# Patient Record
Sex: MALE | Race: WHITE | NOT HISPANIC OR LATINO | Employment: OTHER | URBAN - METROPOLITAN AREA
[De-identification: names, ages, dates, MRNs, and addresses within clinical notes are randomized per-mention and may not be internally consistent; named-entity substitution may affect disease eponyms.]

---

## 2017-01-11 RX ORDER — LISINOPRIL 20 MG/1
TABLET ORAL
Qty: 90 TABLET | Refills: 1 | Status: SHIPPED | OUTPATIENT
Start: 2017-01-11 | End: 2017-06-02

## 2017-01-23 RX ORDER — TAMSULOSIN HYDROCHLORIDE 0.4 MG/1
CAPSULE ORAL
Qty: 90 CAPSULE | Refills: 1 | Status: SHIPPED | OUTPATIENT
Start: 2017-01-23 | End: 2017-07-30 | Stop reason: SDUPTHER

## 2017-01-24 RX ORDER — DICLOFENAC SODIUM 75 MG/1
TABLET, DELAYED RELEASE ORAL
Qty: 90 TABLET | Refills: 1 | Status: SHIPPED | OUTPATIENT
Start: 2017-01-24 | End: 2017-07-07 | Stop reason: SDUPTHER

## 2017-01-24 RX ORDER — ATORVASTATIN CALCIUM 40 MG/1
TABLET, FILM COATED ORAL
Qty: 90 TABLET | Refills: 1 | Status: SHIPPED | OUTPATIENT
Start: 2017-01-24 | End: 2017-07-19 | Stop reason: SDUPTHER

## 2017-01-26 ENCOUNTER — OFFICE VISIT (OUTPATIENT)
Dept: INTERNAL MEDICINE | Facility: CLINIC | Age: 77
End: 2017-01-26

## 2017-01-26 ENCOUNTER — APPOINTMENT (OUTPATIENT)
Dept: LAB | Facility: HOSPITAL | Age: 77
End: 2017-01-26

## 2017-01-26 VITALS
RESPIRATION RATE: 16 BRPM | BODY MASS INDEX: 25.18 KG/M2 | HEART RATE: 64 BPM | OXYGEN SATURATION: 98 % | SYSTOLIC BLOOD PRESSURE: 124 MMHG | WEIGHT: 156 LBS | DIASTOLIC BLOOD PRESSURE: 60 MMHG | TEMPERATURE: 97.8 F

## 2017-01-26 DIAGNOSIS — M15.9 GENERALIZED OSTEOARTHRITIS: ICD-10-CM

## 2017-01-26 DIAGNOSIS — G89.29 CHRONIC LEFT SHOULDER PAIN: ICD-10-CM

## 2017-01-26 DIAGNOSIS — R73.03 PREDIABETES: ICD-10-CM

## 2017-01-26 DIAGNOSIS — E78.00 PURE HYPERCHOLESTEROLEMIA: Primary | ICD-10-CM

## 2017-01-26 DIAGNOSIS — I10 ESSENTIAL HYPERTENSION: ICD-10-CM

## 2017-01-26 DIAGNOSIS — M25.512 CHRONIC LEFT SHOULDER PAIN: ICD-10-CM

## 2017-01-26 DIAGNOSIS — R73.9 HYPERGLYCEMIA: ICD-10-CM

## 2017-01-26 LAB
ALBUMIN SERPL-MCNC: 4.5 G/DL (ref 3.2–4.8)
ALBUMIN/GLOB SERPL: 1.6 G/DL (ref 1.5–2.5)
ALP SERPL-CCNC: 108 U/L (ref 25–100)
ALT SERPL W P-5'-P-CCNC: 39 U/L (ref 7–40)
ANION GAP SERPL CALCULATED.3IONS-SCNC: 5 MMOL/L (ref 3–11)
ARTICHOKE IGE QN: 58 MG/DL (ref 0–130)
AST SERPL-CCNC: 37 U/L (ref 0–33)
BILIRUB SERPL-MCNC: 0.6 MG/DL (ref 0.3–1.2)
BUN BLD-MCNC: 29 MG/DL (ref 9–23)
BUN/CREAT SERPL: 22.3 (ref 7–25)
CALCIUM SPEC-SCNC: 10.7 MG/DL (ref 8.7–10.4)
CHLORIDE SERPL-SCNC: 102 MMOL/L (ref 99–109)
CHOLEST SERPL-MCNC: 118 MG/DL (ref 0–200)
CO2 SERPL-SCNC: 32 MMOL/L (ref 20–31)
CREAT BLD-MCNC: 1.3 MG/DL (ref 0.6–1.3)
GFR SERPL CREATININE-BSD FRML MDRD: 54 ML/MIN/1.73
GLOBULIN UR ELPH-MCNC: 2.8 GM/DL
GLUCOSE BLD-MCNC: 106 MG/DL (ref 70–100)
HBA1C MFR BLD: 6.1 % (ref 4.8–5.6)
HDLC SERPL-MCNC: 39 MG/DL (ref 40–60)
POTASSIUM BLD-SCNC: 5.3 MMOL/L (ref 3.5–5.5)
PROT SERPL-MCNC: 7.3 G/DL (ref 5.7–8.2)
SODIUM BLD-SCNC: 139 MMOL/L (ref 132–146)
TRIGL SERPL-MCNC: 75 MG/DL (ref 0–150)

## 2017-01-26 PROCEDURE — 80053 COMPREHEN METABOLIC PANEL: CPT | Performed by: INTERNAL MEDICINE

## 2017-01-26 PROCEDURE — 80061 LIPID PANEL: CPT | Performed by: INTERNAL MEDICINE

## 2017-01-26 PROCEDURE — 99214 OFFICE O/P EST MOD 30 MIN: CPT | Performed by: INTERNAL MEDICINE

## 2017-01-26 PROCEDURE — 83036 HEMOGLOBIN GLYCOSYLATED A1C: CPT | Performed by: INTERNAL MEDICINE

## 2017-01-26 PROCEDURE — 36415 COLL VENOUS BLD VENIPUNCTURE: CPT | Performed by: INTERNAL MEDICINE

## 2017-01-26 NOTE — PATIENT INSTRUCTIONS
1.  Continue same medications and supplements - as listed.    2.  Maintain routine physical fitness program - every week.    3.  Follow well-balanced diet - low in salt and low in sugar.    4.  The nurse will call with test results.    5.  Return in May - annual checkup fasting.

## 2017-01-26 NOTE — MR AVS SNAPSHOT
Nathan Velarde   1/26/2017 9:30 AM   Office Visit    Provider:  Hu Castillo MD   Department:  [facility]   Dept Phone:  [phone]                Your Full Care Plan              Your Updated Medication List          This list is accurate as of: 1/26/17 10:20 AM.  Always use your most recent med list.                atorvastatin 40 MG tablet   Commonly known as:  LIPITOR   TAKE 1 TABLET AT BEDTIME       DELZICOL 400 MG capsule delayed-release delayed release capsule   Generic drug:  mesalamine   TAKE 2 CAPSULES TWICE A DAY       diclofenac 75 MG EC tablet   Commonly known as:  VOLTAREN   TAKE 1 TABLET DAILY       digoxin 125 MCG tablet   Commonly known as:  LANOXIN   TAKE ONE TABLET BY MOUTH DAILY       diltiazem  MG 24 hr capsule   Commonly known as:  DILACOR XR   Take 1 capsule by mouth Daily.       EXCEDRIN MIGRAINE 250-250-65 MG per tablet   Generic drug:  aspirin-acetaminophen-caffeine       folic acid 400 MCG tablet   Commonly known as:  FOLVITE       GLUCOSAMINE 1500 COMPLEX capsule       hydrochlorothiazide 25 MG tablet   Commonly known as:  HYDRODIURIL   TAKE 1 TABLET DAILY       lisinopril 20 MG tablet   Commonly known as:  PRINIVIL,ZESTRIL   TAKE ONE TABLET BY MOUTH DAILY       potassium chloride 10 MEQ CR tablet   Commonly known as:  K-DUR   TAKE 1 TABLET DAILY       tamsulosin 0.4 MG capsule 24 hr capsule   Commonly known as:  FLOMAX   TAKE ONE CAPSULE BY MOUTH EVERY NIGHT AT BEDTIME               We Performed the Following     Comprehensive Metabolic Panel     Hemoglobin A1c     Lipid Panel       You Were Diagnosed With        Codes Comments    Pure hypercholesterolemia    -  Primary ICD-10-CM: E78.00  ICD-9-CM: 272.0     Essential hypertension     ICD-10-CM: I10  ICD-9-CM: 401.9     Chronic left shoulder pain     ICD-10-CM: M25.512, G89.29  ICD-9-CM: 719.41, 338.29     Generalized osteoarthritis     ICD-10-CM: M15.9  ICD-9-CM:  715.00     Prediabetes     ICD-10-CM: R73.03  ICD-9-CM: 790.29     Hyperglycemia     ICD-10-CM: R73.9  ICD-9-CM: 790.29       Instructions    1.  Continue same medications and supplements - as listed.    2.  Maintain routine physical fitness program - every week.    3.  Follow well-balanced diet - low in salt and low in sugar.    4.  The nurse will call with test results.    5.  Return in May - annual checkup fasting.     Patient Instructions History      MyChart Signup     Our records indicate that you have an active [facility] account.    You can view your After Visit Summary by going to [facility] and logging in with your [facility] username and password.  If you don't have a [employer] username and password but a parent or guardian has access to your record, the parent or guardian should login with their own [employer] username and password and access your record to view the After Visit Summary.    If you have questions, you can email [email or web address] or call [phone] to talk to our [employer] staff.  Remember, [employer] is NOT to be used for urgent needs.  For medical emergencies, dial 911.               Other Info from Your Visit           Your Appointments     Apr 27, 2017  9:30 AM EDT   Office Visit with Ry Feliciano MD   [facility] (--)    [address] [place] [address]. [address] [place] [id number] [phone]           Arrive 15 minutes prior to appointment.              Allergies     Sulfa Antibiotics Unspecified     Headache      Reason for Visit     Hyperlipidemia           Vital Signs     Blood Pressure Pulse Temperature Respirations Weight Oxygen Saturation    124/60 (BP Location: Right arm, Patient Position: Sitting) 64 97.8 °F (36.6 °C) (Oral) 16 156 lb (70.8 kg) 98%    Body Mass Index Smoking Status                25.18 kg/m2 Never Smoker          Problems and Diagnoses Noted     Generalized osteoarthritis    High cholesterol or  triglycerides    High blood pressure    Prediabetes    Pain in left shoulder    Hyperglycemia          No Longer an Issue     Neck pain

## 2017-01-26 NOTE — PROGRESS NOTES
Subjective   Nathan Velarde is a 76 y.o. male.     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  The left shoulder has become more stiff and achy in the last year.  He is doing gentle rehabilitation now and feels that he is coping well.  He does take diclofenac on a daily basis with significant relief.  History of substantial weight lifting but is decreased his weight load with some benefit in pain control.    The patient has moderate hyperlipidemia and risk for atherosclerotic cardiovascular disease.  He has no history of tobacco use.  He is been on atorvastatin and generally kept his LDL below 100.  He has been on prediabetic and remains on a diabetic diet.  He has no history of an atherosclerotic event.  He does have mild paroxysmal fibrillation but has only depended on aspirin for embolic prevention.  He has done well with diltiazem and digoxin with rate control.      The following portions of the patient's history were reviewed and updated as appropriate: allergies, current medications, past family history, past medical history, past social history, past surgical history and problem list.    Review of Systems   Constitutional: Negative for appetite change and fatigue.   HENT: Negative for ear pain and sore throat.    Respiratory: Negative for cough and shortness of breath.    Cardiovascular: Positive for palpitations. Negative for chest pain.        Minimal palpitations   Gastrointestinal: Negative for abdominal pain and nausea.   Musculoskeletal: Negative for arthralgias and back pain.        Persistent left shoulder pain   Neurological: Negative for dizziness and headaches.       Objective   Blood pressure 124/60, pulse 64, temperature 97.8 °F (36.6 °C), temperature source Oral, resp. rate 16, weight 156 lb (70.8 kg), SpO2 98 %.    Physical Exam   Constitutional: He is oriented to person, place, and time. He appears well-developed and well-nourished. No distress.    Cardiovascular: Normal rate, regular rhythm and normal heart sounds.    Pulmonary/Chest: Effort normal and breath sounds normal. He has no wheezes. He has no rales.   Abdominal: Soft. Bowel sounds are normal. He exhibits no distension and no mass. There is no tenderness.   Musculoskeletal: He exhibits no edema.   Shoulders reveals 70% elevation and rotation with 3+ stiffness and mild tenderness  Knee show advanced osteoarthritis with moderate stiffness and mild tenderness   Neurological: He is alert and oriented to person, place, and time. He exhibits normal muscle tone. Coordination normal.   Psychiatric: He has a normal mood and affect. His behavior is normal. Judgment and thought content normal.   Nursing note and vitals reviewed.    Procedures  Assessment/Plan   Nathan was seen today for hyperlipidemia.    Diagnoses and all orders for this visit:    Pure hypercholesterolemia  -     Comprehensive Metabolic Panel  -     Lipid Panel    Essential hypertension    Chronic left shoulder pain    Generalized osteoarthritis    Prediabetes    Hyperglycemia  -     Hemoglobin A1c    The patient's lipid control is excellent with an LDL of 58.  He should continue on daily aspirin for primary prevention.      His hemoglobin A1c has risen to 6.1 and he should continue a diabetic diet.  We may need to decrease the HCTZ.    The patient's hypertension is in good control on current medications.  If we can keep his systolic between 125-135 we may be able to lower his HCTZ dose.    The patient has has osteoarthritis of his shoulders.  He would require a major shoulder replacement if pain became refractory.  He seems to be content with his current treatment plan.    Patient Instructions   1.  Continue same medications and supplements - as listed.    2.  Maintain routine physical fitness program - every week.    3.  Follow well-balanced diet - low in salt and low in sugar.    4.  The nurse will call with test results.    5.  Return in  May - annual checkup fasting.    6.  Hemoglobin A1c has increased to 6.1.  This suggests the need for a tight diabetic diet and lifestyle.    7.  Consider Reducing HCTZ if glucoses continue to rise.    Electronically signed Hu Castillo M.D.1/28/2017 4:32 PM

## 2017-02-01 ENCOUNTER — TELEPHONE (OUTPATIENT)
Dept: INTERNAL MEDICINE | Facility: CLINIC | Age: 77
End: 2017-02-01

## 2017-02-01 NOTE — TELEPHONE ENCOUNTER
Pt called and notified labs all fine except haic 6.1 higher, need to tighten up on diabetic diet per TGF. Pt admits he eats a lot of breads.

## 2017-04-17 RX ORDER — MESALAMINE 400 MG/1
CAPSULE, DELAYED RELEASE ORAL
Qty: 360 CAPSULE | Refills: 1 | Status: SHIPPED | OUTPATIENT
Start: 2017-04-17 | End: 2017-09-25 | Stop reason: SDUPTHER

## 2017-04-27 ENCOUNTER — OFFICE VISIT (OUTPATIENT)
Dept: GASTROENTEROLOGY | Facility: CLINIC | Age: 77
End: 2017-04-27

## 2017-04-27 VITALS
DIASTOLIC BLOOD PRESSURE: 70 MMHG | OXYGEN SATURATION: 94 % | TEMPERATURE: 97.9 F | SYSTOLIC BLOOD PRESSURE: 108 MMHG | WEIGHT: 153.4 LBS | BODY MASS INDEX: 24.65 KG/M2 | HEART RATE: 71 BPM | HEIGHT: 66 IN

## 2017-04-27 DIAGNOSIS — K51.90 ULCERATIVE COLITIS WITHOUT COMPLICATIONS, UNSPECIFIED LOCATION (HCC): Primary | ICD-10-CM

## 2017-04-27 PROCEDURE — 99213 OFFICE O/P EST LOW 20 MIN: CPT | Performed by: INTERNAL MEDICINE

## 2017-04-27 NOTE — PROGRESS NOTES
PCP: Hu Castillo MD    Chief Complaint   Patient presents with   • Follow-up     UC       History of Present Illness:   HPI  Mr. Velarde returns for a follow up visit. The patient has regular bowel habits and there is no sign of bleeding. He has a good appetite without any unexplained weight loss.  The patient denies any abdominal pain.  There is no history of unexplained weight loss.  Mr. Velarde exercises on a regular basis and has a good energy level.  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    • Bladder cancer    • Dyslipidemia    • Knee osteoarthritis    • Mitral valve insufficiency    • Nephrolithiasis    • Paroxysmal atrial fibrillation    • Prostatitis    • Rotator cuff tendinitis    • Ulcerative colitis        Past Surgical History:   Procedure Laterality Date   • CYSTOSCOPY  1977   • INGUINAL HERNIA REPAIR Right 1994   • TONSILLECTOMY           Current Outpatient Prescriptions:   •  aspirin-acetaminophen-caffeine (EXCEDRIN MIGRAINE) 250-250-65 MG per tablet, Take  by mouth., Disp: , Rfl:   •  atorvastatin (LIPITOR) 40 MG tablet, TAKE 1 TABLET AT BEDTIME, Disp: 90 tablet, Rfl: 1  •  DELZICOL 400 MG capsule delayed-release delayed release capsule, TAKE 2 CAPSULES TWICE A DAY, Disp: 360 capsule, Rfl: 1  •  diclofenac (VOLTAREN) 75 MG EC tablet, TAKE 1 TABLET DAILY, Disp: 90 tablet, Rfl: 1  •  digoxin (LANOXIN) 125 MCG tablet, TAKE ONE TABLET BY MOUTH DAILY, Disp: 90 tablet, Rfl: 1  •  diltiazem XR (DILACOR XR) 180 MG 24 hr capsule, Take 1 capsule by mouth Daily., Disp: 90 capsule, Rfl: 1  •  folic acid (FOLVITE) 400 MCG tablet, Take 1 tablet by mouth. OTC, Disp: , Rfl:   •  Glucosamine-Chondroit-Vit C-Mn (GLUCOSAMINE 1500 COMPLEX) capsule, Take  by mouth daily., Disp: , Rfl:   •  hydrochlorothiazide (HYDRODIURIL) 25 MG tablet, TAKE 1 TABLET DAILY, Disp: 90 tablet, Rfl: 1  •  lisinopril  (PRINIVIL,ZESTRIL) 20 MG tablet, TAKE ONE TABLET BY MOUTH DAILY, Disp: 90 tablet, Rfl: 1  •  potassium chloride (K-DUR) 10 MEQ CR tablet, TAKE 1 TABLET DAILY, Disp: 90 tablet, Rfl: 1  •  tamsulosin (FLOMAX) 0.4 MG capsule 24 hr capsule, TAKE ONE CAPSULE BY MOUTH EVERY NIGHT AT BEDTIME, Disp: 90 capsule, Rfl: 1    Allergies   Allergen Reactions   • Sulfa Antibiotics      Headache       Family History   Problem Relation Age of Onset   • Alzheimer's disease Mother      diedag e89   • Arthritis Mother    • Hyperlipidemia Mother    • Angina Father    • Hypertension Father    • Pneumonia Father       age 70 - tobacco   • Aneurysm Father      abd aortic   • Unexplained death Brother    • Breast cancer Maternal Grandmother    • Arthritis Maternal Grandmother    • Diabetes Paternal Uncle        Social History     Social History   • Marital status:      Spouse name: N/A   • Number of children: N/A   • Years of education: N/A     Occupational History   • Not on file.     Social History Main Topics   • Smoking status: Never Smoker   • Smokeless tobacco: Never Used   • Alcohol use 0.6 oz/week     1 Cans of beer per week   • Drug use: No   • Sexual activity: Defer     Other Topics Concern   • Not on file     Social History Narrative    Domestic life- lives in private home with wife        Amish- none listed        Sleep hygiene- sleeps 8 hours nightly good quality        Caffeine use- drinks 1 diet Mountain Dew daily and occasionally uses Excedrin        Exercise habits- disciplined exercise Program 5 days weekly with walking and weight lifting and yoga        Diet- salt diet well-balanced American Heart Association guidelines        Occupation- retired         Hearing- Hears well        Vision- Corrects well with glasses        Driving- No limitations               Review of Systems   Constitutional: Negative for activity change, appetite change, fatigue, fever and unexpected weight change.    HENT: Negative for dental problem, hearing loss, mouth sores, postnasal drip, sneezing, trouble swallowing and voice change.    Eyes: Negative for pain, redness, itching and visual disturbance.   Respiratory: Negative for cough, choking, chest tightness, shortness of breath and wheezing.    Cardiovascular: Negative for chest pain, palpitations and leg swelling.   Gastrointestinal: Negative for abdominal distention, abdominal pain, anal bleeding, blood in stool, constipation, diarrhea, nausea, rectal pain and vomiting.   Endocrine: Negative for cold intolerance, heat intolerance, polydipsia, polyphagia and polyuria.   Genitourinary: Negative.  Negative for dysuria, enuresis, flank pain, hematuria and urgency.   Musculoskeletal: Negative for arthralgias, back pain, gait problem, joint swelling and myalgias.   Skin: Negative for color change, pallor and rash.   Allergic/Immunologic: Negative for environmental allergies, food allergies and immunocompromised state.   Neurological: Negative for dizziness, tremors, seizures, facial asymmetry, speech difficulty, numbness and headaches.   Hematological: Negative for adenopathy.   Psychiatric/Behavioral: Negative for behavioral problems, confusion, dysphoric mood, hallucinations and self-injury.       Vitals:    04/27/17 0915   BP: 108/70   Pulse: 71   Temp: 97.9 °F (36.6 °C)   SpO2: 94%       Physical Exam   Constitutional: He is oriented to person, place, and time. He appears well-nourished. No distress.   HENT:   Head: Normocephalic and atraumatic.   Mouth/Throat: Oropharynx is clear and moist. No oropharyngeal exudate.   Eyes: Conjunctivae and EOM are normal. Pupils are equal, round, and reactive to light. No scleral icterus.   Neck: Normal range of motion. Neck supple. No thyromegaly present.   Cardiovascular: Normal rate, regular rhythm and normal heart sounds.  Exam reveals no gallop.    No murmur heard.  Pulmonary/Chest: Effort normal and breath sounds normal. He  has no wheezes. He has no rales.   Abdominal: Soft. Bowel sounds are normal. There is no tenderness. There is no rebound and no guarding.   Musculoskeletal: Normal range of motion. He exhibits no edema or tenderness.   Lymphadenopathy:     He has no cervical adenopathy.   Neurological: He is alert and oriented to person, place, and time. He exhibits normal muscle tone.   Skin: Skin is dry. No rash noted. He is not diaphoretic. No erythema.   Psychiatric: He has a normal mood and affect. His behavior is normal. Judgment and thought content normal.   Vitals reviewed.      Nathan was seen today for follow-up.    Diagnoses and all orders for this visit:    Ulcerative colitis without complications, unspecified location  Mr. Velarde is doing well on mesalamine medication. He has no clinical signs of active colitis.      Plan: Continue current medication.           Follow-up office visit in 6 months.

## 2017-05-15 RX ORDER — HYDROCHLOROTHIAZIDE 25 MG/1
TABLET ORAL
Qty: 90 TABLET | Refills: 0 | Status: SHIPPED | OUTPATIENT
Start: 2017-05-15 | End: 2017-06-02

## 2017-05-30 RX ORDER — POTASSIUM CHLORIDE 750 MG/1
TABLET, FILM COATED, EXTENDED RELEASE ORAL
Qty: 90 TABLET | Refills: 1 | Status: SHIPPED | OUTPATIENT
Start: 2017-05-30 | End: 2017-11-30 | Stop reason: SDUPTHER

## 2017-06-02 ENCOUNTER — OFFICE VISIT (OUTPATIENT)
Dept: INTERNAL MEDICINE | Facility: CLINIC | Age: 77
End: 2017-06-02

## 2017-06-02 ENCOUNTER — APPOINTMENT (OUTPATIENT)
Dept: LAB | Facility: HOSPITAL | Age: 77
End: 2017-06-02
Attending: INTERNAL MEDICINE

## 2017-06-02 VITALS
TEMPERATURE: 96.6 F | SYSTOLIC BLOOD PRESSURE: 112 MMHG | HEART RATE: 56 BPM | OXYGEN SATURATION: 97 % | RESPIRATION RATE: 16 BRPM | BODY MASS INDEX: 24.11 KG/M2 | WEIGHT: 150 LBS | DIASTOLIC BLOOD PRESSURE: 50 MMHG | HEIGHT: 66 IN

## 2017-06-02 DIAGNOSIS — G37.3 TRANSVERSE MYELITIS (HCC): ICD-10-CM

## 2017-06-02 DIAGNOSIS — E78.00 PURE HYPERCHOLESTEROLEMIA: ICD-10-CM

## 2017-06-02 DIAGNOSIS — R31.0 GROSS HEMATURIA: ICD-10-CM

## 2017-06-02 DIAGNOSIS — I48.0 PAROXYSMAL ATRIAL FIBRILLATION (HCC): Primary | ICD-10-CM

## 2017-06-02 DIAGNOSIS — R73.03 PREDIABETES: ICD-10-CM

## 2017-06-02 DIAGNOSIS — D64.9 ANEMIA, UNSPECIFIED TYPE: ICD-10-CM

## 2017-06-02 DIAGNOSIS — R53.82 CHRONIC FATIGUE: ICD-10-CM

## 2017-06-02 DIAGNOSIS — R31.21 ASYMPTOMATIC MICROSCOPIC HEMATURIA: ICD-10-CM

## 2017-06-02 DIAGNOSIS — N40.0 BENIGN NON-NODULAR PROSTATIC HYPERPLASIA WITHOUT LOWER URINARY TRACT SYMPTOMS: ICD-10-CM

## 2017-06-02 DIAGNOSIS — E53.9 VITAMIN B DEFICIENCY: ICD-10-CM

## 2017-06-02 DIAGNOSIS — N18.2 CKD (CHRONIC KIDNEY DISEASE), STAGE II: ICD-10-CM

## 2017-06-02 DIAGNOSIS — E55.9 VITAMIN D DEFICIENCY: ICD-10-CM

## 2017-06-02 DIAGNOSIS — K51.00 ULCERATIVE PANCOLITIS WITHOUT COMPLICATION (HCC): ICD-10-CM

## 2017-06-02 DIAGNOSIS — M15.9 GENERALIZED OSTEOARTHRITIS: ICD-10-CM

## 2017-06-02 DIAGNOSIS — M25.512 CHRONIC LEFT SHOULDER PAIN: ICD-10-CM

## 2017-06-02 DIAGNOSIS — I10 ESSENTIAL HYPERTENSION: ICD-10-CM

## 2017-06-02 DIAGNOSIS — G89.29 CHRONIC LEFT SHOULDER PAIN: ICD-10-CM

## 2017-06-02 LAB
25(OH)D3 SERPL-MCNC: 46.2 NG/ML
ALBUMIN SERPL-MCNC: 4.6 G/DL (ref 3.2–4.8)
ALBUMIN/GLOB SERPL: 1.8 G/DL (ref 1.5–2.5)
ALP SERPL-CCNC: 107 U/L (ref 25–100)
ALT SERPL W P-5'-P-CCNC: 37 U/L (ref 7–40)
ANION GAP SERPL CALCULATED.3IONS-SCNC: 3 MMOL/L (ref 3–11)
ARTICHOKE IGE QN: 68 MG/DL (ref 0–130)
AST SERPL-CCNC: 38 U/L (ref 0–33)
BASOPHILS # BLD AUTO: 0.02 10*3/MM3 (ref 0–0.2)
BASOPHILS NFR BLD AUTO: 0.2 % (ref 0–1)
BILIRUB SERPL-MCNC: 0.6 MG/DL (ref 0.3–1.2)
BILIRUB UR QL STRIP: NEGATIVE
BUN BLD-MCNC: 38 MG/DL (ref 9–23)
BUN/CREAT SERPL: 29.2 (ref 7–25)
CALCIUM SPEC-SCNC: 10.3 MG/DL (ref 8.7–10.4)
CHLORIDE SERPL-SCNC: 107 MMOL/L (ref 99–109)
CHOLEST SERPL-MCNC: 117 MG/DL (ref 0–200)
CLARITY UR: CLEAR
CO2 SERPL-SCNC: 31 MMOL/L (ref 20–31)
COLOR UR: ABNORMAL
CREAT BLD-MCNC: 1.3 MG/DL (ref 0.6–1.3)
CRP SERPL-MCNC: 1.08 MG/DL (ref 0–1)
DEPRECATED RDW RBC AUTO: 48.2 FL (ref 37–54)
EOSINOPHIL # BLD AUTO: 0.27 10*3/MM3 (ref 0.1–0.3)
EOSINOPHIL NFR BLD AUTO: 3.2 % (ref 0–3)
ERYTHROCYTE [DISTWIDTH] IN BLOOD BY AUTOMATED COUNT: 14.2 % (ref 11.3–14.5)
ERYTHROCYTE [SEDIMENTATION RATE] IN BLOOD: 33 MM/HR (ref 0–20)
FOLATE SERPL-MCNC: >24 NG/ML (ref 3.2–20)
GFR SERPL CREATININE-BSD FRML MDRD: 54 ML/MIN/1.73
GLOBULIN UR ELPH-MCNC: 2.6 GM/DL
GLUCOSE BLD-MCNC: 98 MG/DL (ref 70–100)
GLUCOSE UR STRIP-MCNC: NEGATIVE MG/DL
HBA1C MFR BLD: 5.7 % (ref 4.8–5.6)
HCT VFR BLD AUTO: 43.6 % (ref 38.9–50.9)
HDLC SERPL-MCNC: 37 MG/DL (ref 40–60)
HGB BLD-MCNC: 13.4 G/DL (ref 13.1–17.5)
HGB UR QL STRIP.AUTO: NEGATIVE
IMM GRANULOCYTES # BLD: 0.02 10*3/MM3 (ref 0–0.03)
IMM GRANULOCYTES NFR BLD: 0.2 % (ref 0–0.6)
KETONES UR QL STRIP: ABNORMAL
LEUKOCYTE ESTERASE UR QL STRIP.AUTO: NEGATIVE
LYMPHOCYTES # BLD AUTO: 1.62 10*3/MM3 (ref 0.6–4.8)
LYMPHOCYTES NFR BLD AUTO: 19.1 % (ref 24–44)
MCH RBC QN AUTO: 28.4 PG (ref 27–31)
MCHC RBC AUTO-ENTMCNC: 30.7 G/DL (ref 32–36)
MCV RBC AUTO: 92.4 FL (ref 80–99)
MONOCYTES # BLD AUTO: 0.5 10*3/MM3 (ref 0–1)
MONOCYTES NFR BLD AUTO: 5.9 % (ref 0–12)
NEUTROPHILS # BLD AUTO: 6.07 10*3/MM3 (ref 1.5–8.3)
NEUTROPHILS NFR BLD AUTO: 71.4 % (ref 41–71)
NITRITE UR QL STRIP: NEGATIVE
PH UR STRIP.AUTO: 6 [PH] (ref 5–8)
PLATELET # BLD AUTO: 309 10*3/MM3 (ref 150–450)
PMV BLD AUTO: 9.8 FL (ref 6–12)
POTASSIUM BLD-SCNC: 4.7 MMOL/L (ref 3.5–5.5)
PROT SERPL-MCNC: 7.2 G/DL (ref 5.7–8.2)
PROT UR QL STRIP: NEGATIVE
PSA SERPL-MCNC: 3.68 NG/ML (ref 0–4)
RBC # BLD AUTO: 4.72 10*6/MM3 (ref 4.2–5.76)
SODIUM BLD-SCNC: 141 MMOL/L (ref 132–146)
SP GR UR STRIP: 1.03 (ref 1–1.03)
TRIGL SERPL-MCNC: 64 MG/DL (ref 0–150)
TSH SERPL DL<=0.05 MIU/L-ACNC: 2.16 MIU/ML (ref 0.35–5.35)
UROBILINOGEN UR QL STRIP: ABNORMAL
VIT B12 BLD-MCNC: >2000 PG/ML (ref 211–911)
WBC NRBC COR # BLD: 8.5 10*3/MM3 (ref 3.5–10.8)

## 2017-06-02 PROCEDURE — 82306 VITAMIN D 25 HYDROXY: CPT | Performed by: INTERNAL MEDICINE

## 2017-06-02 PROCEDURE — 36415 COLL VENOUS BLD VENIPUNCTURE: CPT | Performed by: INTERNAL MEDICINE

## 2017-06-02 PROCEDURE — 80061 LIPID PANEL: CPT | Performed by: INTERNAL MEDICINE

## 2017-06-02 PROCEDURE — 93000 ELECTROCARDIOGRAM COMPLETE: CPT | Performed by: INTERNAL MEDICINE

## 2017-06-02 PROCEDURE — 85025 COMPLETE CBC W/AUTO DIFF WBC: CPT | Performed by: INTERNAL MEDICINE

## 2017-06-02 PROCEDURE — 82746 ASSAY OF FOLIC ACID SERUM: CPT | Performed by: INTERNAL MEDICINE

## 2017-06-02 PROCEDURE — 83036 HEMOGLOBIN GLYCOSYLATED A1C: CPT | Performed by: INTERNAL MEDICINE

## 2017-06-02 PROCEDURE — 84153 ASSAY OF PSA TOTAL: CPT | Performed by: INTERNAL MEDICINE

## 2017-06-02 PROCEDURE — 86140 C-REACTIVE PROTEIN: CPT | Performed by: INTERNAL MEDICINE

## 2017-06-02 PROCEDURE — 85652 RBC SED RATE AUTOMATED: CPT | Performed by: INTERNAL MEDICINE

## 2017-06-02 PROCEDURE — 99215 OFFICE O/P EST HI 40 MIN: CPT | Performed by: INTERNAL MEDICINE

## 2017-06-02 PROCEDURE — 82607 VITAMIN B-12: CPT | Performed by: INTERNAL MEDICINE

## 2017-06-02 PROCEDURE — 80053 COMPREHEN METABOLIC PANEL: CPT | Performed by: INTERNAL MEDICINE

## 2017-06-02 PROCEDURE — 81003 URINALYSIS AUTO W/O SCOPE: CPT | Performed by: INTERNAL MEDICINE

## 2017-06-02 PROCEDURE — 84443 ASSAY THYROID STIM HORMONE: CPT | Performed by: INTERNAL MEDICINE

## 2017-06-02 RX ORDER — LISINOPRIL AND HYDROCHLOROTHIAZIDE 12.5; 1 MG/1; MG/1
1 TABLET ORAL DAILY
Qty: 90 TABLET | Refills: 3 | Status: SHIPPED | OUTPATIENT
Start: 2017-06-02 | End: 2017-08-15

## 2017-06-02 NOTE — PROGRESS NOTES
Subjective   Nathan Velarde is a 76 y.o. male.     Chief Complaint   Patient presents with   • Shoulder Pain       History of Present Illness     The patient developed progressive left shoulder pain throughout 2016.  X-ray shows advanced DJD.  He takes diclofenac which helps his degree of arthritic pain.  He is physically active with a disciplined exercise program every week at the gymnasium.  He is losing function capacity of the left arm lifting and neuropathy or behind his back.  He has had a modified many of his ADLs.  Physical therapy offered him little benefit.  He has used tramadol in the past and has been able to wean off of this medication.  He feels that he has lost substantial use of his left arm.    The following portions of the patient's history were reviewed and updated as appropriate: allergies, current medications, past family history, past medical history, past social history, past surgical history and problem list.    Active Ambulatory Problems     Diagnosis Date Noted   • Anemia 05/15/2016   • Aortic valve disorder 05/15/2016   • Ataxic gait 05/15/2016   • Atrial fibrillation 05/15/2016   • Benign prostatic hyperplasia 05/15/2016   • Fatigue 05/15/2016   • Ulcerative colitis 05/15/2016   • Transverse myelitis 05/15/2016   • Testosterone deficiency 05/15/2016   • Paresthesia 05/15/2016   • Lumbosacral neuritis 05/15/2016   • 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   • Hypertension 05/15/2016   • Hyperlipidemia 05/15/2016   • Generalized osteoarthritis 05/15/2016   • Gross hematuria 05/15/2016   • CKD (chronic kidney disease), 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   • Prediabetes 05/28/2016   • Shoulder pain, left 09/26/2016   • Preventative health care 11/29/2016     Resolved Ambulatory Problems     Diagnosis Date Noted   • Neck pain 05/15/2016   • Ulcerative enterocolitis 05/15/2016   • Impaired glucose tolerance 05/15/2016     Past Medical History:   Diagnosis Date   • Aortic valve insufficiency    • Atrial  flutter    • 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    • Bilateral bunions    • Bladder cancer    • Dyslipidemia    • HTN (hypertension)    • Knee osteoarthritis    • Mitral valve insufficiency    • Nephrolithiasis    • Paroxysmal atrial fibrillation    • Prostatism    • Prostatitis    • Rotator cuff tendinitis    • Ulcerative colitis      Past Surgical History:   Procedure Laterality Date   • COLONOSCOPY      Colitis in remission   • CYSTOSCOPY  1977    Excision of bladder cancer   • INGUINAL HERNIA REPAIR Right    • KNEE SURGERY Right     Excision of cyst   • TONSILLECTOMY  1943     Family History   Problem Relation Age of Onset   • Alzheimer's disease Mother       age 89   • Arthritis Mother    • Hyperlipidemia Mother    • Angina Father    • Hypertension Father    • Pneumonia Father       age 70 - tobacco   • Aneurysm Father      abd aortic   • Unexplained death Brother    • Breast cancer Maternal Grandmother    • Arthritis Maternal Grandmother    • Diabetes Paternal Uncle      Social History     Social History   • Marital status:      Spouse name: N/A   • Number of children: N/A   • Years of education: N/A     Occupational History   • Not on file.     Social History Main Topics   • Smoking status: Never Smoker   • Smokeless tobacco: Never Used   • Alcohol use 0.6 oz/week     1 Cans of beer per week   • Drug use: No   • Sexual activity: Defer     Other Topics Concern   • Not on file     Social History Narrative    Domestic life- lives in private home with wife        Mandaen- none listed        Sleep hygiene- sleeps 8 hours nightly good quality        Caffeine use- drinks 1 diet Mountain Dew daily and occasionally uses Excedrin        Exercise habits- disciplined exercise Program 5 days weekly with walking and weight lifting and yoga        Diet- salt diet well-balanced American Heart Association guidelines        Occupation- retired  "        Hearing- Hears well        Vision- Corrects well with bifocal glasses        Driving- No limitations                 Review of Systems   Constitutional: Negative for appetite change and fatigue.   HENT: Negative for ear pain and sore throat.    Eyes: Negative for itching and visual disturbance.   Respiratory: Negative for cough and shortness of breath.    Cardiovascular: Negative for chest pain and palpitations.        Minimal palpitations this year   Gastrointestinal: Negative for abdominal pain and nausea.   Endocrine: Negative for cold intolerance and heat intolerance.   Genitourinary: Negative for dysuria and hematuria.   Musculoskeletal: Positive for arthralgias. Negative for back pain and gait problem.        Moderate progressive left shoulder pain   Skin: Negative for rash and wound.   Allergic/Immunologic: Negative for environmental allergies and food allergies.   Neurological: Negative for dizziness and headaches.   Hematological: Negative for adenopathy. Does not bruise/bleed easily.   Psychiatric/Behavioral: Negative for sleep disturbance. The patient is not nervous/anxious.        Objective   Blood pressure 112/50, pulse 56, temperature 96.6 °F (35.9 °C), temperature source Oral, resp. rate 16, height 66\" (167.6 cm), weight 150 lb (68 kg), SpO2 97 %.    Physical Exam   Constitutional: He is oriented to person, place, and time. He appears well-developed and well-nourished.   HENT:   Right Ear: External ear normal.   Left Ear: External ear normal.   Nose: Nose normal.   Mouth/Throat: Oropharynx is clear and moist.   Eyes: EOM are normal. Pupils are equal, round, and reactive to light. No scleral icterus.   Neck: Normal range of motion. Neck supple. No JVD present. No thyromegaly present.   Cardiovascular: Normal rate, regular rhythm and normal heart sounds.    Pulmonary/Chest: Effort normal and breath sounds normal. He has no wheezes. He has no rales.   Abdominal: Soft. Bowel " sounds are normal. He exhibits no distension and no mass. There is no tenderness. No hernia.   Genitourinary: Rectum normal, prostate normal and penis normal.   Genitourinary Comments: Testicles normal   Musculoskeletal: Normal range of motion. He exhibits no edema.   Right shoulder area percent elevation and rotation no pain  Left shoulder elevation and 30% rotation with moderate tenderness and guarding  Advanced DJD of the knees right much more than left  Moderate bilateral bunions left much more than right   Lymphadenopathy:     He has no cervical adenopathy.   Neurological: He is alert and oriented to person, place, and time. He displays normal reflexes. No cranial nerve deficit. He exhibits normal muscle tone. Coordination normal.   Vibratory normal  Romberg negative  Gait normal  Plantars downgoing     Skin: Skin is warm and dry. No rash noted.   Psychiatric: He has a normal mood and affect. His behavior is normal. Judgment and thought content normal.   Nursing note and vitals reviewed.      ECG 12 Lead  Date/Time: 6/2/2017 10:22 AM  Performed by: HU CASTILLO  Authorized by: HU CASTILLO   Interpreted by ED physician: Hu Castillo M.D.  Comparison: compared with previous ECG from 5/26/2016  Similar to previous ECG  Rhythm: sinus rhythm  Rate: normal  BPM: 55  Conduction: conduction normal  ST Segments: ST segments normal  T Waves: T waves normal  QRS axis: left  Other: no other findings  Clinical impression: non-specific ECG  Comments: Indication - atrial fibrillation  Baseline EKG          Assessment/Plan   Nathan was seen today for shoulder pain.    Diagnoses and all orders for this visit:    Paroxysmal atrial fibrillation  -     TSH  -     ECG 12 Lead    Essential hypertension    Pure hypercholesterolemia  -     Comprehensive Metabolic Panel  -     Lipid Panel    Ulcerative pancolitis without complication  -     CBC & Differential  -     C-reactive Protein  -     CBC Auto  Differential    Chronic left shoulder pain  -     Sedimentation Rate  -     Ambulatory Referral to Orthopedic Surgery    Transverse myelitis    Generalized osteoarthritis    Benign non-nodular prostatic hyperplasia without lower urinary tract symptoms    Prediabetes  -     Hemoglobin A1c  -     PSA    Chronic fatigue    Anemia, unspecified type    CKD (chronic kidney disease), stage II    Gross hematuria  -     Urinalysis With / Microscopic If Indicated    Vitamin B deficiency  -     Folate  -     Vitamin B12    Vitamin D deficiency  -     Vitamin D 25 Hydroxy    Asymptomatic microscopic hematuria   -     PSA    Other orders  -     lisinopril-hydrochlorothiazide (ZESTORETIC) 10-12.5 MG per tablet; Take 1 tablet by mouth Daily.      The left arm has lost substantial function due to advanced DJD of the shoulder joint.  I've asked him to see the orthopedist about his options for pain control and rehabilitation.  Likely he will need surgical correction to make substantial progress.      The patient has become prediabetic in recent years.  His hemoglobin A1c has been as high as 6.2%.  His weight loss has been remarkable through dietary discipline.  His hemoglobin A1c has almost returned to normal at 5.7%.    Patient's blood pressure has run much lower in the last year with multiple readings ranging from 110-120 systolic.  Much of this likely has to do with his weight loss.  I've counseled him on reducing his lisinopril and HCTZ to one half of previous doses.    The patient has a history of recurring atrial fibrillation.  He has generally done well on digoxin and diltiazem.  He has declined using anticoagulation.  A new Holter monitor will give us improved guidance on this treatment needs.    The patient's ulcerative colitis is in full remission with mesalamine.  He has recently seen the gastroenterologist who has confirmed his treatment plan.    Patient Instructions   1.  Stop lisinopril and HCTZ separate pills.    2.    Start lisinopril/HCTZ 10/12.5 daily - for blood pressure.    3.  Appointment with shoulder specialist - review left shoulder pain.    4.  Continue usual medicines and supplements - as listed.    5.  Return in 4 months - fasting checkup and Holter monitor.    6.  Hemoglobin A1c is improved at 5.7%.  Continue well-balanced diabetic diet.    7.  Sedimentation rate and CRP are only borderline high - indicating minimal inflammatory activity.    8.  Other laboratory tests are acceptable and require no change in treatment.    Current Outpatient Prescriptions:   •  aspirin-acetaminophen-caffeine (EXCEDRIN MIGRAINE) 250-250-65 MG per tablet, Take  by mouth., Disp: , Rfl:   •  atorvastatin (LIPITOR) 40 MG tablet, TAKE 1 TABLET AT BEDTIME, Disp: 90 tablet, Rfl: 1  •  DELZICOL 400 MG capsule delayed-release delayed release capsule, TAKE 2 CAPSULES TWICE A DAY, Disp: 360 capsule, Rfl: 1  •  diclofenac (VOLTAREN) 75 MG EC tablet, TAKE 1 TABLET DAILY, Disp: 90 tablet, Rfl: 1  •  digoxin (LANOXIN) 125 MCG tablet, TAKE ONE TABLET BY MOUTH DAILY, Disp: 90 tablet, Rfl: 1  •  diltiazem XR (DILACOR XR) 180 MG 24 hr capsule, Take 1 capsule by mouth Daily., Disp: 90 capsule, Rfl: 1  •  Glucosamine-Chondroit-Vit C-Mn (GLUCOSAMINE 1500 COMPLEX) capsule, Take  by mouth daily., Disp: , Rfl:   •  lisinopril-hydrochlorothiazide (ZESTORETIC) 10-12.5 MG per tablet, Take 1 tablet by mouth Daily., Disp: 90 tablet, Rfl: 3  •  potassium chloride (K-DUR) 10 MEQ CR tablet, TAKE 1 TABLET DAILY, Disp: 90 tablet, Rfl: 1  •  tamsulosin (FLOMAX) 0.4 MG capsule 24 hr capsule, TAKE ONE CAPSULE BY MOUTH EVERY NIGHT AT BEDTIME, Disp: 90 capsule, Rfl: 1    Allergies   Allergen Reactions   • Sulfa Antibiotics      Headache       Immunization History   Administered Date(s) Administered   • Influenza Split High Dose Preservative Free IM 09/26/2016   • Influenza, Quadrivalent 10/11/2015   • Pneumococcal Polysaccharide 05/01/1999, 03/16/2010   • Td 12/13/2007      Electronically signed Hu Castillo M.D.6/4/2017 10:41 AM

## 2017-06-02 NOTE — PATIENT INSTRUCTIONS
1.  Stop lisinopril and HCTZ separate pills.    2.   Start lisinopril/HCTZ 10/12.5 daily - for blood pressure.    3.  Appointment with shoulder specialist - review left shoulder pain.    4.  Continue usual medicines and supplements - as listed.    5.  Return in 4 months - fasting checkup and Holter monitor.

## 2017-06-08 ENCOUNTER — TELEPHONE (OUTPATIENT)
Dept: INTERNAL MEDICINE | Facility: CLINIC | Age: 77
End: 2017-06-08

## 2017-06-08 NOTE — TELEPHONE ENCOUNTER
Left message for pt re: recent labs.  Per TGF - Hemoglobin A1c is improved at 5.7%. Continue well-balanced diabetic diet.   Sedimentation rate and CRP are only borderline high - indicating minimal inflammatory activity.  Other laboratory tests are acceptable and require no change in treatment.  Enc to call if questions or concerns.

## 2017-06-14 RX ORDER — DIGOXIN 125 MCG
TABLET ORAL
Qty: 90 TABLET | Refills: 1 | Status: SHIPPED | OUTPATIENT
Start: 2017-06-14 | End: 2017-12-09 | Stop reason: SDUPTHER

## 2017-07-07 RX ORDER — DICLOFENAC SODIUM 75 MG/1
TABLET, DELAYED RELEASE ORAL
Qty: 90 TABLET | Refills: 1 | Status: SHIPPED | OUTPATIENT
Start: 2017-07-07 | End: 2017-12-30 | Stop reason: SDUPTHER

## 2017-07-19 RX ORDER — ATORVASTATIN CALCIUM 40 MG/1
TABLET, FILM COATED ORAL
Qty: 90 TABLET | Refills: 1 | Status: SHIPPED | OUTPATIENT
Start: 2017-07-19 | End: 2018-01-17 | Stop reason: SDUPTHER

## 2017-07-31 RX ORDER — TAMSULOSIN HYDROCHLORIDE 0.4 MG/1
CAPSULE ORAL
Qty: 90 CAPSULE | Refills: 1 | Status: SHIPPED | OUTPATIENT
Start: 2017-07-31 | End: 2018-01-13 | Stop reason: SDUPTHER

## 2017-08-15 ENCOUNTER — OFFICE VISIT (OUTPATIENT)
Dept: INTERNAL MEDICINE | Facility: CLINIC | Age: 77
End: 2017-08-15

## 2017-08-15 DIAGNOSIS — M15.9 GENERALIZED OSTEOARTHRITIS: ICD-10-CM

## 2017-08-15 DIAGNOSIS — I10 ESSENTIAL HYPERTENSION: Primary | ICD-10-CM

## 2017-08-15 DIAGNOSIS — M25.512 CHRONIC LEFT SHOULDER PAIN: ICD-10-CM

## 2017-08-15 DIAGNOSIS — G89.29 CHRONIC LEFT SHOULDER PAIN: ICD-10-CM

## 2017-08-15 PROCEDURE — 99213 OFFICE O/P EST LOW 20 MIN: CPT | Performed by: INTERNAL MEDICINE

## 2017-08-15 RX ORDER — LISINOPRIL AND HYDROCHLOROTHIAZIDE 12.5; 1 MG/1; MG/1
0.5 TABLET ORAL DAILY
Qty: 90 TABLET | Refills: 3
Start: 2017-08-15 | End: 2018-07-05

## 2017-08-15 NOTE — PROGRESS NOTES
Subjective   Nathan Velarde is a 76 y.o. male.     History of Present Illness     The patient has 4 days of increasing right sided chest wall pain and abdominal pain.  He has had no obvious injuries or falls.  He has been working out with weights more at the fitness club and accelerating the weights in recent weeks.  He has been doing new exercises requiring him to pull weights down with his arms working out his abdominal muscles.  He has had no significant cough or palpitations.  He has had no exertional chest pain or dyspnea.    The following portions of the patient's history were reviewed and updated as appropriate: allergies, current medications, past family history, past medical history, past social history, past surgical history and problem list.    Review of Systems   Constitutional: Negative for appetite change and fatigue.   Respiratory: Positive for chest tightness. Negative for cough.    Cardiovascular: Positive for chest pain. Negative for palpitations.   Gastrointestinal: Positive for abdominal pain. Negative for abdominal distention and nausea.   Neurological: Negative for dizziness and light-headedness.       Objective   Blood pressure 110/60, pulse 60, temperature 97.4 °F (36.3 °C), temperature source Oral, resp. rate 16, weight 150 lb (68 kg), SpO2 96 %.    Physical Exam   Constitutional: He is oriented to person, place, and time. He appears well-developed and well-nourished. No distress.   Cardiovascular: Normal rate, regular rhythm, normal heart sounds and intact distal pulses.    Pulmonary/Chest: Effort normal and breath sounds normal. He has no wheezes. He has no rales. He exhibits tenderness.   Abdominal: Soft. Bowel sounds are normal. He exhibits distension. He exhibits no mass. There is no tenderness. There is no rebound and no guarding.   Moderate tenderness in right upper abdominal wall   Neurological: He is alert and oriented to person, place, and time. He exhibits normal muscle tone.  Coordination normal.   Psychiatric: He has a normal mood and affect. His behavior is normal. Judgment and thought content normal.   Nursing note and vitals reviewed.    Procedures  Assessment/Plan   Nathan was seen today for pain.    Diagnoses and all orders for this visit:    Essential hypertension    Chronic left shoulder pain    Generalized osteoarthritis    Other orders  -     lisinopril-hydrochlorothiazide (ZESTORETIC) 10-12.5 MG per tablet; Take 0.5 tablets by mouth Daily.      The patient appears to have a chest wall tenderness and abdominal wall tenderness from excessive weight lifting.  He feels he in fact is improved over the last 24 hours.  Because of his benign findings and initial improvement I've asked him to monitor this on only light stretching the legs.     The patient has a low blood pressure presenting at a systolic of 90.  He did read 110/60 sitting and standing during the exam.  Nevertheless he should drop this dose was in a pill for prevention of orthostatic symptoms.    Patient Instructions   1.  Continue light weights stretching exercises - maintain flexibility and strength.    2.  Avoid excessive weight lifting - protect joints muscles and tendons.    3.  Decrease lisinopril/HCTZ - 5 mg daily.    4.  Check blood pressure Monday Wednesday Friday - record readings for next visit.    5.  Return visit October 16 - fasting checkup    Electronically signed Hu Castillo M.D.8/17/2017 7:08 PM

## 2017-08-15 NOTE — PATIENT INSTRUCTIONS
1.  Continue light weights stretching exercises - maintain flexibility and strength.    2.  Avoid excessive weight lifting - protect joints muscles and tendons.    3.  Decrease lisinopril/HCTZ - 5 mg daily.    4.  Check blood pressure Monday Wednesday Friday - record readings for next visit.    5.  Return visit October 16 - fasting checkup

## 2017-08-17 VITALS
SYSTOLIC BLOOD PRESSURE: 110 MMHG | TEMPERATURE: 97.4 F | BODY MASS INDEX: 24.21 KG/M2 | RESPIRATION RATE: 16 BRPM | OXYGEN SATURATION: 96 % | WEIGHT: 150 LBS | DIASTOLIC BLOOD PRESSURE: 60 MMHG | HEART RATE: 60 BPM

## 2017-09-24 RX ORDER — MESALAMINE 400 MG/1
CAPSULE, DELAYED RELEASE ORAL
Qty: 360 CAPSULE | Refills: 1 | Status: CANCELLED | OUTPATIENT
Start: 2017-09-24

## 2017-09-25 RX ORDER — DILTIAZEM HYDROCHLORIDE 180 MG/1
180 CAPSULE, EXTENDED RELEASE ORAL DAILY
Qty: 90 CAPSULE | Refills: 1 | Status: SHIPPED | OUTPATIENT
Start: 2017-09-25 | End: 2018-03-05 | Stop reason: SDUPTHER

## 2017-09-25 RX ORDER — DILTIAZEM HYDROCHLORIDE 180 MG/1
CAPSULE, EXTENDED RELEASE ORAL
Qty: 90 CAPSULE | Refills: 1 | OUTPATIENT
Start: 2017-09-25

## 2017-09-25 RX ORDER — MESALAMINE 400 MG/1
800 CAPSULE, DELAYED RELEASE ORAL 2 TIMES DAILY
Qty: 360 CAPSULE | Refills: 1 | Status: SHIPPED | OUTPATIENT
Start: 2017-09-25 | End: 2018-04-09 | Stop reason: SDUPTHER

## 2017-10-16 ENCOUNTER — APPOINTMENT (OUTPATIENT)
Dept: LAB | Facility: HOSPITAL | Age: 77
End: 2017-10-16

## 2017-10-16 ENCOUNTER — OFFICE VISIT (OUTPATIENT)
Dept: INTERNAL MEDICINE | Facility: CLINIC | Age: 77
End: 2017-10-16

## 2017-10-16 VITALS
WEIGHT: 150 LBS | TEMPERATURE: 96.3 F | RESPIRATION RATE: 16 BRPM | OXYGEN SATURATION: 98 % | DIASTOLIC BLOOD PRESSURE: 60 MMHG | HEART RATE: 72 BPM | SYSTOLIC BLOOD PRESSURE: 136 MMHG | BODY MASS INDEX: 24.21 KG/M2

## 2017-10-16 DIAGNOSIS — M25.512 CHRONIC LEFT SHOULDER PAIN: ICD-10-CM

## 2017-10-16 DIAGNOSIS — R73.9 HYPERGLYCEMIA: ICD-10-CM

## 2017-10-16 DIAGNOSIS — I48.0 PAROXYSMAL ATRIAL FIBRILLATION (HCC): Primary | ICD-10-CM

## 2017-10-16 DIAGNOSIS — E78.00 PURE HYPERCHOLESTEROLEMIA: ICD-10-CM

## 2017-10-16 DIAGNOSIS — I10 ESSENTIAL HYPERTENSION: ICD-10-CM

## 2017-10-16 DIAGNOSIS — G89.29 CHRONIC LEFT SHOULDER PAIN: ICD-10-CM

## 2017-10-16 LAB
ALBUMIN SERPL-MCNC: 4.7 G/DL (ref 3.2–4.8)
ALBUMIN/GLOB SERPL: 1.8 G/DL (ref 1.5–2.5)
ALP SERPL-CCNC: 129 U/L (ref 25–100)
ALT SERPL W P-5'-P-CCNC: 39 U/L (ref 7–40)
ANION GAP SERPL CALCULATED.3IONS-SCNC: 9 MMOL/L (ref 3–11)
ARTICHOKE IGE QN: 69 MG/DL (ref 0–130)
AST SERPL-CCNC: 32 U/L (ref 0–33)
BILIRUB SERPL-MCNC: 0.6 MG/DL (ref 0.3–1.2)
BUN BLD-MCNC: 34 MG/DL (ref 9–23)
BUN/CREAT SERPL: 28.3 (ref 7–25)
CALCIUM SPEC-SCNC: 10.1 MG/DL (ref 8.7–10.4)
CHLORIDE SERPL-SCNC: 106 MMOL/L (ref 99–109)
CHOLEST SERPL-MCNC: 122 MG/DL (ref 0–200)
CO2 SERPL-SCNC: 27 MMOL/L (ref 20–31)
CREAT BLD-MCNC: 1.2 MG/DL (ref 0.6–1.3)
GFR SERPL CREATININE-BSD FRML MDRD: 59 ML/MIN/1.73
GLOBULIN UR ELPH-MCNC: 2.6 GM/DL
GLUCOSE BLD-MCNC: 107 MG/DL (ref 70–100)
HBA1C MFR BLD: 6 % (ref 4.8–5.6)
HDLC SERPL-MCNC: 41 MG/DL (ref 40–60)
POTASSIUM BLD-SCNC: 4.4 MMOL/L (ref 3.5–5.5)
PROT SERPL-MCNC: 7.3 G/DL (ref 5.7–8.2)
SODIUM BLD-SCNC: 142 MMOL/L (ref 132–146)
TRIGL SERPL-MCNC: 105 MG/DL (ref 0–150)

## 2017-10-16 PROCEDURE — 90662 IIV NO PRSV INCREASED AG IM: CPT | Performed by: INTERNAL MEDICINE

## 2017-10-16 PROCEDURE — 99213 OFFICE O/P EST LOW 20 MIN: CPT | Performed by: INTERNAL MEDICINE

## 2017-10-16 PROCEDURE — 80053 COMPREHEN METABOLIC PANEL: CPT | Performed by: INTERNAL MEDICINE

## 2017-10-16 PROCEDURE — 80061 LIPID PANEL: CPT | Performed by: INTERNAL MEDICINE

## 2017-10-16 PROCEDURE — 83036 HEMOGLOBIN GLYCOSYLATED A1C: CPT | Performed by: INTERNAL MEDICINE

## 2017-10-16 PROCEDURE — 36415 COLL VENOUS BLD VENIPUNCTURE: CPT | Performed by: INTERNAL MEDICINE

## 2017-10-16 PROCEDURE — G0008 ADMIN INFLUENZA VIRUS VAC: HCPCS | Performed by: INTERNAL MEDICINE

## 2017-10-16 NOTE — PATIENT INSTRUCTIONS
1.  Continue usual medicines and supplements - as listed.    2.  Follow well-balanced diet - low in salt and low in sugar.    3.  Maintain routine physical fitness program - every week.    4.  Avoid excessive weights for upper body - protect shoulders.    5.  Return visit in December - for wellness exam.    6.  Nurse will call with test results.    7.  Return visit in February - fasting checkup.

## 2017-10-16 NOTE — PROGRESS NOTES
Subjective   Nathan Velarde is a 77 y.o. male.     History of Present Illness     The patient has end-stage DJD of his left shoulder.  He has had progressive shoulder pain in recent years and is unable to sleep on that side.  He has seen the orthopedist who has given him a cortisone injection.  He has less pain at rest after the injection but generally his functional capacity is stable and limited.    The following portions of the patient's history were reviewed and updated as appropriate: allergies, current medications, past family history, past medical history, past social history, past surgical history and problem list.    Review of Systems   Constitutional: Negative for appetite change and fatigue.   Cardiovascular: Negative for chest pain.        Minimal Palpitations   Gastrointestinal: Negative for abdominal distention and nausea.   Genitourinary: Negative for frequency and urgency.   Musculoskeletal: Negative for back pain.        Moderate chronic left shoulder pain   Neurological: Negative for dizziness and light-headedness.       Objective   Blood pressure 136/60, pulse 72, temperature 96.3 °F (35.7 °C), temperature source Oral, resp. rate 16, weight 150 lb (68 kg), SpO2 98 %.    Physical Exam   Constitutional: He is oriented to person, place, and time. He appears well-developed and well-nourished. No distress.   Cardiovascular: Normal rate, regular rhythm and normal heart sounds.    Pulmonary/Chest: Effort normal and breath sounds normal. He has no wheezes. He has no rales.   Musculoskeletal:   Right shoulder 70% range of motion with no tenderness left shoulder 50% rotation and range of motion with moderate tenderness   Neurological: He is alert and oriented to person, place, and time. He exhibits normal muscle tone. Coordination normal.   Psychiatric: He has a normal mood and affect. His behavior is normal. Judgment and thought content normal.   Nursing note and vitals  reviewed.    Procedures  Assessment/Plan   Nathan was seen today for shoulder pain.    Diagnoses and all orders for this visit:    Paroxysmal atrial fibrillation    Pure hypercholesterolemia  -     Comprehensive Metabolic Panel  -     Lipid Panel    Essential hypertension    Hyperglycemia  -     Hemoglobin A1c    Chronic left shoulder pain    Other orders  -     Flu Vaccine High Dose PF 65YR+ ([id number])     the patient has advanced DJD left shoulder.  I've advised him to maintain range of motion to prevent shoulder injury.    Patient has moderate hyperlipidemia on atorvastatin 40 mg.  His LDL cholesterol is acceptable at 69.    Patient is prediabetic doing well on the diabetic diet.  He is hemoglobin A1c at 6.0% is acceptable.    Patient Instructions   1.  Continue usual medicines and supplements - as listed.    2.  Follow well-balanced diet - low in salt and low in sugar.    3.  Maintain routine physical fitness program - every week.    4.  Avoid excessive weights for upper body - protect shoulders.    5.  Return visit in December - for wellness exam.    6.  Nurse will call with test results.    7.  Return visit in February - fasting checkup.    8.  Hemoglobin A1c at 6.0% indicates adequate control of blood sugars.    9.  Chemistry panel and lipid panel are acceptable and require no change in treatment.    Electronically signed Hu Castillo M.D.10/16/2017 5:51 PM

## 2017-10-18 ENCOUNTER — TELEPHONE (OUTPATIENT)
Dept: INTERNAL MEDICINE | Facility: CLINIC | Age: 77
End: 2017-10-18

## 2017-10-18 NOTE — TELEPHONE ENCOUNTER
Left message re: lab results.  Per TGF - Hemoglobin A1c at 6.0% indicates adequate control of blood sugars.  Chemistry panel and lipid panel are acceptable and require no change in treatment.  Enc to call if questions or concerns.

## 2017-11-01 ENCOUNTER — OFFICE VISIT (OUTPATIENT)
Dept: GASTROENTEROLOGY | Facility: CLINIC | Age: 77
End: 2017-11-01

## 2017-11-01 VITALS
BODY MASS INDEX: 25.07 KG/M2 | HEART RATE: 71 BPM | SYSTOLIC BLOOD PRESSURE: 130 MMHG | DIASTOLIC BLOOD PRESSURE: 62 MMHG | WEIGHT: 156 LBS | HEIGHT: 66 IN | OXYGEN SATURATION: 100 % | TEMPERATURE: 97.9 F

## 2017-11-01 DIAGNOSIS — K51.90 ULCERATIVE COLITIS WITHOUT COMPLICATIONS, UNSPECIFIED LOCATION (HCC): Primary | ICD-10-CM

## 2017-11-01 PROCEDURE — 99213 OFFICE O/P EST LOW 20 MIN: CPT | Performed by: INTERNAL MEDICINE

## 2017-11-01 NOTE — PROGRESS NOTES
PCP: Hu Castillo MD    Chief Complaint   Patient presents with   • Follow-up       History of Present Illness:   HPI  Mr. Velarde returns for an office visit.  The patient is doing well at this time.  He denies any abdominal pain.  There is no history of nausea or vomiting.  The patient has a good appetite.  He has no unexplained weight loss.  He will occasionally have one or 2 bowel movements in the morning that are not watery.  He has not experienced any blood in the stool.  Mr. Velarde is still very active exercising on a regular basis.  He does have some left shoulder discomfort and possibly may need surgery.  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    • Bilateral bunions    • Bladder cancer 1977    curative exfulguration   • Dyslipidemia    • HTN (hypertension) 1990   • Knee osteoarthritis 2004   • Mitral valve insufficiency    • Nephrolithiasis 2000    Documented on IVP   • Paroxysmal atrial fibrillation 2008   • Prostatism 1995   • Prostatitis    • Rotator cuff tendinitis    • Ulcerative colitis 1977    Mild disease       Past Surgical History:   Procedure Laterality Date   • COLONOSCOPY  2015    Colitis in remission   • CYSTOSCOPY  1977    Excision of bladder cancer   • INGUINAL HERNIA REPAIR Right 1994   • KNEE SURGERY Right 1955    Excision of cyst   • TONSILLECTOMY  1943         Current Outpatient Prescriptions:   •  aspirin-acetaminophen-caffeine (EXCEDRIN MIGRAINE) 250-250-65 MG per tablet, Take  by mouth., Disp: , Rfl:   •  atorvastatin (LIPITOR) 40 MG tablet, TAKE 1 TABLET AT BEDTIME, Disp: 90 tablet, Rfl: 1  •  diclofenac (VOLTAREN) 75 MG EC tablet, TAKE 1 TABLET DAILY, Disp: 90 tablet, Rfl: 1  •  digoxin (LANOXIN) 125 MCG tablet, TAKE ONE TABLET BY MOUTH DAILY, Disp: 90 tablet, Rfl: 1  •  diltiazem XR (DILACOR XR) 180 MG 24 hr capsule, Take 1 capsule by mouth Daily., Disp: 90 capsule,  Rfl: 1  •  Glucosamine-Chondroit-Vit C-Mn (GLUCOSAMINE 1500 COMPLEX) capsule, Take  by mouth daily., Disp: , Rfl:   •  lisinopril-hydrochlorothiazide (ZESTORETIC) 10-12.5 MG per tablet, Take 0.5 tablets by mouth Daily., Disp: 90 tablet, Rfl: 3  •  mesalamine (DELZICOL) 400 MG capsule delayed-release delayed release capsule, Take 2 capsules by mouth 2 (Two) Times a Day., Disp: 360 capsule, Rfl: 1  •  potassium chloride (K-DUR) 10 MEQ CR tablet, TAKE 1 TABLET DAILY, Disp: 90 tablet, Rfl: 1  •  tamsulosin (FLOMAX) 0.4 MG capsule 24 hr capsule, TAKE ONE CAPSULE BY MOUTH EVERY NIGHT AT BEDTIME, Disp: 90 capsule, Rfl: 1    Allergies   Allergen Reactions   • Sulfa Antibiotics      Headache       Family History   Problem Relation Age of Onset   • Alzheimer's disease Mother       age 89   • Arthritis Mother    • Hyperlipidemia Mother    • Angina Father    • Hypertension Father    • Pneumonia Father       age 70 - tobacco   • Aneurysm Father      abd aortic   • Unexplained death Brother    • Breast cancer Maternal Grandmother    • Arthritis Maternal Grandmother    • Diabetes Paternal Uncle        Social History     Social History   • Marital status:      Spouse name: N/A   • Number of children: N/A   • Years of education: N/A     Occupational History   • Not on file.     Social History Main Topics   • Smoking status: Never Smoker   • Smokeless tobacco: Never Used   • Alcohol use 0.6 oz/week     1 Cans of beer per week   • Drug use: No   • Sexual activity: Not on file     Other Topics Concern   • Not on file     Social History Narrative    Domestic life- lives in private home with wife        Mosque- none listed        Sleep hygiene- sleeps 8 hours nightly good quality        Caffeine use- drinks 1 diet Mountain Dew daily and occasionally uses Excedrin        Exercise habits- disciplined exercise Program 5 days weekly with walking and weight lifting and yoga        Diet-   well-balanced American Heart  Association - low in salt        Occupation- retired         Hearing- Hears well        Vision- Corrects well with bifocal glasses        Driving- No limitations               Review of Systems   Constitutional: Negative for activity change, appetite change, fatigue, fever and unexpected weight change.   HENT: Negative for dental problem, hearing loss, mouth sores, postnasal drip, sneezing, trouble swallowing and voice change.    Eyes: Negative for pain, redness, itching and visual disturbance.   Respiratory: Negative for cough, choking, chest tightness, shortness of breath and wheezing.    Cardiovascular: Negative for chest pain, palpitations and leg swelling.   Gastrointestinal: Negative for abdominal distention, abdominal pain, anal bleeding, blood in stool, constipation, diarrhea, nausea, rectal pain and vomiting.   Endocrine: Negative for cold intolerance, heat intolerance, polydipsia, polyphagia and polyuria.   Genitourinary: Negative.  Negative for dysuria, enuresis, flank pain, hematuria and urgency.   Musculoskeletal: Negative for arthralgias, back pain, gait problem, joint swelling and myalgias.   Skin: Negative for color change, pallor and rash.   Allergic/Immunologic: Negative for environmental allergies, food allergies and immunocompromised state.   Neurological: Negative for dizziness, tremors, seizures, facial asymmetry, speech difficulty, numbness and headaches.   Hematological: Negative for adenopathy.   Psychiatric/Behavioral: Negative for behavioral problems, confusion, dysphoric mood, hallucinations and self-injury.       Vitals:    11/01/17 1453   BP: 130/62   Pulse: 71   Temp: 97.9 °F (36.6 °C)   SpO2: 100%       Physical Exam   Constitutional: He is oriented to person, place, and time. He appears well-nourished. No distress.   HENT:   Head: Normocephalic and atraumatic.   Mouth/Throat: Oropharynx is clear and moist. No oropharyngeal exudate.   Eyes: EOM are normal. Pupils  are equal, round, and reactive to light. No scleral icterus.   Neck: Normal range of motion. Neck supple. No thyromegaly present.   Cardiovascular: Normal rate, regular rhythm and normal heart sounds.  Exam reveals no gallop.    No murmur heard.  Pulmonary/Chest: Effort normal and breath sounds normal. He has no wheezes. He has no rales.   Abdominal: Soft. Bowel sounds are normal. There is no tenderness. There is no guarding.   Musculoskeletal: Normal range of motion. He exhibits no edema or tenderness.   Lymphadenopathy:     He has no cervical adenopathy.   Neurological: He is alert and oriented to person, place, and time. He exhibits normal muscle tone.   Skin: Skin is dry. No erythema. No pallor.   Psychiatric: He has a normal mood and affect. His behavior is normal. Thought content normal.   Vitals reviewed.      Nathan was seen today for follow-up.    Diagnoses and all orders for this visit:    Ulcerative colitis without complications, unspecified location  The patient is currently doing very well on mesalamine medication.  He has no clinical signs of active disease.      Plan: Continue current medical regimen.           Follow-up in the office in 6 months.      I spent over 50% of the office visit counseling and answering questions from the patient.

## 2017-11-30 RX ORDER — POTASSIUM CHLORIDE 750 MG/1
TABLET, FILM COATED, EXTENDED RELEASE ORAL
Qty: 90 TABLET | Refills: 1 | Status: SHIPPED | OUTPATIENT
Start: 2017-11-30 | End: 2018-06-03 | Stop reason: SDUPTHER

## 2017-12-11 RX ORDER — DIGOXIN 125 MCG
TABLET ORAL
Qty: 90 TABLET | Refills: 1 | Status: SHIPPED | OUTPATIENT
Start: 2017-12-11 | End: 2018-06-14 | Stop reason: SDUPTHER

## 2017-12-20 ENCOUNTER — OFFICE VISIT (OUTPATIENT)
Dept: INTERNAL MEDICINE | Facility: CLINIC | Age: 77
End: 2017-12-20

## 2017-12-20 VITALS
SYSTOLIC BLOOD PRESSURE: 118 MMHG | BODY MASS INDEX: 24.91 KG/M2 | WEIGHT: 155 LBS | HEIGHT: 66 IN | HEART RATE: 62 BPM | OXYGEN SATURATION: 97 % | RESPIRATION RATE: 18 BRPM | DIASTOLIC BLOOD PRESSURE: 52 MMHG | TEMPERATURE: 97 F

## 2017-12-20 DIAGNOSIS — Z23 NEED FOR TETANUS BOOSTER: Primary | ICD-10-CM

## 2017-12-20 DIAGNOSIS — Z00.00 PREVENTATIVE HEALTH CARE: ICD-10-CM

## 2017-12-20 PROCEDURE — 90471 IMMUNIZATION ADMIN: CPT | Performed by: INTERNAL MEDICINE

## 2017-12-20 PROCEDURE — 90715 TDAP VACCINE 7 YRS/> IM: CPT | Performed by: INTERNAL MEDICINE

## 2017-12-20 PROCEDURE — G0439 PPPS, SUBSEQ VISIT: HCPCS | Performed by: INTERNAL MEDICINE

## 2017-12-20 NOTE — PROGRESS NOTES
QUICK REFERENCE INFORMATION:  The ABCs of the Annual Wellness Visit    Subsequent Medicare Wellness Visit    HEALTH RISK ASSESSMENT    1940    Recent Hospitalizations:  No hospitalization(s) within the last year..        Current Medical Providers:  Patient Care Team:  Hu Castillo MD as PCP - General        Smoking Status:  History   Smoking Status   • Never Smoker   Smokeless Tobacco   • Never Used       Alcohol Consumption:  History   Alcohol Use   • 0.6 oz/week   • 1 Cans of beer per week       Depression Screen:   PHQ-2/PHQ-9 Depression Screening 12/20/2017   Little interest or pleasure in doing things 0   Feeling down, depressed, or hopeless 0   Total Score 0       Health Habits and Functional and Cognitive Screening:  Functional & Cognitive Status 12/20/2017   Do you have difficulty preparing food and eating? No   Do you have difficulty bathing yourself, getting dressed or grooming yourself? No   Do you have difficulty using the toilet? No   Do you have difficulty moving around from place to place? No   Do you have trouble with steps or getting out of a bed or a chair? No   In the past year have you fallen or experienced a near fall? No   Current Diet Well Balanced Diet   Dental Exam Up to date   Eye Exam Up to date   Exercise (times per week) 5 times per week   Current Exercise Activities Include Aerobics   Do you need help using the phone?  No   Are you deaf or do you have serious difficulty hearing?  No   Do you need help with transportation? No   Do you need help shopping? No   Do you need help preparing meals?  No   Do you need help with housework?  No   Do you need help with laundry? No   Do you need help taking your medications? No   Do you need help managing money? No   Have you felt unusual stress, anger or loneliness in the last month? No   Who do you live with? Spouse   If you need help, do you have trouble finding someone available to you? No   Have you been bothered in the last four  weeks by sexual problems? No   Do you have difficulty concentrating, remembering or making decisions? No           Does the patient have evidence of cognitive impairment? No    Aspirin use counseling: Taking ASA appropriately as indicated      Recent Lab Results:  CMP:  Lab Results   Component Value Date    BUN 34 (H) 10/16/2017    CREATININE 1.20 10/16/2017    EGFRIFNONA 59 (L) 10/16/2017    BCR 28.3 (H) 10/16/2017     10/16/2017    K 4.4 10/16/2017    CO2 27.0 10/16/2017    CALCIUM 10.1 10/16/2017    PROTENTOTREF 7.1 07/16/2014    ALBUMIN 4.70 10/16/2017    LABGLOBREF 3.2 07/16/2014    LABIL2 1.8 10/16/2017    BILITOT 0.6 10/16/2017    ALKPHOS 129 (H) 10/16/2017    AST 32 10/16/2017    ALT 39 10/16/2017     Lipid Panel:  Lab Results   Component Value Date    CHOL 122 10/16/2017    TRIG 105 10/16/2017    HDL 41 10/16/2017    LDLDIRECT 69 10/16/2017     HbA1c:  Lab Results   Component Value Date    HGBA1C 6.00 (H) 10/16/2017       Visual Acuity:  No exam data present    Age-appropriate Screening Schedule:  Refer to the list below for future screening recommendations based on patient's age, sex and/or medical conditions. Orders for these recommended tests are listed in the plan section. The patient has been provided with a written plan.    Health Maintenance   Topic Date Due   • TDAP/TD VACCINES (1 - Tdap) 12/14/2007   • PNEUMOCOCCAL VACCINES (65+ LOW/MEDIUM RISK) (2 of 2 - PCV13) 03/16/2011   • ZOSTER VACCINE  05/15/2016   • LIPID PANEL  10/16/2018   • COLONOSCOPY  10/01/2025   • INFLUENZA VACCINE  Completed        Subjective   History of Present Illness    Nathan Velarde is a 77 y.o. male who presents for an Subsequent Wellness Visit.    The following portions of the patient's history were reviewed and updated as appropriate: allergies, current medications, past family history, past medical history, past social history, past surgical history and problem list.    Outpatient Medications Prior to Visit    Medication Sig Dispense Refill   • aspirin-acetaminophen-caffeine (EXCEDRIN MIGRAINE) 250-250-65 MG per tablet Take  by mouth.     • atorvastatin (LIPITOR) 40 MG tablet TAKE 1 TABLET AT BEDTIME 90 tablet 1   • diclofenac (VOLTAREN) 75 MG EC tablet TAKE 1 TABLET DAILY 90 tablet 1   • digoxin (LANOXIN) 125 MCG tablet TAKE ONE TABLET BY MOUTH DAILY 90 tablet 1   • diltiazem XR (DILACOR XR) 180 MG 24 hr capsule Take 1 capsule by mouth Daily. 90 capsule 1   • Glucosamine-Chondroit-Vit C-Mn (GLUCOSAMINE 1500 COMPLEX) capsule Take  by mouth daily.     • lisinopril-hydrochlorothiazide (ZESTORETIC) 10-12.5 MG per tablet Take 0.5 tablets by mouth Daily. 90 tablet 3   • mesalamine (DELZICOL) 400 MG capsule delayed-release delayed release capsule Take 2 capsules by mouth 2 (Two) Times a Day. 360 capsule 1   • potassium chloride (K-DUR) 10 MEQ CR tablet TAKE 1 TABLET DAILY 90 tablet 1   • tamsulosin (FLOMAX) 0.4 MG capsule 24 hr capsule TAKE ONE CAPSULE BY MOUTH EVERY NIGHT AT BEDTIME 90 capsule 1     No facility-administered medications prior to visit.        Patient Active Problem List   Diagnosis   • Anemia   • Aortic valve disorder   • Ataxic gait   • Atrial fibrillation   • Prostatism   • Fatigue   • Ulcerative colitis   • Transverse myelitis   • Testosterone deficiency   • Paresthesia   • Lumbosacral neuritis   • Osteoarthritis of lumbar spine   • Hypertension   • Hyperlipidemia   • Generalized osteoarthritis   • Gross hematuria   • CKD (chronic kidney disease), stage II   • Hyperglycemia   • Shoulder pain, left   • Preventative health care       Advance Care Planning:  has NO advance directive - information provided to the patient today    Identification of Risk Factors:  Risk factors include: weight , unhealthy diet, cardiovascular risk, chronic pain, hearing limitations and polypharmacy.    Review of Systems    Compared to one year ago, the patient feels his physical health is worse.  Compared to one year ago, the  "patient feels his mental health is worse.    Objective     Physical Exam    Vitals:    12/20/17 1111   BP: 118/52   BP Location: Right arm   Patient Position: Sitting   Cuff Size: Adult   Pulse: 62   Resp: 18   Temp: 97 °F (36.1 °C)   TempSrc: Oral   SpO2: 97%   Weight: 70.3 kg (155 lb)   Height: 167.6 cm (66\")   PainSc: 2  Comment: left shoulder   PainLoc: Shoulder       Body mass index is 25.02 kg/(m^2).  Discussed the patient's BMI with him. BMI is within normal parameters. No follow-up required.    Assessment/Plan   Patient Self-Management and Personalized Health Advice  The patient has been provided with information about: diet, exercise, prevention of cardiac or vascular disease and designing advance directives and preventive services including:   · Counseling for cardiovascular disease risk reduction, Exercise counseling provided, Fall Risk assessment done, Nutrition counseling provided, TdaP vaccine.    Visit Diagnoses:  No diagnosis found.    No orders of the defined types were placed in this encounter.      Outpatient Encounter Prescriptions as of 12/20/2017   Medication Sig Dispense Refill   • aspirin-acetaminophen-caffeine (EXCEDRIN MIGRAINE) 250-250-65 MG per tablet Take  by mouth.     • atorvastatin (LIPITOR) 40 MG tablet TAKE 1 TABLET AT BEDTIME 90 tablet 1   • diclofenac (VOLTAREN) 75 MG EC tablet TAKE 1 TABLET DAILY 90 tablet 1   • digoxin (LANOXIN) 125 MCG tablet TAKE ONE TABLET BY MOUTH DAILY 90 tablet 1   • diltiazem XR (DILACOR XR) 180 MG 24 hr capsule Take 1 capsule by mouth Daily. 90 capsule 1   • Glucosamine-Chondroit-Vit C-Mn (GLUCOSAMINE 1500 COMPLEX) capsule Take  by mouth daily.     • lisinopril-hydrochlorothiazide (ZESTORETIC) 10-12.5 MG per tablet Take 0.5 tablets by mouth Daily. 90 tablet 3   • mesalamine (DELZICOL) 400 MG capsule delayed-release delayed release capsule Take 2 capsules by mouth 2 (Two) Times a Day. 360 capsule 1   • potassium chloride (K-DUR) 10 MEQ CR tablet TAKE 1 " TABLET DAILY 90 tablet 1   • tamsulosin (FLOMAX) 0.4 MG capsule 24 hr capsule TAKE ONE CAPSULE BY MOUTH EVERY NIGHT AT BEDTIME 90 capsule 1     No facility-administered encounter medications on file as of 12/20/2017.        Reviewed use of high risk medication in the elderly: yes  Reviewed for potential of harmful drug interactions in the elderly: yes    Follow Up:  No Follow-up on file.     An After Visit Summary and PPPS with all of these plans were given to the patient.        The wellness exam has been reviewed in detail.  The patient has been fully counseled on preventative guidelines for vaccines, cancer screenings, and other health maintenance needs.  Functional testing has been performed to assess capacity for independent living and need for other medical interventions.   The patient was counseled on maintaining a lifestyle to promote good health and to minimize chronic diseases.  The patient has been assisted with scheduling healthcare procedures for the coming year and given a written document outlining these recommendations.    Electronically signed Hu Castillo M.D.12/22/2017 2:26 PM

## 2018-01-02 RX ORDER — DICLOFENAC SODIUM 75 MG/1
TABLET, DELAYED RELEASE ORAL
Qty: 90 TABLET | Refills: 1 | Status: SHIPPED | OUTPATIENT
Start: 2018-01-02 | End: 2018-05-08 | Stop reason: HOSPADM

## 2018-01-15 RX ORDER — TAMSULOSIN HYDROCHLORIDE 0.4 MG/1
CAPSULE ORAL
Qty: 90 CAPSULE | Refills: 1 | Status: SHIPPED | OUTPATIENT
Start: 2018-01-15 | End: 2018-07-22 | Stop reason: SDUPTHER

## 2018-01-18 RX ORDER — ATORVASTATIN CALCIUM 40 MG/1
TABLET, FILM COATED ORAL
Qty: 90 TABLET | Refills: 1 | Status: SHIPPED | OUTPATIENT
Start: 2018-01-18 | End: 2018-07-31 | Stop reason: SDUPTHER

## 2018-02-19 ENCOUNTER — APPOINTMENT (OUTPATIENT)
Dept: LAB | Facility: HOSPITAL | Age: 78
End: 2018-02-19

## 2018-02-19 ENCOUNTER — OFFICE VISIT (OUTPATIENT)
Dept: INTERNAL MEDICINE | Facility: CLINIC | Age: 78
End: 2018-02-19

## 2018-02-19 VITALS
SYSTOLIC BLOOD PRESSURE: 126 MMHG | DIASTOLIC BLOOD PRESSURE: 60 MMHG | HEART RATE: 72 BPM | BODY MASS INDEX: 24.21 KG/M2 | TEMPERATURE: 97.7 F | OXYGEN SATURATION: 97 % | WEIGHT: 150 LBS | RESPIRATION RATE: 16 BRPM

## 2018-02-19 DIAGNOSIS — G89.29 CHRONIC LEFT SHOULDER PAIN: Primary | ICD-10-CM

## 2018-02-19 DIAGNOSIS — R73.9 HYPERGLYCEMIA: ICD-10-CM

## 2018-02-19 DIAGNOSIS — M15.9 GENERALIZED OSTEOARTHRITIS: ICD-10-CM

## 2018-02-19 DIAGNOSIS — E78.00 PURE HYPERCHOLESTEROLEMIA: ICD-10-CM

## 2018-02-19 DIAGNOSIS — M25.512 CHRONIC LEFT SHOULDER PAIN: Primary | ICD-10-CM

## 2018-02-19 DIAGNOSIS — I10 ESSENTIAL HYPERTENSION: ICD-10-CM

## 2018-02-19 LAB
ALBUMIN SERPL-MCNC: 4.5 G/DL (ref 3.2–4.8)
ALBUMIN/GLOB SERPL: 1.7 G/DL (ref 1.5–2.5)
ALP SERPL-CCNC: 107 U/L (ref 25–100)
ALT SERPL W P-5'-P-CCNC: 46 U/L (ref 7–40)
ANION GAP SERPL CALCULATED.3IONS-SCNC: 7 MMOL/L (ref 3–11)
ARTICHOKE IGE QN: 63 MG/DL (ref 0–130)
AST SERPL-CCNC: 39 U/L (ref 0–33)
BILIRUB SERPL-MCNC: 0.6 MG/DL (ref 0.3–1.2)
BUN BLD-MCNC: 32 MG/DL (ref 9–23)
BUN/CREAT SERPL: 29.1 (ref 7–25)
CALCIUM SPEC-SCNC: 10.2 MG/DL (ref 8.7–10.4)
CHLORIDE SERPL-SCNC: 106 MMOL/L (ref 99–109)
CHOLEST SERPL-MCNC: 108 MG/DL (ref 0–200)
CO2 SERPL-SCNC: 29 MMOL/L (ref 20–31)
CREAT BLD-MCNC: 1.1 MG/DL (ref 0.6–1.3)
GFR SERPL CREATININE-BSD FRML MDRD: 65 ML/MIN/1.73
GLOBULIN UR ELPH-MCNC: 2.6 GM/DL
GLUCOSE BLD-MCNC: 118 MG/DL (ref 70–100)
HBA1C MFR BLD: 6.2 % (ref 4.8–5.6)
HDLC SERPL-MCNC: 32 MG/DL (ref 40–60)
POTASSIUM BLD-SCNC: 4.2 MMOL/L (ref 3.5–5.5)
PROT SERPL-MCNC: 7.1 G/DL (ref 5.7–8.2)
SODIUM BLD-SCNC: 142 MMOL/L (ref 132–146)
TRIGL SERPL-MCNC: 96 MG/DL (ref 0–150)

## 2018-02-19 PROCEDURE — 36415 COLL VENOUS BLD VENIPUNCTURE: CPT | Performed by: INTERNAL MEDICINE

## 2018-02-19 PROCEDURE — 80061 LIPID PANEL: CPT | Performed by: INTERNAL MEDICINE

## 2018-02-19 PROCEDURE — 99214 OFFICE O/P EST MOD 30 MIN: CPT | Performed by: INTERNAL MEDICINE

## 2018-02-19 PROCEDURE — 83036 HEMOGLOBIN GLYCOSYLATED A1C: CPT | Performed by: INTERNAL MEDICINE

## 2018-02-19 PROCEDURE — 80053 COMPREHEN METABOLIC PANEL: CPT | Performed by: INTERNAL MEDICINE

## 2018-02-19 NOTE — PATIENT INSTRUCTIONS
1.  Visit with shoulder specialist - possible left shoulder surgery.    2.  Continue usual medicines and supplements - as listed.    3.  Follow well-balanced diet - low in salt and low in sugar.    4.  Maintain a low impact physical fitness program - every week.    5.  Next visit - 4 months - annual checkup fasting.

## 2018-02-19 NOTE — PROGRESS NOTES
Subjective   Nathan Velarde is a 77 y.o. male.     History of Present Illness     The patient has 2 years of moderate progressive left shoulder pain.  X-ray shows advanced DJD.  He is taking diclofenac with some benefit but this is working less effectively.  He is having to cut back on activities at home and his degree of exercise at the gymnasium.  The patient is not keeping her awake at night.  He has had physical therapy with minimal benefit.    The following portions of the patient's history were reviewed and updated as appropriate: allergies, current medications, past family history, past medical history, past social history, past surgical history and problem list.    Review of Systems   Constitutional: Negative for appetite change and fatigue.   HENT: Negative for ear pain and sore throat.    Respiratory: Negative for cough and shortness of breath.    Cardiovascular: Negative for chest pain and palpitations.   Gastrointestinal: Negative for abdominal pain and nausea.   Musculoskeletal: Negative for arthralgias and back pain.        Moderate progressive  left shoulder pain   Neurological: Negative for dizziness and headaches.       Objective   Blood pressure 126/60, pulse 72, temperature 97.7 °F (36.5 °C), temperature source Oral, resp. rate 16, weight 68 kg (150 lb), SpO2 97 %.    Physical Exam   Constitutional: He is oriented to person, place, and time. He appears well-developed and well-nourished. No distress.   Cardiovascular: Normal rate, regular rhythm and normal heart sounds.    Pulmonary/Chest: Effort normal and breath sounds normal. He has no wheezes. He has no rales.   Abdominal: Soft. Bowel sounds are normal. He exhibits no distension and no mass. There is no tenderness.   Musculoskeletal: He exhibits no edema.   Left shoulder reveals 60% elevation and 20% rotation with moderate stiffness and tenderness.  Right shoulder is 75% elevation and 50% rotation with minimal tenderness.   Neurological: He is  alert and oriented to person, place, and time. He exhibits normal muscle tone. Coordination normal.   Psychiatric: He has a normal mood and affect. His behavior is normal. Judgment and thought content normal.   Nursing note and vitals reviewed.    Procedures  Assessment/Plan   Nathan was seen today for shoulder pain.    Diagnoses and all orders for this visit:    Chronic left shoulder pain  -     Ambulatory Referral to Orthopedic Surgery    Pure hypercholesterolemia  -     Comprehensive Metabolic Panel  -     Lipid Panel    Essential hypertension    Hyperglycemia  -     Hemoglobin A1c    Generalized osteoarthritis    The patient has severe advancing DJD of his left shoulder.  I suggested that he see the orthopedist and discuss shoulder replacement surgery.  I personally talked with the orthopedist to facilitate a visit soon.    The patient has moderate hyperlipidemia and risk for cardiovascular disease.  His LDL at 63 is excellent on atorvastatin 40 mg.    Patient's had many years of hypertension with some tendency to atrial fibrillation.  Diltiazem plus Zestoretic to control blood pressure well and he has minimal palpitations.  Digoxin has had a remarkable stabilizing effect on his heart rhythm.    Patient Instructions   1.  Visit with shoulder specialist - possible left shoulder surgery.    2.  Continue usual medicines and supplements - as listed.    3.  Follow well-balanced diet - low in salt and low in sugar.    4.  Maintain a low impact physical fitness program - every week.    5.  Next visit - 4 months - annual checkup fasting.    6.  Hemoglobin A1c at 6.2% and LDL cholesterol at 69 indicate excellent control of blood sugars and cholesterol    7.  Chemistry panel is acceptable.    Electronically signed Hu Castillo M.D.2/21/2018 6:38 AM

## 2018-02-21 ENCOUNTER — TELEPHONE (OUTPATIENT)
Dept: INTERNAL MEDICINE | Facility: CLINIC | Age: 78
End: 2018-02-21

## 2018-02-21 NOTE — TELEPHONE ENCOUNTER
Called labs.  Per TGF - Hemoglobin A1c at 6.2% and LDL cholesterol at 69 indicate excellent control of blood sugars and cholesterol.  Chemistry panel is acceptable.  Pt verb understanding.

## 2018-03-05 RX ORDER — DILTIAZEM HYDROCHLORIDE 180 MG/1
CAPSULE, EXTENDED RELEASE ORAL
Qty: 90 CAPSULE | Refills: 1 | Status: SHIPPED | OUTPATIENT
Start: 2018-03-05 | End: 2018-06-08

## 2018-04-10 ENCOUNTER — TRANSCRIBE ORDERS (OUTPATIENT)
Dept: ADMINISTRATIVE | Facility: HOSPITAL | Age: 78
End: 2018-04-10

## 2018-04-10 DIAGNOSIS — M19.012 LOCALIZED OSTEOARTHRITIS OF SHOULDER, LEFT: Primary | ICD-10-CM

## 2018-04-10 RX ORDER — MESALAMINE 400 MG/1
CAPSULE, DELAYED RELEASE ORAL
Qty: 360 CAPSULE | Refills: 1 | Status: SHIPPED | OUTPATIENT
Start: 2018-04-10 | End: 2018-11-15 | Stop reason: SDUPTHER

## 2018-04-30 ENCOUNTER — APPOINTMENT (OUTPATIENT)
Dept: PREADMISSION TESTING | Facility: HOSPITAL | Age: 78
End: 2018-04-30

## 2018-04-30 ENCOUNTER — HOSPITAL ENCOUNTER (OUTPATIENT)
Dept: GENERAL RADIOLOGY | Facility: HOSPITAL | Age: 78
Discharge: HOME | End: 2018-04-30

## 2018-04-30 ENCOUNTER — HOSPITAL ENCOUNTER (OUTPATIENT)
Dept: CT IMAGING | Facility: HOSPITAL | Age: 78
Discharge: HOME | End: 2018-04-30
Attending: ORTHOPAEDIC SURGERY | Admitting: ORTHOPAEDIC SURGERY

## 2018-04-30 VITALS — HEIGHT: 66 IN | BODY MASS INDEX: 24.65 KG/M2 | WEIGHT: 153.4 LBS

## 2018-04-30 DIAGNOSIS — M19.012 LOCALIZED OSTEOARTHRITIS OF SHOULDER, LEFT: ICD-10-CM

## 2018-04-30 LAB
ANION GAP SERPL CALCULATED.3IONS-SCNC: 4 MMOL/L (ref 3–11)
BUN BLD-MCNC: 36 MG/DL (ref 9–23)
BUN/CREAT SERPL: 27.7 (ref 7–25)
CALCIUM SPEC-SCNC: 10 MG/DL (ref 8.7–10.4)
CHLORIDE SERPL-SCNC: 105 MMOL/L (ref 99–109)
CO2 SERPL-SCNC: 32 MMOL/L (ref 20–31)
CREAT BLD-MCNC: 1.3 MG/DL (ref 0.6–1.3)
DEPRECATED RDW RBC AUTO: 46 FL (ref 37–54)
ERYTHROCYTE [DISTWIDTH] IN BLOOD BY AUTOMATED COUNT: 14.2 % (ref 11.3–14.5)
GFR SERPL CREATININE-BSD FRML MDRD: 54 ML/MIN/1.73
GLUCOSE BLD-MCNC: 119 MG/DL (ref 70–100)
HBA1C MFR BLD: 6.1 % (ref 4.8–5.6)
HCT VFR BLD AUTO: 44 % (ref 38.9–50.9)
HGB BLD-MCNC: 14.1 G/DL (ref 13.1–17.5)
MCH RBC QN AUTO: 28.3 PG (ref 27–31)
MCHC RBC AUTO-ENTMCNC: 32 G/DL (ref 32–36)
MCV RBC AUTO: 88.2 FL (ref 80–99)
PLATELET # BLD AUTO: 300 10*3/MM3 (ref 150–450)
PMV BLD AUTO: 9.1 FL (ref 6–12)
POTASSIUM BLD-SCNC: 4.4 MMOL/L (ref 3.5–5.5)
RBC # BLD AUTO: 4.99 10*6/MM3 (ref 4.2–5.76)
SODIUM BLD-SCNC: 141 MMOL/L (ref 132–146)
WBC NRBC COR # BLD: 9.43 10*3/MM3 (ref 3.5–10.8)

## 2018-04-30 PROCEDURE — 71046 X-RAY EXAM CHEST 2 VIEWS: CPT

## 2018-04-30 PROCEDURE — 93005 ELECTROCARDIOGRAM TRACING: CPT

## 2018-04-30 PROCEDURE — 93010 ELECTROCARDIOGRAM REPORT: CPT | Performed by: INTERNAL MEDICINE

## 2018-04-30 PROCEDURE — 85027 COMPLETE CBC AUTOMATED: CPT | Performed by: ORTHOPAEDIC SURGERY

## 2018-04-30 PROCEDURE — 83036 HEMOGLOBIN GLYCOSYLATED A1C: CPT | Performed by: ORTHOPAEDIC SURGERY

## 2018-04-30 PROCEDURE — 73200 CT UPPER EXTREMITY W/O DYE: CPT

## 2018-04-30 PROCEDURE — 80048 BASIC METABOLIC PNL TOTAL CA: CPT | Performed by: ORTHOPAEDIC SURGERY

## 2018-04-30 PROCEDURE — 36415 COLL VENOUS BLD VENIPUNCTURE: CPT

## 2018-04-30 RX ORDER — BACITRACIN 500 UNIT/G
1 OINTMENT (GRAM) TOPICAL DAILY
COMMUNITY

## 2018-04-30 RX ORDER — MELATONIN
1000 DAILY
COMMUNITY

## 2018-04-30 RX ORDER — VITAMIN E 268 MG
400 CAPSULE ORAL DAILY
COMMUNITY

## 2018-04-30 RX ORDER — MULTIVITAMIN WITH IRON
250 TABLET ORAL DAILY
COMMUNITY

## 2018-04-30 RX ORDER — ALPHA LIPOIC ACID 300 MG
300 CAPSULE ORAL DAILY
COMMUNITY
End: 2019-05-08 | Stop reason: HOSPADM

## 2018-05-02 ENCOUNTER — OFFICE VISIT (OUTPATIENT)
Dept: GASTROENTEROLOGY | Facility: CLINIC | Age: 78
End: 2018-05-02

## 2018-05-02 VITALS
WEIGHT: 153.8 LBS | SYSTOLIC BLOOD PRESSURE: 124 MMHG | BODY MASS INDEX: 24.72 KG/M2 | TEMPERATURE: 97.2 F | OXYGEN SATURATION: 96 % | HEIGHT: 66 IN | DIASTOLIC BLOOD PRESSURE: 62 MMHG | HEART RATE: 75 BPM

## 2018-05-02 DIAGNOSIS — K51.90 ULCERATIVE COLITIS WITHOUT COMPLICATIONS, UNSPECIFIED LOCATION (HCC): Primary | ICD-10-CM

## 2018-05-02 PROCEDURE — 99214 OFFICE O/P EST MOD 30 MIN: CPT | Performed by: INTERNAL MEDICINE

## 2018-05-02 NOTE — PROGRESS NOTES
PCP: Hu Castillo MD    Chief Complaint   Patient presents with   • Follow-up       History of Present Illness:   HPI  Mr. Velarde returns to the office for a follow-up visit.  The patient denies any abdominal pain.  He has one or two bowel movements daily. The stool is formed.  There is no history of blood in the stool.  The patient denies any nausea or vomiting. Mr. Velarde is taking the mesalamine medication on a daily basis.  He states the medication since he began has completely resolved his symptoms.  He is scheduled for left shoulder surgery this Monday.  He denies any night sweats, fever or chills. There is no history of mouth ulcers or unexplained skin rash.  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    • Bilateral bunions    • Bladder cancer 1977    curative exfulguration   • DJD of left shoulder Adulthood    Severely advanced   • Dyslipidemia    • HTN (hypertension) 1990   • Hyperlipidemia    • Knee osteoarthritis 2004   • Mitral valve insufficiency    • Nephrolithiasis 2000    Documented on IVP   • Paroxysmal atrial fibrillation 2008   • Prostatism 1995   • Prostatitis    • Ulcerative colitis 1977    Well-controlled disease   • Wears glasses        Past Surgical History:   Procedure Laterality Date   • COLONOSCOPY  2015    Colitis in remission   • CYSTOSCOPY  1977    Excision of bladder cancer   • INGUINAL HERNIA REPAIR Right 1994   • KNEE SURGERY Right 1955    Excision of cyst   • TONSILLECTOMY  1943         Current Outpatient Prescriptions:   •  Alpha-Lipoic Acid 300 MG capsule, Take 300 mg by mouth Daily., Disp: , Rfl:   •  atorvastatin (LIPITOR) 40 MG tablet, TAKE 1 TABLET AT BEDTIME, Disp: 90 tablet, Rfl: 1  •  Calcium Carbonate-Vitamin D (CALCIUM 600+D PO), Take 1 tablet by mouth Daily., Disp: , Rfl:   •  CARTIA  MG  24 hr capsule, TAKE 1 CAPSULE DAILY, Disp: 90 capsule, Rfl: 1  •  cholecalciferol (VITAMIN D3) 1000 units tablet, Take 1,000 Units by mouth Daily., Disp: , Rfl:   •  DELZICOL 400 MG capsule delayed-release delayed release capsule, TAKE 2 CAPSULES TWICE A DAY, Disp: 360 capsule, Rfl: 1  •  digoxin (LANOXIN) 125 MCG tablet, TAKE ONE TABLET BY MOUTH DAILY, Disp: 90 tablet, Rfl: 1  •  Glucosamine-Chondroit-Vit C-Mn (GLUCOSAMINE 1500 COMPLEX) capsule, Take 1 tablet by mouth Daily., Disp: , Rfl:   •  lisinopril-hydrochlorothiazide (ZESTORETIC) 10-12.5 MG per tablet, Take 0.5 tablets by mouth Daily., Disp: 90 tablet, Rfl: 3  •  Magnesium 250 MG tablet, Take 250 mg by mouth Daily., Disp: , Rfl:   •  potassium chloride (K-DUR) 10 MEQ CR tablet, TAKE 1 TABLET DAILY, Disp: 90 tablet, Rfl: 1  •  Saw Palmetto 160 MG capsule, Take 1 capsule by mouth Daily., Disp: , Rfl:   •  Selenium 200 MCG capsule, Take 200 mcg by mouth Daily., Disp: , Rfl:   •  tamsulosin (FLOMAX) 0.4 MG capsule 24 hr capsule, TAKE ONE CAPSULE BY MOUTH EVERY NIGHT AT BEDTIME, Disp: 90 capsule, Rfl: 1  •  Ubiquinol 100 MG capsule, Take 100 mg by mouth Daily., Disp: , Rfl:   •  vitamin E 400 UNIT capsule, Take 400 Units by mouth Daily., Disp: , Rfl:   •  Zinc 50 MG capsule, Take 50 mg by mouth Daily., Disp: , Rfl:   •  aspirin-acetaminophen-caffeine (EXCEDRIN MIGRAINE) 250-250-65 MG per tablet, Take 1 tablet by mouth As Needed for Headache., Disp: , Rfl:   •  diclofenac (VOLTAREN) 75 MG EC tablet, TAKE 1 TABLET DAILY, Disp: 90 tablet, Rfl: 1  •  Fish Oil-Cholecalciferol (OMEGA-3 + D PO), Take 1 capsule by mouth Daily., Disp: , Rfl:   •  Multiple Vitamins-Minerals (MULTIVITAMIN ADULT PO), Take 1 tablet by mouth Daily., Disp: , Rfl:     Allergies   Allergen Reactions   • Sulfa Antibiotics Nausea Only and Other (See Comments)     Headache       Family History   Problem Relation Age of Onset   • Alzheimer's disease Mother       age 89   • Arthritis Mother    •  Hyperlipidemia Mother    • Angina Father    • Hypertension Father    • Pneumonia Father       age 70 - tobacco   • Aneurysm Father      abd aortic   • Unexplained death Brother    • Breast cancer Maternal Grandmother    • Arthritis Maternal Grandmother    • Diabetes Paternal Uncle        Social History     Social History   • Marital status:      Spouse name: N/A   • Number of children: N/A   • Years of education: N/A     Occupational History   • Not on file.     Social History Main Topics   • Smoking status: Never Smoker   • Smokeless tobacco: Never Used   • Alcohol use 0.6 oz/week     1 Cans of beer per week   • Drug use: No   • Sexual activity: Not on file     Other Topics Concern   • Not on file     Social History Narrative    Domestic life- lives in private home with wife        Voodoo- none listed        Sleep hygiene- sleeps 8 hours nightly good quality        Caffeine use- drinks 1 diet Mountain Dew daily and occasionally uses Excedrin        Exercise habits- disciplined exercise Program 5 days weekly with walking and weight lifting and yoga        Diet-   well-balanced American Heart Association - low in salt        Occupation- retired         Hearing- Hears well        Vision- Corrects well with bifocal glasses        Driving- No limitations               Review of Systems   Constitutional: Negative for activity change, appetite change, fatigue, fever and unexpected weight change.   HENT: Negative for dental problem, hearing loss, mouth sores, postnasal drip, sneezing, trouble swallowing and voice change.    Eyes: Negative for pain, redness, itching and visual disturbance.   Respiratory: Negative for cough, choking, chest tightness, shortness of breath and wheezing.    Cardiovascular: Negative for chest pain, palpitations and leg swelling.   Gastrointestinal: Negative for abdominal distention, abdominal pain, anal bleeding, blood in stool, constipation, diarrhea, nausea,  rectal pain and vomiting.   Endocrine: Negative for cold intolerance, heat intolerance, polydipsia, polyphagia and polyuria.   Genitourinary: Negative.  Negative for dysuria, enuresis, flank pain, hematuria and urgency.   Musculoskeletal: Negative for arthralgias, back pain, gait problem, joint swelling and myalgias.   Skin: Negative for color change, pallor and rash.   Allergic/Immunologic: Negative for environmental allergies, food allergies and immunocompromised state.   Neurological: Negative for dizziness, tremors, seizures, facial asymmetry, speech difficulty, numbness and headaches.   Hematological: Negative for adenopathy.   Psychiatric/Behavioral: Negative for behavioral problems, confusion, dysphoric mood, hallucinations and self-injury.       Vitals:    05/02/18 1449   BP: 124/62   Pulse: 75   Temp: 97.2 °F (36.2 °C)   SpO2: 96%       Physical Exam   Constitutional: He is oriented to person, place, and time. He appears well-nourished. No distress.   HENT:   Head: Normocephalic and atraumatic.   Mouth/Throat: Oropharynx is clear and moist. No oropharyngeal exudate.   Eyes: EOM are normal. No scleral icterus.   Neck: Neck supple. No thyromegaly present.   Cardiovascular: Normal rate, regular rhythm and normal heart sounds.  Exam reveals no gallop.    No murmur heard.  Pulmonary/Chest: Effort normal and breath sounds normal. He has no wheezes. He has no rales.   Abdominal: Soft. Bowel sounds are normal. There is no tenderness. There is no rebound and no guarding.   Musculoskeletal: Normal range of motion. He exhibits no edema or tenderness.   Lymphadenopathy:     He has no cervical adenopathy.   Neurological: He is alert and oriented to person, place, and time. He exhibits normal muscle tone.   Skin: Skin is dry. No erythema. No pallor.   Psychiatric: He has a normal mood and affect. His behavior is normal. Thought content normal.   Vitals reviewed.      Nathan was seen today for follow-up.    Diagnoses and  all orders for this visit:    Ulcerative colitis without complications, unspecified location    The patient has no clinical signs of acute exacerbation with regards to ulcerative colitis.  The examination from several years ago demonstrated quiescent disease in the rectum.      Plan: Continue mesalamine daily.            Follow up in 6 months.      I spent over 50% of the office visit counseling and answering questions from the patient.

## 2018-05-07 ENCOUNTER — ANESTHESIA EVENT (OUTPATIENT)
Dept: PERIOP | Facility: HOSPITAL | Age: 78
End: 2018-05-07

## 2018-05-07 ENCOUNTER — HOSPITAL ENCOUNTER (INPATIENT)
Facility: HOSPITAL | Age: 78
LOS: 1 days | Discharge: HOME | End: 2018-05-08
Attending: ORTHOPAEDIC SURGERY | Admitting: ORTHOPAEDIC SURGERY

## 2018-05-07 ENCOUNTER — ANESTHESIA (OUTPATIENT)
Dept: PERIOP | Facility: HOSPITAL | Age: 78
End: 2018-05-07

## 2018-05-07 ENCOUNTER — APPOINTMENT (OUTPATIENT)
Dept: GENERAL RADIOLOGY | Facility: HOSPITAL | Age: 78
End: 2018-05-07

## 2018-05-07 DIAGNOSIS — Z74.09 IMPAIRED FUNCTIONAL MOBILITY, BALANCE, GAIT, AND ENDURANCE: ICD-10-CM

## 2018-05-07 DIAGNOSIS — Z74.09 IMPAIRED MOBILITY AND ADLS: Primary | ICD-10-CM

## 2018-05-07 DIAGNOSIS — Z78.9 IMPAIRED MOBILITY AND ADLS: Primary | ICD-10-CM

## 2018-05-07 PROBLEM — Z96.612 STATUS POST TOTAL SHOULDER ARTHROPLASTY, LEFT: Status: ACTIVE | Noted: 2018-05-07

## 2018-05-07 LAB — POTASSIUM BLDA-SCNC: 3.83 MMOL/L (ref 3.5–5.3)

## 2018-05-07 PROCEDURE — 0LS40ZZ REPOSITION LEFT UPPER ARM TENDON, OPEN APPROACH: ICD-10-PCS | Performed by: ORTHOPAEDIC SURGERY

## 2018-05-07 PROCEDURE — 25010000003 CEFAZOLIN IN DEXTROSE 2-4 GM/100ML-% SOLUTION: Performed by: ORTHOPAEDIC SURGERY

## 2018-05-07 PROCEDURE — 97530 THERAPEUTIC ACTIVITIES: CPT | Performed by: OCCUPATIONAL THERAPIST

## 2018-05-07 PROCEDURE — C1713 ANCHOR/SCREW BN/BN,TIS/BN: HCPCS | Performed by: ORTHOPAEDIC SURGERY

## 2018-05-07 PROCEDURE — 94799 UNLISTED PULMONARY SVC/PX: CPT

## 2018-05-07 PROCEDURE — 25010000002 ONDANSETRON PER 1 MG: Performed by: NURSE ANESTHETIST, CERTIFIED REGISTERED

## 2018-05-07 PROCEDURE — 84132 ASSAY OF SERUM POTASSIUM: CPT | Performed by: ANESTHESIOLOGY

## 2018-05-07 PROCEDURE — 25010000002 PROPOFOL 10 MG/ML EMULSION: Performed by: NURSE ANESTHETIST, CERTIFIED REGISTERED

## 2018-05-07 PROCEDURE — 25010000002 DEXAMETHASONE PER 1 MG: Performed by: NURSE ANESTHETIST, CERTIFIED REGISTERED

## 2018-05-07 PROCEDURE — 99221 1ST HOSP IP/OBS SF/LOW 40: CPT | Performed by: HOSPITALIST

## 2018-05-07 PROCEDURE — 25010000002 NEOSTIGMINE PER 0.5 MG: Performed by: NURSE ANESTHETIST, CERTIFIED REGISTERED

## 2018-05-07 PROCEDURE — 25010000002 ROPIVACAINE HCL-NACL 0.2-0.9 % SOLUTION

## 2018-05-07 PROCEDURE — 25010000002 PHENYLEPHRINE PER 1 ML: Performed by: NURSE ANESTHETIST, CERTIFIED REGISTERED

## 2018-05-07 PROCEDURE — 73030 X-RAY EXAM OF SHOULDER: CPT

## 2018-05-07 PROCEDURE — 97166 OT EVAL MOD COMPLEX 45 MIN: CPT | Performed by: OCCUPATIONAL THERAPIST

## 2018-05-07 PROCEDURE — C1776 JOINT DEVICE (IMPLANTABLE): HCPCS | Performed by: ORTHOPAEDIC SURGERY

## 2018-05-07 PROCEDURE — 0RRK0JZ REPLACEMENT OF LEFT SHOULDER JOINT WITH SYNTHETIC SUBSTITUTE, OPEN APPROACH: ICD-10-PCS | Performed by: ORTHOPAEDIC SURGERY

## 2018-05-07 PROCEDURE — L3670 SO ACRO/CLAV CAN WEB PRE OTS: HCPCS | Performed by: ORTHOPAEDIC SURGERY

## 2018-05-07 PROCEDURE — 25010000002 VANCOMYCIN PER 500 MG: Performed by: ORTHOPAEDIC SURGERY

## 2018-05-07 PROCEDURE — 25010000002 FENTANYL CITRATE (PF) 100 MCG/2ML SOLUTION: Performed by: ANESTHESIOLOGY

## 2018-05-07 DEVICE — SCRW EQUINOXE TORQ DEFINE KT SQ: Type: IMPLANTABLE DEVICE | Site: SHOULDER | Status: FUNCTIONAL

## 2018-05-07 DEVICE — IMPLANTABLE DEVICE: Type: IMPLANTABLE DEVICE | Site: SHOULDER | Status: FUNCTIONAL

## 2018-05-07 DEVICE — HD HUM EQUINOXE SHT 44MM: Type: IMPLANTABLE DEVICE | Site: SHOULDER | Status: FUNCTIONAL

## 2018-05-07 DEVICE — PLT HUM EQUINOXE REPLICAT OFFST 1.5: Type: IMPLANTABLE DEVICE | Site: SHOULDER | Status: FUNCTIONAL

## 2018-05-07 DEVICE — TOTL SHLDR ARTHROPLASTY SYS: Type: IMPLANTABLE DEVICE | Site: SHOULDER | Status: FUNCTIONAL

## 2018-05-07 DEVICE — STEM HUM PRI EQUINOXE PRESSFIT 13MM: Type: IMPLANTABLE DEVICE | Site: SHOULDER | Status: FUNCTIONAL

## 2018-05-07 DEVICE — SMARTSET HIGH PERFORMANCE MV MEDIUM VISCOSITY BONE CEMENT 40G
Type: IMPLANTABLE DEVICE | Site: SHOULDER | Status: FUNCTIONAL
Brand: SMARTSET

## 2018-05-07 RX ORDER — LABETALOL HYDROCHLORIDE 5 MG/ML
5 INJECTION, SOLUTION INTRAVENOUS
Status: DISCONTINUED | OUTPATIENT
Start: 2018-05-07 | End: 2018-05-07 | Stop reason: HOSPADM

## 2018-05-07 RX ORDER — VANCOMYCIN HYDROCHLORIDE 1 G/200ML
15 INJECTION, SOLUTION INTRAVENOUS ONCE
Status: COMPLETED | OUTPATIENT
Start: 2018-05-07 | End: 2018-05-07

## 2018-05-07 RX ORDER — MAGNESIUM HYDROXIDE 1200 MG/15ML
LIQUID ORAL AS NEEDED
Status: DISCONTINUED | OUTPATIENT
Start: 2018-05-07 | End: 2018-05-07 | Stop reason: HOSPADM

## 2018-05-07 RX ORDER — DEXAMETHASONE SODIUM PHOSPHATE 4 MG/ML
INJECTION, SOLUTION INTRA-ARTICULAR; INTRALESIONAL; INTRAMUSCULAR; INTRAVENOUS; SOFT TISSUE AS NEEDED
Status: DISCONTINUED | OUTPATIENT
Start: 2018-05-07 | End: 2018-05-07 | Stop reason: SURG

## 2018-05-07 RX ORDER — DIGOXIN 125 MCG
125 TABLET ORAL DAILY
Status: DISCONTINUED | OUTPATIENT
Start: 2018-05-08 | End: 2018-05-08 | Stop reason: HOSPADM

## 2018-05-07 RX ORDER — CEFAZOLIN SODIUM 2 G/100ML
2 INJECTION, SOLUTION INTRAVENOUS ONCE
Status: COMPLETED | OUTPATIENT
Start: 2018-05-07 | End: 2018-05-07

## 2018-05-07 RX ORDER — ATORVASTATIN CALCIUM 40 MG/1
40 TABLET, FILM COATED ORAL NIGHTLY
Status: DISCONTINUED | OUTPATIENT
Start: 2018-05-07 | End: 2018-05-08 | Stop reason: HOSPADM

## 2018-05-07 RX ORDER — BISACODYL 5 MG/1
10 TABLET, DELAYED RELEASE ORAL DAILY PRN
Status: DISCONTINUED | OUTPATIENT
Start: 2018-05-07 | End: 2018-05-08 | Stop reason: HOSPADM

## 2018-05-07 RX ORDER — DOCUSATE SODIUM 100 MG/1
100 CAPSULE, LIQUID FILLED ORAL 2 TIMES DAILY PRN
Status: DISCONTINUED | OUTPATIENT
Start: 2018-05-07 | End: 2018-05-08 | Stop reason: HOSPADM

## 2018-05-07 RX ORDER — NALOXONE HCL 0.4 MG/ML
0.1 VIAL (ML) INJECTION
Status: DISCONTINUED | OUTPATIENT
Start: 2018-05-07 | End: 2018-05-08 | Stop reason: HOSPADM

## 2018-05-07 RX ORDER — ATRACURIUM BESYLATE 10 MG/ML
INJECTION, SOLUTION INTRAVENOUS AS NEEDED
Status: DISCONTINUED | OUTPATIENT
Start: 2018-05-07 | End: 2018-05-07 | Stop reason: SURG

## 2018-05-07 RX ORDER — ZINC GLUCONATE 50 MG
50 TABLET ORAL DAILY
Status: DISCONTINUED | OUTPATIENT
Start: 2018-05-07 | End: 2018-05-08 | Stop reason: HOSPADM

## 2018-05-07 RX ORDER — MESALAMINE 400 MG/1
800 CAPSULE, DELAYED RELEASE ORAL 2 TIMES DAILY
Status: DISCONTINUED | OUTPATIENT
Start: 2018-05-07 | End: 2018-05-08 | Stop reason: HOSPADM

## 2018-05-07 RX ORDER — PROPOFOL 10 MG/ML
VIAL (ML) INTRAVENOUS AS NEEDED
Status: DISCONTINUED | OUTPATIENT
Start: 2018-05-07 | End: 2018-05-07 | Stop reason: SURG

## 2018-05-07 RX ORDER — FAMOTIDINE 20 MG/1
20 TABLET, FILM COATED ORAL ONCE
Status: COMPLETED | OUTPATIENT
Start: 2018-05-07 | End: 2018-05-07

## 2018-05-07 RX ORDER — HYDROCODONE BITARTRATE AND ACETAMINOPHEN 10; 325 MG/1; MG/1
1 TABLET ORAL EVERY 4 HOURS PRN
Status: DISCONTINUED | OUTPATIENT
Start: 2018-05-07 | End: 2018-05-08 | Stop reason: HOSPADM

## 2018-05-07 RX ORDER — ONDANSETRON 2 MG/ML
4 INJECTION INTRAMUSCULAR; INTRAVENOUS ONCE AS NEEDED
Status: DISCONTINUED | OUTPATIENT
Start: 2018-05-07 | End: 2018-05-07 | Stop reason: HOSPADM

## 2018-05-07 RX ORDER — LIDOCAINE HYDROCHLORIDE 10 MG/ML
0.5 INJECTION, SOLUTION EPIDURAL; INFILTRATION; INTRACAUDAL; PERINEURAL ONCE AS NEEDED
Status: COMPLETED | OUTPATIENT
Start: 2018-05-07 | End: 2018-05-07

## 2018-05-07 RX ORDER — ACETAMINOPHEN, ASPIRIN AND CAFFEINE 250; 250; 65 MG/1; MG/1; MG/1
1 TABLET, FILM COATED ORAL AS NEEDED
Status: DISCONTINUED | OUTPATIENT
Start: 2018-05-07 | End: 2018-05-08 | Stop reason: HOSPADM

## 2018-05-07 RX ORDER — SODIUM CHLORIDE 450 MG/100ML
50 INJECTION, SOLUTION INTRAVENOUS CONTINUOUS
Status: DISCONTINUED | OUTPATIENT
Start: 2018-05-07 | End: 2018-05-08 | Stop reason: HOSPADM

## 2018-05-07 RX ORDER — DILTIAZEM HYDROCHLORIDE 180 MG/1
180 CAPSULE, COATED, EXTENDED RELEASE ORAL DAILY
Status: DISCONTINUED | OUTPATIENT
Start: 2018-05-08 | End: 2018-05-08 | Stop reason: HOSPADM

## 2018-05-07 RX ORDER — GLYCOPYRROLATE 0.2 MG/ML
INJECTION INTRAMUSCULAR; INTRAVENOUS AS NEEDED
Status: DISCONTINUED | OUTPATIENT
Start: 2018-05-07 | End: 2018-05-07 | Stop reason: SURG

## 2018-05-07 RX ORDER — LIDOCAINE HYDROCHLORIDE 10 MG/ML
INJECTION, SOLUTION INFILTRATION; PERINEURAL AS NEEDED
Status: DISCONTINUED | OUTPATIENT
Start: 2018-05-07 | End: 2018-05-07 | Stop reason: SURG

## 2018-05-07 RX ORDER — MELATONIN
1000 DAILY
Status: DISCONTINUED | OUTPATIENT
Start: 2018-05-07 | End: 2018-05-08 | Stop reason: HOSPADM

## 2018-05-07 RX ORDER — ROPIVACAINE IN 0.9% SOD CHL/PF 0.2% 545ML
6 ELASTOMERIC PUMP, HI VARIABLE RATE INJECTION CONTINUOUS
Status: DISCONTINUED | OUTPATIENT
Start: 2018-05-07 | End: 2018-05-08 | Stop reason: HOSPADM

## 2018-05-07 RX ORDER — FENTANYL CITRATE 50 UG/ML
INJECTION, SOLUTION INTRAMUSCULAR; INTRAVENOUS AS NEEDED
Status: DISCONTINUED | OUTPATIENT
Start: 2018-05-07 | End: 2018-05-07 | Stop reason: SURG

## 2018-05-07 RX ORDER — VITAMIN E 268 MG
400 CAPSULE ORAL DAILY
Status: DISCONTINUED | OUTPATIENT
Start: 2018-05-07 | End: 2018-05-08 | Stop reason: HOSPADM

## 2018-05-07 RX ORDER — ONDANSETRON 2 MG/ML
INJECTION INTRAMUSCULAR; INTRAVENOUS AS NEEDED
Status: DISCONTINUED | OUTPATIENT
Start: 2018-05-07 | End: 2018-05-07 | Stop reason: SURG

## 2018-05-07 RX ORDER — BUPIVACAINE HYDROCHLORIDE 2.5 MG/ML
INJECTION, SOLUTION EPIDURAL; INFILTRATION; INTRACAUDAL AS NEEDED
Status: DISCONTINUED | OUTPATIENT
Start: 2018-05-07 | End: 2018-05-07 | Stop reason: SURG

## 2018-05-07 RX ORDER — SODIUM CHLORIDE, SODIUM LACTATE, POTASSIUM CHLORIDE, CALCIUM CHLORIDE 600; 310; 30; 20 MG/100ML; MG/100ML; MG/100ML; MG/100ML
9 INJECTION, SOLUTION INTRAVENOUS CONTINUOUS
Status: DISCONTINUED | OUTPATIENT
Start: 2018-05-07 | End: 2018-05-08 | Stop reason: HOSPADM

## 2018-05-07 RX ORDER — TAMSULOSIN HYDROCHLORIDE 0.4 MG/1
0.4 CAPSULE ORAL NIGHTLY
Status: DISCONTINUED | OUTPATIENT
Start: 2018-05-07 | End: 2018-05-08 | Stop reason: HOSPADM

## 2018-05-07 RX ORDER — HYDROMORPHONE HYDROCHLORIDE 1 MG/ML
0.5 INJECTION, SOLUTION INTRAMUSCULAR; INTRAVENOUS; SUBCUTANEOUS
Status: DISCONTINUED | OUTPATIENT
Start: 2018-05-07 | End: 2018-05-08 | Stop reason: HOSPADM

## 2018-05-07 RX ORDER — SODIUM CHLORIDE 0.9 % (FLUSH) 0.9 %
1-10 SYRINGE (ML) INJECTION AS NEEDED
Status: DISCONTINUED | OUTPATIENT
Start: 2018-05-07 | End: 2018-05-07 | Stop reason: HOSPADM

## 2018-05-07 RX ADMIN — Medication 3 MG: at 12:17

## 2018-05-07 RX ADMIN — EPHEDRINE SULFATE 10 MG: 50 INJECTION INTRAMUSCULAR; INTRAVENOUS; SUBCUTANEOUS at 10:40

## 2018-05-07 RX ADMIN — EPHEDRINE SULFATE 5 MG: 50 INJECTION INTRAMUSCULAR; INTRAVENOUS; SUBCUTANEOUS at 10:38

## 2018-05-07 RX ADMIN — DEXAMETHASONE SODIUM PHOSPHATE 4 MG: 4 INJECTION, SOLUTION INTRAMUSCULAR; INTRAVENOUS at 10:45

## 2018-05-07 RX ADMIN — VANCOMYCIN HYDROCHLORIDE 1000 MG: 1 INJECTION, SOLUTION INTRAVENOUS at 09:29

## 2018-05-07 RX ADMIN — HYDROCODONE BITARTRATE AND ACETAMINOPHEN 1 TABLET: 10; 325 TABLET ORAL at 23:10

## 2018-05-07 RX ADMIN — BUPIVACAINE HYDROCHLORIDE 20 ML: 2.5 INJECTION, SOLUTION EPIDURAL; INFILTRATION; INTRACAUDAL; PERINEURAL at 09:06

## 2018-05-07 RX ADMIN — ATRACURIUM BESYLATE 30 MG: 10 INJECTION, SOLUTION INTRAVENOUS at 10:28

## 2018-05-07 RX ADMIN — EPHEDRINE SULFATE 5 MG: 50 INJECTION INTRAMUSCULAR; INTRAVENOUS; SUBCUTANEOUS at 11:27

## 2018-05-07 RX ADMIN — VANCOMYCIN HYDROCHLORIDE 1000 MG: 1 INJECTION, SOLUTION INTRAVENOUS at 20:35

## 2018-05-07 RX ADMIN — MESALAMINE 800 MG: 400 CAPSULE, DELAYED RELEASE ORAL at 20:36

## 2018-05-07 RX ADMIN — SODIUM CHLORIDE 50 ML/HR: 4.5 INJECTION, SOLUTION INTRAVENOUS at 14:15

## 2018-05-07 RX ADMIN — FAMOTIDINE 20 MG: 20 TABLET ORAL at 08:20

## 2018-05-07 RX ADMIN — PHENYLEPHRINE HYDROCHLORIDE 80 MCG: 10 INJECTION INTRAVENOUS at 10:42

## 2018-05-07 RX ADMIN — FENTANYL CITRATE 100 MCG: 50 INJECTION, SOLUTION INTRAMUSCULAR; INTRAVENOUS at 09:06

## 2018-05-07 RX ADMIN — LIDOCAINE HYDROCHLORIDE 0.5 ML: 10 INJECTION, SOLUTION EPIDURAL; INFILTRATION; INTRACAUDAL; PERINEURAL at 08:30

## 2018-05-07 RX ADMIN — EPHEDRINE SULFATE 5 MG: 50 INJECTION INTRAMUSCULAR; INTRAVENOUS; SUBCUTANEOUS at 10:44

## 2018-05-07 RX ADMIN — EPHEDRINE SULFATE 10 MG: 50 INJECTION INTRAMUSCULAR; INTRAVENOUS; SUBCUTANEOUS at 11:24

## 2018-05-07 RX ADMIN — ATORVASTATIN CALCIUM 40 MG: 40 TABLET, FILM COATED ORAL at 20:36

## 2018-05-07 RX ADMIN — PHENYLEPHRINE HYDROCHLORIDE 80 MCG: 10 INJECTION INTRAVENOUS at 10:44

## 2018-05-07 RX ADMIN — ATRACURIUM BESYLATE 10 MG: 10 INJECTION, SOLUTION INTRAVENOUS at 11:04

## 2018-05-07 RX ADMIN — PHENYLEPHRINE HYDROCHLORIDE 80 MCG: 10 INJECTION INTRAVENOUS at 11:16

## 2018-05-07 RX ADMIN — PHENYLEPHRINE HYDROCHLORIDE 0.5 MCG/KG/MIN: 10 INJECTION INTRAVENOUS at 11:24

## 2018-05-07 RX ADMIN — PHENYLEPHRINE HYDROCHLORIDE 80 MCG: 10 INJECTION INTRAVENOUS at 11:12

## 2018-05-07 RX ADMIN — TAMSULOSIN HYDROCHLORIDE 0.4 MG: 0.4 CAPSULE ORAL at 20:36

## 2018-05-07 RX ADMIN — EPHEDRINE SULFATE 10 MG: 50 INJECTION INTRAMUSCULAR; INTRAVENOUS; SUBCUTANEOUS at 11:33

## 2018-05-07 RX ADMIN — PROPOFOL 120 MG: 10 INJECTION, EMULSION INTRAVENOUS at 10:27

## 2018-05-07 RX ADMIN — LIDOCAINE HYDROCHLORIDE 30 MG: 10 INJECTION, SOLUTION INFILTRATION; PERINEURAL at 10:27

## 2018-05-07 RX ADMIN — VITAMIN D, TAB 1000IU (100/BT) 1000 UNITS: 25 TAB at 14:32

## 2018-05-07 RX ADMIN — Medication 50 MG: at 14:32

## 2018-05-07 RX ADMIN — EPHEDRINE SULFATE 10 MG: 50 INJECTION INTRAMUSCULAR; INTRAVENOUS; SUBCUTANEOUS at 11:16

## 2018-05-07 RX ADMIN — EPHEDRINE SULFATE 5 MG: 50 INJECTION INTRAMUSCULAR; INTRAVENOUS; SUBCUTANEOUS at 10:42

## 2018-05-07 RX ADMIN — ONDANSETRON 4 MG: 2 INJECTION INTRAMUSCULAR; INTRAVENOUS at 11:53

## 2018-05-07 RX ADMIN — PHENYLEPHRINE HYDROCHLORIDE 80 MCG: 10 INJECTION INTRAVENOUS at 10:40

## 2018-05-07 RX ADMIN — CEFAZOLIN SODIUM 2 G: 2 INJECTION, SOLUTION INTRAVENOUS at 10:00

## 2018-05-07 RX ADMIN — EPHEDRINE SULFATE 10 MG: 50 INJECTION INTRAMUSCULAR; INTRAVENOUS; SUBCUTANEOUS at 11:12

## 2018-05-07 RX ADMIN — VITAMIN E CAP 400 UNIT 400 UNITS: 400 CAP at 14:32

## 2018-05-07 RX ADMIN — GLYCOPYRROLATE 0.4 MG: 0.2 INJECTION, SOLUTION INTRAMUSCULAR; INTRAVENOUS at 12:17

## 2018-05-07 RX ADMIN — SODIUM CHLORIDE, POTASSIUM CHLORIDE, SODIUM LACTATE AND CALCIUM CHLORIDE 9 ML/HR: 600; 310; 30; 20 INJECTION, SOLUTION INTRAVENOUS at 08:30

## 2018-05-07 RX ADMIN — BUPIVACAINE HYDROCHLORIDE 5 ML: 2.5 INJECTION, SOLUTION EPIDURAL; INFILTRATION; INTRACAUDAL; PERINEURAL at 12:07

## 2018-05-07 NOTE — ANESTHESIA PREPROCEDURE EVALUATION
Anesthesia Evaluation     Patient summary reviewed and Nursing notes reviewed   no history of anesthetic complications:  NPO Solid Status: > 8 hours  NPO Liquid Status: > 8 hours           Airway   Mallampati: II  TM distance: >3 FB  Neck ROM: full  No difficulty expected  Dental      Pulmonary - negative pulmonary ROS and normal exam   Cardiovascular - normal exam    (+) hypertension, valvular problems/murmurs MR, dysrhythmias Atrial Fib, hyperlipidemia,       Neuro/Psych- negative ROS  GI/Hepatic/Renal/Endo      Musculoskeletal     Abdominal    Substance History      OB/GYN          Other   (+) arthritis                     Anesthesia Plan    ASA 2     general and regional     intravenous induction   Anesthetic plan and risks discussed with patient.    Plan discussed with CRNA.

## 2018-05-07 NOTE — CONSULTS
[facility] Medicine Services  CONSULT NOTE      Patient Name: Nathan Velarde  : 1940  MRN: [id number]    Primary Care Physician: Hu Castillo MD  Referring Provider: Abner Humphrey MD    Subjective   Subjective     Reason for Consultation:  Medical management s/p left total shoulder arthroplasty    HPI:  Nathan Velarde is a 77 y.o. male with hx of paroxysmal Afib (not on anticoagulation), HTN, HLD, CKD, ulcerative colitis, and osteoarthritis presents for left total shoulder arthroplasty with Dr. Humphrey today. Hospitalists consulted post-op for medical management. Patient states he has been doing well leading up to surgery. His ulcerative colitis is well controlled on mesalamine and he follows with Dr. Feliciano. His Afib has been controlled with Digoxin and he is not on any anticoagulation. Denies any recent medication changes. No fevers, chills. Does complain of a dry cough that began this morning when he woke up. No SOA or chest pain.       Review of Systems      Otherwise 10-system ROS reviewed and is negative except as mentioned in the HPI.      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    • Bilateral bunions     LEFT SIDE ONLY   • Bladder cancer     curative exfulguration   • DJD of left shoulder Adulthood    Severely advanced   • Dyslipidemia    • HTN (hypertension)    • Hyperlipidemia    • Knee osteoarthritis    • Mitral valve insufficiency    • Nephrolithiasis     Documented on IVP   • Paroxysmal atrial fibrillation    • Prostatism    • Prostatitis     pt denies   • Ulcerative colitis     Well-controlled disease   • Wears glasses        Past Surgical History:   Procedure Laterality Date   • COLONOSCOPY      Colitis in remission   • CYSTOSCOPY       Excision of bladder cancer   • INGUINAL HERNIA REPAIR Right 1994   • KNEE SURGERY Right 1955    Excision of cyst   • TONSILLECTOMY  1943       Family History: family history includes Alzheimer's disease in his mother; Aneurysm in his father; Angina in his father; Arthritis in his maternal grandmother and mother; Breast cancer in his maternal grandmother; Diabetes in his paternal uncle; Hyperlipidemia in his mother; Hypertension in his father; Pneumonia in his father; Unexplained death in his brother.     Social History:  reports that he has never smoked. He has never used smokeless tobacco. He reports that he drinks about 0.6 oz of alcohol per week . He reports that he does not use drugs.    Medications:  Prescriptions Prior to Admission   Medication Sig Dispense Refill Last Dose   • Alpha-Lipoic Acid 300 MG capsule Take 300 mg by mouth Daily.   5/6/2018 at AM   • aspirin-acetaminophen-caffeine (EXCEDRIN MIGRAINE) 250-250-65 MG per tablet Take 1 tablet by mouth As Needed for Headache.   Past Month at Unknown time   • atorvastatin (LIPITOR) 40 MG tablet TAKE 1 TABLET AT BEDTIME 90 tablet 1 5/6/2018 at AM   • Calcium Carbonate-Vitamin D (CALCIUM 600+D PO) Take 1 tablet by mouth Daily.   5/6/2018 at AM   • CARTIA  MG 24 hr capsule TAKE 1 CAPSULE DAILY 90 capsule 1 5/7/2018 at AM   • cholecalciferol (VITAMIN D3) 1000 units tablet Take 1,000 Units by mouth Daily.   5/6/2018 at AM   • DELZICOL 400 MG capsule delayed-release delayed release capsule TAKE 2 CAPSULES TWICE A  capsule 1 5/6/2018 at PM   • digoxin (LANOXIN) 125 MCG tablet TAKE ONE TABLET BY MOUTH DAILY 90 tablet 1 5/7/2018 at AM   • lisinopril-hydrochlorothiazide (ZESTORETIC) 10-12.5 MG per tablet Take 0.5 tablets by mouth Daily. 90 tablet 3 5/7/2018 at AM   • Magnesium 250 MG tablet Take 250 mg by mouth Daily.   5/6/2018 at AM   • Multiple Vitamins-Minerals (MULTIVITAMIN ADULT PO) Take 1 tablet by mouth Daily.   4/30/2018   • potassium chloride  (K-DUR) 10 MEQ CR tablet TAKE 1 TABLET DAILY 90 tablet 1 5/6/2018 at AM   • Saw Palmetto 160 MG capsule Take 1 capsule by mouth Daily.   5/6/2018 at AM   • Selenium 200 MCG capsule Take 200 mcg by mouth Daily.   5/6/2018 at AM   • tamsulosin (FLOMAX) 0.4 MG capsule 24 hr capsule TAKE ONE CAPSULE BY MOUTH EVERY NIGHT AT BEDTIME 90 capsule 1 5/6/2018 at PM   • Ubiquinol 100 MG capsule Take 100 mg by mouth Daily.   5/6/2018 at AM   • Zinc 50 MG capsule Take 50 mg by mouth Daily.   5/6/2018 at AM   • diclofenac (VOLTAREN) 75 MG EC tablet TAKE 1 TABLET DAILY 90 tablet 1 4/30/2018   • Fish Oil-Cholecalciferol (OMEGA-3 + D PO) Take 1 capsule by mouth Daily.   AM   • Glucosamine-Chondroit-Vit C-Mn (GLUCOSAMINE 1500 COMPLEX) capsule Take 1 tablet by mouth Daily.   Taking   • vitamin E 400 UNIT capsule Take 400 Units by mouth Daily.   4/30/2018       Allergies   Allergen Reactions   • Sulfa Antibiotics Nausea Only and Other (See Comments)     Headache       Objective   Objective     Vital Signs:   Temp:  [97.2 °F (36.2 °C)-98.3 °F (36.8 °C)] 97.2 °F (36.2 °C)  Heart Rate:  [64-77] 67  Resp:  [15-18] 16  BP: (122-188)/(54-88) 130/57     Physical Exam  Constitutional: No acute distress, awake, alert  Eyes: PERRLA, sclerae anicteric, no conjunctival injection  HENT: NCAT, mucous membranes moist  Neck: Supple, no thyromegaly, no lymphadenopathy, trachea midline  Respiratory: Clear to auscultation bilaterally, nonlabored respirations   Cardiovascular: RRR, no murmurs, rubs, or gallops, palpable pedal pulses bilaterally  Gastrointestinal: Positive bowel sounds, soft, nontender, nondistended  Musculoskeletal: No bilateral ankle edema, no clubbing or cyanosis to extremities  Psychiatric: Appropriate affect, cooperative  Neurologic: Oriented x 3, strength symmetric in all extremities, Cranial Nerves grossly intact to confrontation, speech clear  Skin: No rashes      Results Reviewed:  I have personally reviewed current lab, radiology,  and data and agree.      Results from last 7 days  Lab Units 04/30/18  1400   WBC 10*3/mm3 9.43   HEMOGLOBIN g/dL 14.1   HEMATOCRIT % 44.0   PLATELETS 10*3/mm3 300       Results from last 7 days  Lab Units 04/30/18  1400   SODIUM mmol/L 141   POTASSIUM mmol/L 4.4   CHLORIDE mmol/L 105   CO2 mmol/L 32.0*   BUN mg/dL 36*   CREATININE mg/dL 1.30   GLUCOSE mg/dL 119*   CALCIUM mg/dL 10.0     Estimated Creatinine Clearance: 45.5 mL/min (by C-G formula based on SCr of 1.3 mg/dL).  Brief Urine Lab Results  (Last result in the past 365 days)      Color   Clarity   Blood   Leuk Est   Nitrite   Protein   CREAT   Urine HCG        06/02/17 1205 Dark Yellow(A) Clear Negative Negative Negative Negative             No results found for: BNP  No results found for: PHART    Microbiology Results Abnormal     None          Imaging Results (last 24 hours)     Procedure Component Value Units Date/Time    XR Shoulder 2+ View Left [[id number]] Updated:  05/07/18 1344             Assessment/Plan   Assessment / Plan     Hospital Problem List     * (Principal)Status post total shoulder arthroplasty, left    Paroxysmal atrial fibrillation    Ulcerative colitis    Hypertension          78 yo M dwith hx of paroxysmal Afib (not on anticoagulation), HTN, HLD, CKD, ulcerative colitis, and osteoarthritis presents for left total shoulder arthroplasty with Dr. Humphrey. Hospitalists consulted post-op for medical management.       Plan:  --Resume home meds.   --CXR reviewed, no acute findings. Encourage incentive spirometry. On room air.   --Ambulate.  --Repeat labs in AM.       Thank you for allowing [facility] Medicine Service to provide consultative care for your patient, we will continue to follow while clinically appropriate.    Electronically signed by Yolanda Pillai MD, 05/07/18, 1:58 PM

## 2018-05-07 NOTE — ANESTHESIA PROCEDURE NOTES
Airway  Urgency: elective    Airway not difficult    General Information and Staff    Patient location during procedure: OR  CRNA: RAYMUNDO ROBLES    Indications and Patient Condition  Indications for airway management: airway protection    Preoxygenated: yes  MILS not maintained throughout  Mask difficulty assessment: 1 - vent by mask    Final Airway Details  Final airway type: endotracheal airway      Successful airway: ETT  Cuffed: yes   Successful intubation technique: direct laryngoscopy  Facilitating devices/methods: Bougie and anterior pressure/BURP  Endotracheal tube insertion site: oral  Blade: Clarence  Blade size: #3  ETT size: 7.5 mm  Cormack-Lehane Classification: grade IIa - partial view of glottis  Placement verified by: chest auscultation and capnometry   Inital cuff pressure (cm H2O): 22  Measured from: lips  ETT to lips (cm): 20  Number of attempts at approach: 1    Additional Comments  Negative epigastric sounds, Breath sound equal bilaterally with symmetric chest rise and fall

## 2018-05-07 NOTE — ANESTHESIA PROCEDURE NOTES
Peripheral Block    Patient location during procedure: pre-op  Reason for block: at surgeon's request and post-op pain management  Preanesthetic Checklist  Completed: patient identified, site marked, surgical consent, pre-op evaluation, timeout performed, IV checked, risks and benefits discussed and monitors and equipment checked  Prep:  Sterile barriers:cap, gloves, mask and sterile barriers  Prep: ChloraPrep  Patient monitoring: blood pressure monitoring, continuous pulse oximetry and EKG  Procedure  Sedation:yes    Guidance:ultrasound guided  Images:still images obtained    Block Type:interscalene  Injection Technique:catheter  Needle Type:Tuohy and echogenic  Needle Gauge:18 G    Catheter Size:20 G (20g)  Medications  Local Injected:bupivacaine 0.25% Local Amount Injected:15mL  Post Assessment  Injection Assessment: negative aspiration for heme, no paresthesia on injection and incremental injection  Patient Tolerance:comfortable throughout block  Complications:no  Additional Notes  Procedure:                 The pt was placed in semifowlers position with a slight tilt of the thorax contralateral to the insertion site.  The Insertion Site was prepped and draped in sterile fashion.  The pt was anesthetized with  IV Sedation( see meds) and  Skin and cutaneous tissue was infiltrated and anesthetized with 1% Lidocaine 3 mls via a 25g needle.  Utilizing ultrasound guidance, a BBraun 2 inch 18 g Contiplex echogenic touhy needle was advanced in-plane.  Hydro dissection of tissue was achieved with Normal saline. Major vessels(carotid and Internal Jugular) where visualized as the brachial plexus was approached at the approximate level of C-7/ T-1.  Cervical 5 and Branches of Cervical 6 nerve roots where visualized and the needle tip was placed posterior at the level of C-6 roots.  LA spread was visualized and injection was made incrementally every 5 mls with aspiration. Injection pressure was normal or little, there was  no intraneural injection, no vascular injection.      The BBraun 20 g wire stylet  catheter was then placed under US guidance on the posterior aspect of the Brachial Plexus.  Location of catheter was confirmed with NS injection visualized with US . The tuohy was then removed and the skin was sealed with Skin AFix at catheter insertion site.  Skin was prepped with mastisol and the labeled catheter  was secured with steristrips and a CHG tegaderm. Thank You.

## 2018-05-07 NOTE — ANESTHESIA POSTPROCEDURE EVALUATION
Patient: Nathan Velarde    Procedure Summary     Date:  05/07/18 Room / Location:   [facility]    Anesthesia Start:  1022 Anesthesia Stop:      Procedures:       LEFT TOTAL SHOULDER ARTHROPLASTY (Left Shoulder)      BICEPS TENDODESIS (Left Shoulder) Diagnosis:      Surgeon:  Abner Humphrey MD Provider:  Brant Patel MD    Anesthesia Type:  general, regional ASA Status:  2          Anesthesia Type: general, regional  Last vitals  BP   131/60 (05/07/18 1233)   Temp   97.8 °F (36.6 °C) (05/07/18 1233)   Pulse   77 (05/07/18 1233)   Resp   15 (05/07/18 1233)     SpO2   100 % (05/07/18 1233)     Anesthesia Post Evaluation

## 2018-05-07 NOTE — PLAN OF CARE
Problem: Patient Care Overview  Goal: Plan of Care Review  Outcome: Ongoing (interventions implemented as appropriate)   05/07/18 8632   Coping/Psychosocial   Plan of Care Reviewed With patient;spouse   OTHER   Outcome Summary ON-Q running at 4 and pt had no c/o pain. He tolerated L shoulder PROM , IR chest, ER 30. Pt scored 17 on mobility screen, recommend PT evaluation. No issues with desaturaiton on RA. Recommend DC home with assist from spouse when medically ready.

## 2018-05-07 NOTE — NURSING NOTE
Acute Pain Service:  On-Q teaching completed with patient and spouse.  Video demonstration, handout and bracelet provided with [facility] on call central phone number.  Instructed to call with any questions or concerns.  Patient verbalized understanding.  Service will continue to follow until catheter DC'd.  Please contact patient at [phone] if needed.

## 2018-05-07 NOTE — ANESTHESIA POSTPROCEDURE EVALUATION
Patient: Nathan Velarde    Procedure Summary     Date:  05/07/18 Room / Location:   [facility]    Anesthesia Start:  1022 Anesthesia Stop:      Procedures:       LEFT TOTAL SHOULDER ARTHROPLASTY (Left Shoulder)      BICEPS TENDODESIS (Left Shoulder) Diagnosis:      Surgeon:  Abner Humphrey MD Provider:  Brant Patel MD    Anesthesia Type:  general, regional ASA Status:  2          Anesthesia Type: general, regional  Last vitals  BP   (!) 188/88 (05/07/18 0829)   Temp   97.8 °F (36.6 °C) (05/07/18 1232)   Pulse   64 (05/07/18 0829)   Resp   15 (05/07/18 1232)     SpO2   100 % (05/07/18 1232)     Post Anesthesia Care and Evaluation    Patient location during evaluation: PACU  Patient participation: complete - patient participated  Level of consciousness: awake and alert  Pain score: 0  Pain management: adequate  Airway patency: patent  Anesthetic complications: No anesthetic complications  PONV Status: none  Cardiovascular status: hemodynamically stable and acceptable  Respiratory status: nonlabored ventilation, acceptable and nasal cannula  Hydration status: acceptable

## 2018-05-07 NOTE — BRIEF OP NOTE
TOTAL SHOULDER ARTHROPLASTY, BICEPS TENDON REPAIR  Progress Note    Nathan Velarde  5/7/2018    Pre-op Diagnosis:   Primary localized osteoarthrosis of shoulder region, right [M19.011]     * Bicipital tendinitis, right [M75.21]       Post-Op Diagnosis Codes:     * Primary localized osteoarthrosis of shoulder region, right [M19.011]     * Bicipital tendinitis, right [M75.21]    Procedure/CPT® Codes:  [place] RECONSTR TOTAL SHOULDER IMPLANT [58623]  [place] REPAIR BICEPS LONG TENDON [99710]    Procedure(s):  LEFT TOTAL SHOULDER ARTHROPLASTY  BICEPS TENDODESIS    Surgeon(s):  Abner Humphrey MD    Anesthesia: General with Block    Staff:   Circulator: Bina Cormier RN; Syed Sanders RN  Scrub Person: Corbin Harrell; Jax Schroeder  Vendor Representative: Abby Hughes  Nursing Assistant: Leonora Black  Other: Pari Costello RN    Estimated Blood Loss: 100 mL    Urine Voided: * No values recorded between 5/7/2018 10:21 AM and 5/7/2018 12:25 PM *    Specimens:                None      Drains:      Findings: per dictation    Complications: none      Abner Humphrey MD     Date: 5/7/2018  Time: 12:39 PM

## 2018-05-07 NOTE — H&P
Pre-Op H&P  Nathan Velarde  [id number]  1940    Chief complaint: Left shoulder pain    HPI:    Patient is a 77 y.o.male presents with chronic left shoulder pain and found to have left shoulder OA and here today for left total shoulder arthroplasty    Review of Systems:  General ROS: negative for chills, fever or skin lesions;  No changes since last office visit  Cardiovascular ROS: no chest pain or dyspnea on exertion.  History of PAF and followed with Dr. Wells.  Now follows only with PCP with no recent known episodes.  History of MR/AI per pt report.  Negative activity intolerance.  No presyncope/syncope.  One episode of dizziness with changing of positions.    Respiratory ROS: no cough, shortness of breath, or wheezing    Allergies:   Allergies   Allergen Reactions   • Sulfa Antibiotics Nausea Only and Other (See Comments)     Headache       Home Meds:    No current facility-administered medications on file prior to encounter.      Current Outpatient Prescriptions on File Prior to Encounter   Medication Sig Dispense Refill   • aspirin-acetaminophen-caffeine (EXCEDRIN MIGRAINE) 250-250-65 MG per tablet Take 1 tablet by mouth As Needed for Headache.     • atorvastatin (LIPITOR) 40 MG tablet TAKE 1 TABLET AT BEDTIME 90 tablet 1   • CARTIA  MG 24 hr capsule TAKE 1 CAPSULE DAILY 90 capsule 1   • digoxin (LANOXIN) 125 MCG tablet TAKE ONE TABLET BY MOUTH DAILY 90 tablet 1   • lisinopril-hydrochlorothiazide (ZESTORETIC) 10-12.5 MG per tablet Take 0.5 tablets by mouth Daily. 90 tablet 3   • potassium chloride (K-DUR) 10 MEQ CR tablet TAKE 1 TABLET DAILY 90 tablet 1   • tamsulosin (FLOMAX) 0.4 MG capsule 24 hr capsule TAKE ONE CAPSULE BY MOUTH EVERY NIGHT AT BEDTIME 90 capsule 1   • diclofenac (VOLTAREN) 75 MG EC tablet TAKE 1 TABLET DAILY 90 tablet 1   • Glucosamine-Chondroit-Vit C-Mn (GLUCOSAMINE 1500 COMPLEX) capsule Take 1 tablet by mouth Daily.         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    • Bilateral bunions     LEFT SIDE ONLY   • Bladder cancer 1977    curative exfulguration   • DJD of left shoulder Adulthood    Severely advanced   • Dyslipidemia    • HTN (hypertension) 1990   • Hyperlipidemia    • Knee osteoarthritis 2004   • Mitral valve insufficiency    • Nephrolithiasis 2000    Documented on IVP   • Paroxysmal atrial fibrillation 2008   • Prostatism 1995   • Prostatitis     pt denies   • Ulcerative colitis 1977    Well-controlled disease   • Wears glasses      PSH:    Past Surgical History:   Procedure Laterality Date   • COLONOSCOPY  2015    Colitis in remission   • CYSTOSCOPY  1977    Excision of bladder cancer   • INGUINAL HERNIA REPAIR Right 1994   • KNEE SURGERY Right 1955    Excision of cyst   • TONSILLECTOMY  1943       Immunization History:  Influenza: no  Pneumococcal: no  Tetanus: no    Social History:   Tobacco:   History   Smoking Status   • Never Smoker   Smokeless Tobacco   • Never Used      Alcohol:     History   Alcohol Use   • 0.6 oz/week   • 1 Cans of beer per week       Vitals:           BP (!) 188/88 (BP Location: Right arm, Patient Position: Lying)   Pulse 64   Temp 97.2 °F (36.2 °C) (Tympanic)   Resp 16   Ht 167.6 cm (66\")   Wt 67.6 kg (149 lb)   SpO2 98%   BMI 24.05 kg/m²     Physical Exam:  General Appearance:    Alert, cooperative, no distress, appears stated age   Head:    Normocephalic, without obvious abnormality, atraumatic   Lungs:     Clear to auscultation bilaterally, respirations unlabored    Heart:   Regular rate and rhythm, S1 and S2 normal, no murmur, rub    or gallop    Abdomen:    Soft, non-tender.  +bowel sounds   Breast Exam:    deferred   Genitalia:    deferred   Extremities:   Extremities normal, atraumatic, no cyanosis or edema   Skin:   Skin color, texture, turgor " "normal, no rashes or lesions   Neurologic:   Grossly intact   Results Review  I reviewed the patient's new clinical results.  Discussed with pt/wife about \"dizziness\" episode.  Pt to see Dr. Castillo in the next month and asked pt/wife to report to PCP for need for further testing if appropriate.    Cancer Staging (if applicable)  Cancer Patient: __ yes _x_no __unknown; If yes, clinical stage T:__ N:__M:__, stage group or __N/A    Impression: Chronic Left shoulder pain/OA left shoulder    Plan: Left total shoulder arthroplasty    Juliana Finn, APRN   5/7/2018   9:15 AM  "

## 2018-05-07 NOTE — OP NOTE
DATE OF OPERATION: 05/07/18  PREOPERATIVE DIAGNOSIS: Left shoulder degenerative joint disease with pain and limitation of function and motion.    POSTOPERATIVE DIAGNOSES:  1. Left shoulder degenerative joint disease with pain and limitation of function and motion.    2. Biceps tenosynovitis.    PROCEDURES PERFORMED:  1. Left total shoulder arthroplasty.    2. Left biceps tenodesis.    SURGEON: Abner Humphrey MD  ASSISTANTS:  1. Adrian Costello MD, PGY-6 Sports Fellow  2. Clint Yu MD, PGY-5    ANESTHESIA: General plus block.    ESTIMATED BLOOD LOSS:100mL.    COMPLICATIONS: None.    DISPOSITION: Recovery room in stable condition.    IMPLANTS: Ex[employer] Equinoxe total shoulder system, 13 mm stem press-fit, 1.5 replicator plate, 44 short humeral head, and large beta cage glenoid peripherally cemented.    INDICATIONS: This is a 77-year-old male with left shoulder pain and limited function and motion secondary to DJD. They have failed conservative treatment and after a discussion of risks, benefits, and alternatives, wished to proceed with shoulder arthroplasty.  DESCRIPTION OF PROCEDURE: On the day of surgery, he identified the left shoulder as the correct operative extremity. This was initialed by the surgeon with the patients's acknowledgment. The patient underwent placement of an interscalene block and was taken to the operating room and placed in the supine position. Upon induction of adequate anesthesia, the patient was brought up to the beach chair position and the shoulder and upper extremity were prepped and draped in the usual sterile fashion. Timeout confirmed the correct patient and operative extremity as well as that antibiotics were on board. A standard deltopectoral approach to the shoulder was carried out. It was carried sharply through the skin and subcutaneous tissue. Medial and lateral flaps were developed over the deltopectoral fascia. The cephalic vein was identified and mobilized medially with the  deltoid. The subdeltoid and subpectoral spaces were mobilized and a blunt retractor was placed deep to this. The clavipectoral fascia was opened on the lateral edge of the conjoined tendon and the retractor was moved deep to this. The leading edge of the pectoralis was released exposing the long head of the biceps. This was tenosynovitic. It was tenodesed to the pectoralis and released proximal to this. The clavipectoral fascia was opened on the lateral edge of conjoined tendon and the retractor moved deep to this. The 3 sisters were identified and coagulated. A subscapularis tenotomy was performed 1 cm medial to the lesser tuberosity and rotator interval was released to the glenoid exposing the humeral head. The inferior capsule was released directly off the humerus to allow greater than 90° of external rotation. A large inferior osteophyte was present which was removed with rongeur. The anatomic neck was exposed and the humeral head osteotomy was performed in approximately 30° of retroversion. The remainder of the osteophytes were removed. The canal was then entered, reamed, and broached. The final stem impacted in in approximately 30° of retroversion. A head protector was placed. The humerus was subluxed posteriorly. The glenoid exposed. Circumferential labral excision and capsular release were performed. A 270° mobilization of the subscapularis was carried out as well.  A centering hole was drilled. The glenoid was gently reamed and peripheral holes were drilled and trialing was carried out. The appropriate size was chosen. Thrombin-soaked Gelfoam was inserted into the holes to obtain hemostasis. Once the cement was prepared, Gelfoam was removed. Cement was inserted into the 3 peripheral holes and pressurized twice prior to impaction of the caged glenoid. Excellent fit was achieved with no rocking or instability. No excess cement was identified. The humerus was carefully subluxed back anteriorly. A 1.5  replicator plate was chosen and trialing was carried out. The appropriate head size and position were chosen and allowed approximately 50% posterior subluxation with spontaneous reduction, 25% inferior subluxation with spontaneous reduction, and full passive range of motion. The joint was copiously irrigated with orthopedic irrigation mixed with Betadine after the final implants were assembled and locked into place. The shoulder was reduced. The subscapularis was meticulously repaired with #2 FiberWire as was the rotator interval. This was stable to approximately 60° of external rotation, but will be limited to 30° in the perioperative period. The deltopectoral interval was approximated with 0 Vicryl, the subcutaneous tissue with 2-0 Vicryl, and the skin with Monocryl and Dermabond. A sterile dressing was placed. Anesthesia was reversed and the patient was taken to the recovery room in stable condition. All instrument, needle, and sponge counts were correct.      Abner Humphrey MD*

## 2018-05-08 VITALS
OXYGEN SATURATION: 94 % | DIASTOLIC BLOOD PRESSURE: 59 MMHG | BODY MASS INDEX: 23.95 KG/M2 | HEART RATE: 87 BPM | SYSTOLIC BLOOD PRESSURE: 132 MMHG | WEIGHT: 149 LBS | HEIGHT: 66 IN | TEMPERATURE: 97.8 F | RESPIRATION RATE: 16 BRPM

## 2018-05-08 LAB
ANION GAP SERPL CALCULATED.3IONS-SCNC: 5 MMOL/L (ref 3–11)
BASOPHILS # BLD AUTO: 0.01 10*3/MM3 (ref 0–0.2)
BASOPHILS NFR BLD AUTO: 0.1 % (ref 0–1)
BUN BLD-MCNC: 17 MG/DL (ref 9–23)
BUN/CREAT SERPL: 18.9 (ref 7–25)
CALCIUM SPEC-SCNC: 9.5 MG/DL (ref 8.7–10.4)
CHLORIDE SERPL-SCNC: 105 MMOL/L (ref 99–109)
CO2 SERPL-SCNC: 27 MMOL/L (ref 20–31)
CREAT BLD-MCNC: 0.9 MG/DL (ref 0.6–1.3)
DEPRECATED RDW RBC AUTO: 44.7 FL (ref 37–54)
EOSINOPHIL # BLD AUTO: 0.01 10*3/MM3 (ref 0–0.3)
EOSINOPHIL NFR BLD AUTO: 0.1 % (ref 0–3)
ERYTHROCYTE [DISTWIDTH] IN BLOOD BY AUTOMATED COUNT: 14 % (ref 11.3–14.5)
GFR SERPL CREATININE-BSD FRML MDRD: 82 ML/MIN/1.73
GLUCOSE BLD-MCNC: 122 MG/DL (ref 70–100)
HCT VFR BLD AUTO: 35.5 % (ref 38.9–50.9)
HGB BLD-MCNC: 11.9 G/DL (ref 13.1–17.5)
IMM GRANULOCYTES # BLD: 0.02 10*3/MM3 (ref 0–0.03)
IMM GRANULOCYTES NFR BLD: 0.2 % (ref 0–0.6)
LYMPHOCYTES # BLD AUTO: 0.9 10*3/MM3 (ref 0.6–4.8)
LYMPHOCYTES NFR BLD AUTO: 9.5 % (ref 24–44)
MCH RBC QN AUTO: 29.2 PG (ref 27–31)
MCHC RBC AUTO-ENTMCNC: 33.5 G/DL (ref 32–36)
MCV RBC AUTO: 87 FL (ref 80–99)
MONOCYTES # BLD AUTO: 1.31 10*3/MM3 (ref 0–1)
MONOCYTES NFR BLD AUTO: 13.8 % (ref 0–12)
NEUTROPHILS # BLD AUTO: 7.23 10*3/MM3 (ref 1.5–8.3)
NEUTROPHILS NFR BLD AUTO: 76.3 % (ref 41–71)
PLATELET # BLD AUTO: 241 10*3/MM3 (ref 150–450)
PMV BLD AUTO: 8.9 FL (ref 6–12)
POTASSIUM BLD-SCNC: 4 MMOL/L (ref 3.5–5.5)
RBC # BLD AUTO: 4.08 10*6/MM3 (ref 4.2–5.76)
SODIUM BLD-SCNC: 137 MMOL/L (ref 132–146)
WBC NRBC COR # BLD: 9.48 10*3/MM3 (ref 3.5–10.8)

## 2018-05-08 PROCEDURE — 85025 COMPLETE CBC W/AUTO DIFF WBC: CPT | Performed by: ORTHOPAEDIC SURGERY

## 2018-05-08 PROCEDURE — 97530 THERAPEUTIC ACTIVITIES: CPT | Performed by: OCCUPATIONAL THERAPIST

## 2018-05-08 PROCEDURE — 94799 UNLISTED PULMONARY SVC/PX: CPT

## 2018-05-08 PROCEDURE — 99239 HOSP IP/OBS DSCHRG MGMT >30: CPT | Performed by: NURSE PRACTITIONER

## 2018-05-08 PROCEDURE — 97161 PT EVAL LOW COMPLEX 20 MIN: CPT

## 2018-05-08 PROCEDURE — 97116 GAIT TRAINING THERAPY: CPT

## 2018-05-08 PROCEDURE — 80048 BASIC METABOLIC PNL TOTAL CA: CPT | Performed by: ORTHOPAEDIC SURGERY

## 2018-05-08 RX ORDER — HYDROCODONE BITARTRATE AND ACETAMINOPHEN 10; 325 MG/1; MG/1
1 TABLET ORAL EVERY 4 HOURS PRN
Qty: 40 TABLET | Refills: 0 | Status: SHIPPED | OUTPATIENT
Start: 2018-05-08 | End: 2018-05-17

## 2018-05-08 RX ORDER — PSEUDOEPHEDRINE HCL 30 MG
100 TABLET ORAL 2 TIMES DAILY PRN
Start: 2018-05-08 | End: 2018-11-07 | Stop reason: HOSPADM

## 2018-05-08 RX ADMIN — DOCUSATE SODIUM 100 MG: 100 CAPSULE, LIQUID FILLED ORAL at 09:46

## 2018-05-08 RX ADMIN — MESALAMINE 800 MG: 400 CAPSULE, DELAYED RELEASE ORAL at 08:39

## 2018-05-08 RX ADMIN — VITAMIN E CAP 400 UNIT 400 UNITS: 400 CAP at 08:38

## 2018-05-08 RX ADMIN — DIGOXIN 125 MCG: 125 TABLET ORAL at 08:39

## 2018-05-08 RX ADMIN — Medication 50 MG: at 08:38

## 2018-05-08 RX ADMIN — HYDROCODONE BITARTRATE AND ACETAMINOPHEN 1 TABLET: 10; 325 TABLET ORAL at 09:46

## 2018-05-08 RX ADMIN — DILTIAZEM HYDROCHLORIDE 180 MG: 180 CAPSULE, EXTENDED RELEASE ORAL at 09:35

## 2018-05-08 RX ADMIN — VITAMIN D, TAB 1000IU (100/BT) 1000 UNITS: 25 TAB at 08:38

## 2018-05-08 NOTE — PATIENT CARE CONFERENCE
Received call from pt's spouse Reshma who stated that  since DC home earlier today, pain has spiked to 10 and he is not tolerating exercises. She said ON-Q is running at 6 and she increased it from 4 over an hour ago. He was medicated orally at 2:30. OT instructed her to complete exercise only as he can tolerate today. OT then called [facility] and spoke with Mason Chaparro. He said to instruct pt to increase ONQ to 8 and leave for 2 hours, then they can alternate between setting of 6 and 8. OT called Reshma to inform her of this and she reports pain is decreased from 10 to a 5-6 on anterior surface of shoulder. Instructed her to call Pain Services if pain was still an issued after increasing to an 8 or if they had any further questions with ONQ. Also instructed her to apply ice to shoulder as needed and to not leave on longer than 20 minutes. She verbalized understanding. Called Dr. Humphrey's office at [phone] and spoke with Susan, notifying her of above conversations.     Ailyn Lowe OTR/L, [id number], 05/08/18

## 2018-05-08 NOTE — PROGRESS NOTES
[facility]    Acute pain service Inpatient Progress Note    Patient Name: Nathan Velarde  :  1940  MRN:  [id number]        Acute Pain  Service Inpatient Progress Note:    Analgesia:Excellent  Pain Score:1/10  LOC: alert and awake  Resp Status: room air  Cardiac: VS stable  Side Effects:None  Catheter Site:clean, dressing intact and dry  Cath type: peripheral nerve cath with ON Q  Infusion rate: 4ml/hr  Catheter Plan:Catheter to remain Insitu and Continue catheter infusion rate unchanged

## 2018-05-08 NOTE — PLAN OF CARE
Problem: Patient Care Overview  Goal: Plan of Care Review  Outcome: Ongoing (interventions implemented as appropriate)   05/08/18 0973   Coping/Psychosocial   Plan of Care Reviewed With patient   OTHER   Outcome Summary Patient ambulated 350 feet without use of AD. Mild unsteadiness with turns but no LOB, probably is patient's baseline. Completed stair training with no difficulty. HR jumped to 180's, highest at 191, near end of ambulation. Notified nursing, who came in to further assess. HR down to 107 once sitting EOB. O2 sats remained >/=94% throughout mobility. Patient should be able to safely mobilize about the home upon d/c today.    Plan of Care Review   Progress improving

## 2018-05-08 NOTE — PROGRESS NOTES
Orthopedic Daily Progress Note      CC: no complaints    Pain well controlled  General: no fevers, chills  Abdomen: no nausea, vomiting, or diarrhea    No other complaints    Physical Exam:  I have reviewed the vital signs.  Temp:  [97.2 °F (36.2 °C)-98.3 °F (36.8 °C)] 97.8 °F (36.6 °C)  Heart Rate:  [64-91] 73  Resp:  [15-18] 16  BP: ()/(51-88) 90/51    Objective  General Appearance:    Alert, cooperative, no distress  Extremities: No clubbing, cyanosis, or edema to lower extremities  Pulses:  2+ in distal surgical extremity  Skin: Dressing Clean/dry/intact  LLE: silt ax/m/u/r, fingers wwp, grossly moving all fingers      Results Review:    I have reviewed the labs, radiology results and diagnostic studies:yes      Results from last 7 days  Lab Units 05/08/18  0706   WBC 10*3/mm3 9.48   HEMOGLOBIN g/dL 11.9*   PLATELETS 10*3/mm3 241       Results from last 7 days  Lab Units 05/08/18  0706   SODIUM mmol/L 137   POTASSIUM mmol/L 4.0   CO2 mmol/L 27.0   CREATININE mg/dL 0.90   GLUCOSE mg/dL 122*       I have reviewed the medications.    Assessment/Problem List  POD# 1 S/p L TSA    Plan  D/c home today after working with therapy  F/u in clinic in 1 week      Discharge Planning: I expect patient to be discharged to home in today.    Arturo Yu MD  05/08/18  8:00 AM

## 2018-05-08 NOTE — PROGRESS NOTES
Discharge Planning Assessment  [facility]     Patient Name: Nathan Velarde  MRN: [id number]  Today's Date: 5/8/2018    Admit Date: 5/7/2018          Discharge Needs Assessment     Row Name 05/08/18 1132       Living Environment    Lives With spouse    Name(s) of Who Lives With Patient Reshma Henriquez ( spouse)    Current Living Arrangements home/apartment/condo    Primary Care Provided by self    Provides Primary Care For no one    Family Caregiver if Needed spouse       Resource/Environmental Concerns    Resource/Environmental Concerns none    Transportation Concerns car, none       Transition Planning    Patient/Family Anticipates Transition to home    Patient/Family Anticipated Services at Transition none    Transportation Anticipated family or friend will provide       Discharge Needs Assessment    Readmission Within the Last 30 Days no previous admission in last 30 days    Concerns to be Addressed no discharge needs identified    Equipment Currently Used at Home none    Equipment Needed After Discharge none    Offered/Gave Vendor List --   N/A            Discharge Plan     Row Name 05/08/18 1134       Plan    Plan Home    Patient/Family in Agreement with Plan yes    Plan Comments I met with Mr Velarde and his wife at bedside to discuss d/c plan. His plan is to return home. His wife will be available to assist with any care needs. He was independent with all ADL's prior to admit. He is independent with ambulation n today. No equipment needs. No d/c needs identifed.    Final Discharge Disposition Code 01 - home or self-care        Destination     No service coordination in this encounter.      Durable Medical Equipment     No service coordination in this encounter.      Dialysis/Infusion     No service coordination in this encounter.      Home Medical Care     No service coordination in this encounter.      Social Care     No service coordination in this encounter.        Expected Discharge Date and Time      Expected Discharge Date Expected Discharge Time    May 8, 2018               Demographic Summary     Row Name 05/08/18 1131       General Information    Admission Type inpatient    Arrived From home    Referral Source physician    Reason for Consult discharge planning    Preferred Language English    General Information Comments PCP Dr. Hu Castillo       Contact Information    Permission Granted to Share Info With             Functional Status     Row Name 05/08/18 1132       Functional Status    Usual Activity Tolerance good    Current Activity Tolerance good       Functional Status, IADL    Medications independent    Meal Preparation independent    Housekeeping independent    Laundry independent    Shopping independent       Mental Status    General Appearance WDL WDL       Mental Status Summary    Recent Changes in Mental Status/Cognitive Functioning no changes       Employment/    Employment Status retired    Employment/ Comments [employer] replacement            Psychosocial    No documentation.           Abuse/Neglect    No documentation.           Legal    No documentation.           Substance Abuse    No documentation.           Patient Forms    No documentation.         Sonja C Kellerman, RN

## 2018-05-08 NOTE — THERAPY DISCHARGE NOTE
Acute Care - Occupational Therapy Treatment Note/Discharge  [facility]     Patient Name: Nathan Velarde  : 1940  MRN: [id number]  Today's Date: 2018  Onset of Illness/Injury or Date of Surgery: 18  Date of Referral to OT: 18  Referring Physician: MD Milagro      Admit Date: 2018    Visit Dx:     ICD-10-CM ICD-9-CM   1. Impaired mobility and ADLs Z74.09 799.89   2. Impaired functional mobility, balance, gait, and endurance Z74.09 V49.89     Patient Active Problem List   Diagnosis   • Anemia   • Aortic valve disorder   • Ataxic gait   • Paroxysmal atrial fibrillation   • Prostatism   • Fatigue   • Ulcerative colitis   • Transverse myelitis   • Testosterone deficiency   • Paresthesia   • Lumbosacral neuritis   • Osteoarthritis of lumbar spine   • Hypertension   • Hyperlipidemia   • Generalized osteoarthritis   • Gross hematuria   • CKD (chronic kidney disease), stage II   • Hyperglycemia   • Shoulder pain, left   • Preventative health care   • Status post total shoulder arthroplasty, left       Therapy Treatment          Rehabilitation Treatment Summary     Row Name 18 0800             Treatment Time/Intention    Discipline occupational therapist  -AR      Document Type therapy note (daily note);discharge evaluation/summary   POD#1 left TSA  -AR      Subjective Information no complaints  -AR      Mode of Treatment occupational therapy  -AR      Patient/Family Observations pt supine, spouse at bedside and they anticipate DC home today  -AR      Patient Effort excellent  -AR      Existing Precautions/Restrictions fall;non-weight bearing;shoulder;other (see comments)   left ineterscalene nerve catheter  -AR      Recorded by [AR] Ailyn Lowe, OT 18 1206 18 1206      Row Name 18 0800             Cognitive Assessment/Intervention    Additional Documentation Cognitive Assessment/Intervention (Group)  -AR      Recorded by [AR] Ailyn Lowe, OT 18 1206  05/08/18 1206      Row Name 05/08/18 0800             Cognitive Assessment/Intervention- PT/OT    Affect/Mental Status (Cognitive) WNL  -AR      Orientation Status (Cognition) oriented x 4  -AR      Follows Commands (Cognition) WNL  -AR      Cognitive Function (Cognitive) WNL  -AR      Personal Safety Interventions fall prevention program maintained  -AR      Recorded by [AR] Ailyn Lowe, OT 05/08/18 1206 05/08/18 1206      Row Name 05/08/18 0800             Mobility Assessment/Intervention    Extremity Weight-bearing Status left upper extremity  -AR      Left Upper Extremity (Weight-bearing Status) non weight-bearing (NWB)  -AR      Recorded by [AR] Ailyn Lowe, OT 05/08/18 1206 05/08/18 1206      Row Name 05/08/18 0800             Bed Mobility Assessment/Treatment    Supine-Sit Powell (Bed Mobility) conditional independence  -AR      Assistive Device (Bed Mobility) bed rails;head of bed elevated  -AR      Comment (Bed Mobility) Good ability to maintain NWB LUE  -AR      Recorded by [AR] Ailyn Lowe, OT 05/08/18 1206 05/08/18 1206      Row Name 05/08/18 0800             Functional Mobility    Functional Mobility- Ind. Level contact guard assist;verbal cues required  -AR      Functional Mobility-Distance (Feet) 10  -AR      Recorded by [AR] Ailyn Loew, OT 05/08/18 1206 05/08/18 1206      Row Name 05/08/18 0800             Transfer Assessment/Treatment    Transfer Assessment/Treatment sit-stand transfer;stand-sit transfer;bed-chair transfer  -AR      Maintains Weight-bearing Status (Transfers) able to maintain  -AR      Comment (Transfers) cues for chair approach  -AR      Bed-Chair [place] (Transfers) contact guard;verbal cues  -AR      Sit-Stand [place] (Transfers) contact guard;verbal cues  -AR      Stand-Sit [place] (Transfers) contact guard;verbal cues  -AR      Recorded by [AR] Ailyn Lowe, OT 05/08/18 1206 05/08/18 1206      Row Name 05/08/18 0800              ADL Assessment/Intervention    BADL Assessment/Intervention bathing;upper body dressing;lower body dressing  -AR      Recorded by [AR] Ailyn Lowe, OT 05/08/18 1206 05/08/18 1206      Row Name 05/08/18 0800             Bathing Assessment/Intervention    Comment (Bathing) Pt recalled left axilla care with independence   -AR      Recorded by [AR] Ailyn Lowe, OT 05/08/18 1206 05/08/18 1206      Row Name 05/08/18 0800             Upper Body Dressing Assessment/Training    Upper Body Dressing [place] Level doff;front opening garment;don;pull-over garment;other (see comments);maximum assist (25% patient effort)   LUE [place] Ultra II sling  -AR      Assistive Devices (Upper Body Dressing) one hand technique  -AR      Upper Body Dressing Position edge of bed sitting  -AR      Comment (Upper Body Dressing) Reviewed L shoulder precautions with patient and spouse as well as ADL retraining to maintain, care of ON-Q ball with ADLS and sling management including application/wear schedule and proper fit. Post teaching, pt and spouse donned T-shirt with CGA with cues to avoid dislodgement of ONQ and they doffed/donned LUE sling with supervision and min cues. Issued handout with internet link to video on sling application in case questions arise.   -AR      Recorded by [AR] Ailyn Lowe, OT 05/08/18 1206 05/08/18 1206      Row Name 05/08/18 0800             Lower Body Dressing Assessment/Training    Lower Body Dressing Grant Level don;socks;shoes/slippers;moderate assist (50% patient effort);pants/bottoms  -AR      Lower Body Dressing Position edge of bed sitting;supported standing  -AR      Recorded by [AR] Ailyn Lowe, OT 05/08/18 1206 05/08/18 1206      Row Name 05/08/18 0800             BADL Safety/Performance    Impairments, BADL Safety/Performance other (see comments)   L shoulder precautions  -AR      Skilled BADL Treatment/Intervention BADL process/adaptation  training;monty/one-hand technique  -AR      Recorded by [AR] Ailyn Lowe, OT 05/08/18 1206 05/08/18 1206      Row Name 05/08/18 0800             Therapeutic Exercise    Upper Extremity Range of Motion (Therapeutic Exercise) shoulder flexion/extension, left;shoulder internal/external rotation, left;elbow flexion/extension, left;forearm supination/pronation, left;wrist flexion/extension, left   Bilat scapular retractions  -AR      Hand (Therapeutic Exercise) finger flexion/extension, left;thumb finger opposition, left  -AR      Exercise Type (Therapeutic Exercise) AAROM (active assistive range of motion);AROM (active range of motion);PROM (passive range of motion)   AROM scap/hand/FA/wrist,AAROM elbow, PROM FE/IR/ER  -AR      Position (Therapeutic Exercise) supine  -AR      Sets/Reps (Therapeutic Exercise) 1/10  -AR      Comment (Therapeutic Exercise) Pt and spouse completed LUE HEP within physician parameters with supervision. Pt tolerated PROM , IR chest, ER 30  -AR      Recorded by [AR] Ailyn Lowe, OT 05/08/18 1206 05/08/18 1206      Row Name 05/08/18 0800             Balance    Balance static sitting balance;static standing balance  -AR      Recorded by [AR] Ailyn Lowe, OT 05/08/18 1206 05/08/18 1206      Row Name 05/08/18 0800             Static Sitting Balance    Level of [place] (Unsupported Sitting, Static Balance) independent  -AR      Sitting Position (Unsupported Sitting, Static Balance) long sitting on bed  -AR      Time Able to Maintain Position (Unsupported Sitting, Static Balance) more than 5 minutes  -AR      Recorded by [AR] Ailyn Lowe, OT 05/08/18 1206 05/08/18 1206      Row Name 05/08/18 0800             Static Standing Balance    Level of [place] (Supported Standing, Static Balance) contact guard assist  -AR      Time Able to Maintain Position (Supported Standing, Static Balance) 3 to 4 minutes  -AR      Recorded by [AR] Ailyn Lowe, OT 05/08/18  1206 05/08/18 1206      Row Name 05/08/18 0800             Fine Motor Testing & Training    Comment, Fine Motor Coordination opposition intact   -AR      Recorded by [AR] Ailyn Lowe, OT 05/08/18 1206 05/08/18 1206      Row Name 05/08/18 0800             Positioning and Restraints    Pre-Treatment Position in bed  -AR      Post Treatment Position bed  -AR      In Bed supine;call light within reach;encouraged to call for assist;with family/caregiver;with brace  -AR      Recorded by [AR] Ailyn Lowe, OT 05/08/18 1206 05/08/18 1206      Row Name 05/08/18 0800             Pain Scale: Numbers Pre/Post-Treatment    Pain Scale: Numbers, Pretreatment 0/10 - no pain  -AR      Pain Scale: Numbers, Post-Treatment 0/10 - no pain  -AR      Pre/Post Treatment Pain Comment ONQ at 4  -AR      Recorded by [AR] Ailyn Lowe, OT 05/08/18 1206 05/08/18 1206      Row Name 05/08/18 0800             Sensory Assessment/Intervention    Sensory General Assessment light touch sensation deficits identified  -AR      Recorded by [AR] Ailyn Lowe, OT 05/08/18 1206 05/08/18 1206      Row Name 05/08/18 0800             Light Touch Sensation Assessment    Left Upper Extremity: Light Touch Sensation Assessment mild impairment, 75% or more correct responses   hand  -AR      Recorded by [AR] Ailyn Lowe, OT 05/08/18 1206 05/08/18 1206      Row Name 05/08/18 0800             Upper Extremity Orthosis Management    Type (Upper Extremity Orthosis) KURT Ultra II sling  -AR      Functional Design (Upper Extremity Orthosis) static orthosis  -AR      Therapeutic Indications (Upper Extremity Orthosis) mobilization orthosis;post-op positioning/protection;stabilization and support  -AR      Wearing Schedule (Upper Extremity Orthosis) remove for exercise;remove for hygiene/bathing  -AR      Orthosis Training (Upper Extremity Orthosis) patient and caregiver;all orthosis skills;donning/doffing orthosis;cleaning/care of  orthosis;orthosis adjustment;orthosis maintenance;purpose/goals of orthosis;sensory precautions;wearing schedule;able to verbalize training;able to teach back training  -AR      Compliance/Wearing Issues (Upper Extremity Orthosis) patient/caregiver comprehend strategies;patient/caregiver comprehend rationale for orthosis  -AR      Recorded by [AR] Ailyn Lowe OT 05/08/18 1206 05/08/18 1206      Row Name                Wound 05/07/18 1054 Left shoulder incision    Wound - Properties Group Date first assessed: 05/07/18 [JA] Time first assessed: 1054 [JA] Side: Left [JA] Location: shoulder [JA] Type: incision [JA] Recorded by:  [JA] Syed Sanders RN 05/07/18 1054 05/07/18 1054    Row Name 05/08/18 0800             Plan of Care Review    Plan of Care Reviewed With patient;spouse  -AR      Recorded by [AR] Ailyn Lowe OT 05/08/18 1206 05/08/18 1206      Row Name 05/08/18 0800             Outcome Summary/Treatment Plan (OT)    Daily Summary of Progress (OT) progress toward functional goals as expected  -AR      Anticipated Discharge Disposition (OT) home with home health  -AR      Reason for Discharge (OT Discharge Summary) patient met all goals and outcomes;patient discharged from this facility  -AR      Recorded by [AR] Ailyn Lowe OT 05/08/18 1206 05/08/18 1206        User Key  (r) = Recorded By, (t) = Taken By, (c) = Cosigned By    Initials Name Effective Dates Discipline    AR Ailyn Lowe OT 06/22/15 -  OT    DUYEN Sanders RN 06/16/16 -  Nurse        Wound 05/07/18 1054 Left shoulder incision (Active)   Dressing Appearance dry;intact;no drainage 5/8/2018  8:10 AM   Drainage Amount none 5/8/2018  8:10 AM   Dressing Care, Wound border dressing 5/8/2018  8:10 AM             OT Rehab Goals     Row Name 05/08/18 0800             Bed Mobility Goal 1 (OT)    Progress/Outcomes (Bed Mobility Goal 1, OT) goal met  -AR         Transfer Goal 1 (OT)    Progress/Outcome (Transfer Goal  1, OT) goal not met  -AR         Dressing Goal 1 (OT)    Progress/Outcome (Dressing Goal 1, OT) goal met  -AR         ROM Goal 1 (OT)    Progress/Outcome (ROM Goal 1, OT) goal met  -AR         Precaution Goal 1 (OT)    Progress/Outcome (Precaution Goal 1, OT) goal met  -AR        User Key  (r) = Recorded By, (t) = Taken By, (c) = Cosigned By    Initials Name Provider Type Discipline    CANDY Lowe, OT Occupational Therapist OT          Occupational Therapy Education     Title: PT OT SLP Therapies (Done)     Topic: Occupational Therapy (Done)     Point: ADL training (Done)     Description: Instruct learner(s) on proper safety adaptation and remediation techniques during self care or transfers.   Instruct in proper use of assistive devices.   Learning Progress Summary     Learner Status Readiness Method Response Comment Documented by    Patient Done Priscillaer E,TB,D,H VU,DU  AR 05/08/18 1206     Done Priscillaer HARRIETTB,D,H VU,NR  AR 05/07/18 1936    Significant Other Done Priscillaer HARRIETTB,D,H VU,DU  AR 05/08/18 1206     Done Eager HARRIETTB,D,H VU,NR  AR 05/07/18 1936          Point: Home exercise program (Done)     Description: Instruct learner(s) on appropriate technique for monitoring, assisting and/or progressing therapeutic exercises/activities.   Learning Progress Summary     Learner Status Readiness Method Response Comment Documented by    Patient Done Priscillaer E,TB,D,H VU,DU  AR 05/08/18 1206     Done Eager HARRIETTB,D,H VU,NR  AR 05/07/18 1936    Significant Other Done Eager ETB,D,H VU,DU  AR 05/08/18 1206     Done Eager HARRIETTB,D,H VU,NR  AR 05/07/18 1936          Point: Precautions (Done)     Description: Instruct learner(s) on prescribed precautions during self-care and functional transfers.   Learning Progress Summary     Learner Status Readiness Method Response Comment Documented by    Patient Done Eager E,TB,D,H VU,DU  AR 05/08/18 1206     Done Eager E,TB,D,H VU,NR  AR 05/07/18 1936    Significant Other Done Eager E,TB,D,H  VU,DU  AR 05/08/18 1206     Done Priscillaer ETB,D,H VU,NR  AR 05/07/18 1936          Point: Body mechanics (Done)     Description: Instruct learner(s) on proper positioning and spine alignment during self-care, functional mobility activities and/or exercises.   Learning Progress Summary     Learner Status Readiness Method Response Comment Documented by    Patient Done Priscillaer E,TB,D,H VU,DU  AR 05/08/18 1206     Done Priscillaer ETB,D,H VU,NR  AR 05/07/18 1936    Significant Other Done Priscillaer E,TB,D,H VU,DU  AR 05/08/18 1206     Done Eager E,TB,D,H VU,NR  AR 05/07/18 1936                      User Key     Initials Effective Dates Name Provider Type Discipline    AR 06/22/15 -  Ailyn Lowe, OT Occupational Therapist OT                OT Recommendation and Plan  Outcome Summary/Treatment Plan (OT)  Daily Summary of Progress (OT): progress toward functional goals as expected  Anticipated Discharge Disposition (OT): home or self care  Reason for Discharge (OT Discharge Summary): patient met all goals and outcomes, patient discharged from this facility  Planned Therapy Interventions (OT Eval): BADL retraining, occupation/activity based interventions, passive ROM/stretching, patient/caregiver education/training, orthotic fabrication/fitting/training, ROM/therapeutic exercise, transfer/mobility retraining  Therapy Frequency (OT Eval): daily  Daily Summary of Progress (OT): progress toward functional goals as expected  Plan of Care Review  Plan of Care Reviewed With: patient, spouse  Plan of Care Reviewed With: patient, spouse  Outcome Summary: ONQ running at 4 and pt had no c/o pain. He tolerated PROM , IR chest/ER 30 with good teachback from spouse. Pt and spouse donned/doffed sling with supervision. They state they feel comfortable with and desire planned DC home. Issued rehab department phone number and encourged them to call with questions. Recommend DC home with assist from spouse.           Outcome Measures     Row  Name 05/08/18 0914 05/08/18 0800 05/07/18 1530       How much help from another person do you currently need...    Turning from your back to your side while in flat bed without using bedrails? 4  -LR  --  --    Moving from lying on back to sitting on the side of a flat bed without bedrails? 4  -LR  --  --    Moving to and from a bed to a chair (including a wheelchair)? 3  -LR  --  --    Standing up from a chair using your arms (e.g., wheelchair, bedside chair)? 3  -LR  --  --    Climbing 3-5 steps with a railing? 3  -LR  --  --    To walk in hospital room? 3  -LR  --  --    AM-PAC 6 Clicks Score 20  -LR  --  --       How much help from another is currently needed...    Putting on and taking off regular lower body clothing?  -- 2  -AR 2  -AR    Bathing (including washing, rinsing, and drying)  -- 3  -AR 3  -AR    Toileting (which includes using toilet bed pan or urinal)  -- 3  -AR 3  -AR    Putting on and taking off regular upper body clothing  -- 2  -AR 2  -AR    Taking care of personal grooming (such as brushing teeth)  -- 3  -AR 3  -AR    Eating meals  -- 3  -AR 3  -AR    Score  -- 16  -AR 16  -AR       Functional Assessment    Outcome Measure Options AM-PAC 6 Clicks Basic Mobility (PT)  -LR AM-PAC 6 Clicks Daily Activity (OT)  -AR --  -AR      User Key  (r) = Recorded By, (t) = Taken By, (c) = Cosigned By    Initials Name Provider Type    LR Allegra Mendes, PT Physical Therapist    AR Ailyn Lowe, OT Occupational Therapist           Time Calculation:          Time Calculation- OT     Row Name 05/08/18 1210             Time Calculation- OT    OT Start Time 0800  -AR      Total Timed Code Minutes- OT 70 minute(s)  -AR      OT Received On 05/08/18  -AR      OT Goal Re-Cert Due Date 05/17/18  -AR        User Key  (r) = Recorded By, (t) = Taken By, (c) = Cosigned By    Initials Name Provider Type    AR Ailyn Lowe OT Occupational Therapist          Therapy Charges for Today     Code Description  Service Date Service Provider Modifiers Qty    [id number]  OT EVAL MOD COMPLEXITY 4 5/7/2018 Ailyn Lowe OT GO 1    33511886617 HC OT THERAPEUTIC ACT EA 15 MIN 5/7/2018 Ailyn Lowe OT GO 3    [id number]  OT THERAPEUTIC ACT EA 15 MIN 5/8/2018 Ailyn Lowe OT GO 5               OT Discharge Summary  Anticipated Discharge Disposition (OT): home or self care  Reason for Discharge: Discharge from facility  Outcomes Achieved: Patient able to partially acheive established goals  Discharge Destination: Home    Ailyn Lowe OT  5/8/2018

## 2018-05-08 NOTE — PLAN OF CARE
Problem: Patient Care Overview  Goal: Plan of Care Review  Outcome: Outcome(s) achieved Date Met: 05/08/18 05/08/18 1208   Coping/Psychosocial   Plan of Care Reviewed With patient;spouse   OTHER   Outcome Summary ONQ running at 4 and pt had no c/o pain. He tolerated PROM , IR chest/ER 30 with good teachback from spouse. Pt and spouse donned/doffed sling with supervision. They state they feel comfortable with and desire planned DC home. Issued rehab department phone number and encourged them to call with questions. Recommend DC home with assist from spouse.        Problem: Shoulder Arthroplasty (Adult)  Goal: Signs and Symptoms of Listed Potential Problems Will be Absent, Minimized or Managed (Shoulder Arthroplasty)  Outcome: Outcome(s) achieved Date Met: 05/08/18 05/08/18 1208   Goal/Outcome Evaluation   Problems Assessed (Shoulder Arthro) functional deficit   Problems Present (Shoulder Arthro) functional deficit

## 2018-05-08 NOTE — DISCHARGE SUMMARY
[facility] Medicine Services  DISCHARGE SUMMARY    Patient Name: Nathan Velarde  : 1940  MRN: [id number]    Date of Admission: 2018  Date of Discharge:  18  Primary Care Physician: Hu Castillo MD    Consults     Date and Time Order Name Status Description    2018 1325 Inpatient Consult to Hospitalist Completed         Hospital Course     Presenting Problem:   Status post total shoulder arthroplasty, left [Z96.612]    Active Hospital Problems (** Indicates Principal Problem)    Diagnosis Date Noted   • **Status post total shoulder arthroplasty, left [Z96.612] 2018   • Hypertension [I10] 05/15/2016   • Paroxysmal atrial fibrillation [I48.91] 05/15/2016   • Ulcerative colitis [K51.90] 05/15/2016      Resolved Hospital Problems    Diagnosis Date Noted Date Resolved   No resolved problems to display.          Hospital Course:  Nathan Velarde is a 77 y.o. male paroxysmal Afib (not on anticoagulation), HTN, HLD, CKD, ulcerative colitis, and osteoarthritis presents for left total shoulder arthroplasty with Dr. Humphrey.     Patient had a left total shoulder arthroplasty on 18. Hospitalists consulted for post-op for medical management. Patient had an uneventful post op course. Patient was cleared for discharge by orthopedics. Patient has remained clinically stable and will be discharged home today. He will follow up with PCP in 1-2 weeks. Follow up with Milagro in clinic in 1 week.     Procedure(s):  LEFT TOTAL SHOULDER ARTHROPLASTY  BICEPS TENDODESIS       Day of Discharge     HPI:   Patient sitting on side of bed in NAD. Has been up walking with PT this am. Pain controlled. Voiding without difficulty. No acute events overnight per nursing. Patient is agreeable to discharge today.     Review of Systems  Gen- No fevers, chills  CV- No chest pain, palpitations  Resp- No cough, dyspnea  GI- No N/V/D, abd pain    Otherwise ROS is negative except as mentioned in the  HPI.    Vital Signs:   Temp:  [97.2 °F (36.2 °C)-98.3 °F (36.8 °C)] 97.8 °F (36.6 °C)  Heart Rate:  [67-91] 87  Resp:  [15-18] 16  BP: ()/(51-69) 132/59     Physical Exam:  Constitutional: No acute distress, awake, alert  Eyes: PERRLA, sclerae anicteric, no conjunctival injection  HENT: NCAT, mucous membranes moist  Neck: Supple,  trachea midline  Respiratory: Clear to auscultation bilaterally, nonlabored respirations   Cardiovascular: RRR, no murmurs, rubs, or gallops, palpable pedal pulses bilaterally  Gastrointestinal: Positive bowel sounds, soft, nontender, nondistended  Musculoskeletal: No bilateral ankle edema, no clubbing or cyanosis to extremities, left arm in sling.   Psychiatric: Appropriate affect, cooperative  Neurologic: Oriented x 3, strength symmetric in all extremities, Cranial Nerves grossly intact to confrontation, speech clear  Skin: No rashes, left arm dressing cdi     Pertinent  and/or Most Recent Results       Results from last 7 days  Lab Units 05/08/18  0706   WBC 10*3/mm3 9.48   HEMOGLOBIN g/dL 11.9*   HEMATOCRIT % 35.5*   PLATELETS 10*3/mm3 241   SODIUM mmol/L 137   POTASSIUM mmol/L 4.0   CHLORIDE mmol/L 105   CO2 mmol/L 27.0   BUN mg/dL 17   CREATININE mg/dL 0.90   GLUCOSE mg/dL 122*   CALCIUM mg/dL 9.5           Invalid input(s): PROT, LABALBU        Invalid input(s): TG, LDLCALC, LDLREALC      Brief Urine Lab Results  (Last result in the past 365 days)      Color   Clarity   Blood   Leuk Est   Nitrite   Protein   CREAT   Urine HCG        06/02/17 1205 Dark Yellow(A) Clear Negative Negative Negative Negative               Microbiology Results Abnormal     None          Imaging Results (all)     Procedure Component Value Units Date/Time    XR Shoulder 2+ View Left [[id number]] Collected:  05/07/18 1414     Updated:  05/07/18 1428    Narrative:       EXAMINATION: XR SHOULDER 2+ VW, LEFT-05/07/2018:      INDICATION: Postop.      COMPARISON: 09/26/2016.     FINDINGS: Status post left  total shoulder arthroplasty without evidence  of complication in satisfactory anatomic alignment. Acromioclavicular  interval within normal limits. Expected postsurgical changes of the  adjacent soft tissues including soft tissue emphysema.          Impression:       Status post left shoulder arthroplasty without evidence of  complication.     D:  05/07/2018  E:  05/07/2018     This report was finalized on 5/7/2018 2:26 PM by Dr. Abdirahman Christie.                              Discharge Details      Nathan Velarde   Home Medication Instructions MADY:[id number]    Printed on:05/08/18 1021   Medication Information                      Alpha-Lipoic Acid 300 MG capsule  Take 300 mg by mouth Daily.             aspirin-acetaminophen-caffeine (EXCEDRIN MIGRAINE) 250-250-65 MG per tablet  Take 1 tablet by mouth As Needed for Headache.             atorvastatin (LIPITOR) 40 MG tablet  TAKE 1 TABLET AT BEDTIME             Calcium Carbonate-Vitamin D (CALCIUM 600+D PO)  Take 1 tablet by mouth Daily.             CARTIA  MG 24 hr capsule  TAKE 1 CAPSULE DAILY             cholecalciferol (VITAMIN D3) 1000 units tablet  Take 1,000 Units by mouth Daily.             DELZICOL 400 MG capsule delayed-release delayed release capsule  TAKE 2 CAPSULES TWICE A DAY             digoxin (LANOXIN) 125 MCG tablet  TAKE ONE TABLET BY MOUTH DAILY             docusate sodium 100 MG capsule  Take 100 mg by mouth 2 (Two) Times a Day As Needed for Constipation.             Fish Oil-Cholecalciferol (OMEGA-3 + D PO)  Take 1 capsule by mouth Daily.             Glucosamine-Chondroit-Vit C-Mn (GLUCOSAMINE 1500 COMPLEX) capsule  Take 1 tablet by mouth Daily.             HYDROcodone-acetaminophen (NORCO)  MG per tablet  Take 1 tablet by mouth Every 4 (Four) Hours As Needed for Moderate Pain  for up to 9 days.             lisinopril-hydrochlorothiazide (ZESTORETIC) 10-12.5 MG per tablet  Take 0.5 tablets by mouth Daily.             Magnesium 250 MG  tablet  Take 250 mg by mouth Daily.             Multiple Vitamins-Minerals (MULTIVITAMIN ADULT PO)  Take 1 tablet by mouth Daily.             potassium chloride (K-DUR) 10 MEQ CR tablet  TAKE 1 TABLET DAILY             Saw Palmetto 160 MG capsule  Take 1 capsule by mouth Daily.             Selenium 200 MCG capsule  Take 200 mcg by mouth Daily.             tamsulosin (FLOMAX) 0.4 MG capsule 24 hr capsule  TAKE ONE CAPSULE BY MOUTH EVERY NIGHT AT BEDTIME             Ubiquinol 100 MG capsule  Take 100 mg by mouth Daily.             vitamin E 400 UNIT capsule  Take 400 Units by mouth Daily.             Zinc 50 MG capsule  Take 50 mg by mouth Daily.                   Discharge Disposition:  Home or Self Care    Discharge Diet:  Diet Instructions     Diet: Regular; Thin       Discharge Diet:  Regular    Fluid Consistency:  Thin          Discharge Activity:   Activity Instructions     Activity as Tolerated       Measure Blood Pressure             Special Instructions:  Future Appointments  Date Time Provider Department Center   6/5/2018 10:00 AM Hu Castillo MD [facility] None   11/7/2018 3:30 PM Ry Feliciano MD [facility] None       Additional Instructions for the Follow-ups that You Need to Schedule     Discharge Follow-up with PCP    As directed      Follow Up Details:  pcp 1-2 weeks         Discharge Follow-up with Specified Provider: 1 Week    As directed      Follow Up:  1 Week               Time Spent on Discharge:  35 minutes    Electronically signed by AYAH Richards, 05/08/18, 10:13 AM.

## 2018-05-08 NOTE — THERAPY DISCHARGE NOTE
Acute Care - Physical Therapy Initial Eval/Discharge  [facility]     Patient Name: Nathan Velarde  : 1940  MRN: [id number]  Today's Date: 2018   Onset of Illness/Injury or Date of Surgery: 18  Date of Referral to PT: 18  Referring Physician: MD Milagro      Admit Date: 2018    Visit Dx:    ICD-10-CM ICD-9-CM   1. Impaired mobility and ADLs Z74.09 799.89   2. Impaired functional mobility, balance, gait, and endurance Z74.09 V49.89     Patient Active Problem List   Diagnosis   • Anemia   • Aortic valve disorder   • Ataxic gait   • Paroxysmal atrial fibrillation   • Prostatism   • Fatigue   • Ulcerative colitis   • Transverse myelitis   • Testosterone deficiency   • Paresthesia   • Lumbosacral neuritis   • Osteoarthritis of lumbar spine   • Hypertension   • Hyperlipidemia   • Generalized osteoarthritis   • Gross hematuria   • CKD (chronic kidney disease), stage II   • Hyperglycemia   • Shoulder pain, left   • Preventative health care   • Status post total shoulder arthroplasty, left     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    • Bilateral bunions     LEFT SIDE ONLY   • Bladder cancer     curative exfulguration   • DJD of left shoulder Adulthood    Severely advanced   • Dyslipidemia    • HTN (hypertension)    • Hyperlipidemia    • Knee osteoarthritis    • Mitral valve insufficiency    • Nephrolithiasis     Documented on IVP   • Paroxysmal atrial fibrillation    • Prostatism    • Prostatitis     pt denies   • Ulcerative colitis     Well-controlled disease   • Wears glasses      Past Surgical History:   Procedure Laterality Date   • BICEPS TENDON REPAIR Left 2018    Procedure: BICEPS TENDODESIS;  Surgeon: Abner Humphrey MD;  Location: [facility];  Service: Orthopedics   •  COLONOSCOPY  2015    Colitis in remission   • CYSTOSCOPY  1977    Excision of bladder cancer   • INGUINAL HERNIA REPAIR Right 1994   • KNEE SURGERY Right 1955    Excision of cyst   • TONSILLECTOMY  1943   • TOTAL SHOULDER ARTHROPLASTY Left 5/7/2018    Procedure: LEFT TOTAL SHOULDER ARTHROPLASTY;  Surgeon: Abner Humphrey MD;  Location: [facility] OR;  Service: Orthopedics          PT ASSESSMENT (last 12 hours)      Physical Therapy Evaluation     Row Name 05/08/18 0914          PT Evaluation Time/Intention    Subjective Information no complaints  -LR     Document Type evaluation;discharge evaluation/summary  -LR     Mode of Treatment physical therapy;individual therapy  -LR     Patient Effort good  -LR     Symptoms Noted During/After Treatment significant change in vital signs  -LR     Comment HR in 180's near end of ambulation. Notified charge nurse.   -LR     Row Name 05/08/18 0914          General Information    Patient Profile Reviewed? yes  -LR     Onset of Illness/Injury or Date of Surgery 05/07/18  -LR     Referring Physician MD Milagro  -LR     Patient Observations alert;cooperative;agree to therapy  -LR     General Observations of Patient Patient supine in bed upon arrival. L UE in sling, L interscalene nerve catheter intact.   -LR     Prior Level of Function independent:;all household mobility;community mobility;gait;transfer;bed mobility;driving;shopping;min assist:;ADL's;dressing;grooming  -LR     Equipment Currently Used at Home cane, straight;walker, rolling   not currently using AD  -LR     Pertinent History of Current Functional Problem Patient presents for surgical management of persistent and progressive L shoulder pain and dsyfunction that has failed to improve with conservative management.   -LR     Existing Precautions/Restrictions fall;non-weight bearing;other (see comments)   NWB L UE, sling to L UE, L interscalene nerve cath  -LR     Equipment Issued to Patient --   none  -LR     Equipment  Ordered for Patient --   none  -LR     Risks Reviewed patient:;LOB;dizziness  -LR     Benefits Reviewed patient:;improve function;increase independence;increase strength;increase balance;increase knowledge;decrease pain  -LR     Barriers to Rehab none identified  -LR     Row Name 05/08/18 0914          Relationship/Environment    Primary Source of Support/Comfort spouse  -LR     Lives With spouse  -LR     Primary Roles/Responsibilities retired  -LR     Row Name 05/08/18 0914          Resource/Environmental Concerns    Current Living Arrangements home/apartment/condo  -LR     Resource/Environmental Concerns none  -LR     Transportation Concerns car, none  -LR     Row Name 05/08/18 0914          Home Main Entrance    Number of Stairs, Main Entrance one  -LR     Stair Railings, Main Entrance none  -LR     Row Name 05/08/18 0914          Cognitive Assessment/Intervention- PT/OT    Affect/Mental Status (Cognitive) WFL  -LR     Orientation Status (Cognition) oriented x 4;verbal cues/prompts needed for orientation  -LR     Follows Commands (Cognition) WFL;follows one step commands;over 90% accuracy;verbal cues/prompting required  -LR     Cognitive Function (Cognitive) WFL  -LR     Safety Deficit (Cognitive) other (see comments)   none  -LR     Row Name 05/08/18 0914          Safety Issues, Functional Mobility    Safety Issues Affecting Function (Mobility) other (see comments)   none  -LR     Impairments Affecting Function (Mobility) balance  -LR     Row Name 05/08/18 0914          Mobility Assessment/Treatment    Extremity Weight-bearing Status left upper extremity  -LR     Left Upper Extremity (Weight-bearing Status) non weight-bearing (NWB)  -LR     Row Name 05/08/18 0914          Bed Mobility Assessment/Treatment    Bed Mobility Assessment/Treatment supine-sit  -LR     Supine-Sit [place] (Bed Mobility) conditional independence  -LR     Sit-Supine [place] (Bed Mobility) not tested   Sitting EOB at end of eval.    -LR     Bed Mobility, Safety Issues decreased use of arms for pushing/pulling  -LR     Assistive Device (Bed Mobility) head of bed elevated  -LR     Comment (Bed Mobility) Patient used momentum by rocking back and forth at trunk to sit upright on EOB. Denied dizziness upon sitting up.   -LR     Row Name 05/08/18 0914          Transfer Assessment/Treatment    Transfer Assessment/Treatment sit-stand transfer;stand-sit transfer  -LR     Comment (Transfers) Verbal cues to push up from bed with R UE to stand and to reach back for bed with R UE to lower into sitting.   -LR     Sit-Stand [place] (Transfers) verbal cues;stand by assist  -LR     Stand-Sit [place] (Transfers) verbal cues;stand by assist  -LR     Row Name 05/08/18 0914          Sit-Stand Transfer    Assistive Device (Sit-Stand Transfers) other (see comments)   no AD  -LR     Row Name 05/08/18 0914          Stand-Sit Transfer    Assistive Device (Stand-Sit Transfers) other (see comments)   no AD  -LR     Row Name 05/08/18 0914          Gait/Stairs Assessment/Training    [place] Level (Gait) verbal cues;contact guard  -LR     Assistive Device (Gait) other (see comments)   no AD  -LR     Distance in Feet (Gait) 350  -LR     Pattern (Gait) step-through  -LR     [place] Level (Stairs) verbal cues;contact guard  -LR     Assistive Device (Stairs) other (see comments)   no AD  -LR     Handrail Location (Stairs) right side (ascending)  -LR     Number of Steps (Stairs) 3  -LR     Ascending Technique (Stairs) step-to-step  -LR     Descending Technique (Stairs) step-to-step  -LR     Stairs, Impairments impaired balance  -LR     Comment (Gait/Stairs) Patient ambulated with step through gait pattern at fast pace with good step length. Mildly unsteady with turns, but no LOB and this seems to be patient's baseline. Verbal cues to avoid turns and doorfacings on L to avoid bumping L shoulder into obstacles. HR jumped to 180's during ambulation, patient  denied feeling his heart was racing. Notified charge RN who further evaluated patient upon return to room. Completed stair training with use of one handrail. Cues to take one step at a time for safety.   -LR     Row Name 05/08/18 0914          General ROM    RT Lower Ext Comment  -LR     LT Lower Ext Comment  -LR     Row Name 05/08/18 0914          Right Lower Ext    RT Lower Extremity Comments R LE AROM WFL  -LR     Row Name 05/08/18 0914          Left Lower Ext    LT Lower Extremity Comments L LE AROM WFL  -LR     Row Name 05/08/18 0914          General Assessment (Manual Muscle Testing)    General Manual Muscle Testing (MMT) Assessment no strength deficits identified  -LR     Row Name 05/08/18 0914          Balance    Balance dynamic standing balance  -     Row Name 05/08/18 0914          Sensory Assessment/Intervention    Sensory General Assessment other (see comments)   mild numbness/tingling in fingers/hands on L  -LR     Row Name 05/08/18 0914          Vision Assessment/Intervention    Visual Impairment/Limitations WFL  -     Row Name 05/08/18 0914          Pain Assessment    Additional Documentation Pain Scale: Numbers Pre/Post-Treatment (Group)  -     Row Name 05/08/18 0914          Pain Scale: Numbers Pre/Post-Treatment    Pain Scale: Numbers, Pretreatment 0/10 - no pain  -LR     Pain Scale: Numbers, Post-Treatment 0/10 - no pain  -     Row Name             Wound 05/07/18 1054 Left shoulder incision    Wound - Properties Group Date first assessed: 05/07/18  -JA Time first assessed: 1054  -JA Side: Left  -JA Location: shoulder  -JA Type: incision  -JA    Row Name 05/08/18 0914          Coping    Observed Emotional State accepting;cooperative  -     Verbalized Emotional State acceptance  -LR     Row Name 05/08/18 0914          Plan of Care Review    Plan of Care Reviewed With patient  -     Row Name 05/08/18 0914          Physical Therapy Clinical Impression    Date of Referral to PT 05/07/18   -LR     PT Diagnosis (PT Clinical Impression) L shoulder degenerative joint dz with pain and limitation of function/motion, biceps tenosynovitis/ s/p L total shoulder arthroplasty, L biceps tenodesis  -LR     Prognosis (PT Clinical Impression) good  -LR     Patient/Family Goals Statement (PT Clinical Impression) go home  -LR     Criteria for Skilled Interventions Met (PT Clinical Impression) current level of function same as previous level of function  -LR     Care Plan Review (PT) evaluation/treatment results reviewed;care plan/treatment goals reviewed;risks/benefits reviewed;current/potential barriers reviewed;patient/other agree to care plan  -LR     Row Name 05/08/18 0914          Vital Signs    Intratreatment Heart Rate (beats/min) 191  -LR     Posttreatment Heart Rate (beats/min) 107  -LR     Pre SpO2 (%) 96  -LR     O2 Delivery Pre Treatment room air  -LR     Intra SpO2 (%) 94  -LR     O2 Delivery Intra Treatment room air  -LR     Post SpO2 (%) 96  -LR     O2 Delivery Post Treatment room air  -LR     Pre Patient Position Sitting  -LR     Intra Patient Position --   walking  -LR     Post Patient Position Sitting  -LR     Row Name 05/08/18 0914          Physical Therapy Goals    Bed Mobility Goal Selection (PT) bed mobility, PT goal 1  -LR     Transfer Goal Selection (PT) transfer, PT goal 1  -LR     Gait Training Goal Selection (PT) gait training, PT goal 1  -LR     Stairs Goal Selection (PT) stairs, PT goal 1  -LR     Additional Documentation Stairs Goal Selection (PT) (Row)  -     Row Name 05/08/18 0914          Bed Mobility Goal 1 (PT)    Activity/Assistive Device (Bed Mobility Goal 1, PT) supine to sit  -LR     [place] Level/Cues Needed (Bed Mobility Goal 1, PT) conditional independence  -LR     Time Frame (Bed Mobility Goal 1, PT) short term goal (STG);1 day  -LR     Progress/Outcomes (Bed Mobility Goal 1, PT) goal met  -LR     Row Name 05/08/18 0914          Transfer Goal 1 (PT)     Activity/Assistive Device (Transfer Goal 1, PT) sit-to-stand/stand-to-sit  -LR     [place] Level/Cues Needed (Transfer Goal 1, PT) standby assist  -LR     Time Frame (Transfer Goal 1, PT) short term goal (STG);1 day  -LR     Progress/Outcome (Transfer Goal 1, PT) goal met  -LR     Row Name 05/08/18 0914          Gait Training Goal 1 (PT)    Activity/Assistive Device (Gait Training Goal 1, PT) gait (walking locomotion)  -LR     [place] Level (Gait Training Goal 1, PT) contact guard assist  -LR     Distance (Gait Goal 1, PT) 350 feet  -LR     Time Frame (Gait Training Goal 1, PT) short term goal (STG);1 day  -LR     Progress/Outcome (Gait Training Goal 1, PT) goal met  -LR     Row Name 05/08/18 0914          Stairs Goal 1 (PT)    Activity/Assistive Device (Stairs Goal 1, PT) stairs, all skills;step-to-step;ascending stairs;descending stairs;using handrail  -LR     [place] Level/Cues Needed (Stairs Goal 1, PT) contact guard assist  -LR     Number of Stairs (Stairs Goal 1, PT) 3  -LR     Time Frame (Stairs Goal 1, PT) short term goal (STG);1 day  -LR     Progress/Outcome (Stairs Goal 1, PT) goal met  -LR     Row Name 05/08/18 0914          Positioning and Restraints    Pre-Treatment Position in bed  -LR     Post Treatment Position bed  -LR     In Bed notified nsg;sitting EOB;call light within reach;encouraged to call for assist;side rails up x2  -LR     Row Name 05/08/18 0914          Physical Therapy Discharge Summary    Additional Documentation Discharge Summary, PT Eval (Group)  -LR     Row Name 05/08/18 0914          Discharge Summary, PT Eval    Reason for Discharge (PT Discharge Summary) patient met all goals and outcomes;no further needs identified;patient discharged from this facility;no further expectation of functional progress  -LR     Outcomes Achieved Upon Discharge (PT Discharge Summary) able to achieve all goals within established timeline;discharge from facility occurred on same date as  evaluation;evaluation only  -LR     Row Name 05/08/18 0914          Living Environment    Home Accessibility not wheelchair accessible;stairs to enter home  -LR       User Key  (r) = Recorded By, (t) = Taken By, (c) = Cosigned By    Initials Name Provider Type    LR Allegra Mendes, PT Physical Therapist    DUYEN Sanders RN Registered Nurse          Physical Therapy Education     Title: PT OT SLP Therapies (Done)     Topic: Physical Therapy (Done)     Point: Mobility training (Done)    Learning Progress Summary     Learner Status Readiness Method Response Comment Documented by    Patient Done Acceptance E,D VU,DU Educated on safe technqiues with mobility. LR 05/08/18 0951          Point: Home exercise program (Done)    Learning Progress Summary     Learner Status Readiness Method Response Comment Documented by    Patient Done Acceptance E,D VU,DU Educated on safe technqiues with mobility. LR 05/08/18 0951          Point: Body mechanics (Done)    Learning Progress Summary     Learner Status Readiness Method Response Comment Documented by    Patient Done Acceptance E,D VU,DU Educated on safe technqiues with mobility. LR 05/08/18 0951          Point: Precautions (Done)    Learning Progress Summary     Learner Status Readiness Method Response Comment Documented by    Patient Done Acceptance E,D VU,DU Educated on safe technqiues with mobility. LR 05/08/18 0951                      User Key     Initials Effective Dates Name Provider Type Discipline     06/19/15 -  Allegra Mendes, PT Physical Therapist PT                PT Recommendation and Plan  Anticipated Discharge Disposition (PT): home or self care  Planned Therapy Interventions (PT Eval): other (see comments) (d/c home)  Therapy Frequency (PT Clinical Impression): evaluation only  Outcome Summary/Treatment Plan (PT)  Anticipated Equipment Needs at Discharge (PT):  (none)  Anticipated Discharge Disposition (PT): home or self care  Reason for  Discharge (PT Discharge Summary): patient met all goals and outcomes, no further needs identified, patient discharged from this facility, no further expectation of functional progress  Plan of Care Reviewed With: patient  Progress: improving  Outcome Summary: Patient ambulated 350 feet without use of AD. Mild unsteadiness with turns but no LOB, probably is patient's baseline. Completed stair training with no difficulty. HR jumped to 180's, highest at 191, near end of ambulation. Notified nursing, who came in to further assess. HR down to 107 once sitting EOB. O2 sats remained >/=94% throughout mobility. Patient should be able to safely mobilize about the home upon d/c today.           Outcome Measures     Row Name 05/08/18 0914 05/07/18 1530          How much help from another person do you currently need...    Turning from your back to your side while in flat bed without using bedrails? 4  -LR  --     Moving from lying on back to sitting on the side of a flat bed without bedrails? 4  -LR  --     Moving to and from a bed to a chair (including a wheelchair)? 3  -LR  --     Standing up from a chair using your arms (e.g., wheelchair, bedside chair)? 3  -LR  --     Climbing 3-5 steps with a railing? 3  -LR  --     To walk in hospital room? 3  -LR  --     AM-PAC 6 Clicks Score 20  -LR  --        How much help from another is currently needed...    Putting on and taking off regular lower body clothing?  -- 2  -AR     Bathing (including washing, rinsing, and drying)  -- 3  -AR     Toileting (which includes using toilet bed pan or urinal)  -- 3  -AR     Putting on and taking off regular upper body clothing  -- 2  -AR     Taking care of personal grooming (such as brushing teeth)  -- 3  -AR     Eating meals  -- 3  -AR     Score  -- 16  -AR        Functional Assessment    Outcome Measure Options AM-PAC 6 Clicks Basic Mobility (PT)  -LR --  -AR       User Key  (r) = Recorded By, (t) = Taken By, (c) = Cosigned By    Initials  Name Provider Type    LR Allegra Mendes, PT Physical Therapist    AR Ailyn Lowe, OT Occupational Therapist           Time Calculation:         PT Charges     Row Name 05/08/18 0953             Time Calculation    Start Time 0914  -LR      PT Received On 05/08/18  -LR      PT Goal Re-Cert Due Date 05/18/18  -LR         Time Calculation- PT    Total Timed Code Minutes- PT 8 minute(s)  -LR        User Key  (r) = Recorded By, (t) = Taken By, (c) = Cosigned By    Initials Name Provider Type    LR Allegra Mendes, PT Physical Therapist          Therapy Charges for Today     Code Description Service Date Service Provider Modifiers Qty    96476283876 HC GAIT TRAINING EA 15 MIN 5/8/2018 Allegra Mendes, PT GP 1    76224566518 HC PT EVAL LOW COMPLEXITY 3 5/8/2018 Allegra Mendes, PT GP 1          PT G-Codes  Outcome Measure Options: AM-PAC 6 Clicks Basic Mobility (PT)    PT Discharge Summary  Anticipated Discharge Disposition (PT): home or self care  Reason for Discharge: At baseline function, Discharge from facility  Outcomes Achieved: Able to achieve all goals within established timeline, Discharge from facility occurred on same date as evluation, Refer to plan of care for updates on goals achieved  Discharge Destination: Home with assist    Allegra Mendes, PT  5/8/2018

## 2018-05-09 ENCOUNTER — TRANSITIONAL CARE MANAGEMENT TELEPHONE ENCOUNTER (OUTPATIENT)
Dept: INTERNAL MEDICINE | Facility: CLINIC | Age: 78
End: 2018-05-09

## 2018-05-09 RX ORDER — ONDANSETRON HYDROCHLORIDE 8 MG/1
4 TABLET, FILM COATED ORAL 2 TIMES DAILY PRN
Qty: 10 TABLET | Refills: 0 | Status: SHIPPED | OUTPATIENT
Start: 2018-05-09 | End: 2018-11-07 | Stop reason: HOSPADM

## 2018-05-09 NOTE — OUTREACH NOTE
HYUN call completed.  Please refer to TCM call flowsheet for call documentation.  Patient declined to be seen before 6/5/18.  He would like a medication for nausea sent to [facility].  Please contact patient when this has been done.  Thank you.

## 2018-05-09 NOTE — PROGRESS NOTES
MELANIE Cardoza    Nerve Cath Post Op Call    Patient Name: Nathan Velarde  :  1940  MRN:  [id number]  Date of Discharge: 2018    Nerve Cath Post Op Call:    Analgesia:Good  Pain Score:4/10  Side Effects:None  Catheter Site:clean  Patient Controlled ON Q pump infusion rate: 12ml/hr  Catheter Plan:Will continue with plan at home without changes and The patient was instructed to call ON CALL Anesthesia provider for any questions or problems

## 2018-05-09 NOTE — OUTREACH NOTE
Per TGF, RX for zofran 8 mg 1/2 tab bid prn #10 NR sent to [facility]. Reshma (wife) called and notified.

## 2018-05-10 NOTE — PROGRESS NOTES
MELANIE Cardoza    Nerve Cath Post Op Call    Patient Name: Nathan Velarde  :  1940  MRN:  [id number]  Date of Discharge: 2018    Nerve Cath Post Op Call:    Analgesia:Good  Side Effects:None  Catheter Site:clean  Catheter Plan:Patient/Family member report nerve catheter previously discontinued, tip intact

## 2018-06-04 RX ORDER — POTASSIUM CHLORIDE 750 MG/1
TABLET, FILM COATED, EXTENDED RELEASE ORAL
Qty: 90 TABLET | Refills: 1 | Status: SHIPPED | OUTPATIENT
Start: 2018-06-04 | End: 2018-12-04 | Stop reason: SDUPTHER

## 2018-06-05 ENCOUNTER — APPOINTMENT (OUTPATIENT)
Dept: LAB | Facility: HOSPITAL | Age: 78
End: 2018-06-05

## 2018-06-05 ENCOUNTER — OFFICE VISIT (OUTPATIENT)
Dept: INTERNAL MEDICINE | Facility: CLINIC | Age: 78
End: 2018-06-05

## 2018-06-05 VITALS
SYSTOLIC BLOOD PRESSURE: 114 MMHG | OXYGEN SATURATION: 97 % | BODY MASS INDEX: 23.14 KG/M2 | HEART RATE: 76 BPM | RESPIRATION RATE: 16 BRPM | TEMPERATURE: 98 F | DIASTOLIC BLOOD PRESSURE: 50 MMHG | HEIGHT: 66 IN | WEIGHT: 144 LBS

## 2018-06-05 DIAGNOSIS — E34.9 TESTOSTERONE DEFICIENCY: ICD-10-CM

## 2018-06-05 DIAGNOSIS — I10 ESSENTIAL HYPERTENSION: ICD-10-CM

## 2018-06-05 DIAGNOSIS — E55.9 VITAMIN D DEFICIENCY: ICD-10-CM

## 2018-06-05 DIAGNOSIS — M25.512 CHRONIC LEFT SHOULDER PAIN: ICD-10-CM

## 2018-06-05 DIAGNOSIS — R53.82 CHRONIC FATIGUE: ICD-10-CM

## 2018-06-05 DIAGNOSIS — I35.9 AORTIC VALVE DISORDER: ICD-10-CM

## 2018-06-05 DIAGNOSIS — Z12.5 ENCOUNTER FOR SCREENING FOR MALIGNANT NEOPLASM OF PROSTATE: ICD-10-CM

## 2018-06-05 DIAGNOSIS — R63.4 WEIGHT LOSS: ICD-10-CM

## 2018-06-05 DIAGNOSIS — G89.29 CHRONIC LEFT SHOULDER PAIN: ICD-10-CM

## 2018-06-05 DIAGNOSIS — N40.0 PROSTATISM: ICD-10-CM

## 2018-06-05 DIAGNOSIS — D64.9 ANEMIA, UNSPECIFIED TYPE: ICD-10-CM

## 2018-06-05 DIAGNOSIS — N18.2 CKD (CHRONIC KIDNEY DISEASE), STAGE II: ICD-10-CM

## 2018-06-05 DIAGNOSIS — M47.816 SPONDYLOSIS OF LUMBAR REGION WITHOUT MYELOPATHY OR RADICULOPATHY: ICD-10-CM

## 2018-06-05 DIAGNOSIS — E78.00 PURE HYPERCHOLESTEROLEMIA: ICD-10-CM

## 2018-06-05 DIAGNOSIS — I48.0 PAROXYSMAL ATRIAL FIBRILLATION (HCC): ICD-10-CM

## 2018-06-05 DIAGNOSIS — K51.00 ULCERATIVE PANCOLITIS WITHOUT COMPLICATION (HCC): Primary | ICD-10-CM

## 2018-06-05 DIAGNOSIS — Z96.612 STATUS POST TOTAL SHOULDER ARTHROPLASTY, LEFT: ICD-10-CM

## 2018-06-05 DIAGNOSIS — M15.9 GENERALIZED OSTEOARTHRITIS: ICD-10-CM

## 2018-06-05 LAB
25(OH)D3 SERPL-MCNC: 63.3 NG/ML
ALBUMIN SERPL-MCNC: 4.68 G/DL (ref 3.2–4.8)
ALBUMIN/GLOB SERPL: 1.7 G/DL (ref 1.5–2.5)
ALP SERPL-CCNC: 139 U/L (ref 25–100)
ALT SERPL W P-5'-P-CCNC: 52 U/L (ref 7–40)
ANION GAP SERPL CALCULATED.3IONS-SCNC: 9 MMOL/L (ref 3–11)
ARTICHOKE IGE QN: 71 MG/DL (ref 0–130)
AST SERPL-CCNC: 41 U/L (ref 0–33)
BASOPHILS # BLD AUTO: 0.02 10*3/MM3 (ref 0–0.2)
BASOPHILS NFR BLD AUTO: 0.2 % (ref 0–1)
BILIRUB SERPL-MCNC: 0.6 MG/DL (ref 0.3–1.2)
BILIRUB UR QL STRIP: NEGATIVE
BUN BLD-MCNC: 27 MG/DL (ref 9–23)
BUN/CREAT SERPL: 25 (ref 7–25)
CALCIUM SPEC-SCNC: 9.9 MG/DL (ref 8.7–10.4)
CHLORIDE SERPL-SCNC: 107 MMOL/L (ref 99–109)
CHOLEST SERPL-MCNC: 122 MG/DL (ref 0–200)
CLARITY UR: CLEAR
CO2 SERPL-SCNC: 27 MMOL/L (ref 20–31)
COLOR UR: ABNORMAL
CREAT BLD-MCNC: 1.08 MG/DL (ref 0.6–1.3)
CRP SERPL-MCNC: 1 MG/DL (ref 0–1)
DEPRECATED RDW RBC AUTO: 48.2 FL (ref 37–54)
DIGOXIN SERPL-MCNC: 0.79 NG/ML (ref 0.8–2)
EOSINOPHIL # BLD AUTO: 0.38 10*3/MM3 (ref 0–0.3)
EOSINOPHIL NFR BLD AUTO: 4.2 % (ref 0–3)
ERYTHROCYTE [DISTWIDTH] IN BLOOD BY AUTOMATED COUNT: 14.8 % (ref 11.3–14.5)
ERYTHROCYTE [SEDIMENTATION RATE] IN BLOOD: 24 MM/HR (ref 0–20)
GFR SERPL CREATININE-BSD FRML MDRD: 66 ML/MIN/1.73
GLOBULIN UR ELPH-MCNC: 2.8 GM/DL
GLUCOSE BLD-MCNC: 113 MG/DL (ref 70–100)
GLUCOSE UR STRIP-MCNC: NEGATIVE MG/DL
HCT VFR BLD AUTO: 41.4 % (ref 38.9–50.9)
HDLC SERPL-MCNC: 37 MG/DL (ref 40–60)
HGB BLD-MCNC: 12.8 G/DL (ref 13.1–17.5)
HGB UR QL STRIP.AUTO: NEGATIVE
IMM GRANULOCYTES # BLD: 0.02 10*3/MM3 (ref 0–0.03)
IMM GRANULOCYTES NFR BLD: 0.2 % (ref 0–0.6)
IRON 24H UR-MRATE: 66 MCG/DL (ref 50–175)
IRON SATN MFR SERPL: 18 % (ref 20–50)
KETONES UR QL STRIP: NEGATIVE
LEUKOCYTE ESTERASE UR QL STRIP.AUTO: NEGATIVE
LYMPHOCYTES # BLD AUTO: 1.24 10*3/MM3 (ref 0.6–4.8)
LYMPHOCYTES NFR BLD AUTO: 13.6 % (ref 24–44)
MAGNESIUM SERPL-MCNC: 2.3 MG/DL (ref 1.3–2.7)
MCH RBC QN AUTO: 27.8 PG (ref 27–31)
MCHC RBC AUTO-ENTMCNC: 30.9 G/DL (ref 32–36)
MCV RBC AUTO: 90 FL (ref 80–99)
MONOCYTES # BLD AUTO: 0.51 10*3/MM3 (ref 0–1)
MONOCYTES NFR BLD AUTO: 5.6 % (ref 0–12)
NEUTROPHILS # BLD AUTO: 6.97 10*3/MM3 (ref 1.5–8.3)
NEUTROPHILS NFR BLD AUTO: 76.2 % (ref 41–71)
NITRITE UR QL STRIP: NEGATIVE
PH UR STRIP.AUTO: 6.5 [PH] (ref 5–8)
PLATELET # BLD AUTO: 356 10*3/MM3 (ref 150–450)
PMV BLD AUTO: 10.1 FL (ref 6–12)
POTASSIUM BLD-SCNC: 4.5 MMOL/L (ref 3.5–5.5)
PREALB SERPL-MCNC: 36.5 MG/DL (ref 10–40)
PROT SERPL-MCNC: 7.5 G/DL (ref 5.7–8.2)
PROT UR QL STRIP: NEGATIVE
PSA SERPL-MCNC: 3.72 NG/ML (ref 0–4)
RBC # BLD AUTO: 4.6 10*6/MM3 (ref 4.2–5.76)
SODIUM BLD-SCNC: 143 MMOL/L (ref 132–146)
SP GR UR STRIP: 1.02 (ref 1–1.03)
TESTOST SERPL-MCNC: 353.09 NG/DL (ref 86.98–780.1)
TIBC SERPL-MCNC: 377 MCG/DL (ref 250–450)
TRIGL SERPL-MCNC: 100 MG/DL (ref 0–150)
TSH SERPL DL<=0.05 MIU/L-ACNC: 2.22 MIU/ML (ref 0.35–5.35)
UROBILINOGEN UR QL STRIP: ABNORMAL
WBC NRBC COR # BLD: 9.14 10*3/MM3 (ref 3.5–10.8)

## 2018-06-05 PROCEDURE — 81003 URINALYSIS AUTO W/O SCOPE: CPT | Performed by: INTERNAL MEDICINE

## 2018-06-05 PROCEDURE — 85652 RBC SED RATE AUTOMATED: CPT | Performed by: INTERNAL MEDICINE

## 2018-06-05 PROCEDURE — 82306 VITAMIN D 25 HYDROXY: CPT | Performed by: INTERNAL MEDICINE

## 2018-06-05 PROCEDURE — 83540 ASSAY OF IRON: CPT | Performed by: INTERNAL MEDICINE

## 2018-06-05 PROCEDURE — G0103 PSA SCREENING: HCPCS | Performed by: INTERNAL MEDICINE

## 2018-06-05 PROCEDURE — 80053 COMPREHEN METABOLIC PANEL: CPT | Performed by: INTERNAL MEDICINE

## 2018-06-05 PROCEDURE — 99215 OFFICE O/P EST HI 40 MIN: CPT | Performed by: INTERNAL MEDICINE

## 2018-06-05 PROCEDURE — 93000 ELECTROCARDIOGRAM COMPLETE: CPT | Performed by: INTERNAL MEDICINE

## 2018-06-05 PROCEDURE — 84630 ASSAY OF ZINC: CPT | Performed by: INTERNAL MEDICINE

## 2018-06-05 PROCEDURE — 80061 LIPID PANEL: CPT | Performed by: INTERNAL MEDICINE

## 2018-06-05 PROCEDURE — 84134 ASSAY OF PREALBUMIN: CPT | Performed by: INTERNAL MEDICINE

## 2018-06-05 PROCEDURE — 86140 C-REACTIVE PROTEIN: CPT | Performed by: INTERNAL MEDICINE

## 2018-06-05 PROCEDURE — 36415 COLL VENOUS BLD VENIPUNCTURE: CPT | Performed by: INTERNAL MEDICINE

## 2018-06-05 PROCEDURE — 84403 ASSAY OF TOTAL TESTOSTERONE: CPT | Performed by: INTERNAL MEDICINE

## 2018-06-05 PROCEDURE — 84443 ASSAY THYROID STIM HORMONE: CPT | Performed by: INTERNAL MEDICINE

## 2018-06-05 PROCEDURE — 83550 IRON BINDING TEST: CPT | Performed by: INTERNAL MEDICINE

## 2018-06-05 PROCEDURE — 83735 ASSAY OF MAGNESIUM: CPT | Performed by: INTERNAL MEDICINE

## 2018-06-05 PROCEDURE — 85025 COMPLETE CBC W/AUTO DIFF WBC: CPT | Performed by: INTERNAL MEDICINE

## 2018-06-05 PROCEDURE — 80162 ASSAY OF DIGOXIN TOTAL: CPT | Performed by: INTERNAL MEDICINE

## 2018-06-05 NOTE — PROGRESS NOTES
Subjective   Nathan Velarde is a 77 y.o. male.     Chief Complaint   Patient presents with   • Weight Loss       History of Present Illness     The patient underwent left total shoulder replacement one month ago.  He had uncomplicated surgery and is taking no narcotics at this time.  He admits that his appetite has been down although is trying to eat 3 times a day on a regular schedule.  He has had more lightheadedness and weakness.  He has a history of hypertension and continues on diltiazem and lisinopril daily.  He has had a disciplined exercise program through adulthood and is back at the gymnasium at this time.  He has experienced no syncope.    The following portions of the patient's history were reviewed and updated as appropriate: allergies, current medications, past family history, past medical history, past social history, past surgical history and problem list.    Active Ambulatory Problems     Diagnosis Date Noted   • Anemia 05/15/2016   • Aortic valve disorder 05/15/2016   • Ataxic gait 05/15/2016   • Paroxysmal atrial fibrillation 05/15/2016   • Prostatism 05/15/2016   • Fatigue 05/15/2016   • Ulcerative colitis 05/15/2016   • Transverse myelitis 05/15/2016   • Testosterone deficiency 05/15/2016   • Paresthesia 05/15/2016   • Lumbosacral neuritis 05/15/2016   • 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   • Hypertension 05/15/2016   • Hyperlipidemia 05/15/2016   • Generalized osteoarthritis 05/15/2016   • Gross hematuria 05/15/2016   • CKD (chronic kidney disease), 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   • Hyperglycemia 05/28/2016   • Shoulder pain, left 09/26/2016   • Preventative health care 11/29/2016   • Status post total shoulder arthroplasty, 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     Resolved Ambulatory Problems     Diagnosis Date Noted   • Neck pain 05/15/2016   • Ulcerative enterocolitis 05/15/2016   • Impaired glucose tolerance 05/15/2016     Past Medical History:   Diagnosis Date   • Aortic valve insufficiency    • Benign neoplasm of  connective and soft tissue of pelvis    • Bilateral bunions    • Bladder cancer    • DJD of left shoulder Adulthood   • Generalized osteoarthritis    • HTN (hypertension)    • Hyperlipidemia    • Knee osteoarthritis    • Mitral valve insufficiency    • Nephrolithiasis    • PAF (paroxysmal atrial fibrillation)    • Prediabetes    • Prostatism    • Transverse myelitis    • Ulcerative colitis      Past Surgical History:   Procedure Laterality Date   • BICEPS TENDON REPAIR Left 2018    Procedure: BICEPS TENDODESIS;  Surgeon: Abner Humphrey MD;  Location:  [facility];  Service: Orthopedics   • COLONOSCOPY      Colitis in remission   • CYSTOSCOPY  1977    Excision of bladder cancer   • INGUINAL HERNIA REPAIR Right    • KNEE SURGERY Right     Excision of cyst   • TONSILLECTOMY     • TOTAL SHOULDER ARTHROPLASTY Left 2018    Procedure: LEFT TOTAL SHOULDER ARTHROPLASTY;  Surgeon: Abner Humphrey MD;  Location:  [facility];  Service: Orthopedics     Family History   Problem Relation Age of Onset   • Alzheimer's disease Mother          age 89   • Arthritis Mother    • Hyperlipidemia Mother    • Angina Father    • Hypertension Father    • Pneumonia Father          age 70 - tobacco   • Aneurysm Father         abd aortic   • Unexplained death Brother    • Breast cancer Maternal Grandmother    • Arthritis Maternal Grandmother    • Diabetes Paternal Uncle      Social History     Social History   • Marital status:      Spouse name: N/A   • Number of children: N/A   • Years of education: N/A     Occupational History   • Not on file.     Social History Main Topics   • Smoking status: Never Smoker   • Smokeless tobacco: Never Used   • Alcohol use 0.6 oz/week     1 Cans of beer per week   • Drug use: No   • Sexual activity: Not on file     Other Topics Concern   • Not on file     Social History Narrative    Domestic life- lives in private home with wife      "   Anabaptism- none listed        Sleep hygiene- sleeps 8 hours nightly good quality        Caffeine use- drinks 1 diet Mountain Dew daily and occasionally uses Excedrin        Exercise habits- disciplined exercise Program 5 days weekly with walking and weight lifting and yoga        Diet-   well-balanced American Heart Association - low in salt        Occupation- retired         Hearing-   no impairment         Vision- Corrects well with bifocal glasses        Driving- No limitations                 Review of Systems   Constitutional: Negative for appetite change and fatigue.   HENT: Negative for ear pain and sore throat.    Eyes: Negative for itching and visual disturbance.   Respiratory: Negative for cough and shortness of breath.    Cardiovascular: Negative for chest pain and palpitations.   Gastrointestinal: Negative for abdominal pain and nausea.   Endocrine: Negative for cold intolerance and heat intolerance.   Genitourinary: Negative for dysuria and hematuria.   Musculoskeletal: Positive for arthralgias, back pain and gait problem.   Skin: Negative for rash and wound.   Allergic/Immunologic: Negative for environmental allergies and food allergies.   Neurological: Positive for light-headedness. Negative for dizziness and headaches.   Hematological: Negative for adenopathy. Does not bruise/bleed easily.   Psychiatric/Behavioral: Negative for sleep disturbance. The patient is not nervous/anxious.        Objective   Blood pressure 114/50, pulse 76, temperature 98 °F (36.7 °C), temperature source Oral, resp. rate 16, height 167.6 cm (66\"), weight 65.3 kg (144 lb), SpO2 97 %.    Physical Exam   Constitutional: He is oriented to person, place, and time. He appears well-developed and well-nourished. No distress.   HENT:   Right Ear: External ear normal.   Left Ear: External ear normal.   Nose: Nose normal.   Mouth/Throat: Oropharynx is clear and moist.   Eyes: EOM are normal. Pupils are equal, " round, and reactive to light. No scleral icterus.   Neck: Normal range of motion. Neck supple. No JVD present.   Cardiovascular: Normal rate, regular rhythm, normal heart sounds and intact distal pulses.    No murmur heard.  Pulmonary/Chest: Effort normal and breath sounds normal. He has no wheezes. He has no rales.   Abdominal: Soft. Bowel sounds are normal. He exhibits no mass. There is no tenderness.   Genitourinary:   Genitourinary Comments: Deferred   Musculoskeletal: He exhibits no edema.   Left shoulder is in a rigid padded sling now one month postop.  Right shoulder goes good range of motion with no pain.  There is advanced DJD of knees bilaterally with stiffness and minimal tenderness.  Moderate bunions bilaterally left more than right.   Lymphadenopathy:     He has no cervical adenopathy.   Neurological: He is alert and oriented to person, place, and time. He displays normal reflexes. No cranial nerve deficit or sensory deficit. He exhibits normal muscle tone. Coordination normal.   Vibratory normal  Romberg negative  Gait normal  Plantars downgoing     Skin: Skin is warm and dry. No rash noted.   Psychiatric: He has a normal mood and affect. His behavior is normal. Judgment and thought content normal.   Nursing note and vitals reviewed.      ECG 12 Lead  Date/Time: 6/5/2018 10:15 AM  Performed by: HU CASTILLO  Authorized by: HU CASTILLO   Interpreted by ED physician: Hu Castillo M.D.  Comparison: compared with previous ECG from 4/30/2018  Similar to previous ECG  Rhythm: sinus rhythm  Rate: normal  BPM: 70  Conduction: LAFB  ST Segments: ST segments normal  T Waves: T waves normal  QRS axis: left  Clinical impression: non-specific ECG  Comments: Indication - atrial fibrillation  Baseline EKG          Assessment/Plan   Nathan was seen today for weight loss.    Diagnoses and all orders for this visit:    Ulcerative pancolitis without complication  -     CBC & Differential  -      C-reactive Protein  -     Sedimentation Rate  -     CBC Auto Differential    Aortic valve disorder    Paroxysmal atrial fibrillation  -     ECG 12 Lead  -     TSH  -     Digoxin Level    Pure hypercholesterolemia  -     Comprehensive Metabolic Panel  -     Lipid Panel    Essential hypertension  -     Magnesium    Chronic left shoulder pain    Generalized osteoarthritis    Spondylosis of lumbar region without myelopathy or radiculopathy    CKD (chronic kidney disease), stage II  -     Vitamin D 25 Hydroxy  -     Urinalysis With / Culture If Indicated - Urine, Clean Catch    Prostatism  -     PSA Screen    Anemia, unspecified type  -     Iron Profile    Status post total shoulder arthroplasty, left    Chronic fatigue    Testosterone deficiency  -     Testosterone    Weight loss  -     Zinc, Whole Blood  -     Prealbumin    Vitamin D deficiency   -     Vitamin D 25 Hydroxy    Encounter for screening for malignant neoplasm of prostate   -     PSA Screen    Other orders  -     ferrous sulfate 325 (65 FE) MG tablet; Take 1 tablet by mouth Daily With Breakfast.      The patient has a marked degree of weight loss with orthostasis.  He is at significant risk of syncope.  He is generally had good control of hypertension with his current medications.  Repeat blood pressure 120/50 sitting and 100/50 standing.  I've asked him to stop lisinopril HCTZ and monitor blood pressures carefully.    The patient's weight loss is likely multifactorial and related to an appetite loss with his recent surgery.  I've encouraged her more disciplined with increasing calories and routinely taking more fluids by mouth.  His laboratory testing does not show any significant biochemical disturbances.    The patient's had many years of a very trivial paroxysmal atrial fibrillation and atrial flutter.  His spells just generally last for about movement and respond to an extra digoxin.  Diltiazem has had a stabilizing effect on his heart rate.    The  patient's had a lifelong history of ulcerative colitis and is been on anti-inflammatory medications.  In recent years mesalamine has worked well to stabilize his bowel disease and he has normal bowel movements.    The patient has moderate prostatism.  Combination of tamsulosin and saw palmetto have given him adequate urine stream and urine control.    Patient Instructions   1.  Stop lisinopril HCTZ  -  until blood pressure is routinely greater than  - 135 systolic.    2.  Use extra breakfast bar - 100-200 fabricio each morning - for better nutrition.    3.  Continue other regular medicines and supplements - as listed.    4.  Drink Powerade ZERO - 16-32 ounces every day  for the next week - for rehydration.    5.  Walk daily as tolerated - for physical fitness.    6.  Speak to nurse on Friday - about test results.    7.  Return visit one month - nonfasting checkup.    8.  Iron blood level borderline low.  Start ferrous sulfate 325 mg daily for at least one year.    9.  LDL cholesterol is good at 71.    10.  Digoxin blood level is therapeutic at 0.8.    11.  Blood glucose is mildly elevated 111.  Follow diabetic diet guidelines.    12.  Other laboratory tests are acceptable and required no change in treatment.    Current Outpatient Prescriptions:   •  Alpha-Lipoic Acid 300 MG capsule, Take 300 mg by mouth Daily., Disp: , Rfl:   •  aspirin-acetaminophen-caffeine (EXCEDRIN MIGRAINE) 250-250-65 MG per tablet, Take 1 tablet by mouth As Needed for Headache., Disp: , Rfl:   •  atorvastatin (LIPITOR) 40 MG tablet, TAKE 1 TABLET AT BEDTIME, Disp: 90 tablet, Rfl: 1  •  Calcium Carbonate-Vitamin D (CALCIUM 600+D PO), Take 1 tablet by mouth Daily., Disp: , Rfl:   •  CARTIA  MG 24 hr capsule, TAKE 1 CAPSULE DAILY, Disp: 90 capsule, Rfl: 1  •  cholecalciferol (VITAMIN D3) 1000 units tablet, Take 1,000 Units by mouth Daily., Disp: , Rfl:   •  DELZICOL 400 MG capsule delayed-release delayed release capsule, TAKE 2 CAPSULES TWICE A  DAY, Disp: 360 capsule, Rfl: 1  •  digoxin (LANOXIN) 125 MCG tablet, TAKE ONE TABLET BY MOUTH DAILY, Disp: 90 tablet, Rfl: 1  •  docusate sodium 100 MG capsule, Take 100 mg by mouth 2 (Two) Times a Day As Needed for Constipation., Disp: , Rfl:   •  ferrous sulfate 325 (65 FE) MG tablet, Take 1 tablet by mouth Daily With Breakfast., Disp: 90 tablet, Rfl: 3  •  Fish Oil-Cholecalciferol (OMEGA-3 + D PO), Take 1 capsule by mouth Daily., Disp: , Rfl:   •  Glucosamine-Chondroit-Vit C-Mn (GLUCOSAMINE 1500 COMPLEX) capsule, Take 1 tablet by mouth Daily., Disp: , Rfl:   •  lisinopril-hydrochlorothiazide (ZESTORETIC) 10-12.5 MG per tablet, Take 0.5 tablets by mouth Daily., Disp: 90 tablet, Rfl: 3  •  Magnesium 250 MG tablet, Take 250 mg by mouth Daily., Disp: , Rfl:   •  Multiple Vitamins-Minerals (MULTIVITAMIN ADULT PO), Take 1 tablet by mouth Daily., Disp: , Rfl:   •  ondansetron (ZOFRAN) 8 MG tablet, Take 0.5 tablets by mouth 2 (Two) Times a Day As Needed for Nausea., Disp: 10 tablet, Rfl: 0  •  potassium chloride (K-DUR) 10 MEQ CR tablet, TAKE 1 TABLET DAILY, Disp: 90 tablet, Rfl: 1  •  Saw Palmetto 160 MG capsule, Take 1 capsule by mouth Daily., Disp: , Rfl:   •  Selenium 200 MCG capsule, Take 200 mcg by mouth Daily., Disp: , Rfl:   •  tamsulosin (FLOMAX) 0.4 MG capsule 24 hr capsule, TAKE ONE CAPSULE BY MOUTH EVERY NIGHT AT BEDTIME, Disp: 90 capsule, Rfl: 1  •  Ubiquinol 100 MG capsule, Take 100 mg by mouth Daily., Disp: , Rfl:   •  vitamin E 400 UNIT capsule, Take 400 Units by mouth Daily., Disp: , Rfl:   •  Zinc 50 MG capsule, Take 50 mg by mouth Daily., Disp: , Rfl:     Allergies   Allergen Reactions   • Sulfa Antibiotics Nausea Only and Other (See Comments)     Headache       Immunization History   Administered Date(s) Administered   • Flu Vaccine High Dose PF 65YR+ 09/26/2016, 10/16/2017   • Influenza, Quadrivalent 10/11/2015   • Pneumococcal Polysaccharide (PPSV23) 05/01/1999, 03/16/2010   • Td 12/13/2007   • Tdap  12/20/2017     Electronically signed Hu Castillo M.D.6/8/2018 7:22 AM

## 2018-06-05 NOTE — PATIENT INSTRUCTIONS
1.  Stop lisinopril HCTZ  -  until blood pressure is routinely greater than  - 135 systolic.    2.  Use extra breakfast bar - 100-200 fabricio each morning - for better nutrition.    3.  Continue other regular medicines and supplements - as listed.    4.  Drink Powerade ZERO - 16-32 ounces every day  for the next week - for rehydration.    5.  Walk daily as tolerated - for physical fitness.    6.  Speak to nurse on Friday - about test results.    7.  Return visit one month - nonfasting checkup.

## 2018-06-08 LAB — ZINC BLD-MCNC: 743 UG/DL (ref 440–860)

## 2018-06-08 RX ORDER — DILTIAZEM HYDROCHLORIDE 180 MG/1
1 CAPSULE, COATED, EXTENDED RELEASE ORAL DAILY
COMMUNITY
End: 2018-09-10 | Stop reason: SDUPTHER

## 2018-06-08 RX ORDER — FERROUS SULFATE 325(65) MG
325 TABLET ORAL
Qty: 90 TABLET | Refills: 3 | Status: SHIPPED | OUTPATIENT
Start: 2018-06-08 | End: 2019-05-08 | Stop reason: HOSPADM

## 2018-06-12 ENCOUNTER — TELEPHONE (OUTPATIENT)
Dept: INTERNAL MEDICINE | Facility: CLINIC | Age: 78
End: 2018-06-12

## 2018-06-12 NOTE — TELEPHONE ENCOUNTER
Called labs to pt. Per TGF:  -Iron blood level borderline low.  Start ferrous sulfate 325 mg daily for at least one year.  -LDL is good at 71.  -Digoxin blood level is therapeutic at 0.8.  -Blood glucose is mildly elevated 111.  Follow diabetic diet guidelines.  -Other labs ok - no change in tx.  Pt verb understanding.

## 2018-06-14 RX ORDER — DIGOXIN 125 MCG
TABLET ORAL
Qty: 90 TABLET | Refills: 1 | Status: SHIPPED | OUTPATIENT
Start: 2018-06-14 | End: 2018-12-29 | Stop reason: SDUPTHER

## 2018-07-05 ENCOUNTER — OFFICE VISIT (OUTPATIENT)
Dept: INTERNAL MEDICINE | Facility: CLINIC | Age: 78
End: 2018-07-05

## 2018-07-05 VITALS
WEIGHT: 151.8 LBS | SYSTOLIC BLOOD PRESSURE: 128 MMHG | TEMPERATURE: 97.2 F | HEART RATE: 60 BPM | RESPIRATION RATE: 14 BRPM | OXYGEN SATURATION: 98 % | DIASTOLIC BLOOD PRESSURE: 42 MMHG | BODY MASS INDEX: 24.5 KG/M2

## 2018-07-05 DIAGNOSIS — Z96.612 STATUS POST TOTAL SHOULDER ARTHROPLASTY, LEFT: ICD-10-CM

## 2018-07-05 DIAGNOSIS — I10 ESSENTIAL HYPERTENSION: Primary | ICD-10-CM

## 2018-07-05 DIAGNOSIS — M15.9 GENERALIZED OSTEOARTHRITIS: ICD-10-CM

## 2018-07-05 DIAGNOSIS — R53.82 CHRONIC FATIGUE: ICD-10-CM

## 2018-07-05 DIAGNOSIS — G89.29 CHRONIC LEFT SHOULDER PAIN: ICD-10-CM

## 2018-07-05 DIAGNOSIS — M25.512 CHRONIC LEFT SHOULDER PAIN: ICD-10-CM

## 2018-07-05 PROCEDURE — 99213 OFFICE O/P EST LOW 20 MIN: CPT | Performed by: INTERNAL MEDICINE

## 2018-07-05 NOTE — PROGRESS NOTES
Subjective   Nathan Velarde is a 77 y.o. male.     History of Present Illness     The patient experienced a 9 pound weight loss through the month of May following left total shoulder replacement on May 7.  He developed lightheadedness and low blood pressure and was taken off lisinopril and HCTZ.  He was counseled on June 5 about the problem and is been working effectively with his wife to increase nutritional intake.  He has had no significant GI symptoms or other feelings of poor health.  He feels he is convalescing well and regaining shoulder function.    The following portions of the patient's history were reviewed and updated as appropriate: allergies, current medications, past family history, past medical history, past social history, past surgical history and problem list.    Review of Systems   Constitutional: Positive for appetite change. Negative for fatigue.   Respiratory: Negative for cough and shortness of breath.    Cardiovascular: Negative for chest pain and palpitations.   Gastrointestinal: Negative for abdominal distention and nausea.   Musculoskeletal: Positive for arthralgias.   Neurological: Negative for dizziness and light-headedness.       Objective   Blood pressure 128/42, pulse 60, temperature 97.2 °F (36.2 °C), temperature source Oral, resp. rate 14, weight 68.9 kg (151 lb 12.8 oz), SpO2 98 %.    Physical Exam   Constitutional: He is oriented to person, place, and time. He appears well-developed and well-nourished. No distress.   Neck: Normal range of motion. Neck supple. No JVD present.   Cardiovascular: Normal rate, regular rhythm and normal heart sounds.    Pulmonary/Chest: Effort normal and breath sounds normal. He has no wheezes. He has no rales.   Musculoskeletal: He exhibits no edema.   Left shoulder has 30% range of motion with moderate stiffness and mild tenderness   Lymphadenopathy:     He has no cervical adenopathy.   Neurological: He is alert and oriented to person, place, and  time. He exhibits normal muscle tone. Coordination normal.   Skin: Skin is warm and dry. No rash noted.   Psychiatric: He has a normal mood and affect. His behavior is normal. Judgment and thought content normal.   Nursing note and vitals reviewed.    Procedures  Assessment/Plan   Nathan was seen today for weight loss.    Diagnoses and all orders for this visit:    Essential hypertension    Generalized osteoarthritis    Chronic left shoulder pain    Chronic fatigue    Status post total shoulder arthroplasty, left      The patient's weight loss has greatly reversed.  I've counseled him on maintaining his weight over 150 pounds and BMI closer to 25.  He is a muscular man with a large skeleton.    The patient's blood pressure appears to be improving.  I've given guidelines on resuming a half a tablet of his lisinopril HCTZ.    The patient's left shoulder is progressing well.  I've counseled him that he will need at least one year of disciplined exercise to build range of motion, strength, and endurance.    Patient Instructions   1.   Check blood pressure daily - over the next few months.    2.  When blood pressure routinely greater than 150 systolic - over 7 days period - resume lisinopril/HCTZ 1/2 tablet.    3.  Continue physical therapy - for shoulder rehabilitation.    4.  Maintain a well-balanced diet - goal weight 155 lbs.    5.  Next checkup 3 months - with new physician.    Electronically signed Hu Castillo M.D.7/7/2018 11:33 AM

## 2018-07-05 NOTE — PATIENT INSTRUCTIONS
1.   Check blood pressure daily - over the next few months.    2.  When blood pressure routinely greater than 150 systolic - over 7 days period - resume lisinopril/HCTZ 1/2 tablet.    3.  Continue physical therapy - for shoulder rehabilitation.    4.  Maintain a well-balanced diet - goal weight 155 lbs.    5.  Next checkup 3 months - with new physician.

## 2018-07-23 RX ORDER — TAMSULOSIN HYDROCHLORIDE 0.4 MG/1
CAPSULE ORAL
Qty: 90 CAPSULE | Refills: 1 | Status: SHIPPED | OUTPATIENT
Start: 2018-07-23 | End: 2019-01-27 | Stop reason: SDUPTHER

## 2018-07-23 RX ORDER — DICLOFENAC SODIUM 75 MG/1
TABLET, DELAYED RELEASE ORAL
Qty: 90 TABLET | Refills: 1 | OUTPATIENT
Start: 2018-07-23

## 2018-07-31 ENCOUNTER — TELEPHONE (OUTPATIENT)
Dept: INTERNAL MEDICINE | Facility: CLINIC | Age: 78
End: 2018-07-31

## 2018-07-31 RX ORDER — ATORVASTATIN CALCIUM 40 MG/1
TABLET, FILM COATED ORAL
Qty: 90 TABLET | Refills: 1 | Status: SHIPPED | OUTPATIENT
Start: 2018-07-31 | End: 2018-11-07 | Stop reason: ALTCHOICE

## 2018-08-01 RX ORDER — DICLOFENAC SODIUM 75 MG/1
75 TABLET, DELAYED RELEASE ORAL DAILY
Qty: 30 TABLET | Refills: 2 | Status: SHIPPED | OUTPATIENT
Start: 2018-08-01 | End: 2018-10-09 | Stop reason: SDUPTHER

## 2018-09-10 RX ORDER — DILTIAZEM HYDROCHLORIDE 180 MG/1
CAPSULE, EXTENDED RELEASE ORAL
Qty: 90 CAPSULE | Refills: 1 | Status: SHIPPED | OUTPATIENT
Start: 2018-09-10 | End: 2019-03-25 | Stop reason: SDUPTHER

## 2018-10-09 ENCOUNTER — LAB (OUTPATIENT)
Dept: LAB | Facility: HOSPITAL | Age: 78
End: 2018-10-09

## 2018-10-09 ENCOUNTER — OFFICE VISIT (OUTPATIENT)
Dept: FAMILY MEDICINE CLINIC | Facility: CLINIC | Age: 78
End: 2018-10-09

## 2018-10-09 VITALS
BODY MASS INDEX: 25.23 KG/M2 | OXYGEN SATURATION: 97 % | HEART RATE: 66 BPM | DIASTOLIC BLOOD PRESSURE: 80 MMHG | WEIGHT: 157 LBS | HEIGHT: 66 IN | SYSTOLIC BLOOD PRESSURE: 128 MMHG

## 2018-10-09 DIAGNOSIS — R73.9 HYPERGLYCEMIA: ICD-10-CM

## 2018-10-09 DIAGNOSIS — R31.0 GROSS HEMATURIA: ICD-10-CM

## 2018-10-09 DIAGNOSIS — I10 ESSENTIAL HYPERTENSION: ICD-10-CM

## 2018-10-09 DIAGNOSIS — K51.00 ULCERATIVE PANCOLITIS WITHOUT COMPLICATION (HCC): ICD-10-CM

## 2018-10-09 DIAGNOSIS — E78.00 PURE HYPERCHOLESTEROLEMIA: Primary | ICD-10-CM

## 2018-10-09 DIAGNOSIS — M17.0 PRIMARY OSTEOARTHRITIS OF BOTH KNEES: ICD-10-CM

## 2018-10-09 DIAGNOSIS — I48.0 PAROXYSMAL ATRIAL FIBRILLATION (HCC): ICD-10-CM

## 2018-10-09 DIAGNOSIS — E78.00 PURE HYPERCHOLESTEROLEMIA: ICD-10-CM

## 2018-10-09 LAB
ALBUMIN SERPL-MCNC: 4.54 G/DL (ref 3.2–4.8)
ALBUMIN/GLOB SERPL: 2.2 G/DL (ref 1.5–2.5)
ALP SERPL-CCNC: 115 U/L (ref 25–100)
ALT SERPL W P-5'-P-CCNC: 37 U/L (ref 7–40)
ANION GAP SERPL CALCULATED.3IONS-SCNC: 6 MMOL/L (ref 3–11)
ARTICHOKE IGE QN: 66 MG/DL (ref 0–130)
AST SERPL-CCNC: 32 U/L (ref 0–33)
BASOPHILS # BLD AUTO: 0.04 10*3/MM3 (ref 0–0.2)
BASOPHILS NFR BLD AUTO: 0.4 % (ref 0–1)
BILIRUB SERPL-MCNC: 0.6 MG/DL (ref 0.3–1.2)
BILIRUB UR QL STRIP: NEGATIVE
BUN BLD-MCNC: 28 MG/DL (ref 9–23)
BUN/CREAT SERPL: 24.6 (ref 7–25)
CALCIUM SPEC-SCNC: 9.8 MG/DL (ref 8.7–10.4)
CHLORIDE SERPL-SCNC: 103 MMOL/L (ref 99–109)
CHOLEST SERPL-MCNC: 121 MG/DL (ref 0–200)
CLARITY UR: CLEAR
CO2 SERPL-SCNC: 31 MMOL/L (ref 20–31)
COLOR UR: ABNORMAL
CREAT BLD-MCNC: 1.14 MG/DL (ref 0.6–1.3)
DEPRECATED RDW RBC AUTO: 49.1 FL (ref 37–54)
EOSINOPHIL # BLD AUTO: 0.38 10*3/MM3 (ref 0–0.3)
EOSINOPHIL NFR BLD AUTO: 3.9 % (ref 0–3)
ERYTHROCYTE [DISTWIDTH] IN BLOOD BY AUTOMATED COUNT: 15 % (ref 11.3–14.5)
GFR SERPL CREATININE-BSD FRML MDRD: 62 ML/MIN/1.73
GLOBULIN UR ELPH-MCNC: 2.1 GM/DL
GLUCOSE BLD-MCNC: 97 MG/DL (ref 70–100)
GLUCOSE UR STRIP-MCNC: NEGATIVE MG/DL
HBA1C MFR BLD: 6.3 % (ref 4.8–5.6)
HCT VFR BLD AUTO: 44.4 % (ref 38.9–50.9)
HDLC SERPL-MCNC: 38 MG/DL (ref 40–60)
HGB BLD-MCNC: 14 G/DL (ref 13.1–17.5)
HGB UR QL STRIP.AUTO: NEGATIVE
IMM GRANULOCYTES # BLD: 0.02 10*3/MM3 (ref 0–0.03)
IMM GRANULOCYTES NFR BLD: 0.2 % (ref 0–0.6)
KETONES UR QL STRIP: NEGATIVE
LEUKOCYTE ESTERASE UR QL STRIP.AUTO: NEGATIVE
LYMPHOCYTES # BLD AUTO: 1.44 10*3/MM3 (ref 0.6–4.8)
LYMPHOCYTES NFR BLD AUTO: 14.7 % (ref 24–44)
MCH RBC QN AUTO: 28.6 PG (ref 27–31)
MCHC RBC AUTO-ENTMCNC: 31.5 G/DL (ref 32–36)
MCV RBC AUTO: 90.6 FL (ref 80–99)
MONOCYTES # BLD AUTO: 0.66 10*3/MM3 (ref 0–1)
MONOCYTES NFR BLD AUTO: 6.7 % (ref 0–12)
NEUTROPHILS # BLD AUTO: 7.27 10*3/MM3 (ref 1.5–8.3)
NEUTROPHILS NFR BLD AUTO: 74.3 % (ref 41–71)
NITRITE UR QL STRIP: NEGATIVE
PH UR STRIP.AUTO: 6 [PH] (ref 5–8)
PLATELET # BLD AUTO: 254 10*3/MM3 (ref 150–450)
PMV BLD AUTO: 10.5 FL (ref 6–12)
POTASSIUM BLD-SCNC: 4.7 MMOL/L (ref 3.5–5.5)
PROT SERPL-MCNC: 6.6 G/DL (ref 5.7–8.2)
PROT UR QL STRIP: NEGATIVE
RBC # BLD AUTO: 4.9 10*6/MM3 (ref 4.2–5.76)
SODIUM BLD-SCNC: 140 MMOL/L (ref 132–146)
SP GR UR STRIP: 1.02 (ref 1–1.03)
TRIGL SERPL-MCNC: 111 MG/DL (ref 0–150)
UROBILINOGEN UR QL STRIP: ABNORMAL
WBC NRBC COR # BLD: 9.79 10*3/MM3 (ref 3.5–10.8)

## 2018-10-09 PROCEDURE — 99214 OFFICE O/P EST MOD 30 MIN: CPT | Performed by: FAMILY MEDICINE

## 2018-10-09 PROCEDURE — 85025 COMPLETE CBC W/AUTO DIFF WBC: CPT | Performed by: FAMILY MEDICINE

## 2018-10-09 PROCEDURE — 80053 COMPREHEN METABOLIC PANEL: CPT | Performed by: FAMILY MEDICINE

## 2018-10-09 PROCEDURE — 83036 HEMOGLOBIN GLYCOSYLATED A1C: CPT | Performed by: FAMILY MEDICINE

## 2018-10-09 PROCEDURE — 80061 LIPID PANEL: CPT | Performed by: FAMILY MEDICINE

## 2018-10-09 PROCEDURE — 36415 COLL VENOUS BLD VENIPUNCTURE: CPT

## 2018-10-09 PROCEDURE — 81003 URINALYSIS AUTO W/O SCOPE: CPT | Performed by: FAMILY MEDICINE

## 2018-10-09 RX ORDER — DICLOFENAC SODIUM 75 MG/1
75 TABLET, DELAYED RELEASE ORAL DAILY
Qty: 90 TABLET | Refills: 2 | Status: SHIPPED | OUTPATIENT
Start: 2018-10-09 | End: 2019-05-08 | Stop reason: HOSPADM

## 2018-10-09 NOTE — PROGRESS NOTES
Subjective   Nathan Velarde is a 78 y.o. male.     Chief Complaint   Patient presents with   • Follow-up       History of Present Illness     Nathan Velarde presents today for to establish care. he is a former patient of Dr. Castillo and is transitioning care after his assisted. he reports no changes to his health since his last scheduled visit, and is here for a regularly scheduled follow-up appointment.  He reports good compliance with his medications.    The following portions of the patient's history were reviewed and updated as appropriate: allergies, current medications, past family history, past medical history, past social history, past surgical history and problem list.    Active Ambulatory Problems     Diagnosis Date Noted   • Anemia 05/15/2016   • Aortic valve disorder 05/15/2016   • Ataxic gait 05/15/2016   • Paroxysmal atrial fibrillation 05/15/2016   • Prostatism 05/15/2016   • Fatigue 05/15/2016   • Ulcerative colitis (CMS/HCC) 05/15/2016   • Transverse myelitis (CMS/HCC) 05/15/2016   • Testosterone deficiency 05/15/2016   • Paresthesia 05/15/2016   • Lumbosacral neuritis 05/15/2016   • 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   • Hypertension 05/15/2016   • Hyperlipidemia 05/15/2016   • Generalized osteoarthritis 05/15/2016   • Gross hematuria 05/15/2016   • CKD (chronic kidney disease), 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   • Hyperglycemia 05/28/2016   • Shoulder pain, left 09/26/2016   • Preventative health care 11/29/2016   • Status post total shoulder arthroplasty, 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   • Knee osteoarthritis 01/01/2004     Resolved Ambulatory Problems     Diagnosis Date Noted   • Neck pain 05/15/2016   • Ulcerative enterocolitis (CMS/HCC) 05/15/2016   • Impaired glucose tolerance 05/15/2016     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    • Bilateral bunions    • Bladder cancer (CMS/[facility]) 1977   • DJD of left shoulder Adulthood    • Generalized osteoarthritis Adulthood   • HTN (hypertension)    • Hyperlipidemia Adulthood   • Knee osteoarthritis    • Mitral valve insufficiency    • Nephrolithiasis    • PAF (paroxysmal atrial fibrillation) (CMS/[facility])    • Prediabetes    • Prostatism    • Transverse myelitis (CMS/[facility])    • Ulcerative colitis (CMS/[facility])      Past Surgical History:   Procedure Laterality Date   • BICEPS TENDON REPAIR Left 2018    Procedure: BICEPS TENDODESIS;  Surgeon: Abner Humphrey MD;  Location:  [facility];  Service: Orthopedics   • COLONOSCOPY      Colitis in remission   • CYSTOSCOPY  1977    Excision of bladder cancer   • INGUINAL HERNIA REPAIR Right    • KNEE SURGERY Right     Excision of cyst   • TONSILLECTOMY     • TOTAL SHOULDER ARTHROPLASTY Left 2018    Procedure: LEFT TOTAL SHOULDER ARTHROPLASTY;  Surgeon: Abner Humphrey MD;  Location:  [facility];  Service: Orthopedics     Family History   Problem Relation Age of Onset   • Alzheimer's disease Mother          age 89   • Arthritis Mother    • Hyperlipidemia Mother    • Angina Father    • Hypertension Father    • Pneumonia Father          age 70 - tobacco   • Aneurysm Father         abd aortic   • Unexplained death Brother    • Breast cancer Maternal Grandmother    • Arthritis Maternal Grandmother    • Diabetes Paternal Uncle      Social History     Social History   • Marital status:      Spouse name: N/A   • Number of children: N/A   • Years of education: N/A     Occupational History   • Not on file.     Social History Main Topics   • Smoking status: Never Smoker   • Smokeless tobacco: Never Used   • Alcohol use 0.6 oz/week     1 Cans of beer per week   • Drug use: No   • Sexual activity: Not on file     Other Topics Concern   • Not on file     Social History Narrative    Domestic life- lives in private home with wife        Orthodox- none listed        Sleep hygiene- sleeps 8 hours nightly  "good quality        Caffeine use- drinks 1 diet Mountain Dew daily and occasionally uses Excedrin        Exercise habits- disciplined exercise Program 5 days weekly with walking and weight lifting and yoga        Diet-   well-balanced American Heart Association - low in salt        Occupation- retired         Hearing-   no impairment         Vision- Corrects well with bifocal glasses        Driving- No limitations                 Review of Systems   Constitutional: Negative.    HENT: Negative.    Eyes: Negative.    Respiratory: Negative for cough, chest tightness, shortness of breath and wheezing.    Cardiovascular: Negative for chest pain and palpitations.   Gastrointestinal: Negative for abdominal distention, abdominal pain, constipation, diarrhea, nausea and vomiting.   Musculoskeletal: Negative for gait problem and joint swelling.        Bilateral osteoarthritis of the knees   Skin: Negative for color change and wound.   Psychiatric/Behavioral: Negative for agitation and hallucinations.       Objective   Blood pressure 128/80, pulse 66, height 167.6 cm (65.98\"), weight 71.2 kg (157 lb), SpO2 97 %.  Nursing note reviewed  Physical Exam  Const: NAD, A&Ox4, Pleasant, Cooperative  Eyes: EOMI, no conjunctivitis  ENT: No nasal discharge present, neck supple  Cardiac: Regular rate and rhythm, no peripheral edema or cyanosis  Resp: Respiratory rate within normal limits, no increased work of breathing, no audible wheezing or retractions noted  GI: No distention or ascites  MSK: Motor and sensation grossly intact in bilateral upper extremities  Neurologic, CN II-XII grossly intact  Psych: Appropriate mood and behavior.  Skin: Pink, warm, dry  Procedures  Assessment/Plan   Nathan was seen today for follow-up.    Diagnoses and all orders for this visit:    Pure hypercholesterolemia  -     Lipid panel; Future    Essential hypertension  -     Comprehensive metabolic panel; Future    Ulcerative pancolitis " without complication (CMS/HCC)    Paroxysmal atrial fibrillation (CMS/HCC)  -     CBC w AUTO Differential; Future    Hyperglycemia  -     Comprehensive metabolic panel; Future  -     Hemoglobin A1c; Future    Gross hematuria  -     Urinalysis With Microscopic If Indicated (No Culture) - Urine, Clean Catch; Future    Primary osteoarthritis of both knees  -     diclofenac (VOLTAREN) 75 MG EC tablet; Take 1 tablet by mouth Daily.      Chronic health conditions:  #1 HLD  Lab Results   Component Value Date    CHOL 121 10/09/2018    CHLPL 125 05/26/2016    TRIG 111 10/09/2018    HDL 38 (L) 10/09/2018    LDL 66 10/09/2018   Excellent control on atorvastatin 40 mg.    #2 hyperglycemia  Lab Results   Component Value Date    HGBA1C 6.30 (H) 10/09/2018   Persistent within the prediabetic range.  Recommended low sugar diet.     #3 hypertension  At goal today, 128/80.    #4 primary osteoarthritis of both knees  Ideally I would like him to avoid chronic NSAIDs given his cardiac history and risk.  He would do well with periodic cortisone injections, this was discussed with him and he will return as needed for these.    Patient Instructions   1.  Continue same medications and supplements - as listed.    2.  Continue well-balanced diet - low in salt and low in sugar.    3.  Maintain a routine exercise program - every week.    4.  A letter will be sent with your test results.    5.  Call if injections are desired.      Ambulatory progress note signed and attested to by Rell Ahuja D.O. on 10/30/2018 at 05:07.

## 2018-10-09 NOTE — PATIENT INSTRUCTIONS
1.  Continue same medications and supplements - as listed.    2.  Continue well-balanced diet - low in salt and low in sugar.    3.  Maintain a routine exercise program - every week.    4.  A letter will be sent with your test results.    5.  Call if injections are desired.

## 2018-11-07 ENCOUNTER — OFFICE VISIT (OUTPATIENT)
Dept: GASTROENTEROLOGY | Facility: CLINIC | Age: 78
End: 2018-11-07

## 2018-11-07 VITALS
DIASTOLIC BLOOD PRESSURE: 80 MMHG | HEIGHT: 66 IN | BODY MASS INDEX: 25.65 KG/M2 | WEIGHT: 159.6 LBS | OXYGEN SATURATION: 98 % | SYSTOLIC BLOOD PRESSURE: 126 MMHG | TEMPERATURE: 97.3 F | HEART RATE: 62 BPM

## 2018-11-07 DIAGNOSIS — K51.90 ULCERATIVE COLITIS WITHOUT COMPLICATIONS, UNSPECIFIED LOCATION (HCC): Primary | ICD-10-CM

## 2018-11-07 PROCEDURE — 99213 OFFICE O/P EST LOW 20 MIN: CPT | Performed by: INTERNAL MEDICINE

## 2018-11-07 NOTE — PROGRESS NOTES
PCP: Rell Ahuja DO    Chief Complaint   Patient presents with   • Follow-up       History of Present Illness:   HPI  Mr. Velarde returns for an office visit.  He is doing well at this time.  The patient has regular bowel habits without signs of gastrointestinal bleeding.  He denies any abdominal pain with meals or postprandially.  There is no history of nausea or vomiting.  He does admit that due to the cost of the Delzicol he has decreased the dosage to taking 2 daily.  There is no history of weight loss.  He denies any night sweats, fever or chills.  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   • Prostatism 1995   • Transverse myelitis (CMS/HCC) 2014    Transient T10 with numbness and minimal ataxia - transient    • Ulcerative colitis (CMS/HCC) 1977    Well-controlled on mesalamine        Past Surgical History:   Procedure Laterality Date   • BICEPS TENDON REPAIR Left 5/7/2018    Procedure: BICEPS TENDODESIS;  Surgeon: Abner Humphrey MD;  [facility]  Service: Orthopedics   • COLONOSCOPY  2015    Colitis in remission   • CYSTOSCOPY  1977    Excision of bladder cancer   • INGUINAL HERNIA REPAIR Right 1994   • KNEE SURGERY Right 1955    Excision of cyst   • TONSILLECTOMY  1943   • TOTAL SHOULDER ARTHROPLASTY Left 5/7/2018    Procedure: LEFT  TOTAL SHOULDER ARTHROPLASTY;  Surgeon: Abner Humphrey MD;  Location: [facility];  Service: Orthopedics         Current Outpatient Prescriptions:   •  Alpha-Lipoic Acid 300 MG capsule, Take 300 mg by mouth Daily., Disp: , Rfl:   •  aspirin-acetaminophen-caffeine (EXCEDRIN MIGRAINE) 250-250-65 MG per tablet, Take 1 tablet by mouth As Needed for Headache., Disp: , Rfl:   •  Calcium Carbonate-Vitamin D (CALCIUM 600+D PO), Take 1 tablet by mouth Daily., Disp: , Rfl:   •  CARTIA  MG 24 hr capsule, TAKE 1 CAPSULE DAILY, Disp: 90 capsule, Rfl: 1  •  cholecalciferol (VITAMIN D3) 1000 units tablet, Take 1,000 Units by mouth Daily., Disp: , Rfl:   •  DELZICOL 400 MG capsule delayed-release delayed release capsule, TAKE 2 CAPSULES TWICE A DAY, Disp: 360 capsule, Rfl: 1  •  diclofenac (VOLTAREN) 75 MG EC tablet, Take 1 tablet by mouth Daily., Disp: 90 tablet, Rfl: 2  •  digoxin (LANOXIN) 125 MCG tablet, TAKE ONE TABLET BY MOUTH DAILY, Disp: 90 tablet, Rfl: 1  •  ferrous sulfate 325 (65 FE) MG tablet, Take 1 tablet by mouth Daily With Breakfast., Disp: 90 tablet, Rfl: 3  •  Fish Oil-Cholecalciferol (OMEGA-3 + D PO), Take 1 capsule by mouth Daily., Disp: , Rfl:   •  Glucosamine-Chondroit-Vit C-Mn (GLUCOSAMINE 1500 COMPLEX) capsule, Take 1 tablet by mouth Daily., Disp: , Rfl:   •  Magnesium 250 MG tablet, Take 250 mg by mouth Daily., Disp: , Rfl:   •  Multiple Vitamins-Minerals (MULTIVITAMIN ADULT PO), Take 1 tablet by mouth Daily., Disp: , Rfl:   •  potassium chloride (K-DUR) 10 MEQ CR tablet, TAKE 1 TABLET DAILY, Disp: 90 tablet, Rfl: 1  •  Saw Palmetto 160 MG capsule, Take 1 capsule by mouth Daily., Disp: , Rfl:   •  Selenium 200 MCG capsule, Take 200 mcg by mouth Daily., Disp: , Rfl:   •  tamsulosin (FLOMAX) 0.4 MG capsule 24 hr capsule, TAKE ONE CAPSULE BY MOUTH EVERY NIGHT AT BEDTIME, Disp: 90 capsule, Rfl: 1  •  Ubiquinol 100 MG capsule, Take 100 mg by mouth Daily., Disp: , Rfl:   •  vitamin E 400 UNIT capsule,  Take 400 Units by mouth Daily., Disp: , Rfl:   •  Zinc 50 MG capsule, Take 50 mg by mouth Daily., Disp: , Rfl:     Allergies   Allergen Reactions   • Sulfa Antibiotics Nausea Only and Other (See Comments)     Headache       Family History   Problem Relation Age of Onset   • Alzheimer's disease Mother          age 89   • Arthritis Mother    • Hyperlipidemia Mother    • Angina Father    • Hypertension Father    • Pneumonia Father          age 70 - tobacco   • Aneurysm Father         abd aortic   • Unexplained death Brother    • Breast cancer Maternal Grandmother    • Arthritis Maternal Grandmother    • Diabetes Paternal Uncle        Social History     Social History   • Marital status:      Spouse name: N/A   • Number of children: N/A   • Years of education: N/A     Occupational History   • Not on file.     Social History Main Topics   • Smoking status: Never Smoker   • Smokeless tobacco: Never Used   • Alcohol use 0.6 oz/week     1 Cans of beer per week   • Drug use: No   • Sexual activity: Not on file     Other Topics Concern   • Not on file     Social History Narrative    Domestic life- lives in private home with wife        Bahai- none listed        Sleep hygiene- sleeps 8 hours nightly good quality        Caffeine use- drinks 1 diet Mountain Dew daily and occasionally uses Excedrin        Exercise habits- disciplined exercise Program 5 days weekly with walking and weight lifting and yoga        Diet-   well-balanced American Heart Association - low in salt        Occupation- retired         Hearing-   no impairment         Vision- Corrects well with bifocal glasses        Driving- No limitations               Review of Systems   Constitutional: Negative for activity change, appetite change, fatigue, fever and unexpected weight change.   HENT: Negative for dental problem, hearing loss, mouth sores, postnasal drip, sneezing, trouble swallowing and voice change.    Eyes: Negative  for pain, redness, itching and visual disturbance.   Respiratory: Negative for cough, choking, chest tightness, shortness of breath and wheezing.    Cardiovascular: Negative for chest pain, palpitations and leg swelling.   Gastrointestinal: Negative for abdominal distention, abdominal pain, anal bleeding, blood in stool, constipation, diarrhea, nausea, rectal pain and vomiting.   Endocrine: Negative for cold intolerance, heat intolerance, polydipsia, polyphagia and polyuria.   Genitourinary: Negative.  Negative for dysuria, enuresis, flank pain, hematuria and urgency.   Musculoskeletal: Negative for arthralgias, back pain, gait problem, joint swelling and myalgias.   Skin: Negative for color change, pallor and rash.   Allergic/Immunologic: Negative for environmental allergies, food allergies and immunocompromised state.   Neurological: Negative for dizziness, tremors, seizures, facial asymmetry, speech difficulty, numbness and headaches.   Hematological: Negative for adenopathy.   Psychiatric/Behavioral: Negative for behavioral problems, confusion, dysphoric mood, hallucinations and self-injury.       Vitals:    11/07/18 1509   BP: 126/80   Pulse: 62   Temp: 97.3 °F (36.3 °C)   SpO2: 98%       Physical Exam   Constitutional: He is oriented to person, place, and time. He appears well-nourished. No distress.   HENT:   Head: Atraumatic.   Mouth/Throat: Oropharynx is clear and moist. No oropharyngeal exudate.   Eyes: EOM are normal. No scleral icterus.   Neck: Neck supple. No thyromegaly present.   Cardiovascular: Normal rate, regular rhythm and normal heart sounds.  Exam reveals no gallop.    No murmur heard.  Pulmonary/Chest: Effort normal and breath sounds normal. He has no wheezes. He has no rales.   Abdominal: Soft. Bowel sounds are normal. There is no tenderness. There is no rebound and no guarding.   Musculoskeletal: Normal range of motion. He exhibits no edema or tenderness.   Lymphadenopathy:     He has no  cervical adenopathy.   Neurological: He is alert and oriented to person, place, and time. He exhibits normal muscle tone.   Skin: Skin is dry. No erythema.   Psychiatric: He has a normal mood and affect. His behavior is normal. Thought content normal.   Vitals reviewed.      Nathan was seen today for follow-up.    Diagnoses and all orders for this visit:    Ulcerative colitis without complications, unspecified location (CMS/HCC)    The patient is doing well on the mesalamine medication.  The last colonoscopy in October 2015 demonstrated quiescent disease of the rectum and the remaining biopsies were all within normal limits.      Plan: Discussed with the patient about possible changing to Apriso.           Follow-up in the office in 6 months.      I spent over 50% of the office visit counseling and answering questions from the patient.

## 2018-11-09 ENCOUNTER — OFFICE VISIT (OUTPATIENT)
Dept: FAMILY MEDICINE CLINIC | Facility: CLINIC | Age: 78
End: 2018-11-09

## 2018-11-09 ENCOUNTER — HOSPITAL ENCOUNTER (OUTPATIENT)
Dept: GENERAL RADIOLOGY | Facility: HOSPITAL | Age: 78
Discharge: HOME | End: 2018-11-09
Attending: FAMILY MEDICINE | Admitting: FAMILY MEDICINE

## 2018-11-09 VITALS
BODY MASS INDEX: 25.55 KG/M2 | WEIGHT: 159 LBS | DIASTOLIC BLOOD PRESSURE: 70 MMHG | OXYGEN SATURATION: 98 % | HEIGHT: 66 IN | HEART RATE: 59 BPM | SYSTOLIC BLOOD PRESSURE: 120 MMHG

## 2018-11-09 DIAGNOSIS — R06.02 SHORTNESS OF BREATH: ICD-10-CM

## 2018-11-09 DIAGNOSIS — R20.2 PARESTHESIA OF RIGHT LOWER EXTREMITY: ICD-10-CM

## 2018-11-09 DIAGNOSIS — J98.6 DISORDER OF DIAPHRAGM: ICD-10-CM

## 2018-11-09 DIAGNOSIS — J98.6 DISORDER OF DIAPHRAGM: Primary | ICD-10-CM

## 2018-11-09 PROCEDURE — 72100 X-RAY EXAM L-S SPINE 2/3 VWS: CPT

## 2018-11-09 PROCEDURE — 71046 X-RAY EXAM CHEST 2 VIEWS: CPT

## 2018-11-09 PROCEDURE — 99214 OFFICE O/P EST MOD 30 MIN: CPT | Performed by: FAMILY MEDICINE

## 2018-11-09 NOTE — PROGRESS NOTES
Subjective   Nathan Velarde is a 78 y.o. male.     Chief Complaint   Patient presents with   • Breathing Problem     cannot take a full deep breath    • Tingling     base of spine        History of Present Illness     Nathan Velarde presents today for acute issue. He reports difficulty taking a full breath in. He denies any shortness of breath, but occasionally notices that he has been taking shallow breaths and feels a sudden urge to have to take a deep breath. He reports that sometimes he feels unable to take a deep enough breath. He also reports some mild tingling of his lumbar spine. No radicular pain, no rash.    The following portions of the patient's history were reviewed and updated as appropriate: allergies, current medications, past family history, past medical history, past social history, past surgical history and problem list.    Active Ambulatory Problems     Diagnosis Date Noted   • Anemia 05/15/2016   • Aortic valve disorder 05/15/2016   • Ataxic gait 05/15/2016   • Paroxysmal atrial fibrillation 05/15/2016   • Prostatism 05/15/2016   • Fatigue 05/15/2016   • Ulcerative colitis (CMS/[facility]) 05/15/2016   • Transverse myelitis (CMS/[facility]) 05/15/2016   • Testosterone deficiency 05/15/2016   • Paresthesia 05/15/2016   • Lumbosacral neuritis 05/15/2016   • 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   • Hypertension 05/15/2016   • Hyperlipidemia 05/15/2016   • Generalized osteoarthritis 05/15/2016   • Gross hematuria 05/15/2016   • CKD (chronic kidney disease), 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   • Hyperglycemia 05/28/2016   • Shoulder pain, left 09/26/2016   • Preventative health care 11/29/2016   • Status post total shoulder arthroplasty, 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   • Knee osteoarthritis 01/01/2004     Resolved Ambulatory Problems     Diagnosis Date Noted   • Neck pain 05/15/2016   • Ulcerative enterocolitis (CMS/[facility]) 05/15/2016   • Impaired glucose tolerance 05/15/2016     Past Medical History:   Diagnosis Date   • Aortic valve  insufficiency    • 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    • Bilateral bunions    • Bladder cancer (CMS/[facility])    • DJD of left shoulder Adulthood   • Generalized osteoarthritis Adulthood   • HTN (hypertension)    • Hyperlipidemia Adulthood   • Knee osteoarthritis    • Mitral valve insufficiency    • Nephrolithiasis    • PAF (paroxysmal atrial fibrillation) (CMS/[facility])    • Prediabetes    • Prostatism    • Transverse myelitis (CMS/[facility])    • Ulcerative colitis (CMS/[facility])      Past Surgical History:   Procedure Laterality Date   • COLONOSCOPY      Colitis in remission   • CYSTOSCOPY      Excision of bladder cancer   • INGUINAL HERNIA REPAIR Right    • KNEE SURGERY Right     Excision of cyst   • TONSILLECTOMY       Family History   Problem Relation Age of Onset   • Alzheimer's disease Mother          age 89   • Arthritis Mother    • Hyperlipidemia Mother    • Angina Father    • Hypertension Father    • Pneumonia Father          age 70 - tobacco   • Aneurysm Father         abd aortic   • Unexplained death Brother    • Breast cancer Maternal Grandmother    • Arthritis Maternal Grandmother    • Diabetes Paternal Uncle      Social History     Socioeconomic History   • Marital status:      Spouse name: Not on file   • Number of children: Not on file   • Years of education: Not on file   • Highest education level: Not on file   Social Needs   • Financial resource strain: Not on file   • Food insecurity - worry: Not on file   • Food insecurity - inability: Not on file   • Transportation needs - medical: Not on file   • Transportation needs - non-medical: Not on file   Occupational History   • Not on file   Tobacco Use   • Smoking status: Never Smoker   • Smokeless tobacco: Never Used   Substance and Sexual Activity   • Alcohol use: Yes     Alcohol/week: 0.6 oz     Types: 1 Cans of beer per week   • Drug use: No   • Sexual activity: Not on  "file   Other Topics Concern   • Not on file   Social History Narrative    Domestic life- lives in private home with wife        Cheondoism- none listed        Sleep hygiene- sleeps 8 hours nightly good quality        Caffeine use- drinks 1 diet Mountain Dew daily and occasionally uses Excedrin        Exercise habits- disciplined exercise Program 5 days weekly with walking and weight lifting and yoga        Diet-   well-balanced American Heart Association - low in salt        Occupation- retired         Hearing-   no impairment         Vision- Corrects well with bifocal glasses        Driving- No limitations             Review of Systems   Constitutional: Negative.    Respiratory: Positive for apnea and shortness of breath. Negative for cough and wheezing.    Cardiovascular: Negative.        Objective   Blood pressure 120/70, pulse 59, height 167.6 cm (65.98\"), weight 72.1 kg (159 lb), SpO2 98 %.  Nursing note reviewed  Physical Exam  Const: NAD, A&Ox4, Pleasant, Cooperative  Eyes: EOMI, no conjunctivitis  ENT: No nasal discharge present, neck supple  Cardiac: Regular rate and rhythm, no cyanosis  Resp: Respiratory rate within normal limits, no increased work of breathing, no audible wheezing or retractions noted  GI: No distention or ascites  MSK: Motor and sensation grossly intact in bilateral upper extremities  Neurologic: CN II-XII grossly intact  Psych: Appropriate mood and behavior.  Skin: Pink, warm, dry  Procedures  Assessment/Plan   Nathan was seen today for breathing problem and tingling.    Diagnoses and all orders for this visit:    Disorder of diaphragm  -     XR Chest 2 View; Future  -     CBC w AUTO Differential; Future    Shortness of breath  -     XR Chest 2 View; Future  -     CBC w AUTO Differential; Future    Paresthesia of right lower extremity  -     XR Spine Lumbar 2 or 3 View; Future      Acute concerns:  #1 Disordered breathing  Sounds like a diaphragmatic spasm. He has had " diaphragmatic issues previously. No concerning findings on exam today. Will check chest xray and CBC.    #2 Lumbar paresthesia RLE  Lumbar xray pending. If negative, consider returning to Dr. Warren    Patient Instructions   1.  Chest xray results pending.    2.  Lumbar xray pending. If negative will refer to Dr. Warren again.      No Follow-up on file.    Ambulatory progress note signed and attested to by Rell Ahuja D.O. on 11/13/2018 at 10:05.

## 2018-11-09 NOTE — PATIENT INSTRUCTIONS
1.  Chest xray results pending.    2.  Lumbar xray pending. If negative will refer to Dr. Farris again.

## 2018-11-15 RX ORDER — MESALAMINE 400 MG/1
CAPSULE, DELAYED RELEASE ORAL
Qty: 360 CAPSULE | Refills: 1 | Status: SHIPPED | OUTPATIENT
Start: 2018-11-15 | End: 2019-05-13 | Stop reason: SDUPTHER

## 2018-12-05 RX ORDER — POTASSIUM CHLORIDE 750 MG/1
TABLET, FILM COATED, EXTENDED RELEASE ORAL
Qty: 90 TABLET | Refills: 1 | Status: SHIPPED | OUTPATIENT
Start: 2018-12-05 | End: 2019-06-06 | Stop reason: SDUPTHER

## 2018-12-31 RX ORDER — DIGOXIN 125 UG/1
TABLET ORAL
Qty: 90 TABLET | Refills: 0 | Status: SHIPPED | OUTPATIENT
Start: 2018-12-31 | End: 2019-03-11 | Stop reason: SDUPTHER

## 2019-01-28 RX ORDER — TAMSULOSIN HYDROCHLORIDE 0.4 MG/1
CAPSULE ORAL
Qty: 90 CAPSULE | Refills: 0 | Status: SHIPPED | OUTPATIENT
Start: 2019-01-28 | End: 2019-04-21 | Stop reason: SDUPTHER

## 2019-02-06 RX ORDER — ATORVASTATIN CALCIUM 40 MG/1
TABLET, FILM COATED ORAL
Qty: 90 TABLET | Refills: 1 | Status: SHIPPED | OUTPATIENT
Start: 2019-02-06 | End: 2019-07-29 | Stop reason: SDUPTHER

## 2019-03-12 RX ORDER — DIGOXIN 125 UG/1
TABLET ORAL
Qty: 90 TABLET | Refills: 1 | Status: SHIPPED | OUTPATIENT
Start: 2019-03-12 | End: 2019-09-01 | Stop reason: SDUPTHER

## 2019-03-26 RX ORDER — DILTIAZEM HYDROCHLORIDE 180 MG/1
CAPSULE, EXTENDED RELEASE ORAL
Qty: 90 CAPSULE | Refills: 1 | Status: SHIPPED | OUTPATIENT
Start: 2019-03-26 | End: 2019-09-16 | Stop reason: SDUPTHER

## 2019-04-09 ENCOUNTER — OFFICE VISIT (OUTPATIENT)
Dept: FAMILY MEDICINE CLINIC | Facility: CLINIC | Age: 79
End: 2019-04-09

## 2019-04-09 ENCOUNTER — LAB (OUTPATIENT)
Dept: LAB | Facility: HOSPITAL | Age: 79
End: 2019-04-09

## 2019-04-09 ENCOUNTER — HOSPITAL ENCOUNTER (OUTPATIENT)
Dept: GENERAL RADIOLOGY | Facility: HOSPITAL | Age: 79
Discharge: HOME | End: 2019-04-09
Admitting: FAMILY MEDICINE

## 2019-04-09 VITALS
OXYGEN SATURATION: 98 % | WEIGHT: 160 LBS | HEART RATE: 66 BPM | HEIGHT: 66 IN | BODY MASS INDEX: 25.71 KG/M2 | SYSTOLIC BLOOD PRESSURE: 162 MMHG | DIASTOLIC BLOOD PRESSURE: 72 MMHG

## 2019-04-09 DIAGNOSIS — I10 ESSENTIAL HYPERTENSION: Primary | ICD-10-CM

## 2019-04-09 DIAGNOSIS — Z85.51 HISTORY OF BLADDER CANCER: ICD-10-CM

## 2019-04-09 DIAGNOSIS — N40.1 BPH ASSOCIATED WITH NOCTURIA: ICD-10-CM

## 2019-04-09 DIAGNOSIS — I10 ESSENTIAL HYPERTENSION: ICD-10-CM

## 2019-04-09 DIAGNOSIS — E78.00 PURE HYPERCHOLESTEROLEMIA: ICD-10-CM

## 2019-04-09 DIAGNOSIS — R06.02 SHORTNESS OF BREATH: ICD-10-CM

## 2019-04-09 DIAGNOSIS — R73.9 HYPERGLYCEMIA: ICD-10-CM

## 2019-04-09 DIAGNOSIS — N40.0 PROSTATISM: ICD-10-CM

## 2019-04-09 DIAGNOSIS — R22.31 NODULE OF FINGER OF RIGHT HAND: ICD-10-CM

## 2019-04-09 DIAGNOSIS — N18.2 CKD (CHRONIC KIDNEY DISEASE), STAGE II: ICD-10-CM

## 2019-04-09 DIAGNOSIS — R35.1 BPH ASSOCIATED WITH NOCTURIA: ICD-10-CM

## 2019-04-09 LAB
ALBUMIN SERPL-MCNC: 4.2 G/DL (ref 3.5–5.2)
ALBUMIN/GLOB SERPL: 1.3 G/DL
ALP SERPL-CCNC: 109 U/L (ref 39–117)
ALT SERPL W P-5'-P-CCNC: 33 U/L (ref 1–41)
ANION GAP SERPL CALCULATED.3IONS-SCNC: 10.7 MMOL/L
AST SERPL-CCNC: 30 U/L (ref 1–40)
BASOPHILS # BLD AUTO: 0.05 10*3/MM3 (ref 0–0.2)
BASOPHILS NFR BLD AUTO: 0.5 % (ref 0–1.5)
BILIRUB SERPL-MCNC: 0.4 MG/DL (ref 0.2–1.2)
BILIRUB UR QL STRIP: NEGATIVE
BUN BLD-MCNC: 26 MG/DL (ref 8–23)
BUN/CREAT SERPL: 23.2 (ref 7–25)
CALCIUM SPEC-SCNC: 10.2 MG/DL (ref 8.6–10.5)
CHLORIDE SERPL-SCNC: 105 MMOL/L (ref 98–107)
CHOLEST SERPL-MCNC: 113 MG/DL (ref 0–200)
CLARITY UR: CLEAR
CO2 SERPL-SCNC: 27.3 MMOL/L (ref 22–29)
COLOR UR: YELLOW
CREAT BLD-MCNC: 1.12 MG/DL (ref 0.76–1.27)
DEPRECATED RDW RBC AUTO: 50.4 FL (ref 37–54)
EOSINOPHIL # BLD AUTO: 0.24 10*3/MM3 (ref 0–0.4)
EOSINOPHIL NFR BLD AUTO: 2.6 % (ref 0.3–6.2)
ERYTHROCYTE [DISTWIDTH] IN BLOOD BY AUTOMATED COUNT: 14.6 % (ref 12.3–15.4)
GFR SERPL CREATININE-BSD FRML MDRD: 63 ML/MIN/1.73
GLOBULIN UR ELPH-MCNC: 3.3 GM/DL
GLUCOSE BLD-MCNC: 119 MG/DL (ref 65–99)
GLUCOSE UR STRIP-MCNC: NEGATIVE MG/DL
HBA1C MFR BLD: 5.7 % (ref 4.8–5.6)
HCT VFR BLD AUTO: 44.3 % (ref 37.5–51)
HDLC SERPL-MCNC: 33 MG/DL (ref 40–60)
HGB BLD-MCNC: 13.4 G/DL (ref 13–17.7)
HGB UR QL STRIP.AUTO: NEGATIVE
IMM GRANULOCYTES # BLD AUTO: 0.04 10*3/MM3 (ref 0–0.05)
IMM GRANULOCYTES NFR BLD AUTO: 0.4 % (ref 0–0.5)
KETONES UR QL STRIP: NEGATIVE
LDLC SERPL CALC-MCNC: 59 MG/DL (ref 0–100)
LDLC/HDLC SERPL: 1.78 {RATIO}
LEUKOCYTE ESTERASE UR QL STRIP.AUTO: NEGATIVE
LYMPHOCYTES # BLD AUTO: 1.04 10*3/MM3 (ref 0.7–3.1)
LYMPHOCYTES NFR BLD AUTO: 11.4 % (ref 19.6–45.3)
MCH RBC QN AUTO: 28.4 PG (ref 26.6–33)
MCHC RBC AUTO-ENTMCNC: 30.2 G/DL (ref 31.5–35.7)
MCV RBC AUTO: 93.9 FL (ref 79–97)
MONOCYTES # BLD AUTO: 0.67 10*3/MM3 (ref 0.1–0.9)
MONOCYTES NFR BLD AUTO: 7.3 % (ref 5–12)
NEUTROPHILS # BLD AUTO: 7.11 10*3/MM3 (ref 1.4–7)
NEUTROPHILS NFR BLD AUTO: 77.8 % (ref 42.7–76)
NITRITE UR QL STRIP: NEGATIVE
NRBC BLD AUTO-RTO: 0.1 /100 WBC (ref 0–0)
PH UR STRIP.AUTO: 6.5 [PH] (ref 5–8)
PLATELET # BLD AUTO: 284 10*3/MM3 (ref 140–450)
PMV BLD AUTO: 11 FL (ref 6–12)
POTASSIUM BLD-SCNC: 5 MMOL/L (ref 3.5–5.2)
PROT SERPL-MCNC: 7.5 G/DL (ref 6–8.5)
PROT UR QL STRIP: ABNORMAL
PSA SERPL-MCNC: 4.77 NG/ML (ref 0–4)
RBC # BLD AUTO: 4.72 10*6/MM3 (ref 4.14–5.8)
SODIUM BLD-SCNC: 143 MMOL/L (ref 136–145)
SP GR UR STRIP: 1.02 (ref 1–1.03)
TRIGL SERPL-MCNC: 107 MG/DL (ref 0–150)
UROBILINOGEN UR QL STRIP: ABNORMAL
VLDLC SERPL-MCNC: 21.4 MG/DL (ref 5–40)
WBC NRBC COR # BLD: 9.15 10*3/MM3 (ref 3.4–10.8)

## 2019-04-09 PROCEDURE — 99214 OFFICE O/P EST MOD 30 MIN: CPT | Performed by: FAMILY MEDICINE

## 2019-04-09 PROCEDURE — 81003 URINALYSIS AUTO W/O SCOPE: CPT

## 2019-04-09 PROCEDURE — 73130 X-RAY EXAM OF HAND: CPT

## 2019-04-09 PROCEDURE — 80053 COMPREHEN METABOLIC PANEL: CPT

## 2019-04-09 PROCEDURE — 85025 COMPLETE CBC W/AUTO DIFF WBC: CPT

## 2019-04-09 PROCEDURE — 83036 HEMOGLOBIN GLYCOSYLATED A1C: CPT

## 2019-04-09 PROCEDURE — 80061 LIPID PANEL: CPT

## 2019-04-09 PROCEDURE — 84153 ASSAY OF PSA TOTAL: CPT

## 2019-04-09 NOTE — PROGRESS NOTES
Subjective   Nathan Velarde is a 78 y.o. male.     Chief Complaint   Patient presents with   • Hypertension     6 month follow up   • Shortness of Breath       Hypertension   This is a chronic problem. The problem has been rapidly worsening since onset. The problem is uncontrolled. Associated symptoms include shortness of breath. Pertinent negatives include no chest pain or palpitations. Agents associated with hypertension include NSAIDs. Past treatments include ACE inhibitors, calcium channel blockers and diuretics. Current antihypertension treatment includes calcium channel blockers. There are no compliance problems.  Hypertensive end-organ damage includes kidney disease and CAD/MI.   Shortness of Breath   Pertinent negatives include no abdominal pain, chest pain, vomiting or wheezing.        Nathan Velarde presents today to follow up on HTN and SOA. He cannot take a full breath in. This is constant. Actually improves while exercising. He has been on diclofenac for knee pain for years. BP previously very well controlled, over past 6 months has been deteriorating substantially. His HLD is stable and well-controlled.    The following portions of the patient's history were reviewed and updated as appropriate: allergies, current medications, past family history, past medical history, past social history, past surgical history and problem list.    Active Ambulatory Problems     Diagnosis Date Noted   • Anemia 05/15/2016   • Aortic valve disorder 05/15/2016   • Ataxic gait 05/15/2016   • Paroxysmal atrial fibrillation 05/15/2016   • Prostatism 05/15/2016   • Fatigue 05/15/2016   • Ulcerative colitis (CMS/HCC) 05/15/2016   • Transverse myelitis (CMS/[facility]) 05/15/2016   • Testosterone deficiency 05/15/2016   • Paresthesia 05/15/2016   • Lumbosacral neuritis 05/15/2016   • 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   • Hypertension 05/15/2016   • Hyperlipidemia 05/15/2016   • Generalized osteoarthritis 05/15/2016   • Gross  hematuria 05/15/2016   • CKD (chronic kidney disease), stage II 2016   • Hyperglycemia 2016   • Shoulder pain, left 2016   • Preventative health care 2016   • Status post total shoulder arthroplasty, left 2018   • Knee osteoarthritis 2004     Resolved Ambulatory Problems     Diagnosis Date Noted   • Neck pain 05/15/2016   • Ulcerative enterocolitis (CMS/[facility]) 05/15/2016   • Impaired glucose tolerance 05/15/2016     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    • Bilateral bunions    • Bladder cancer (CMS/[facility])    • DJD of left shoulder Adulthood   • Generalized osteoarthritis Adulthood   • HTN (hypertension)    • Hyperlipidemia Adulthood   • Knee osteoarthritis    • Mitral valve insufficiency    • Nephrolithiasis    • PAF (paroxysmal atrial fibrillation) (CMS/[facility])    • Prediabetes    • Prostatism    • Transverse myelitis (CMS/[facility])    • Ulcerative colitis (CMS/[facility])      Past Surgical History:   Procedure Laterality Date   • BICEPS TENDON REPAIR Left 2018    Procedure: BICEPS TENDODESIS;  Surgeon: Abner Humphrey MD;  Location:  [facility] OR;  Service: Orthopedics   • COLONOSCOPY      Colitis in remission   • CYSTOSCOPY      Excision of bladder cancer   • INGUINAL HERNIA REPAIR Right    • KNEE SURGERY Right     Excision of cyst   • TONSILLECTOMY     • TOTAL SHOULDER ARTHROPLASTY Left 2018    Procedure: LEFT TOTAL SHOULDER ARTHROPLASTY;  Surgeon: Abner Humphrey MD;  Location:  [facility] OR;  Service: Orthopedics     Family History   Problem Relation Age of Onset   • Alzheimer's disease Mother          age 89   • Arthritis Mother    • Hyperlipidemia Mother    • Angina Father    • Hypertension Father    • Pneumonia Father          age 70 - tobacco   • Aneurysm Father         abd aortic   • Unexplained death Brother    • Breast  "cancer Maternal Grandmother    • Arthritis Maternal Grandmother    • Diabetes Paternal Uncle      Social History     Socioeconomic History   • Marital status:      Spouse name: Not on file   • Number of children: Not on file   • Years of education: Not on file   • Highest education level: Not on file   Tobacco Use   • Smoking status: Never Smoker   • Smokeless tobacco: Never Used   Substance and Sexual Activity   • Alcohol use: Yes     Alcohol/week: 0.6 oz     Types: 1 Cans of beer per week   • Drug use: No   Social History Narrative    Domestic life- lives in private home with wife        Evangelical- none listed        Sleep hygiene- sleeps 8 hours nightly good quality        Caffeine use- drinks 1 diet Mountain Dew daily and occasionally uses Excedrin        Exercise habits- disciplined exercise Program 5 days weekly with walking and weight lifting and yoga        Diet-   well-balanced American Heart Association - low in salt        Occupation- retired         Hearing-   no impairment         Vision- Corrects well with bifocal glasses        Driving- No limitations             Review of Systems   Constitutional: Negative.    HENT: Negative.    Eyes: Negative.    Respiratory: Positive for shortness of breath. Negative for cough, chest tightness and wheezing.    Cardiovascular: Negative for chest pain and palpitations.   Gastrointestinal: Negative for abdominal distention, abdominal pain, constipation, diarrhea, nausea and vomiting.   Endocrine: Negative.    Genitourinary:        H/O bladder cancer. Nocturia 2-3x per night   Musculoskeletal: Negative for gait problem and joint swelling.        Bilateral osteoarthritis of the knees   Skin: Negative for color change and wound.   Neurological: Negative.    Psychiatric/Behavioral: Negative for agitation and hallucinations.       Objective   Blood pressure 162/72, pulse 66, height 167.6 cm (65.98\"), weight 72.6 kg (160 lb), SpO2 98 %.  Nursing " note reviewed  Physical Exam  Const: NAD, A&Ox4, Pleasant, Cooperative  Eyes: EOMI, no conjunctivitis  ENT: No nasal discharge present, neck supple  Cardiac: Regular rate and rhythm, no peripheral edema or cyanosis  Resp: Respiratory rate within normal limits, no increased work of breathing, no audible wheezing or retractions noted  GI: No distention or ascites  MSK: Motor and sensation grossly intact in bilateral upper extremities. There is an irregularly shaped bony nodule on the dorsum of the PIP (proximal phalanx edge) of index finger of right hand  Neurologic, CN II-XII grossly intact  Psych: Appropriate mood and behavior.  Skin: Pink, warm, dry  Procedures  Assessment/Plan   Nathan was seen today for hypertension and shortness of breath.    Diagnoses and all orders for this visit:    Essential hypertension  -     Comprehensive Metabolic Panel; Future  -     Urinalysis With Microscopic If Indicated (No Culture) - Urine, Clean Catch; Future    Shortness of breath  -     Full Pulmonary Function Test With Bronchodilator; Future  -     CBC & Differential; Future    BPH associated with nocturia  -     Ambulatory Referral to Urology    History of bladder cancer  -     Ambulatory Referral to Urology    Nodule of finger of right hand  -     XR Hand 3+ View Right; Future    Pure hypercholesterolemia  -     Comprehensive Metabolic Panel; Future  -     Lipid Panel; Future    CKD (chronic kidney disease), stage II  -     CBC & Differential; Future  -     Comprehensive Metabolic Panel; Future  -     Urinalysis With Microscopic If Indicated (No Culture) - Urine, Clean Catch; Future    Prostatism  -     Urinalysis With Microscopic If Indicated (No Culture) - Urine, Clean Catch; Future  -     PSA DIAGNOSTIC; Future    Hyperglycemia  -     Comprehensive Metabolic Panel; Future  -     Hemoglobin A1c; Future      Chronic health conditions:  #1 HLD  Lab Results   Component Value Date    CHOL 121 10/09/2018    CHLPL 125 05/26/2016     TRIG 111 10/09/2018    HDL 38 (L) 10/09/2018    LDL 66 10/09/2018   Excellent control on atorvastatin 40 mg.    #2 hyperglycemia  Lab Results   Component Value Date    HGBA1C 6.30 (H) 10/09/2018   Persistent within the prediabetic range.  Recommended low sugar diet.     #3 hypertension  Significantly above goal. Continue Cartia XT. Previously on lisinopril and HCTZ, taken off previously due to low BP.  - Stop diclofenac for 2 weeks    #4 primary osteoarthritis of both knees  Ideally I would like him to avoid chronic NSAIDs given his cardiac history and risk.  He would do well with periodic cortisone injections, this was discussed with him previously but he has thus far declined.    #5 BPH with nocturia  H/O bladder cancer, clear cystocopies for 30 years, discharged by Dr. Fiore. Given ongoing symptoms 2-3x per night despite Flomax and Saw Palmetto, recommend seeing urology again. Referral placed today.    #6 SOA  PFTs ordered today    #7 right hand nodule  XR today to determine significance. Does not bother him functionally.    Patient Instructions   1.  Try going off diclofenac for 1-2 weeks. Check your blood pressure daily.    2.  Follow up after lung function testing.    3.  Our referral coordinator Jacquelyn will be calling you over the next couple days to schedule this and urology appointment.      Ambulatory progress note signed and attested to by Rell Ahuja D.O. on 4/9/2019 at 10:07.

## 2019-04-09 NOTE — PATIENT INSTRUCTIONS
1.  Try going off diclofenac for 2 weeks. Check your blood pressure daily.    2.  Follow up after lung function testing.    3.  Our referral coordinator Jacquelyn will be calling you over the next couple days to schedule this and urology appointment.

## 2019-04-15 ENCOUNTER — OFFICE VISIT (OUTPATIENT)
Dept: PULMONOLOGY | Facility: CLINIC | Age: 79
End: 2019-04-15

## 2019-04-15 DIAGNOSIS — R06.02 SHORTNESS OF BREATH: ICD-10-CM

## 2019-04-15 PROCEDURE — 94729 DIFFUSING CAPACITY: CPT | Performed by: INTERNAL MEDICINE

## 2019-04-15 PROCEDURE — 94060 EVALUATION OF WHEEZING: CPT | Performed by: INTERNAL MEDICINE

## 2019-04-15 PROCEDURE — 94726 PLETHYSMOGRAPHY LUNG VOLUMES: CPT | Performed by: INTERNAL MEDICINE

## 2019-04-15 RX ORDER — ALBUTEROL SULFATE 90 UG/1
4 AEROSOL, METERED RESPIRATORY (INHALATION) ONCE
Status: COMPLETED | OUTPATIENT
Start: 2019-04-15 | End: 2019-04-15

## 2019-04-15 RX ADMIN — ALBUTEROL SULFATE 4 PUFF: 90 AEROSOL, METERED RESPIRATORY (INHALATION) at 14:04

## 2019-04-22 RX ORDER — TAMSULOSIN HYDROCHLORIDE 0.4 MG/1
CAPSULE ORAL
Qty: 90 CAPSULE | Refills: 0 | Status: SHIPPED | OUTPATIENT
Start: 2019-04-22 | End: 2019-07-28 | Stop reason: SDUPTHER

## 2019-04-24 ENCOUNTER — TELEPHONE (OUTPATIENT)
Dept: FAMILY MEDICINE CLINIC | Facility: CLINIC | Age: 79
End: 2019-04-24

## 2019-04-24 NOTE — TELEPHONE ENCOUNTER
PER PT CALLED, WOULD LIKE TO KNOW WHY HE WAS REFERRED TO UROLOGY AND WHY NOT  BUT SHE UROLOGY? PLEASE CALL PT BACK TO ADVISE.

## 2019-05-08 ENCOUNTER — OFFICE VISIT (OUTPATIENT)
Dept: GASTROENTEROLOGY | Facility: CLINIC | Age: 79
End: 2019-05-08

## 2019-05-08 VITALS
WEIGHT: 158.4 LBS | DIASTOLIC BLOOD PRESSURE: 70 MMHG | BODY MASS INDEX: 25.46 KG/M2 | OXYGEN SATURATION: 96 % | TEMPERATURE: 97.9 F | SYSTOLIC BLOOD PRESSURE: 140 MMHG | HEIGHT: 66 IN | HEART RATE: 74 BPM

## 2019-05-08 DIAGNOSIS — K51.80 OTHER ULCERATIVE COLITIS WITHOUT COMPLICATION (HCC): Primary | ICD-10-CM

## 2019-05-08 PROCEDURE — 99213 OFFICE O/P EST LOW 20 MIN: CPT | Performed by: INTERNAL MEDICINE

## 2019-05-08 RX ORDER — UBIDECARENONE 100 MG
100 CAPSULE ORAL DAILY
COMMUNITY

## 2019-05-08 NOTE — PROGRESS NOTES
PCP: Rell Ahuja DO    Chief Complaint   Patient presents with   • Follow-up     UC       History of Present Illness:   HPI  Mr. Velarde returns to the office.  The patient has normal bowel movements at this time.  He will generally have a formed stool on a daily basis.  There is no history of yazmin blood in the stool.  Mr. Velarde denies any abdominal pain with meals or postprandially.  There is no history of nausea or vomiting.  He denies any unexplained weight loss.  Mr. Velarde denies any fever, night sweats or chills.  There is no history of unexplained skin rash.  The patient is going to follow-up with a urologist soon due to an elevated PSA.  He denies any yazmin difficulty with urination.  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   • Prostatism 1995   • Transverse myelitis (CMS/HCC) 2014    Transient T10 with numbness and minimal ataxia - transient    • Ulcerative colitis (CMS/HCC) 1977    Well-controlled on mesalamine        Past Surgical History:   Procedure Laterality Date   • BICEPS TENDON REPAIR Left 5/7/2018    Procedure: BICEPS TENDODESIS;  Surgeon: Abner Humphrey MD;  Location: [facility];  Service: Orthopedics   • COLONOSCOPY  2015    Colitis in remission   • CYSTOSCOPY  1977    Excision of bladder cancer   • INGUINAL HERNIA REPAIR Right 1994    • KNEE SURGERY Right 1955    Excision of cyst   • TONSILLECTOMY  1943   • TOTAL SHOULDER ARTHROPLASTY Left 5/7/2018    Procedure: LEFT TOTAL SHOULDER ARTHROPLASTY;  Surgeon: Abner Humphrey MD;  Location: [facility];  Service: Orthopedics         Current Outpatient Medications:   •  aspirin-acetaminophen-caffeine (EXCEDRIN MIGRAINE) 250-250-65 MG per tablet, Take 1 tablet by mouth As Needed for Headache., Disp: , Rfl:   •  atorvastatin (LIPITOR) 40 MG tablet, TAKE 1 TABLET AT BEDTIME, Disp: 90 tablet, Rfl: 1  •  Calcium Carbonate-Vitamin D (CALCIUM 600+D PO), Take 1 tablet by mouth Daily., Disp: , Rfl:   •  CARTIA  MG 24 hr capsule, TAKE 1 CAPSULE DAILY, Disp: 90 capsule, Rfl: 1  •  cholecalciferol (VITAMIN D3) 1000 units tablet, Take 1,000 Units by mouth Daily., Disp: , Rfl:   •  coenzyme Q10 100 MG capsule, Take 100 mg by mouth Daily., Disp: , Rfl:   •  DELZICOL 400 MG capsule delayed-release delayed release capsule, TAKE 2 CAPSULES TWICE A DAY, Disp: 360 capsule, Rfl: 1  •  DIGOX 125 MCG tablet, TAKE ONE TABLET BY MOUTH DAILY, Disp: 90 tablet, Rfl: 1  •  Fish Oil-Cholecalciferol (OMEGA-3 + D PO), Take 1 capsule by mouth Daily., Disp: , Rfl:   •  Glucosamine-Chondroit-Vit C-Mn (GLUCOSAMINE 1500 COMPLEX) capsule, Take 1 tablet by mouth Daily., Disp: , Rfl:   •  Magnesium 250 MG tablet, Take 250 mg by mouth Daily., Disp: , Rfl:   •  Multiple Vitamins-Minerals (MULTIVITAMIN ADULT PO), Take 1 tablet by mouth Daily., Disp: , Rfl:   •  potassium chloride (K-DUR) 10 MEQ CR tablet, TAKE 1 TABLET DAILY, Disp: 90 tablet, Rfl: 1  •  Saw Palmetto 160 MG capsule, Take 1 capsule by mouth Daily., Disp: , Rfl:   •  Selenium 200 MCG capsule, Take 200 mcg by mouth Daily., Disp: , Rfl:   •  tamsulosin (FLOMAX) 0.4 MG capsule 24 hr capsule, TAKE ONE CAPSULE BY MOUTH EVERY NIGHT AT BEDTIME, Disp: 90 capsule, Rfl: 0  •  vitamin E 400 UNIT capsule, Take 400 Units by mouth Daily., Disp: , Rfl:   •  Zinc 50 MG capsule, Take 50  mg by mouth Daily., Disp: , Rfl:     Allergies   Allergen Reactions   • Sulfa Antibiotics Nausea Only and Other (See Comments)     Headache       Family History   Problem Relation Age of Onset   • Alzheimer's disease Mother          age 89   • Arthritis Mother    • Hyperlipidemia Mother    • Angina Father    • Hypertension Father    • Pneumonia Father          age 70 - tobacco   • Aneurysm Father         abd aortic   • Unexplained death Brother    • Breast cancer Maternal Grandmother    • Arthritis Maternal Grandmother    • Diabetes Paternal Uncle        Social History     Socioeconomic History   • Marital status:      Spouse name: Not on file   • Number of children: Not on file   • Years of education: Not on file   • Highest education level: Not on file   Tobacco Use   • Smoking status: Never Smoker   • Smokeless tobacco: Never Used   Substance and Sexual Activity   • Alcohol use: Yes     Alcohol/week: 0.6 oz     Types: 1 Cans of beer per week     Comment: OCC   • Drug use: No   Social History Narrative    Domestic life- lives in private home with wife        Oriental orthodox- none listed        Sleep hygiene- sleeps 8 hours nightly good quality        Caffeine use- drinks 1 diet Mountain Dew daily and occasionally uses Excedrin        Exercise habits- disciplined exercise Program 5 days weekly with walking and weight lifting and yoga        Diet-   well-balanced American Heart Association - low in salt        Occupation- retired         Hearing-   no impairment         Vision- Corrects well with bifocal glasses        Driving- No limitations           Review of Systems   Constitutional: Negative for activity change, appetite change, fatigue, fever and unexpected weight change.   HENT: Negative for dental problem, hearing loss, mouth sores, postnasal drip, sneezing, trouble swallowing and voice change.    Eyes: Negative for pain, redness, itching and visual disturbance.   Respiratory:  Negative for cough, choking, chest tightness, shortness of breath and wheezing.    Cardiovascular: Negative for chest pain, palpitations and leg swelling.   Gastrointestinal: Negative for abdominal distention, abdominal pain, anal bleeding, blood in stool, constipation, diarrhea, nausea, rectal pain and vomiting.   Endocrine: Negative for cold intolerance, heat intolerance, polydipsia, polyphagia and polyuria.   Genitourinary: Negative.  Negative for dysuria, enuresis, flank pain, hematuria and urgency.   Musculoskeletal: Negative for arthralgias, back pain, gait problem, joint swelling and myalgias.   Skin: Negative for color change, pallor and rash.   Allergic/Immunologic: Negative for environmental allergies, food allergies and immunocompromised state.   Neurological: Negative for dizziness, tremors, seizures, facial asymmetry, speech difficulty, numbness and headaches.   Hematological: Negative for adenopathy.   Psychiatric/Behavioral: Negative for behavioral problems, confusion, dysphoric mood, hallucinations and self-injury.       Vitals:    05/08/19 1507   BP: 140/70   Pulse: 74   Temp: 97.9 °F (36.6 °C)   SpO2: 96%       Physical Exam   Constitutional: He is oriented to person, place, and time. He appears well-nourished. No distress.   HENT:   Head: Atraumatic.   Mouth/Throat: Oropharynx is clear and moist. No oropharyngeal exudate.   Eyes: EOM are normal. No scleral icterus.   Neck: Neck supple. No thyromegaly present.   Cardiovascular: Normal rate, regular rhythm and normal heart sounds. Exam reveals no gallop.   No murmur heard.  Pulmonary/Chest: Effort normal and breath sounds normal. He has no wheezes. He has no rales.   Abdominal: Soft. Bowel sounds are normal. There is no tenderness. There is no rebound and no guarding.   Musculoskeletal: Normal range of motion. He exhibits no edema or tenderness.   Lymphadenopathy:     He has no cervical adenopathy.   Neurological: He is alert and oriented to person,  place, and time. He exhibits normal muscle tone.   Skin: Skin is dry. No erythema.   Psychiatric: He has a normal mood and affect. His behavior is normal. Thought content normal.   Vitals reviewed.      Nathan was seen today for follow-up.    Diagnoses and all orders for this visit:    Other ulcerative colitis without complication (CMS/HCC)    The patient has a history of ulcerative colitis but is well controlled on mesalamine medication.  The last colonoscopy demonstrated quiesced disease in the rectum.  There was no evidence of quiesced and or active disease in the proximal colon.  The patient has no clinical signs of activity at this time.      Plan: Continue mesalamine medication twice daily.           Will follow-up in the office in 1 year.

## 2019-05-13 RX ORDER — MESALAMINE 400 MG/1
CAPSULE, DELAYED RELEASE ORAL
Qty: 360 CAPSULE | Refills: 1 | Status: SHIPPED | OUTPATIENT
Start: 2019-05-13 | End: 2019-12-17 | Stop reason: SDUPTHER

## 2019-06-04 ENCOUNTER — OFFICE VISIT (OUTPATIENT)
Dept: FAMILY MEDICINE CLINIC | Facility: CLINIC | Age: 79
End: 2019-06-04

## 2019-06-04 VITALS
OXYGEN SATURATION: 98 % | DIASTOLIC BLOOD PRESSURE: 84 MMHG | SYSTOLIC BLOOD PRESSURE: 150 MMHG | HEART RATE: 59 BPM | BODY MASS INDEX: 25.07 KG/M2 | HEIGHT: 66 IN | WEIGHT: 156 LBS

## 2019-06-04 DIAGNOSIS — J44.9 CHRONIC OBSTRUCTIVE PULMONARY DISEASE, UNSPECIFIED COPD TYPE (HCC): ICD-10-CM

## 2019-06-04 DIAGNOSIS — R06.02 SHORTNESS OF BREATH: ICD-10-CM

## 2019-06-04 DIAGNOSIS — I10 ESSENTIAL HYPERTENSION: Primary | ICD-10-CM

## 2019-06-04 PROCEDURE — 99214 OFFICE O/P EST MOD 30 MIN: CPT | Performed by: FAMILY MEDICINE

## 2019-06-04 RX ORDER — LISINOPRIL 20 MG/1
20 TABLET ORAL DAILY
Qty: 90 TABLET | Refills: 3 | Status: SHIPPED | OUTPATIENT
Start: 2019-06-04 | End: 2019-08-23

## 2019-06-04 RX ORDER — ALBUTEROL SULFATE 90 UG/1
2 AEROSOL, METERED RESPIRATORY (INHALATION) 2 TIMES DAILY PRN
Qty: 8 G | Refills: 11 | Status: SHIPPED | OUTPATIENT
Start: 2019-06-04 | End: 2020-02-28

## 2019-06-07 RX ORDER — POTASSIUM CHLORIDE 750 MG/1
TABLET, FILM COATED, EXTENDED RELEASE ORAL
Qty: 90 TABLET | Refills: 1 | Status: SHIPPED | OUTPATIENT
Start: 2019-06-07 | End: 2019-12-03 | Stop reason: SDUPTHER

## 2019-06-12 NOTE — PROGRESS NOTES
Subjective   Nathan Velarde is a 78 y.o. male.     Chief Complaint   Patient presents with   • Follow-up       History of Present Illness     Nathan Velarde presents today for follow-up on hypertension.  He was taken off of his lisinopril HCTZ previously due to some low blood pressure, but has been noticing steadily elevating pressures over the past couple of months.  We tried to stop diclofenac for a few weeks, but this has not made much of a difference.    The following portions of the patient's history were reviewed and updated as appropriate: allergies, current medications, past family history, past medical history, past social history, past surgical history and problem list.    Active Ambulatory Problems     Diagnosis Date Noted   • Anemia 05/15/2016   • Aortic valve disorder 05/15/2016   • Ataxic gait 05/15/2016   • Paroxysmal atrial fibrillation 05/15/2016   • Prostatism 05/15/2016   • Fatigue 05/15/2016   • Ulcerative colitis (CMS/HCC) 05/15/2016   • Transverse myelitis (CMS/[facility]) 05/15/2016   • Testosterone deficiency 05/15/2016   • Paresthesia 05/15/2016   • Lumbosacral neuritis 05/15/2016   • 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   • Hypertension 05/15/2016   • Hyperlipidemia 05/15/2016   • Generalized osteoarthritis 05/15/2016   • Gross hematuria 05/15/2016   • CKD (chronic kidney disease), 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   • Hyperglycemia 05/28/2016   • Shoulder pain, left 09/26/2016   • Preventative health care 11/29/2016   • Status post total shoulder arthroplasty, 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   • Knee osteoarthritis 01/01/2004     Resolved Ambulatory Problems     Diagnosis Date Noted   • Neck pain 05/15/2016   • Ulcerative enterocolitis (CMS/HCC) 05/15/2016   • Impaired glucose tolerance 05/15/2016     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    • Bilateral bunions    • Bladder cancer (CMS/[facility]) 1977   • DJD of left shoulder Adulthood    • Generalized osteoarthritis Adulthood   • HTN (hypertension)    • Hyperlipidemia Adulthood   • Knee osteoarthritis    • Mitral valve insufficiency    • Nephrolithiasis    • PAF (paroxysmal atrial fibrillation) (CMS/[facility])    • Prediabetes    • Prostatism    • Transverse myelitis (CMS/[facility])    • Ulcerative colitis (CMS/[facility])      Past Surgical History:   Procedure Laterality Date   • BICEPS TENDON REPAIR Left 2018    Procedure: BICEPS TENDODESIS;  Surgeon: Abner Humphrey MD;  Location:  [facility];  Service: Orthopedics   • COLONOSCOPY      Colitis in remission   • CYSTOSCOPY  1977    Excision of bladder cancer   • INGUINAL HERNIA REPAIR Right    • KNEE SURGERY Right     Excision of cyst   • TONSILLECTOMY     • TOTAL SHOULDER ARTHROPLASTY Left 2018    Procedure: LEFT TOTAL SHOULDER ARTHROPLASTY;  Surgeon: bAner Humphrey MD;  Location:  [facility];  Service: Orthopedics     Family History   Problem Relation Age of Onset   • Alzheimer's disease Mother          age 89   • Arthritis Mother    • Hyperlipidemia Mother    • Angina Father    • Hypertension Father    • Pneumonia Father          age 70 - tobacco   • Aneurysm Father         abd aortic   • Unexplained death Brother    • Breast cancer Maternal Grandmother    • Arthritis Maternal Grandmother    • Diabetes Paternal Uncle      Social History     Socioeconomic History   • Marital status:      Spouse name: Not on file   • Number of children: Not on file   • Years of education: Not on file   • Highest education level: Not on file   Tobacco Use   • Smoking status: Never Smoker   • Smokeless tobacco: Never Used   Substance and Sexual Activity   • Alcohol use: Yes     Alcohol/week: 0.6 oz     Types: 1 Cans of beer per week     Comment: OCC   • Drug use: No   Social History Narrative    Domestic life- lives in private home with wife        Bahai- none listed        Sleep hygiene- sleeps 8  "hours nightly good quality        Caffeine use- drinks 1 diet Mountain Dew daily and occasionally uses Excedrin        Exercise habits- disciplined exercise Program 5 days weekly with walking and weight lifting and yoga        Diet-   well-balanced American Heart Association - low in salt        Occupation- retired         Hearing-   no impairment         Vision- Corrects well with bifocal glasses        Driving- No limitations             Review of Systems   Respiratory: Positive for shortness of breath and wheezing.    Cardiovascular: Negative.        Objective   Blood pressure 150/84, pulse 59, height 167.6 cm (65.98\"), weight 70.8 kg (156 lb), SpO2 98 %.  Nursing note reviewed  Physical Exam  Const: NAD, A&Ox4, Pleasant, Cooperative  Eyes: EOMI, no conjunctivitis  ENT: No nasal discharge present, neck supple  Cardiac: Regular rate and rhythm, no cyanosis  Resp: Respiratory rate within normal limits, no increased work of breathing, no audible wheezing or retractions noted  GI: No distention or ascites  MSK: Motor and sensation grossly intact in bilateral upper extremities  Neurologic: CN II-XII grossly intact  Psych: Appropriate mood and behavior.  Skin: Pink, warm, dry  Procedures  Assessment/Plan   Nathan was seen today for follow-up.    Diagnoses and all orders for this visit:    Essential hypertension  -     lisinopril (PRINIVIL,ZESTRIL) 20 MG tablet; Take 1 tablet by mouth Daily.    Shortness of breath    Chronic obstructive pulmonary disease, unspecified COPD type (CMS/HCC)  -     albuterol sulfate HFA (VENTOLIN HFA) 108 (90 Base) MCG/ACT inhaler; Inhale 2 puffs 2 (Two) Times a Day As Needed for Shortness of Air.        #1  Hypertension  Restart lisinopril 20 mg  Continue Cartia XT    #2  Shortness of breath  Possibly due to his high blood pressure, restarting his lisinopril may help  He does have some underlying COPD, recommend albuterol inhaler as needed    There are no Patient " Instructions on file for this visit.    Return in about 3 months (around 9/4/2019) for HTN.    Ambulatory progress note signed and attested to by Rell Ahuja D.O.

## 2019-07-15 ENCOUNTER — TELEPHONE (OUTPATIENT)
Dept: FAMILY MEDICINE CLINIC | Facility: CLINIC | Age: 79
End: 2019-07-15

## 2019-07-15 NOTE — TELEPHONE ENCOUNTER
PT CALLED STATING THAT THE LISINOPRIL 20mg DOES NOT SEEM TO BE WORKING AND THAT HIS BP IS STILL RUNNIKG HIGH. PT WANTED TO KNOW IF HE SHOULD GET A HIGHER DOSE.

## 2019-07-16 NOTE — TELEPHONE ENCOUNTER
LVM explaining for patient to double up on his lisinopril 20mg prescription, record BP, and call us back in 2 weeks to let us know how this goes. Office # given incase patient has further questions.

## 2019-07-29 ENCOUNTER — TELEPHONE (OUTPATIENT)
Dept: FAMILY MEDICINE CLINIC | Facility: CLINIC | Age: 79
End: 2019-07-29

## 2019-07-29 RX ORDER — TAMSULOSIN HYDROCHLORIDE 0.4 MG/1
CAPSULE ORAL
Qty: 90 CAPSULE | Refills: 0 | Status: SHIPPED | OUTPATIENT
Start: 2019-07-29 | End: 2019-10-20 | Stop reason: SDUPTHER

## 2019-07-29 RX ORDER — ATORVASTATIN CALCIUM 40 MG/1
TABLET, FILM COATED ORAL
Qty: 90 TABLET | Refills: 1 | Status: SHIPPED | OUTPATIENT
Start: 2019-07-29 | End: 2020-01-31 | Stop reason: SDUPTHER

## 2019-08-02 ENCOUNTER — OFFICE VISIT (OUTPATIENT)
Dept: FAMILY MEDICINE CLINIC | Facility: CLINIC | Age: 79
End: 2019-08-02

## 2019-08-02 ENCOUNTER — HOSPITAL ENCOUNTER (OUTPATIENT)
Dept: CARDIOLOGY | Facility: HOSPITAL | Age: 79
Discharge: HOME | End: 2019-08-02
Admitting: FAMILY MEDICINE

## 2019-08-02 ENCOUNTER — TELEPHONE (OUTPATIENT)
Dept: FAMILY MEDICINE CLINIC | Facility: CLINIC | Age: 79
End: 2019-08-02

## 2019-08-02 VITALS
OXYGEN SATURATION: 98 % | HEIGHT: 66 IN | SYSTOLIC BLOOD PRESSURE: 160 MMHG | DIASTOLIC BLOOD PRESSURE: 80 MMHG | BODY MASS INDEX: 24.59 KG/M2 | HEART RATE: 81 BPM | WEIGHT: 153 LBS

## 2019-08-02 DIAGNOSIS — I10 ESSENTIAL HYPERTENSION: ICD-10-CM

## 2019-08-02 DIAGNOSIS — M79.662 PAIN OF LEFT CALF: Primary | ICD-10-CM

## 2019-08-02 DIAGNOSIS — I48.0 PAROXYSMAL ATRIAL FIBRILLATION (HCC): ICD-10-CM

## 2019-08-02 DIAGNOSIS — M79.662 PAIN OF LEFT CALF: ICD-10-CM

## 2019-08-02 LAB
BH CV ECHO MEAS - BSA(HAYCOCK): 1.8 M^2
BH CV ECHO MEAS - BSA: 1.8 M^2
BH CV ECHO MEAS - BZI_BMI: 24.7 KILOGRAMS/M^2
BH CV ECHO MEAS - BZI_METRIC_HEIGHT: 167.6 CM
BH CV ECHO MEAS - BZI_METRIC_WEIGHT: 69.4 KG
BH CV LOWER VASCULAR LEFT COMMON FEMORAL AUGMENT: NORMAL
BH CV LOWER VASCULAR LEFT COMMON FEMORAL COMPRESS: NORMAL
BH CV LOWER VASCULAR LEFT COMMON FEMORAL PHASIC: NORMAL
BH CV LOWER VASCULAR LEFT COMMON FEMORAL SPONT: NORMAL
BH CV LOWER VASCULAR LEFT DISTAL FEMORAL COMPRESS: NORMAL
BH CV LOWER VASCULAR LEFT GASTRONEMIUS COMPRESS: NORMAL
BH CV LOWER VASCULAR LEFT GREATER SAPH AK COMPRESS: NORMAL
BH CV LOWER VASCULAR LEFT GREATER SAPH BK COMPRESS: NORMAL
BH CV LOWER VASCULAR LEFT LESSER SAPH COMPRESS: NORMAL
BH CV LOWER VASCULAR LEFT MID FEMORAL AUGMENT: NORMAL
BH CV LOWER VASCULAR LEFT MID FEMORAL COMPRESS: NORMAL
BH CV LOWER VASCULAR LEFT MID FEMORAL PHASIC: NORMAL
BH CV LOWER VASCULAR LEFT MID FEMORAL SPONT: NORMAL
BH CV LOWER VASCULAR LEFT PERONEAL COMPRESS: NORMAL
BH CV LOWER VASCULAR LEFT POPLITEAL AUGMENT: NORMAL
BH CV LOWER VASCULAR LEFT POPLITEAL COMPRESS: NORMAL
BH CV LOWER VASCULAR LEFT POPLITEAL PHASIC: NORMAL
BH CV LOWER VASCULAR LEFT POPLITEAL SPONT: NORMAL
BH CV LOWER VASCULAR LEFT POSTERIOR TIBIAL COMPRESS: NORMAL
BH CV LOWER VASCULAR LEFT PROFUNDA FEMORAL AUGMENT: NORMAL
BH CV LOWER VASCULAR LEFT PROFUNDA FEMORAL COMPRESS: NORMAL
BH CV LOWER VASCULAR LEFT PROFUNDA FEMORAL PHASIC: NORMAL
BH CV LOWER VASCULAR LEFT PROFUNDA FEMORAL SPONT: NORMAL
BH CV LOWER VASCULAR LEFT PROXIMAL FEMORAL COMPRESS: NORMAL
BH CV LOWER VASCULAR LEFT SAPHENOFEMORAL JUNCTION AUGMENT: NORMAL
BH CV LOWER VASCULAR LEFT SAPHENOFEMORAL JUNCTION COMPRESS: NORMAL
BH CV LOWER VASCULAR LEFT SAPHENOFEMORAL JUNCTION PHASIC: NORMAL
BH CV LOWER VASCULAR LEFT SAPHENOFEMORAL JUNCTION SPONT: NORMAL
BH CV LOWER VASCULAR RIGHT COMMON FEMORAL AUGMENT: NORMAL
BH CV LOWER VASCULAR RIGHT COMMON FEMORAL COMPRESS: NORMAL
BH CV LOWER VASCULAR RIGHT COMMON FEMORAL PHASIC: NORMAL
BH CV LOWER VASCULAR RIGHT COMMON FEMORAL SPONT: NORMAL

## 2019-08-02 PROCEDURE — 93971 EXTREMITY STUDY: CPT | Performed by: INTERNAL MEDICINE

## 2019-08-02 PROCEDURE — 93971 EXTREMITY STUDY: CPT

## 2019-08-02 PROCEDURE — 99214 OFFICE O/P EST MOD 30 MIN: CPT | Performed by: FAMILY MEDICINE

## 2019-08-02 RX ORDER — CLONIDINE HYDROCHLORIDE 0.1 MG/1
0.1 TABLET ORAL 2 TIMES DAILY PRN
Qty: 30 TABLET | Refills: 0 | Status: SHIPPED | OUTPATIENT
Start: 2019-08-02 | End: 2020-01-20 | Stop reason: SDUPTHER

## 2019-08-02 NOTE — PROGRESS NOTES
Subjective   Nathan Velarde is a 78 y.o. male.     Chief Complaint   Patient presents with   • Leg Pain     left calf       History of Present Illness     Nathan Velarde presents today for left calf pain.  History of difficult to control hypertension truly labile with pressures above 180 periodically at home.  Asymptomatic when this occurs.  He recently underwent TURP by his urologist unsuccessful per patient.  He states that this required hospitalization, he passed blood clots in his urine, ports that over the past few days he has noticed swelling and pain in his left calf.  It starts more proximally toward his knee and radiates down posterior calf with a history of paroxysmal atrial fibrillation is to this point declined chronic anticoagulation apart from the ASA he gets in his morning Excedrin.    The following portions of the patient's history were reviewed and updated as appropriate: allergies, current medications, past family history, past medical history, past social history, past surgical history and problem list.    Active Ambulatory Problems     Diagnosis Date Noted   • Anemia 05/15/2016   • Aortic valve disorder 05/15/2016   • Ataxic gait 05/15/2016   • Paroxysmal atrial fibrillation 05/15/2016   • Prostatism 05/15/2016   • Fatigue 05/15/2016   • Ulcerative colitis (CMS/HCC) 05/15/2016   • Transverse myelitis (CMS/[facility]) 05/15/2016   • Testosterone deficiency 05/15/2016   • Paresthesia 05/15/2016   • Lumbosacral neuritis 05/15/2016   • 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   • Hypertension 05/15/2016   • Hyperlipidemia 05/15/2016   • Generalized osteoarthritis 05/15/2016   • Gross hematuria 05/15/2016   • CKD (chronic kidney disease), 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   • Hyperglycemia 05/28/2016   • Shoulder pain, left 09/26/2016   • Preventative health care 11/29/2016   • Status post total shoulder arthroplasty, 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   • Knee osteoarthritis 01/01/2004     Resolved Ambulatory Problems     Diagnosis Date  Noted   • Neck pain 05/15/2016   • Ulcerative enterocolitis (CMS/[facility]) 05/15/2016   • Impaired glucose tolerance 05/15/2016     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    • Bilateral bunions    • Bladder cancer (CMS/[facility])    • DJD of left shoulder Adulthood   • Generalized osteoarthritis Adulthood   • HTN (hypertension)    • Hyperlipidemia Adulthood   • Knee osteoarthritis    • Mitral valve insufficiency    • Nephrolithiasis    • PAF (paroxysmal atrial fibrillation) (CMS/[facility])    • Prediabetes    • Prostatism    • Transverse myelitis (CMS/[facility])    • Ulcerative colitis (CMS/[facility])      Past Surgical History:   Procedure Laterality Date   • BICEPS TENDON REPAIR Left 2018    Procedure: BICEPS TENDODESIS;  Surgeon: Abner Humphrey MD;  Location:  [facility];  Service: Orthopedics   • COLONOSCOPY      Colitis in remission   • CYSTOSCOPY      Excision of bladder cancer   • INGUINAL HERNIA REPAIR Right    • KNEE SURGERY Right     Excision of cyst   • TONSILLECTOMY     • TOTAL SHOULDER ARTHROPLASTY Left 2018    Procedure: LEFT TOTAL SHOULDER ARTHROPLASTY;  Surgeon: Abner Humphrey MD;  Location: [facility];  Service: Orthopedics     Family History   Problem Relation Age of Onset   • Alzheimer's disease Mother          age 89   • Arthritis Mother    • Hyperlipidemia Mother    • Angina Father    • Hypertension Father    • Pneumonia Father          age 70 - tobacco   • Aneurysm Father         abd aortic   • Unexplained death Brother    • Breast cancer Maternal Grandmother    • Arthritis Maternal Grandmother    • Diabetes Paternal Uncle      Social History     Socioeconomic History   • Marital status:      Spouse name: Not on file   • Number of children: Not on file   • Years of education: Not on file   • Highest education level: Not on file   Tobacco Use   •  "Smoking status: Never Smoker   • Smokeless tobacco: Never Used   Substance and Sexual Activity   • Alcohol use: Yes     Alcohol/week: 0.6 oz     Types: 1 Cans of beer per week     Comment: OCC   • Drug use: No   Social History Narrative    Domestic life- lives in private home with wife        Holiness- none listed        Sleep hygiene- sleeps 8 hours nightly good quality        Caffeine use- drinks 1 diet Mountain Dew daily and occasionally uses Excedrin        Exercise habits- disciplined exercise Program 5 days weekly with walking and weight lifting and yoga        Diet-   well-balanced American Heart Association - low in salt        Occupation- retired         Hearing-   no impairment         Vision- Corrects well with bifocal glasses        Driving- No limitations             Review of Systems   Constitutional: Negative.    Respiratory: Negative.    Cardiovascular: Negative.    Musculoskeletal:        Left calf swelling, pain       Objective   Blood pressure 160/80, pulse 81, height 167.6 cm (65.98\"), weight 69.4 kg (153 lb), SpO2 98 %.  Nursing note reviewed  Physical Exam  Const: NAD, A&Ox4, Pleasant, Cooperative  Eyes: EOMI, no conjunctivitis  ENT: No nasal discharge present, neck supple  Cardiac: Regular rate and rhythm, no cyanosis  Resp: Respiratory rate within normal limits, no increased work of breathing, no audible wheezing or retractions noted  GI: No distention or ascites  MSK: Swelling of the left calf 1 cm between left and right side. Minimal tenderness on calf squeeze. Achilles intact. No trauma noted  Skin: Pink, warm, dry  Procedures  Assessment/Plan   Nathan was seen today for leg pain.    Diagnoses and all orders for this visit:    Pain of left calf  -     Duplex Venous Lower Extremity - Left CAR; Future    Essential hypertension  -     Ambulatory Referral to Cardiology  -     CloNIDine (CATAPRES) 0.1 MG tablet; Take 1 tablet by mouth 2 (Two) Times a Day As Needed for High " Blood Pressure.    Paroxysmal atrial fibrillation (CMS/HCC)  -     Ambulatory Referral to Cardiology        #1 Left calf swelling  Rule out blood clot with duplex US today    #2 HTN  Labile  Clonidine for SBP >180  F/U with cardiology    #3 PAF  F/U with new cardiologist  Discussed need for chronic anticoagulation    There are no Patient Instructions on file for this visit.    No Follow-up on file.    Ambulatory progress note signed and attested to by Rell Ahuja D.O.

## 2019-08-02 NOTE — TELEPHONE ENCOUNTER
Avoid painful activities. Ice the area 20 minutes daily followed immediately by a heating pad for 20 minutes.

## 2019-08-02 NOTE — TELEPHONE ENCOUNTER
Patient called, the US showed he did not have a blood clot in his leg so he was wanting to know what he can try for his leg pain. Please call back at [phone]

## 2019-08-02 NOTE — TELEPHONE ENCOUNTER
PT states that he has been having some mild pain for the past 4 days. He rates his pain as a 2 while walking. He reports that it will worsen to about a 5 while doing strenuous activity. Please advise?

## 2019-08-23 ENCOUNTER — CONSULT (OUTPATIENT)
Dept: CARDIOLOGY | Facility: CLINIC | Age: 79
End: 2019-08-23

## 2019-08-23 ENCOUNTER — HOSPITAL ENCOUNTER (OUTPATIENT)
Dept: CARDIOLOGY | Facility: HOSPITAL | Age: 79
Discharge: HOME | End: 2019-08-23
Admitting: PHYSICIAN ASSISTANT

## 2019-08-23 VITALS
WEIGHT: 158 LBS | SYSTOLIC BLOOD PRESSURE: 132 MMHG | HEART RATE: 60 BPM | DIASTOLIC BLOOD PRESSURE: 66 MMHG | BODY MASS INDEX: 25.39 KG/M2 | HEIGHT: 66 IN | OXYGEN SATURATION: 98 %

## 2019-08-23 VITALS — WEIGHT: 158 LBS | BODY MASS INDEX: 25.39 KG/M2 | HEIGHT: 66 IN

## 2019-08-23 DIAGNOSIS — I35.9 AORTIC VALVE DISEASE: ICD-10-CM

## 2019-08-23 DIAGNOSIS — E78.00 PURE HYPERCHOLESTEROLEMIA: ICD-10-CM

## 2019-08-23 DIAGNOSIS — I48.0 PAROXYSMAL ATRIAL FIBRILLATION (HCC): Primary | ICD-10-CM

## 2019-08-23 DIAGNOSIS — I48.0 PAROXYSMAL ATRIAL FIBRILLATION (HCC): ICD-10-CM

## 2019-08-23 DIAGNOSIS — I10 ESSENTIAL HYPERTENSION: ICD-10-CM

## 2019-08-23 PROCEDURE — 93000 ELECTROCARDIOGRAM COMPLETE: CPT | Performed by: INTERNAL MEDICINE

## 2019-08-23 PROCEDURE — 99204 OFFICE O/P NEW MOD 45 MIN: CPT | Performed by: INTERNAL MEDICINE

## 2019-08-23 PROCEDURE — 93306 TTE W/DOPPLER COMPLETE: CPT | Performed by: INTERNAL MEDICINE

## 2019-08-23 PROCEDURE — 93306 TTE W/DOPPLER COMPLETE: CPT

## 2019-08-23 RX ORDER — LOSARTAN POTASSIUM 50 MG/1
50 TABLET ORAL DAILY
Qty: 90 TABLET | Refills: 3 | Status: SHIPPED | OUTPATIENT
Start: 2019-08-23 | End: 2019-09-05

## 2019-08-23 RX ORDER — PERPHENAZINE 16 MG
600 TABLET ORAL DAILY
COMMUNITY

## 2019-08-27 LAB
ASCENDING AORTA: 3.8 CM
AV RADIUS PISA: 0.8 CM
AV VENA CONTRACTA: 0.8 CM
BH CV ECHO MEAS - AI DEC SLOPE: 384.2 CM/SEC^2
BH CV ECHO MEAS - AI MAX PG: 109.9 MMHG
BH CV ECHO MEAS - AI MAX VEL: 524 CM/SEC
BH CV ECHO MEAS - AI P1/2T: 397 MSEC
BH CV ECHO MEAS - AO MAX PG (FULL): 3.7 MMHG
BH CV ECHO MEAS - AO MAX PG: 8.9 MMHG
BH CV ECHO MEAS - AO MEAN PG (FULL): 2.1 MMHG
BH CV ECHO MEAS - AO MEAN PG: 4.8 MMHG
BH CV ECHO MEAS - AO ROOT AREA (BSA CORRECTED): 2
BH CV ECHO MEAS - AO ROOT AREA: 10 CM^2
BH CV ECHO MEAS - AO ROOT DIAM: 3.6 CM
BH CV ECHO MEAS - AO V2 MAX: 149 CM/SEC
BH CV ECHO MEAS - AO V2 MEAN: 101.1 CM/SEC
BH CV ECHO MEAS - AO V2 VTI: 41.7 CM
BH CV ECHO MEAS - ASC AORTA: 3.8 CM
BH CV ECHO MEAS - AVA(I,A): 2.5 CM^2
BH CV ECHO MEAS - AVA(I,D): 2.5 CM^2
BH CV ECHO MEAS - AVA(V,A): 2.8 CM^2
BH CV ECHO MEAS - AVA(V,D): 2.8 CM^2
BH CV ECHO MEAS - BSA(HAYCOCK): 1.8 M^2
BH CV ECHO MEAS - BSA: 1.8 M^2
BH CV ECHO MEAS - BZI_BMI: 25.5 KILOGRAMS/M^2
BH CV ECHO MEAS - BZI_METRIC_HEIGHT: 167.6 CM
BH CV ECHO MEAS - BZI_METRIC_WEIGHT: 71.7 KG
BH CV ECHO MEAS - EDV(CUBED): 93.6 ML
BH CV ECHO MEAS - EDV(MOD-SP2): 97 ML
BH CV ECHO MEAS - EDV(MOD-SP4): 87 ML
BH CV ECHO MEAS - EDV(TEICH): 94.4 ML
BH CV ECHO MEAS - EF(CUBED): 60.7 %
BH CV ECHO MEAS - EF(MOD-BP): 56 %
BH CV ECHO MEAS - EF(MOD-SP2): 55.7 %
BH CV ECHO MEAS - EF(MOD-SP4): 55.2 %
BH CV ECHO MEAS - EF(TEICH): 52.4 %
BH CV ECHO MEAS - ESV(CUBED): 36.7 ML
BH CV ECHO MEAS - ESV(MOD-SP2): 43 ML
BH CV ECHO MEAS - ESV(MOD-SP4): 39 ML
BH CV ECHO MEAS - ESV(TEICH): 44.9 ML
BH CV ECHO MEAS - FS: 26.8 %
BH CV ECHO MEAS - IVS/LVPW: 1
BH CV ECHO MEAS - IVSD: 0.94 CM
BH CV ECHO MEAS - LA DIMENSION: 3.3 CM
BH CV ECHO MEAS - LA/AO: 0.93
BH CV ECHO MEAS - LAD MAJOR: 5.7 CM
BH CV ECHO MEAS - LAT PEAK E' VEL: 7.7 CM/SEC
BH CV ECHO MEAS - LATERAL E/E' RATIO: 9.3
BH CV ECHO MEAS - LV DIASTOLIC VOL/BSA (35-75): 48.1 ML/M^2
BH CV ECHO MEAS - LV MASS(C)D: 142.3 GRAMS
BH CV ECHO MEAS - LV MASS(C)DI: 78.7 GRAMS/M^2
BH CV ECHO MEAS - LV MAX PG: 5.2 MMHG
BH CV ECHO MEAS - LV MEAN PG: 2.7 MMHG
BH CV ECHO MEAS - LV SYSTOLIC VOL/BSA (12-30): 21.6 ML/M^2
BH CV ECHO MEAS - LV V1 MAX: 114.1 CM/SEC
BH CV ECHO MEAS - LV V1 MEAN: 73.1 CM/SEC
BH CV ECHO MEAS - LV V1 VTI: 29.1 CM
BH CV ECHO MEAS - LVIDD: 4.5 CM
BH CV ECHO MEAS - LVIDS: 3.3 CM
BH CV ECHO MEAS - LVLD AP2: 7.8 CM
BH CV ECHO MEAS - LVLD AP4: 8.2 CM
BH CV ECHO MEAS - LVLS AP2: 7 CM
BH CV ECHO MEAS - LVLS AP4: 7.1 CM
BH CV ECHO MEAS - LVOT AREA (M): 3.8 CM^2
BH CV ECHO MEAS - LVOT AREA: 3.6 CM^2
BH CV ECHO MEAS - LVOT DIAM: 2.2 CM
BH CV ECHO MEAS - LVPWD: 0.93 CM
BH CV ECHO MEAS - MED PEAK E' VEL: 4.9 CM/SEC
BH CV ECHO MEAS - MEDIAL E/E' RATIO: 14.6
BH CV ECHO MEAS - MV A MAX VEL: 46.3 CM/SEC
BH CV ECHO MEAS - MV DEC TIME: 0.25 SEC
BH CV ECHO MEAS - MV E MAX VEL: 72.5 CM/SEC
BH CV ECHO MEAS - MV E/A: 1.6
BH CV ECHO MEAS - MV MAX PG: 2.6 MMHG
BH CV ECHO MEAS - MV MEAN PG: 1.1 MMHG
BH CV ECHO MEAS - MV V2 MAX: 80.6 CM/SEC
BH CV ECHO MEAS - MV V2 MEAN: 48.4 CM/SEC
BH CV ECHO MEAS - MV V2 VTI: 27.9 CM
BH CV ECHO MEAS - MVA(VTI): 3.8 CM^2
BH CV ECHO MEAS - PA ACC SLOPE: 282.7 CM/SEC^2
BH CV ECHO MEAS - PA ACC TIME: 0.19 SEC
BH CV ECHO MEAS - PA MAX PG: 2.2 MMHG
BH CV ECHO MEAS - PA PR(ACCEL): -5 MMHG
BH CV ECHO MEAS - PA V2 MAX: 74.2 CM/SEC
BH CV ECHO MEAS - RAP SYSTOLE: 3 MMHG
BH CV ECHO MEAS - SI(AO): 229.8 ML/M^2
BH CV ECHO MEAS - SI(CUBED): 31.4 ML/M^2
BH CV ECHO MEAS - SI(LVOT): 58.5 ML/M^2
BH CV ECHO MEAS - SI(MOD-SP2): 29.8 ML/M^2
BH CV ECHO MEAS - SI(MOD-SP4): 26.5 ML/M^2
BH CV ECHO MEAS - SI(TEICH): 27.3 ML/M^2
BH CV ECHO MEAS - SV(AO): 415.8 ML
BH CV ECHO MEAS - SV(CUBED): 56.8 ML
BH CV ECHO MEAS - SV(LVOT): 105.8 ML
BH CV ECHO MEAS - SV(MOD-SP2): 54 ML
BH CV ECHO MEAS - SV(MOD-SP4): 48 ML
BH CV ECHO MEAS - SV(TEICH): 49.5 ML
BH CV ECHO MEAS - TAPSE (>1.6): 3.6 CM2
BH CV ECHO MEASUREMENTS AVERAGE E/E' RATIO: 11.51
BH CV FAKE - AV AREA-PISA: 0.3 CM2
BH CV VAS BP LEFT ARM: NORMAL MMHG
BH CV XLRA - RV BASE: 3.8 CM
BH CV XLRA - RV LENGTH: 7.2 CM
BH CV XLRA - RV MID: 2.9 CM
BH CV XLRA - TDI S': 15.7 CM/SEC
LEFT ATRIUM VOLUME INDEX: 33.7 ML/M^2
LEFT ATRIUM VOLUME: 61 ML
LV EF 2D ECHO EST: 60 %
MV VENA CONTRACTA: 0.5 CM
PISA AR ALIASING VEL: 37 M/S
PISA AR MAX VEL: 504 M/S

## 2019-08-28 ENCOUNTER — DOCUMENTATION (OUTPATIENT)
Dept: CARDIOLOGY | Facility: CLINIC | Age: 79
End: 2019-08-28

## 2019-08-28 NOTE — PROGRESS NOTES
Informed patient of abnormal echo results and need for annual echo follow-up. He verbalized understanding.

## 2019-09-03 RX ORDER — DIGOXIN 125 UG/1
TABLET ORAL
Qty: 90 TABLET | Refills: 1 | Status: SHIPPED | OUTPATIENT
Start: 2019-09-03 | End: 2020-02-24

## 2019-09-05 ENCOUNTER — OFFICE VISIT (OUTPATIENT)
Dept: FAMILY MEDICINE CLINIC | Facility: CLINIC | Age: 79
End: 2019-09-05

## 2019-09-05 VITALS
OXYGEN SATURATION: 98 % | SYSTOLIC BLOOD PRESSURE: 150 MMHG | DIASTOLIC BLOOD PRESSURE: 84 MMHG | HEIGHT: 66 IN | WEIGHT: 155 LBS | BODY MASS INDEX: 24.91 KG/M2 | HEART RATE: 54 BPM

## 2019-09-05 DIAGNOSIS — R06.02 SHORTNESS OF BREATH: ICD-10-CM

## 2019-09-05 DIAGNOSIS — I10 ESSENTIAL HYPERTENSION: Primary | ICD-10-CM

## 2019-09-05 PROCEDURE — 99214 OFFICE O/P EST MOD 30 MIN: CPT | Performed by: FAMILY MEDICINE

## 2019-09-05 RX ORDER — LOSARTAN POTASSIUM 50 MG/1
50 TABLET ORAL 2 TIMES DAILY
Qty: 180 TABLET | Refills: 3 | Status: SHIPPED | OUTPATIENT
Start: 2019-09-05 | End: 2020-06-30

## 2019-09-05 NOTE — PROGRESS NOTES
Subjective   Nathan Velarde is a 79 y.o. male.     Chief Complaint   Patient presents with   • Follow-up       History of Present Illness     Nathan Velarde presents today for follow-up on hypertension.  He was taken off of his lisinopril HCTZ previously due to some low blood pressure, but had been noticing steadily elevating pressures over the spring.  He tried stopping all NSAIDs, this did not make much of a difference.  At his last visit we put him back on his losartan.  This initially helped somewhat, but over the past couple of weeks he has noticed more blood pressures in the 190s systolic in the mornings, but get better after exercise for a few hours, and then raise again in the afternoon.  He has some shortness of breath that has been worsening over the past year, had an echo recently that showed moderate to severe aortic valve regurgitation.    The following portions of the patient's history were reviewed and updated as appropriate: allergies, current medications, past family history, past medical history, past social history, past surgical history and problem list.    Active Ambulatory Problems     Diagnosis Date Noted   • Anemia 05/15/2016   • Aortic valve disorder 05/15/2016   • Ataxic gait 05/15/2016   • Paroxysmal atrial fibrillation 05/15/2016   • Prostatism 05/15/2016   • Fatigue 05/15/2016   • Ulcerative colitis (CMS/HCC) 05/15/2016   • Transverse myelitis (CMS/[facility]) 05/15/2016   • Testosterone deficiency 05/15/2016   • Paresthesia 05/15/2016   • Lumbosacral neuritis 05/15/2016   • 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   • Hypertension 05/15/2016   • Hyperlipidemia 05/15/2016   • Generalized osteoarthritis 05/15/2016   • Gross hematuria 05/15/2016   • CKD (chronic kidney disease), 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   • Hyperglycemia 05/28/2016   • Shoulder pain, left 09/26/2016   • Preventative health care 11/29/2016   • Status post total shoulder arthroplasty, 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   • Knee osteoarthritis 01/01/2004      Resolved Ambulatory Problems     Diagnosis Date Noted   • Neck pain 05/15/2016   • Ulcerative enterocolitis (CMS/[facility]) 05/15/2016   • Impaired glucose tolerance 05/15/2016     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    • Bilateral bunions    • Bladder cancer (CMS/[facility])    • DJD of left shoulder Adulthood   • Generalized osteoarthritis Adulthood   • HTN (hypertension)    • Hyperlipidemia Adulthood   • Knee osteoarthritis    • Mitral valve insufficiency    • Nephrolithiasis    • PAF (paroxysmal atrial fibrillation) (CMS/HCC)    • Prediabetes    • Prostatism    • Transverse myelitis (CMS/HCC)    • Ulcerative colitis (CMS/HCC)      Past Surgical History:   Procedure Laterality Date   • BICEPS TENDON REPAIR Left 2018    Procedure: BICEPS TENDODESIS;  Surgeon: Abner Humphrey MD;  Location:  [facility];  Service: Orthopedics   • COLONOSCOPY      Colitis in remission   • CYSTOSCOPY      Excision of bladder cancer   • INGUINAL HERNIA REPAIR Right    • KNEE SURGERY Right     Excision of cyst   • TONSILLECTOMY     • TOTAL SHOULDER ARTHROPLASTY Left 2018    Procedure: LEFT TOTAL SHOULDER ARTHROPLASTY;  Surgeon: Abner Humphrey MD;  Location:  [facility];  Service: Orthopedics     Family History   Problem Relation Age of Onset   • Alzheimer's disease Mother          age 89   • Arthritis Mother    • Hyperlipidemia Mother    • Angina Father    • Hypertension Father    • Pneumonia Father          age 70 - tobacco   • Aneurysm Father         abd aortic   • Unexplained death Brother    • Breast cancer Maternal Grandmother    • Arthritis Maternal Grandmother    • Diabetes Paternal Uncle      Social History     Socioeconomic History   • Marital status:      Spouse name: Not on file   • Number of children: Not on file   • Years of education: Not on file   •  "Highest education level: Not on file   Tobacco Use   • Smoking status: Never Smoker   • Smokeless tobacco: Never Used   Substance and Sexual Activity   • Alcohol use: Yes     Alcohol/week: 0.6 oz     Types: 1 Cans of beer per week     Comment: OCC   • Drug use: No   Social History Narrative    Domestic life- lives in private home with wife        Pentecostal- none listed        Sleep hygiene- sleeps 8 hours nightly good quality        Caffeine use- drinks 1 diet Mountain Dew daily and occasionally uses Excedrin        Exercise habits- disciplined exercise Program 5 days weekly with walking and weight lifting and yoga        Diet-   well-balanced American Heart Association - low in salt        Occupation- retired         Hearing-   no impairment         Vision- Corrects well with bifocal glasses        Driving- No limitations             Review of Systems   Constitutional: Negative.  Negative for fatigue.   Respiratory: Positive for shortness of breath. Negative for wheezing.    Cardiovascular: Negative.  Negative for chest pain and leg swelling.   Neurological: Negative.        Objective   Blood pressure 150/84, pulse 54, height 167.6 cm (65.98\"), weight 70.3 kg (155 lb), SpO2 98 %.  Nursing note reviewed  Physical Exam  Const: NAD, A&Ox4, Pleasant, Cooperative  Eyes: EOMI, no conjunctivitis  ENT: No nasal discharge present, neck supple  Cardiac: Regular rate and rhythm, no cyanosis  Resp: Respiratory rate within normal limits, no increased work of breathing, no audible wheezing or retractions noted  GI: No distention or ascites  MSK: Motor and sensation grossly intact in bilateral upper extremities  Neurologic: CN II-XII grossly intact  Psych: Appropriate mood and behavior.  Skin: Pink, warm, dry  Procedures  Assessment/Plan   Nathan was seen today for follow-up.    Diagnoses and all orders for this visit:    Essential hypertension  -     losartan (COZAAR) 50 MG tablet; Take 1 tablet by mouth 2 " (Two) Times a Day.    Shortness of breath        #1  Hypertension  He is still having quite high values, particularly in the morning.  He is currently taking his losartan in the afternoon.  I would like him to start taking 1 tablet of losartan in the morning, and 1 tablet in the evening.  Continue Cartia XT in the evening    #2  Shortness of breath  Likely due to moderate to severe aortic regurgitation, tight blood pressure control will be important.  He has a mild obstruction on his bony function testing, but albuterol does not seem to help him at all.    Patient Instructions   1.  Give this 2 weeks to ramp up.    2.  Message with status at that time.      Return in about 4 months (around 1/5/2020) for Medicare Wellness.    Ambulatory progress note signed and attested to by Rell Ahuja D.O.

## 2019-09-16 RX ORDER — DILTIAZEM HYDROCHLORIDE 180 MG/1
CAPSULE, EXTENDED RELEASE ORAL
Qty: 90 CAPSULE | Refills: 4 | Status: SHIPPED | OUTPATIENT
Start: 2019-09-16 | End: 2020-04-29

## 2019-09-24 ENCOUNTER — TELEPHONE (OUTPATIENT)
Dept: FAMILY MEDICINE CLINIC | Facility: CLINIC | Age: 79
End: 2019-09-24

## 2019-09-24 NOTE — TELEPHONE ENCOUNTER
Mr Velarde checking back in to say the new medication (Losartan 50 Mg) was causing his bp to drop very low and he doesn't fell well for a couple of hours. Then at bedtime his bp is getting high. Please contact patient.

## 2019-10-21 RX ORDER — TAMSULOSIN HYDROCHLORIDE 0.4 MG/1
CAPSULE ORAL
Qty: 90 CAPSULE | Refills: 0 | Status: SHIPPED | OUTPATIENT
Start: 2019-10-21 | End: 2020-01-27

## 2019-12-03 RX ORDER — POTASSIUM CHLORIDE 750 MG/1
TABLET, FILM COATED, EXTENDED RELEASE ORAL
Qty: 90 TABLET | Refills: 4 | Status: SHIPPED | OUTPATIENT
Start: 2019-12-03 | End: 2020-12-11

## 2019-12-18 RX ORDER — MESALAMINE 400 MG/1
CAPSULE, DELAYED RELEASE ORAL
Qty: 360 CAPSULE | Refills: 4 | Status: SHIPPED | OUTPATIENT
Start: 2019-12-18 | End: 2021-03-26 | Stop reason: SDUPTHER

## 2020-01-08 ENCOUNTER — OFFICE VISIT (OUTPATIENT)
Dept: FAMILY MEDICINE CLINIC | Facility: CLINIC | Age: 80
End: 2020-01-08

## 2020-01-08 ENCOUNTER — APPOINTMENT (OUTPATIENT)
Dept: LAB | Facility: HOSPITAL | Age: 80
End: 2020-01-08

## 2020-01-08 VITALS
HEART RATE: 65 BPM | DIASTOLIC BLOOD PRESSURE: 80 MMHG | BODY MASS INDEX: 26.03 KG/M2 | WEIGHT: 162 LBS | SYSTOLIC BLOOD PRESSURE: 180 MMHG | HEIGHT: 66 IN | OXYGEN SATURATION: 99 %

## 2020-01-08 DIAGNOSIS — R35.1 BPH ASSOCIATED WITH NOCTURIA: ICD-10-CM

## 2020-01-08 DIAGNOSIS — I10 ESSENTIAL HYPERTENSION: ICD-10-CM

## 2020-01-08 DIAGNOSIS — Z85.51 HISTORY OF BLADDER CANCER: ICD-10-CM

## 2020-01-08 DIAGNOSIS — R73.9 HYPERGLYCEMIA: ICD-10-CM

## 2020-01-08 DIAGNOSIS — N18.2 CKD (CHRONIC KIDNEY DISEASE), STAGE II: ICD-10-CM

## 2020-01-08 DIAGNOSIS — Z00.00 MEDICARE ANNUAL WELLNESS VISIT, SUBSEQUENT: Primary | ICD-10-CM

## 2020-01-08 DIAGNOSIS — E78.00 PURE HYPERCHOLESTEROLEMIA: ICD-10-CM

## 2020-01-08 DIAGNOSIS — Z00.00 PREVENTATIVE HEALTH CARE: ICD-10-CM

## 2020-01-08 DIAGNOSIS — N40.1 BPH ASSOCIATED WITH NOCTURIA: ICD-10-CM

## 2020-01-08 LAB
ALBUMIN SERPL-MCNC: 4.4 G/DL (ref 3.5–5.2)
ALBUMIN/GLOB SERPL: 1.2 G/DL
ALP SERPL-CCNC: 128 U/L (ref 39–117)
ALT SERPL W P-5'-P-CCNC: 33 U/L (ref 1–41)
ANION GAP SERPL CALCULATED.3IONS-SCNC: 11.3 MMOL/L (ref 5–15)
AST SERPL-CCNC: 32 U/L (ref 1–40)
BASOPHILS # BLD AUTO: 0.05 10*3/MM3 (ref 0–0.2)
BASOPHILS NFR BLD AUTO: 0.6 % (ref 0–1.5)
BILIRUB SERPL-MCNC: 0.4 MG/DL (ref 0.2–1.2)
BUN BLD-MCNC: 23 MG/DL (ref 8–23)
BUN/CREAT SERPL: 18.9 (ref 7–25)
CALCIUM SPEC-SCNC: 10.5 MG/DL (ref 8.6–10.5)
CHLORIDE SERPL-SCNC: 98 MMOL/L (ref 98–107)
CO2 SERPL-SCNC: 25.7 MMOL/L (ref 22–29)
CREAT BLD-MCNC: 1.22 MG/DL (ref 0.76–1.27)
DEPRECATED RDW RBC AUTO: 44.6 FL (ref 37–54)
EOSINOPHIL # BLD AUTO: 0.23 10*3/MM3 (ref 0–0.4)
EOSINOPHIL NFR BLD AUTO: 2.6 % (ref 0.3–6.2)
ERYTHROCYTE [DISTWIDTH] IN BLOOD BY AUTOMATED COUNT: 13.9 % (ref 12.3–15.4)
GFR SERPL CREATININE-BSD FRML MDRD: 57 ML/MIN/1.73
GLOBULIN UR ELPH-MCNC: 3.8 GM/DL
GLUCOSE BLD-MCNC: 102 MG/DL (ref 65–99)
HBA1C MFR BLD: 6.2 % (ref 4.8–5.6)
HCT VFR BLD AUTO: 40.6 % (ref 37.5–51)
HGB BLD-MCNC: 13 G/DL (ref 13–17.7)
IMM GRANULOCYTES # BLD AUTO: 0.03 10*3/MM3 (ref 0–0.05)
IMM GRANULOCYTES NFR BLD AUTO: 0.3 % (ref 0–0.5)
LYMPHOCYTES # BLD AUTO: 1.2 10*3/MM3 (ref 0.7–3.1)
LYMPHOCYTES NFR BLD AUTO: 13.5 % (ref 19.6–45.3)
MCH RBC QN AUTO: 27.9 PG (ref 26.6–33)
MCHC RBC AUTO-ENTMCNC: 32 G/DL (ref 31.5–35.7)
MCV RBC AUTO: 87.1 FL (ref 79–97)
MONOCYTES # BLD AUTO: 0.86 10*3/MM3 (ref 0.1–0.9)
MONOCYTES NFR BLD AUTO: 9.7 % (ref 5–12)
NEUTROPHILS # BLD AUTO: 6.49 10*3/MM3 (ref 1.7–7)
NEUTROPHILS NFR BLD AUTO: 73.3 % (ref 42.7–76)
NRBC BLD AUTO-RTO: 0 /100 WBC (ref 0–0.2)
PLATELET # BLD AUTO: 312 10*3/MM3 (ref 140–450)
PMV BLD AUTO: 10.3 FL (ref 6–12)
POTASSIUM BLD-SCNC: 5.4 MMOL/L (ref 3.5–5.2)
PROT SERPL-MCNC: 8.2 G/DL (ref 6–8.5)
PSA SERPL-MCNC: 4.37 NG/ML (ref 0–4)
RBC # BLD AUTO: 4.66 10*6/MM3 (ref 4.14–5.8)
SODIUM BLD-SCNC: 135 MMOL/L (ref 136–145)
WBC NRBC COR # BLD: 8.86 10*3/MM3 (ref 3.4–10.8)

## 2020-01-08 PROCEDURE — 84153 ASSAY OF PSA TOTAL: CPT | Performed by: FAMILY MEDICINE

## 2020-01-08 PROCEDURE — 80053 COMPREHEN METABOLIC PANEL: CPT | Performed by: FAMILY MEDICINE

## 2020-01-08 PROCEDURE — G0439 PPPS, SUBSEQ VISIT: HCPCS | Performed by: FAMILY MEDICINE

## 2020-01-08 PROCEDURE — 83036 HEMOGLOBIN GLYCOSYLATED A1C: CPT | Performed by: FAMILY MEDICINE

## 2020-01-08 PROCEDURE — 99397 PER PM REEVAL EST PAT 65+ YR: CPT | Performed by: FAMILY MEDICINE

## 2020-01-08 PROCEDURE — 85025 COMPLETE CBC W/AUTO DIFF WBC: CPT | Performed by: FAMILY MEDICINE

## 2020-01-08 NOTE — PROGRESS NOTES
QUICK REFERENCE INFORMATION:  The ABCs of the Annual Wellness Visit    Medicare Subsequent Wellness Visit    Chief Complaint   Patient presents with   • Annual Exam     Subsequent Medicare Wellness         HPI     Nathan Velarde is a 79 y.o. male presenting for subsequent annual wellness visit.  At his last visit in September he was having some increased blood pressure in the mornings, he was initially instructed to take the losartan 50 mg and continue the Cartia in the evening.  We subsequently reported a couple weeks later that this was dropping his blood pressure too low, and he was instructed to split his losartan 50 mg in half and take a half dose in the morning and a half dose in the evening. He has a 9% chance of having prostate CA.    This patient is accompanied by their self who contributes to the history of their care.    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   • Prostatism 1995   • Transverse myelitis (CMS/HCC) 2014    Transient T10 with numbness and minimal ataxia - transient    • Ulcerative colitis (CMS/HCC) 1977    Well-controlled on mesalamine       Past Surgical History:   Procedure Laterality Date   • BICEPS TENDON REPAIR Left 5/7/2018    Procedure: BICEPS TENDODESIS;  Surgeon: Abner Humphrey MD;  Location: [facility];  Service: Orthopedics    • COLONOSCOPY      Colitis in remission   • CYSTOSCOPY      Excision of bladder cancer   • INGUINAL HERNIA REPAIR Right    • KNEE SURGERY Right     Excision of cyst   • TONSILLECTOMY  1943   • TOTAL SHOULDER ARTHROPLASTY Left 2018    Procedure: LEFT TOTAL SHOULDER ARTHROPLASTY;  Surgeon: Abner Humphrey MD;  Location: [facility];  Service: Orthopedics     Family History   Problem Relation Age of Onset   • Alzheimer's disease Mother          age 89   • Arthritis Mother    • Hyperlipidemia Mother    • Angina Father    • Hypertension Father    • Pneumonia Father          age 70 - tobacco   • Aneurysm Father         abd aortic   • Unexplained death Brother    • Breast cancer Maternal Grandmother    • Arthritis Maternal Grandmother    • Diabetes Paternal Uncle       Social History     Socioeconomic History   • Marital status:      Spouse name: Not on file   • Number of children: Not on file   • Years of education: Not on file   • Highest education level: Not on file   Tobacco Use   • Smoking status: Never Smoker   • Smokeless tobacco: Never Used   Substance and Sexual Activity   • Alcohol use: Yes     Alcohol/week: 1.0 standard drinks     Types: 1 Cans of beer per week     Comment: [facility]   • Drug use: No   Social History Narrative    Domestic life- lives in private home with wife        Holiness- none listed        Sleep hygiene- sleeps 8 hours nightly good quality        Caffeine use- drinks 1 diet Mountain Dew daily and occasionally uses Excedrin        Exercise habits- disciplined exercise Program 5 days weekly with walking and weight lifting and yoga        Diet-   well-balanced American Heart Association - low in salt        Occupation- retired         Hearing-   no impairment         Vision- Corrects well with bifocal glasses        Driving- No limitations          Allergies   Allergen Reactions   • Sulfa Antibiotics Nausea Only and Other (See Comments)      Headache      Outpatient Medications Prior to Visit   Medication Sig Dispense Refill   • albuterol sulfate HFA (VENTOLIN HFA) 108 (90 Base) MCG/ACT inhaler Inhale 2 puffs 2 (Two) Times a Day As Needed for Shortness of Air. 8 g 11   • Alpha-Lipoic Acid 600 MG capsule Take 600 mg by mouth Daily.     • aspirin-acetaminophen-caffeine (EXCEDRIN MIGRAINE) 250-250-65 MG per tablet Take 1 tablet by mouth As Needed for Headache.     • atorvastatin (LIPITOR) 40 MG tablet TAKE 1 TABLET AT BEDTIME 90 tablet 1   • Calcium Carbonate-Vitamin D (CALCIUM 600+D PO) Take 1 tablet by mouth Daily.     • CARTIA  MG 24 hr capsule TAKE 1 CAPSULE DAILY 90 capsule 4   • cholecalciferol (VITAMIN D3) 1000 units tablet Take 1,000 Units by mouth Daily.     • coenzyme Q10 100 MG capsule Take 100 mg by mouth Daily.     • DIGOX 125 MCG tablet TAKE ONE TABLET BY MOUTH DAILY 90 tablet 1   • Fish Oil-Cholecalciferol (OMEGA-3 + D PO) Take 1 capsule by mouth Daily.     • Glucosamine-Chondroit-Vit C-Mn (GLUCOSAMINE 1500 COMPLEX) capsule Take 1 tablet by mouth Daily.     • losartan (COZAAR) 50 MG tablet Take 1 tablet by mouth 2 (Two) Times a Day. 180 tablet 3   • Magnesium 250 MG tablet Take 250 mg by mouth Daily.     • mesalamine (DELZICOL) 400 MG capsule delayed-release delayed release capsule TAKE 2 CAPSULES TWICE A  capsule 4   • Multiple Vitamins-Minerals (MULTIVITAMIN ADULT PO) Take 1 tablet by mouth Daily.     • potassium chloride (K-DUR) 10 MEQ CR tablet TAKE 1 TABLET DAILY 90 tablet 4   • Saw Palmetto 160 MG capsule Take 1 capsule by mouth Daily.     • Selenium 200 MCG capsule Take 200 mcg by mouth Daily.     • tamsulosin (FLOMAX) 0.4 MG capsule 24 hr capsule TAKE ONE CAPSULE BY MOUTH EVERY NIGHT AT BEDTIME 90 capsule 0   • vitamin E 400 UNIT capsule Take 400 Units by mouth Daily.     • Zinc 50 MG capsule Take 50 mg by mouth Daily.     • CloNIDine (CATAPRES) 0.1 MG tablet Take 1 tablet by mouth 2 (Two) Times a Day As Needed for High  "Blood Pressure. 30 tablet 0     No facility-administered medications prior to visit.        Reviewed use of high risk medication in the elderly: yes  Reviewed for potential of harmful drug interactions in the elderly: yes    The following portions of the patient's history were reviewed and updated as appropriate: allergies, current medications, past family history, past medical history, past social history, past surgical history and problem list.    Review of Systems   Review of Systems -  General ROS: negative for - chills, fever or night sweats  Cardiovascular ROS: no chest pain or dyspnea on exertion  Gastrointestinal ROS: no abdominal pain, change in bowel habits, or black or bloody stools  Genito-Urinary ROS: no dysuria, trouble voiding, or hematuria.    Vitals:    01/08/20 1131   BP: 180/80   BP Location: Right arm   Patient Position: Sitting   Cuff Size: Adult   Pulse: 65   SpO2: 99%   Weight: 73.5 kg (162 lb)   Height: 167.6 cm (65.98\")   PainSc: 0-No pain       Objective    Physical Exam   Const: NAD, A&Ox4, Pleasant, Cooperative  Eyes: EOMI, no conjunctivitis  ENT: No nasal discharge present, neck supple  Cardiac: Regular rate and rhythm, no cyanosis  Resp: Respiratory rate within normal limits, no increased work of breathing, no audible wheezing or retractions noted  GI: No distention or ascites  MSK: Motor and sensation grossly intact in bilateral upper extremities  Neurologic: CN II-XII grossly intact  Psych: Appropriate mood and behavior.  HEALTH RISK ASSESSMENT    1940    Recent Hospitalizations:  No hospitalization(s) within the last year..      Current Medical Providers:  Patient Care Team:  Rell Ahuja DO as PCP - General (Family Medicine)      Smoking Status:  Social History     Tobacco Use   Smoking Status Never Smoker   Smokeless Tobacco Never Used       Alcohol Consumption:  Social History     Substance and Sexual Activity   Alcohol Use Yes   • Alcohol/week: 1.0 standard drinks "   • Types: 1 Cans of beer per week    Comment: [facility]       Depression Screen:   PHQ-2/PHQ-9 Depression Screening 1/8/2020   Little interest or pleasure in doing things 0   Feeling down, depressed, or hopeless 0   Total Score 0       Health Habits and Functional and Cognitive Screening:  Functional & Cognitive Status 1/8/2020   Do you have difficulty preparing food and eating? No   Do you have difficulty bathing yourself, getting dressed or grooming yourself? No   Do you have difficulty using the toilet? No   Do you have difficulty moving around from place to place? No   Do you have trouble with steps or getting out of a bed or a chair? No   Current Diet Well Balanced Diet   Dental Exam Up to date   Eye Exam Up to date   Exercise (times per week) 5 times per week   Current Exercise Activities Include Light Weight/Kettebells   Do you need help using the phone?  No   Are you deaf or do you have serious difficulty hearing?  No   Do you need help with transportation? No   Do you need help shopping? No   Do you need help preparing meals?  No   Do you need help with housework?  No   Do you need help with laundry? No   Do you need help taking your medications? No   Do you need help managing money? No   Do you ever drive or ride in a car without wearing a seat belt? No   Have you felt unusual stress, anger or loneliness in the last month? No   Who do you live with? Spouse   If you need help, do you have trouble finding someone available to you? No   Have you been bothered in the last four weeks by sexual problems? No   Do you have difficulty concentrating, remembering or making decisions? No           Does the patient have evidence of cognitive impairment? No    Aspirin use counseling? Taking ASA appropriately as indicated      Recent Lab Results:  CMP:  Lab Results   Component Value Date    BUN 23 01/08/2020    CREATININE 1.22 01/08/2020    EGFRIFNONA 57 (L) 01/08/2020    BCR 18.9 01/08/2020     (L) 01/08/2020    K 5.4  (H) 01/08/2020    CO2 25.7 01/08/2020    CALCIUM 10.5 01/08/2020    PROTENTOTREF 7.1 07/16/2014    ALBUMIN 4.40 01/08/2020    LABGLOBREF 3.2 07/16/2014    LABIL2 1.3 07/16/2014    BILITOT 0.4 01/08/2020    ALKPHOS 128 (H) 01/08/2020    AST 32 01/08/2020    ALT 33 01/08/2020     Lipid Panel:  Lab Results   Component Value Date    CHOL 113 04/09/2019    TRIG 107 04/09/2019    HDL 33 (L) 04/09/2019    VLDL 21.4 04/09/2019    LDLHDL 1.78 04/09/2019     HbA1c:  Lab Results   Component Value Date    HGBA1C 6.20 (H) 01/08/2020       Visual Acuity:  No exam data present    Age-appropriate Screening Schedule:  Refer to the list below for future screening recommendations based on patient's age, sex and/or medical conditions. Orders for these recommended tests are listed in the plan section. The patient has been provided with a written plan.    Health Maintenance   Topic Date Due   • ZOSTER VACCINE (1 of 2) 08/26/1990   • PNEUMOCOCCAL VACCINE (65+ HIGH RISK) (2 of 2 - PCV13) 03/16/2011   • INFLUENZA VACCINE  08/01/2019   • LIPID PANEL  04/09/2020   • COLONOSCOPY  10/01/2025   • TDAP/TD VACCINES (2 - Td) 12/20/2027          Advance Care Planning:  Patient has an advance directive - a copy has not been provided. Have asked the patient to send this to us to add to record    Identification of Risk Factors:  Risk factors include: Advance Directive Discussion  Cardiovascular risk  Diabetic Lab Screening   Prostate Cancer Screening   Urinary Incontinence.    Compared to one year ago, the patient feels his physical health is better.  Compared to one year ago, the patient feels his mental health is better.      Nathan was seen today for annual exam.    Diagnoses and all orders for this visit:    Medicare annual wellness visit, subsequent    Preventative health care    Essential hypertension  Comments:  His blood pressure has been notoriously difficult to control.  He should continue losartan 50 mg twice daily and Cartia  mg, take  clonidine as directed  Orders:  -     Comprehensive Metabolic Panel; Future  -     Comprehensive Metabolic Panel    Pure hypercholesterolemia    CKD (chronic kidney disease), stage II  -     Comprehensive Metabolic Panel; Future  -     CBC & Differential; Future  -     Comprehensive Metabolic Panel  -     CBC & Differential  -     CBC Auto Differential    BPH associated with nocturia  -     PSA DIAGNOSTIC; Future  -     PSA DIAGNOSTIC    History of bladder cancer  -     PSA DIAGNOSTIC; Future  -     PSA DIAGNOSTIC    Hyperglycemia  -     Hemoglobin A1c; Future  -     Hemoglobin A1c        Procedure   Procedures       Patient Self-Management and Personalized Health Advice  The patient has been provided with information about: diet, exercise, designing advance directives and supplements and preventive services including:   · Annual Wellness Visit (AWV)  · Depression Screening (15 minutes face to face, Code )  · Diabetes Screening-Lab Order for either glucose quantitative blood (except reagent strip), glucose;post glucose dose(includes glucose), or glucose tolerance test-3 specimens(includes glucose)  · Prostate Cancer Screening .    Nathan was seen today for annual exam.    Diagnoses and all orders for this visit:    Medicare annual wellness visit, subsequent    Preventative health care    Essential hypertension  Comments:  His blood pressure has been notoriously difficult to control.  He should continue losartan 50 mg twice daily and Cartia  mg, take clonidine as directed  Orders:  -     Comprehensive Metabolic Panel; Future  -     Comprehensive Metabolic Panel    Pure hypercholesterolemia    CKD (chronic kidney disease), stage II  -     Comprehensive Metabolic Panel; Future  -     CBC & Differential; Future  -     Comprehensive Metabolic Panel  -     CBC & Differential  -     CBC Auto Differential    BPH associated with nocturia  -     PSA DIAGNOSTIC; Future  -     PSA DIAGNOSTIC    History of bladder  cancer  -     PSA DIAGNOSTIC; Future  -     PSA DIAGNOSTIC    Hyperglycemia  -     Hemoglobin A1c; Future  -     Hemoglobin A1c        Problem List Items Addressed This Visit        Cardiovascular and Mediastinum    Hypertension    Relevant Orders    Comprehensive Metabolic Panel (Completed)    Hyperlipidemia       Genitourinary    CKD (chronic kidney disease), stage II    Relevant Orders    Comprehensive Metabolic Panel (Completed)    CBC & Differential (Completed)    CBC Auto Differential (Completed)       Other    Hyperglycemia    Relevant Orders    Hemoglobin A1c (Completed)    Preventative health care      Other Visit Diagnoses     Medicare annual wellness visit, subsequent    -  Primary    BPH associated with nocturia        Relevant Orders    PSA DIAGNOSTIC (Completed)    History of bladder cancer        Relevant Orders    PSA DIAGNOSTIC (Completed)          Patient Instructions   1.  Continue medications and supplements - as listed.  - Take 0.1mg clonidine in the evening for systolic blood pressure >175    2.  Continue well-balanced diet - low in calories and low in added sugar.  -Plant-based diets have the best evidence for decreasing inflammation and chronic pain, as well as reducing the risk of heart disease and dementia.    3.  Maintain a routine exercise program - 30 minutes at least 3 days every week.  This is important even if you are active at your job.    4.  A letter will be sent with your test results or they will be made available via [employer].  If you have not heard about your results within 10 days for chronic and routine labs, or 48 hours for acute labs, please call the office.    5.  If you would like to have your labs completed prior to your next visit to be discussed at your appointment, please call the office 1 to 2 weeks before your scheduled visit to request that lab orders be placed.    6.  Call in 2 weeks with blood pressure.      The wellness exam has been reviewed in detail.  The  patient has been fully counseled on preventative guidelines for vaccines, cancer screenings, and other health maintenance needs.  Functional testing has been performed to assess capacity for independent living and need for other medical interventions.  Screening for depression was completed via a validated screening model as indicated above, 15 minutes was spent in total on depression screening.  The patient was counseled on maintaining a lifestyle to promote good health and to minimize chronic diseases.  The patient has been assisted with scheduling healthcare procedures for the coming year and given a written document outlining these recommendations.    Follow Up:  Return in about 6 months (around 7/8/2020).     An After Visit Summary and PPPS with all of these plans were given to the patient.

## 2020-01-08 NOTE — PATIENT INSTRUCTIONS
1.  Continue medications and supplements - as listed.  - Take 0.1mg clonidine in the evening for systolic blood pressure >175    2.  Continue well-balanced diet - low in calories and low in added sugar.  -Plant-based diets have the best evidence for decreasing inflammation and chronic pain, as well as reducing the risk of heart disease and dementia.    3.  Maintain a routine exercise program - 30 minutes at least 3 days every week.  This is important even if you are active at your job.    4.  A letter will be sent with your test results or they will be made available via [employer].  If you have not heard about your results within 10 days for chronic and routine labs, or 48 hours for acute labs, please call the office.    5.  If you would like to have your labs completed prior to your next visit to be discussed at your appointment, please call the office 1 to 2 weeks before your scheduled visit to request that lab orders be placed.    6.  Call in 2 weeks with blood pressure.

## 2020-01-20 DIAGNOSIS — I10 ESSENTIAL HYPERTENSION: ICD-10-CM

## 2020-01-20 RX ORDER — CLONIDINE HYDROCHLORIDE 0.1 MG/1
0.1 TABLET ORAL 2 TIMES DAILY PRN
Qty: 60 TABLET | Refills: 0 | Status: SHIPPED | OUTPATIENT
Start: 2020-01-20 | End: 2020-01-22 | Stop reason: SDUPTHER

## 2020-01-20 NOTE — TELEPHONE ENCOUNTER
Patient called to update on the status of the his recently prescribed medication. Patient states he believes it is working and was inquiring about next stages.  Please return call and advise.

## 2020-01-22 ENCOUNTER — TELEPHONE (OUTPATIENT)
Dept: FAMILY MEDICINE CLINIC | Facility: CLINIC | Age: 80
End: 2020-01-22

## 2020-01-22 DIAGNOSIS — I10 ESSENTIAL HYPERTENSION: ICD-10-CM

## 2020-01-22 NOTE — TELEPHONE ENCOUNTER
PATIENT CALLED AND REALIZED HIS PHARMACY DELIVERY HAS CHANGED FROM [facility] TO [facility]    THE PHARMACY HAS BEEN CHANGED IN HIS CHART    A MED REFILL MAY HAVE  BEEN CALLED INTO [facility] FOR HIS   cloNIDine (CATAPRES) 0.1 MG tablet    PATIENT IS ASKING FOR THE MEDICATION TO BE CALLED INTO [facility] [address] THIS ONE TIME  BECAUSE HE WILL NEED IT BEFORE THE END OF THE WEEK    PATIENT CALL BACK NUMBER [phone]

## 2020-01-23 RX ORDER — CLONIDINE HYDROCHLORIDE 0.1 MG/1
0.1 TABLET ORAL 2 TIMES DAILY PRN
Qty: 60 TABLET | Refills: 0 | Status: SHIPPED | OUTPATIENT
Start: 2020-01-23 | End: 2020-02-28

## 2020-01-27 RX ORDER — TAMSULOSIN HYDROCHLORIDE 0.4 MG/1
CAPSULE ORAL
Qty: 90 CAPSULE | Refills: 1 | Status: SHIPPED | OUTPATIENT
Start: 2020-01-27 | End: 2020-07-20

## 2020-01-31 RX ORDER — ATORVASTATIN CALCIUM 40 MG/1
40 TABLET, FILM COATED ORAL
Qty: 90 TABLET | Refills: 1 | Status: SHIPPED | OUTPATIENT
Start: 2020-01-31 | End: 2020-08-18

## 2020-02-10 ENCOUNTER — TELEPHONE (OUTPATIENT)
Dept: FAMILY MEDICINE CLINIC | Facility: CLINIC | Age: 80
End: 2020-02-10

## 2020-02-10 RX ORDER — DICLOFENAC SODIUM 75 MG/1
75 TABLET, DELAYED RELEASE ORAL DAILY
Qty: 90 TABLET | Refills: 1 | Status: SHIPPED | OUTPATIENT
Start: 2020-02-10 | End: 2020-11-03

## 2020-02-10 NOTE — TELEPHONE ENCOUNTER
PT called to request a refill on DICLOFUNAC(VOLTAREN) 75 MG EC TABLET be sent to [facility].         Pt can be contacted at [phone] to clarify any information given.

## 2020-02-24 RX ORDER — DIGOXIN 125 UG/1
TABLET ORAL
Qty: 90 TABLET | Refills: 0 | Status: SHIPPED | OUTPATIENT
Start: 2020-02-24 | End: 2020-05-18

## 2020-02-27 NOTE — PROGRESS NOTES
[facility]  Office Progress Note  Nathan Velarde  1940  [address] [facility] [address]       Visit Date: 02/28/20    PCP: Rell Ahuja DO  [address]  [facility] [address]    IDENTIFICATION: A 79 y.o. male resident of [place], [place].    PROBLEM LIST:     1. PAF  a. CHADS2-VASc score =3:    b. Converted  to sinus rhythm following dig  c. Digitalis noncompliance with reversion to atrial fibrillation.  d. Has declined anticoagulation, on ASA 81  2. HTN  3. HLD, on atorvastatin  a. 4/9/2019   HDL 33 LDL 59  4. VHD  a. Echo 8-23-19: Moderate to severe aortic valve regurgitation is present EF 60%  5. PreDM  a. 4/9/2019 A1c 5.7  6. 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  Ataxia, mid abdominal and bilateral lower extremity sensory deficit.  7. Hx of ulcerative colitis.  8. Hx of bladder cancer; no chemotherapy or radiation required.  a. Status post surgery, remote:  b.  TURP.  c. Herniorrhaphy.    d. Knee surgery (data deficit).      Chief Complaint   Patient presents with   • PAF       Allergies  Allergies   Allergen Reactions   • Sulfa Antibiotics Nausea Only and Other (See Comments)     Headache       Current Medications    Current Outpatient Medications:    •  Alpha-Lipoic Acid 600 MG capsule, Take 600 mg by mouth Daily., Disp: , Rfl:   •  aspirin-acetaminophen-caffeine (EXCEDRIN MIGRAINE) 250-250-65 MG per tablet, Take 1 tablet by mouth As Needed for Headache., Disp: , Rfl:   •  atorvastatin (LIPITOR) 40 MG tablet, Take 1 tablet by mouth every night at bedtime., Disp: 90 tablet, Rfl: 1  •  Calcium Carbonate-Vitamin D (CALCIUM 600+D PO), Take 1 tablet by mouth Daily., Disp: , Rfl:   •  CARTIA  MG 24 hr capsule, TAKE 1 CAPSULE DAILY, Disp: 90 capsule, Rfl: 4  •  cholecalciferol (VITAMIN D3) 1000 units tablet, Take 1,000 Units by mouth Daily., Disp: , Rfl:   •  cloNIDine (CATAPRES) 0.1 MG tablet, Take 1 tablet by mouth . (Patient taking  differently: Take 0.1 mg by mouth Every Night.), Disp: 60 tablet, Rfl: 0  •  coenzyme Q10 100 MG capsule, Take 100 mg by mouth Daily., Disp: , Rfl:   •  diclofenac (VOLTAREN) 75 MG EC tablet, Take 1 tablet by mouth Daily., Disp: 90 tablet, Rfl: 1  •  DIGOX 125 MCG tablet, TAKE ONE TABLET BY MOUTH DAILY, Disp: 90 tablet, Rfl: 0  •  Fish Oil-Cholecalciferol (OMEGA-3 + D PO), Take 1 capsule by mouth Daily., Disp: , Rfl:   •  Glucosamine-Chondroit-Vit C-Mn (GLUCOSAMINE 1500 COMPLEX) capsule, Take 1 tablet by mouth Daily., Disp: , Rfl:   •  losartan (COZAAR) 50 MG tablet, Take 1 tablet by mouth 2 (Two) Times a Day. (Patient taking differently: Take 25 mg by mouth 2 (Two) Times a Day.), Disp: 180 tablet, Rfl: 3  •  Magnesium 250 MG tablet, Take 250 mg by mouth Daily., Disp: , Rfl:   •  mesalamine (DELZICOL) 400 MG capsule delayed-release delayed release capsule, TAKE 2 CAPSULES TWICE A DAY, Disp: 360 capsule, Rfl: 4  •  Multiple Vitamins-Minerals (MULTIVITAMIN ADULT PO), Take 1 tablet by mouth Daily., Disp: , Rfl:   •  potassium chloride (K-DUR) 10 MEQ CR tablet, TAKE 1 TABLET DAILY, Disp: 90 tablet, Rfl: 4  •  Saw Palmetto 160 MG capsule, Take 1 capsule by mouth Daily., Disp: , Rfl:   •  Selenium 200 MCG capsule, Take 200 mcg by mouth Daily., Disp: , Rfl:   •  tamsulosin (FLOMAX) 0.4 MG capsule 24 hr capsule, TAKE ONE CAPSULE BY MOUTH EVERY NIGHT AT BEDTIME, Disp: 90 capsule, Rfl: 1  •  vitamin E 400 UNIT capsule, Take 400 Units by mouth Daily., Disp: , Rfl:   •  Zinc 50 MG capsule, Take 50 mg by mouth Daily., Disp: , Rfl:       History of Present Illness   Nathan Velarde is a 79 y.o. year old male here for follow up.  He returns today with no complaint of chest pain, no shortness of breath no nausea no diaphoresis no orthopnea, PND claudication or lower extremity edema.  He had no awareness of tachyarrhythmias, no dizziness or syncope.  He does however complain of poorly controlled blood pressure which has been very  "labile.  He has had systolic blood pressures generally running from 150 to 160 mmHg systolic but has been as high as 200.  He has been taking PRN clonidine 0.1 mg on a daily basis once per day.  He also reports that after exercise his blood pressure drops to his low as 120 mmHg but on one occasion went to 88 mmHg systolic.  He remains on statin therapy but has not had a recent lipid panel.          OBJECTIVE:  Vitals:    02/28/20 1419   BP: 158/62   BP Location: Left arm   Patient Position: Sitting   Pulse: 60   SpO2: 98%   Weight: 72.3 kg (159 lb 6.4 oz)   Height: 167.6 cm (66\")     Physical Exam   Constitutional: He is oriented to person, place, and time. He appears well-developed and well-nourished.   Cardiovascular: Normal rate, regular rhythm and intact distal pulses. Exam reveals no gallop and no friction rub.   Murmur heard.  Pulmonary/Chest: Effort normal and breath sounds normal. No respiratory distress. He has no wheezes. He has no rales. He exhibits no tenderness.   Bases clear   Musculoskeletal: Normal range of motion. He exhibits no edema.   Neurological: He is alert and oriented to person, place, and time.       Diagnostic Data:  Procedures      ASSESSMENT:   Diagnosis Plan   1. Paroxysmal atrial fibrillation (CMS/HCC)   no known recurrence, declines anticoagulation   2. Essential hypertension   discontinue clonidine.  Start chlorthalidone 25 mg daily   3. Mixed hyperlipidemia   on statin therapy and followed by primary care   4. Aortic valve disorder   repeat echocardiogram in August of this year prior to next appointment with our service.         PLAN:      Rell Ahuja DO, thank you for referring Mr. Velarde for evaluation.  I have forwarded my electronically generated recommendations to you for review.  Please do not hesitate to call with any questions.      Scribed for Giovanni Mcdowell MD by Electronically signed by KELSEY Mancilla, 02/28/20, 3:01 PM.  I, Giovanni Mcdowell MD, " personally performed the services described in this documentation as scribed by the above named individual in my presence, and it is both accurate and complete.  2/28/2020  3:03 PM

## 2020-02-28 ENCOUNTER — OFFICE VISIT (OUTPATIENT)
Dept: CARDIOLOGY | Facility: CLINIC | Age: 80
End: 2020-02-28

## 2020-02-28 VITALS
HEIGHT: 66 IN | WEIGHT: 159.4 LBS | DIASTOLIC BLOOD PRESSURE: 62 MMHG | BODY MASS INDEX: 25.62 KG/M2 | SYSTOLIC BLOOD PRESSURE: 158 MMHG | OXYGEN SATURATION: 98 % | HEART RATE: 60 BPM

## 2020-02-28 DIAGNOSIS — E78.2 MIXED HYPERLIPIDEMIA: ICD-10-CM

## 2020-02-28 DIAGNOSIS — I48.0 PAROXYSMAL ATRIAL FIBRILLATION (HCC): Primary | ICD-10-CM

## 2020-02-28 DIAGNOSIS — I10 ESSENTIAL HYPERTENSION: ICD-10-CM

## 2020-02-28 DIAGNOSIS — I35.9 AORTIC VALVE DISORDER: ICD-10-CM

## 2020-02-28 PROCEDURE — 99214 OFFICE O/P EST MOD 30 MIN: CPT | Performed by: INTERNAL MEDICINE

## 2020-02-28 RX ORDER — CHLORTHALIDONE 25 MG/1
25 TABLET ORAL DAILY
Qty: 30 TABLET | Refills: 11 | Status: SHIPPED | OUTPATIENT
Start: 2020-02-28 | End: 2020-04-29

## 2020-04-29 ENCOUNTER — OFFICE VISIT (OUTPATIENT)
Dept: GASTROENTEROLOGY | Facility: CLINIC | Age: 80
End: 2020-04-29

## 2020-04-29 VITALS
HEART RATE: 60 BPM | OXYGEN SATURATION: 99 % | WEIGHT: 157.4 LBS | HEIGHT: 66 IN | DIASTOLIC BLOOD PRESSURE: 68 MMHG | BODY MASS INDEX: 25.3 KG/M2 | SYSTOLIC BLOOD PRESSURE: 138 MMHG | TEMPERATURE: 98.5 F

## 2020-04-29 DIAGNOSIS — R10.10 PAIN OF UPPER ABDOMEN: ICD-10-CM

## 2020-04-29 DIAGNOSIS — K51.80 OTHER ULCERATIVE COLITIS WITHOUT COMPLICATION (HCC): ICD-10-CM

## 2020-04-29 DIAGNOSIS — R14.0 BLOATING: Primary | ICD-10-CM

## 2020-04-29 PROCEDURE — 99214 OFFICE O/P EST MOD 30 MIN: CPT | Performed by: INTERNAL MEDICINE

## 2020-04-29 RX ORDER — DILTIAZEM HYDROCHLORIDE 180 MG/1
180 CAPSULE, COATED, EXTENDED RELEASE ORAL DAILY
COMMUNITY
End: 2020-09-22

## 2020-04-29 NOTE — PROGRESS NOTES
PCP: Rell Ahuja DO    Chief Complaint   Patient presents with   • Follow-up     uc   Recent bloating.    History of Present Illness:   HPI  Mr. Velarde returns to the office for follow-up.  The patient has regular bowel movements at this time without blood.  He denies any recent decrease in energy level.  The patient has a good appetite.  There is no history of unexplained weight loss.  He does complain of some recent bloating over the last few months.  He states his abdomen feels somewhat distended at times.  He does not notice this when he is lying flat on his back.  The patient walks daily 2 miles and does not notice any discomfort during that exercise.  There is no history of vomiting and has had rare episodes of nausea.  There is no history of yazmin early satiety with meals.  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   • Prostatism 1995   • Transverse myelitis (CMS/HCC) 2014    Transient T10 with numbness and minimal ataxia - transient    • Ulcerative colitis (CMS/HCC) 1977    Well-controlled on mesalamine        Past Surgical History:   Procedure Laterality Date   • BICEPS TENDON REPAIR Left 5/7/2018    Procedure: BICEPS TENDODESIS;  Surgeon: Abner Humphrey MD;  [facility];  Service: Orthopedics   • COLONOSCOPY  2015    Colitis in  remission   • CYSTOSCOPY  1977    Excision of bladder cancer   • INGUINAL HERNIA REPAIR Right 1994   • KNEE SURGERY Right 1955    Excision of cyst   • TONSILLECTOMY  1943   • TOTAL SHOULDER ARTHROPLASTY Left 5/7/2018    Procedure: LEFT TOTAL SHOULDER ARTHROPLASTY;  Surgeon: Abner Humphrey MD;  Location: [facility];  Service: Orthopedics         Current Outpatient Medications:   •  Alpha-Lipoic Acid 600 MG capsule, Take 600 mg by mouth Daily., Disp: , Rfl:   •  aspirin-acetaminophen-caffeine (EXCEDRIN MIGRAINE) 250-250-65 MG per tablet, Take 1 tablet by mouth As Needed for Headache., Disp: , Rfl:   •  atorvastatin (LIPITOR) 40 MG tablet, Take 1 tablet by mouth every night at bedtime., Disp: 90 tablet, Rfl: 1  •  Calcium Carbonate-Vitamin D (CALCIUM 600+D PO), Take 1 tablet by mouth Daily., Disp: , Rfl:   •  cholecalciferol (VITAMIN D3) 1000 units tablet, Take 1,000 Units by mouth Daily., Disp: , Rfl:   •  coenzyme Q10 100 MG capsule, Take 100 mg by mouth Daily., Disp: , Rfl:   •  diclofenac (VOLTAREN) 75 MG EC tablet, Take 1 tablet by mouth Daily., Disp: 90 tablet, Rfl: 1  •  DIGOX 125 MCG tablet, TAKE ONE TABLET BY MOUTH DAILY, Disp: 90 tablet, Rfl: 0  •  dilTIAZem CD (CARDIZEM CD) 180 MG 24 hr capsule, Take 180 mg by mouth Daily., Disp: , Rfl:   •  Fish Oil-Cholecalciferol (OMEGA-3 + D PO), Take 1 capsule by mouth Daily., Disp: , Rfl:   •  Glucosamine-Chondroit-Vit C-Mn (GLUCOSAMINE 1500 COMPLEX) capsule, Take 1 tablet by mouth Daily., Disp: , Rfl:   •  losartan (COZAAR) 50 MG tablet, Take 1 tablet by mouth 2 (Two) Times a Day. (Patient taking differently: Take 25 mg by mouth 2 (Two) Times a Day.), Disp: 180 tablet, Rfl: 3  •  Magnesium 250 MG tablet, Take 250 mg by mouth Daily., Disp: , Rfl:   •  mesalamine (DELZICOL) 400 MG capsule delayed-release delayed release capsule, TAKE 2 CAPSULES TWICE A DAY, Disp: 360 capsule, Rfl: 4  •  Multiple Vitamins-Minerals (MULTIVITAMIN ADULT PO), Take 1 tablet by mouth  Daily., Disp: , Rfl:   •  potassium chloride (K-DUR) 10 MEQ CR tablet, TAKE 1 TABLET DAILY, Disp: 90 tablet, Rfl: 4  •  Saw Palmetto 160 MG capsule, Take 1 capsule by mouth Daily., Disp: , Rfl:   •  Selenium 200 MCG capsule, Take 200 mcg by mouth Daily., Disp: , Rfl:   •  tamsulosin (FLOMAX) 0.4 MG capsule 24 hr capsule, TAKE ONE CAPSULE BY MOUTH EVERY NIGHT AT BEDTIME, Disp: 90 capsule, Rfl: 1  •  vitamin E 400 UNIT capsule, Take 400 Units by mouth Daily., Disp: , Rfl:   •  Zinc 50 MG capsule, Take 50 mg by mouth Daily., Disp: , Rfl:     Allergies   Allergen Reactions   • Sulfa Antibiotics Nausea Only and Other (See Comments)     Headache       Family History   Problem Relation Age of Onset   • Alzheimer's disease Mother          age 89   • Arthritis Mother    • Hyperlipidemia Mother    • Angina Father    • Hypertension Father    • Pneumonia Father          age 70 - tobacco   • Aneurysm Father         abd aortic   • Unexplained death Brother    • Breast cancer Maternal Grandmother    • Arthritis Maternal Grandmother    • Diabetes Paternal Uncle        Social History     Socioeconomic History   • Marital status:      Spouse name: Not on file   • Number of children: Not on file   • Years of education: Not on file   • Highest education level: Not on file   Tobacco Use   • Smoking status: Never Smoker   • Smokeless tobacco: Never Used   Substance and Sexual Activity   • Alcohol use: Yes     Alcohol/week: 1.0 standard drinks     Types: 1 Cans of beer per week     Comment: OCC   • Drug use: No   Social History Narrative    Domestic life- lives in private home with wife        Restorationism- none listed        Sleep hygiene- sleeps 8 hours nightly good quality        Caffeine use- drinks 1 diet Mountain Dew daily and occasionally uses Excedrin        Exercise habits- disciplined exercise Program 5 days weekly with walking and weight lifting and yoga        Diet-   well-balanced American Heart Association - low  in salt        Occupation- retired         Hearing-   no impairment         Vision- Corrects well with bifocal glasses        Driving- No limitations           Review of Systems   Constitutional: Negative for activity change, appetite change, fatigue, fever and unexpected weight change.   HENT: Negative for dental problem, hearing loss, mouth sores, postnasal drip, sneezing, trouble swallowing and voice change.    Eyes: Negative for pain, redness, itching and visual disturbance.   Respiratory: Negative for cough, choking, chest tightness, shortness of breath and wheezing.    Cardiovascular: Negative for chest pain, palpitations and leg swelling.   Gastrointestinal: Positive for abdominal distention (bloating) and abdominal pain (when touching). Negative for anal bleeding, blood in stool, constipation, diarrhea, nausea, rectal pain and vomiting.   Endocrine: Negative for cold intolerance, heat intolerance, polydipsia, polyphagia and polyuria.   Genitourinary: Negative.  Negative for dysuria, enuresis, flank pain, hematuria and urgency.   Musculoskeletal: Negative for arthralgias, back pain, gait problem, joint swelling and myalgias.   Skin: Negative for color change, pallor and rash.   Allergic/Immunologic: Negative for environmental allergies, food allergies and immunocompromised state.   Neurological: Negative for dizziness, tremors, seizures, facial asymmetry, speech difficulty, numbness and headaches.   Hematological: Negative for adenopathy.   Psychiatric/Behavioral: Negative for behavioral problems, confusion, dysphoric mood, hallucinations and self-injury.       Vitals:    04/29/20 1436   BP: 138/68   Pulse: 60   Temp: 98.5 °F (36.9 °C)   SpO2: 99%       Physical Exam   Constitutional: He is oriented to person, place, and time. He appears well-nourished. No distress.   HENT:   Head: Atraumatic.   Mouth/Throat: Oropharynx is clear and moist. No oropharyngeal exudate.   Eyes: EOM are normal.  No scleral icterus.   Neck: Neck supple. No thyromegaly present.   Cardiovascular: Normal rate, regular rhythm and normal heart sounds. Exam reveals no gallop.   No murmur heard.  Pulmonary/Chest: Effort normal and breath sounds normal. He has no wheezes. He has no rales.   Abdominal: Soft. Bowel sounds are normal. He exhibits no distension. There is no tenderness. There is no guarding.   Diastases recti   Musculoskeletal: Normal range of motion. He exhibits no edema.   Lymphadenopathy:     He has no cervical adenopathy.   Neurological: He is alert and oriented to person, place, and time. He exhibits normal muscle tone.   Skin: Skin is dry. No erythema.   Psychiatric: He has a normal mood and affect. His behavior is normal. Thought content normal.   Vitals reviewed.      Nathan was seen today for follow-up.    Diagnoses and all orders for this visit:    Bloating  -     US Abdomen Complete; Future    Pain of upper abdomen  -     US Abdomen Complete; Future    Other ulcerative colitis without complication (CMS/HCC)    The patient has recently experienced some issues with bloating.  There is some mild discomfort in the upper abdomen but no direct relationship with meals.  There is no obvious ascites on physical examination.  However, further evaluation is warranted.  There have been no issues with diarrhea or bleeding.  The patient has been maintained well on the mesalamine medication.      Plan: Will schedule ultrasound abdomen for further evaluation.           Will plan on follow-up in 6 months.           Discussed that will plan on colonoscopy later this calendar year.

## 2020-05-08 ENCOUNTER — HOSPITAL ENCOUNTER (OUTPATIENT)
Dept: ULTRASOUND IMAGING | Facility: HOSPITAL | Age: 80
Discharge: HOME | End: 2020-05-08
Admitting: INTERNAL MEDICINE

## 2020-05-08 DIAGNOSIS — R10.10 PAIN OF UPPER ABDOMEN: ICD-10-CM

## 2020-05-08 DIAGNOSIS — R14.0 BLOATING: ICD-10-CM

## 2020-05-08 PROCEDURE — 76700 US EXAM ABDOM COMPLETE: CPT

## 2020-05-11 ENCOUNTER — TELEPHONE (OUTPATIENT)
Dept: GASTROENTEROLOGY | Facility: CLINIC | Age: 80
End: 2020-05-11

## 2020-05-11 NOTE — TELEPHONE ENCOUNTER
I spoke to Mr. Velarde this afternoon regarding the ultrasound of the abdomen.  There were cysts in the left and right kidney.  He states that a scan was performed at [facility] that demonstrated  kidney cysts.  Those records were not available at the time of this phone call.  There is also an area of the spleen that was lobular and a renal protocol CT scan was recommended by the radiologist at [facility], [place], operated by [facility].  Will pursue an official copy of the CT scan performed at [facility] for further recommendations.

## 2020-05-12 ENCOUNTER — TELEPHONE (OUTPATIENT)
Dept: GASTROENTEROLOGY | Facility: CLINIC | Age: 80
End: 2020-05-12

## 2020-05-12 NOTE — TELEPHONE ENCOUNTER
I called and discussed the CT scan report from [facility] in 2019.  The cyst in the kidneys were present at that time as well as the complex cyst in the spleen.  Will forward the most recent ultrasound to Dr. López's office.

## 2020-05-18 RX ORDER — DIGOXIN 125 UG/1
TABLET ORAL
Qty: 90 TABLET | Refills: 0 | Status: SHIPPED | OUTPATIENT
Start: 2020-05-18 | End: 2020-08-10

## 2020-06-30 ENCOUNTER — OFFICE VISIT (OUTPATIENT)
Dept: FAMILY MEDICINE CLINIC | Facility: CLINIC | Age: 80
End: 2020-06-30

## 2020-06-30 VITALS
HEART RATE: 54 BPM | HEIGHT: 66 IN | WEIGHT: 157 LBS | OXYGEN SATURATION: 98 % | BODY MASS INDEX: 25.23 KG/M2 | DIASTOLIC BLOOD PRESSURE: 78 MMHG | SYSTOLIC BLOOD PRESSURE: 140 MMHG

## 2020-06-30 DIAGNOSIS — I10 ESSENTIAL HYPERTENSION: ICD-10-CM

## 2020-06-30 DIAGNOSIS — D17.21 LIPOMA OF RIGHT UPPER EXTREMITY: Primary | ICD-10-CM

## 2020-06-30 PROCEDURE — 99214 OFFICE O/P EST MOD 30 MIN: CPT | Performed by: FAMILY MEDICINE

## 2020-06-30 RX ORDER — CHLORTHALIDONE 25 MG/1
TABLET ORAL
COMMUNITY
Start: 2020-05-06 | End: 2020-09-03

## 2020-06-30 RX ORDER — LOSARTAN POTASSIUM 50 MG/1
25 TABLET ORAL 2 TIMES DAILY
Start: 2020-06-30 | End: 2020-09-03

## 2020-06-30 NOTE — PROGRESS NOTES
Subjective   Nathan Velarde is a 79 y.o. male.     Chief Complaint   Patient presents with   • Mass     lump on rt side back       History of Present Illness     Nathan Velarde presents today for   Chief Complaint   Patient presents with   • Mass     lump on rt side back     Answers for HPI/ROS submitted by the patient on 6/28/2020   What is the primary reason for your visit?: Other  Please describe your symptoms.: Lump or swelling on right shoulder blade.  Have you had these symptoms before?: No  How long have you been having these symptoms?: 5-7 days  Please list any medications you are currently taking for this condition.: None  Please describe any probable cause for these symptoms. : ??    This patient is accompanied by their self who contributes to the history of their care.    The following portions of the patient's history were reviewed and updated as appropriate: allergies, current medications, past family history, past medical history, past social history, past surgical history and problem list.    Active Ambulatory Problems     Diagnosis Date Noted   • Anemia 05/15/2016   • Aortic valve disorder 05/15/2016   • Ataxic gait 05/15/2016   • Paroxysmal atrial fibrillation 05/15/2016   • Prostatism 05/15/2016   • Ulcerative colitis (CMS/HCC) 05/15/2016   • Transverse myelitis (CMS/[facility]) 05/15/2016   • Testosterone deficiency 05/15/2016   • Paresthesia 05/15/2016   • Lumbosacral neuritis 05/15/2016   • 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   • Hypertension 05/15/2016   • Hyperlipidemia 05/15/2016   • Generalized osteoarthritis 05/15/2016   • CKD (chronic kidney disease), 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   • Hyperglycemia 05/28/2016   • Shoulder pain, left 09/26/2016   • Preventative health care 11/29/2016   • Status post total shoulder arthroplasty, 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   • Knee osteoarthritis 01/01/2004     Resolved Ambulatory Problems     Diagnosis Date Noted   • Neck pain 05/15/2016   • Fatigue 05/15/2016   • Ulcerative  enterocolitis (CMS/[facility]) 05/15/2016   • Impaired glucose tolerance 05/15/2016   • Gross hematuria 05/15/2016     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    • Bilateral bunions    • Bladder cancer (CMS/[facility])    • DJD of left shoulder Adulthood   • HTN (hypertension)    • Mitral valve insufficiency    • Nephrolithiasis    • PAF (paroxysmal atrial fibrillation) (CMS/[facility])    • Prediabetes      Past Surgical History:   Procedure Laterality Date   • BICEPS TENDON REPAIR Left 2018    Procedure: BICEPS TENDODESIS;  Surgeon: Abner Humphrey MD;  Location:  [facility];  Service: Orthopedics   • COLONOSCOPY      Colitis in remission   • CYSTOSCOPY      Excision of bladder cancer   • INGUINAL HERNIA REPAIR Right    • KNEE SURGERY Right     Excision of cyst   • TONSILLECTOMY     • TOTAL SHOULDER ARTHROPLASTY Left 2018    Procedure: LEFT TOTAL SHOULDER ARTHROPLASTY;  Surgeon: Abner Humphrey MD;  Location:  [facility];  Service: Orthopedics     Family History   Problem Relation Age of Onset   • Alzheimer's disease Mother          age 89   • Arthritis Mother    • Hyperlipidemia Mother    • Angina Father    • Hypertension Father    • Pneumonia Father          age 70 - tobacco   • Aneurysm Father         abd aortic   • Unexplained death Brother    • Breast cancer Maternal Grandmother    • Arthritis Maternal Grandmother    • Diabetes Paternal Uncle      Social History     Socioeconomic History   • Marital status:      Spouse name: Not on file   • Number of children: Not on file   • Years of education: Not on file   • Highest education level: Not on file   Tobacco Use   • Smoking status: Never Smoker   • Smokeless tobacco: Never Used   Substance and Sexual Activity   • Alcohol use: Yes     Alcohol/week: 1.0 standard drinks     Types: 1 Cans of beer per week     Comment: OCC   •  "Drug use: No   Social History Narrative    Domestic life- lives in private home with wife        Anglican- none listed        Sleep hygiene- sleeps 8 hours nightly good quality        Caffeine use- drinks 1 diet Mountain Dew daily and occasionally uses Excedrin        Exercise habits- disciplined exercise Program 5 days weekly with walking and weight lifting and yoga        Diet-   well-balanced American Heart Association - low in salt        Occupation- retired         Hearing-   no impairment         Vision- Corrects well with bifocal glasses        Driving- No limitations           Review of Systems  Review of Systems -  General ROS: negative for - chills, fever or night sweats  Cardiovascular ROS: no chest pain or dyspnea on exertion  Gastrointestinal ROS: no abdominal pain, change in bowel habits, or black or bloody stools  Genito-Urinary ROS: no dysuria, trouble voiding, or hematuria    Objective   Blood pressure 140/78, pulse 54, height 167.6 cm (66\"), weight 71.2 kg (157 lb), SpO2 98 %.  Nursing note reviewed  Physical Exam  Const: NAD, A&Ox4, Pleasant, Cooperative  Skin: Large 7.5 x 7 cm rubbery nonmobile subcutaneous mass    Procedures  Assessment/Plan   Problem List Items Addressed This Visit        Cardiovascular and Mediastinum    Hypertension    Relevant Medications    chlorthalidone (HYGROTON) 25 MG tablet    losartan (Cozaar) 50 MG tablet      Other Visit Diagnoses     Lipoma of posterior right shoulder    -  Primary    7.5 x 7 cm posterior right shoulder. Unknown to patient until wife mentioned, had not noticed as of a couple weeks ago. Recommended surg or derm referral for bx    Relevant Orders    Ambulatory Referral to General Surgery          See patient diagnoses and orders along with patient instructions for assessment, plan, and changes to care for patient.    There are no Patient Instructions on file for this visit.    No follow-ups on file.    Ambulatory progress note " signed and attested to by Rell Ahuja D.O.

## 2020-07-06 ENCOUNTER — TELEPHONE (OUTPATIENT)
Dept: CARDIOLOGY | Facility: CLINIC | Age: 80
End: 2020-07-06

## 2020-07-06 NOTE — TELEPHONE ENCOUNTER
office called requesting a cardiac risk assessment on patient for a excision of back mass. Pt last seen in office on 02/28/20. Please advise and letter will be sent.

## 2020-07-13 ENCOUNTER — OFFICE VISIT (OUTPATIENT)
Dept: FAMILY MEDICINE CLINIC | Facility: CLINIC | Age: 80
End: 2020-07-13

## 2020-07-13 ENCOUNTER — LAB (OUTPATIENT)
Dept: LAB | Facility: HOSPITAL | Age: 80
End: 2020-07-13

## 2020-07-13 VITALS
HEART RATE: 60 BPM | HEIGHT: 66 IN | SYSTOLIC BLOOD PRESSURE: 116 MMHG | WEIGHT: 157.5 LBS | DIASTOLIC BLOOD PRESSURE: 70 MMHG | OXYGEN SATURATION: 98 % | BODY MASS INDEX: 25.31 KG/M2

## 2020-07-13 DIAGNOSIS — Z79.899 ENCOUNTER FOR MONITORING DIGOXIN THERAPY: ICD-10-CM

## 2020-07-13 DIAGNOSIS — N40.1 BPH ASSOCIATED WITH NOCTURIA: ICD-10-CM

## 2020-07-13 DIAGNOSIS — Z51.81 THERAPEUTIC DRUG MONITORING: ICD-10-CM

## 2020-07-13 DIAGNOSIS — R35.1 BPH ASSOCIATED WITH NOCTURIA: ICD-10-CM

## 2020-07-13 DIAGNOSIS — N18.2 CKD (CHRONIC KIDNEY DISEASE), STAGE II: ICD-10-CM

## 2020-07-13 DIAGNOSIS — E78.00 PURE HYPERCHOLESTEROLEMIA: ICD-10-CM

## 2020-07-13 DIAGNOSIS — Z51.81 ENCOUNTER FOR MONITORING DIGOXIN THERAPY: ICD-10-CM

## 2020-07-13 DIAGNOSIS — Z85.51 HISTORY OF BLADDER CANCER: ICD-10-CM

## 2020-07-13 DIAGNOSIS — I10 ESSENTIAL HYPERTENSION: Primary | ICD-10-CM

## 2020-07-13 DIAGNOSIS — D17.21 LIPOMA OF RIGHT UPPER EXTREMITY: ICD-10-CM

## 2020-07-13 LAB
ALBUMIN SERPL-MCNC: 4.3 G/DL (ref 3.5–5.2)
ALBUMIN UR-MCNC: 7.4 MG/DL
ALBUMIN/GLOB SERPL: 1.3 G/DL
ALP SERPL-CCNC: 105 U/L (ref 39–117)
ALT SERPL W P-5'-P-CCNC: 42 U/L (ref 1–41)
ANION GAP SERPL CALCULATED.3IONS-SCNC: 12.1 MMOL/L (ref 5–15)
AST SERPL-CCNC: 34 U/L (ref 1–40)
BILIRUB SERPL-MCNC: 0.5 MG/DL (ref 0–1.2)
BUN SERPL-MCNC: 33 MG/DL (ref 8–23)
BUN/CREAT SERPL: 25.6 (ref 7–25)
CALCIUM SPEC-SCNC: 10.6 MG/DL (ref 8.6–10.5)
CHLORIDE SERPL-SCNC: 103 MMOL/L (ref 98–107)
CHOLEST SERPL-MCNC: 111 MG/DL (ref 0–200)
CO2 SERPL-SCNC: 24.9 MMOL/L (ref 22–29)
CREAT SERPL-MCNC: 1.29 MG/DL (ref 0.76–1.27)
CREAT UR-MCNC: 123.4 MG/DL
DIGOXIN SERPL-MCNC: 1.2 NG/ML (ref 0.6–1.2)
GFR SERPL CREATININE-BSD FRML MDRD: 54 ML/MIN/1.73
GLOBULIN UR ELPH-MCNC: 3.3 GM/DL
GLUCOSE SERPL-MCNC: 109 MG/DL (ref 65–99)
HDLC SERPL-MCNC: 28 MG/DL (ref 40–60)
LDLC SERPL CALC-MCNC: 58 MG/DL (ref 0–100)
LDLC/HDLC SERPL: 2.08 {RATIO}
MICROALBUMIN/CREAT UR: 60 MG/G
POTASSIUM SERPL-SCNC: 4.7 MMOL/L (ref 3.5–5.2)
PROT SERPL-MCNC: 7.6 G/DL (ref 6–8.5)
SODIUM SERPL-SCNC: 140 MMOL/L (ref 136–145)
TRIGL SERPL-MCNC: 124 MG/DL (ref 0–150)
VLDLC SERPL-MCNC: 24.8 MG/DL (ref 5–40)

## 2020-07-13 PROCEDURE — 80053 COMPREHEN METABOLIC PANEL: CPT | Performed by: FAMILY MEDICINE

## 2020-07-13 PROCEDURE — 80061 LIPID PANEL: CPT | Performed by: FAMILY MEDICINE

## 2020-07-13 PROCEDURE — 84443 ASSAY THYROID STIM HORMONE: CPT | Performed by: FAMILY MEDICINE

## 2020-07-13 PROCEDURE — 99214 OFFICE O/P EST MOD 30 MIN: CPT | Performed by: FAMILY MEDICINE

## 2020-07-13 PROCEDURE — 80162 ASSAY OF DIGOXIN TOTAL: CPT | Performed by: FAMILY MEDICINE

## 2020-07-13 PROCEDURE — 84153 ASSAY OF PSA TOTAL: CPT | Performed by: FAMILY MEDICINE

## 2020-07-13 PROCEDURE — 82043 UR ALBUMIN QUANTITATIVE: CPT | Performed by: FAMILY MEDICINE

## 2020-07-13 PROCEDURE — 82570 ASSAY OF URINE CREATININE: CPT | Performed by: FAMILY MEDICINE

## 2020-07-13 NOTE — PROGRESS NOTES
Subjective   Nathan Velarde is a 79 y.o. male.     Chief Complaint   Patient presents with   • Hypertension   • Hyperlipidemia       History of Present Illness     Nathan Velarde presents today for   Chief Complaint   Patient presents with   • Hypertension   • Hyperlipidemia     he is in need of chronic disease followup. he denies acute complaints today. He does state that his BP has been elevated at night into 170s. He does drink a lot of caffeine during the day.    This patient is accompanied by their self who contributes to the history of their care.    The following portions of the patient's history were reviewed and updated as appropriate: allergies, current medications, past family history, past medical history, past social history, past surgical history and problem list.    Active Ambulatory Problems     Diagnosis Date Noted   • Anemia 05/15/2016   • Aortic valve disorder 05/15/2016   • Ataxic gait 05/15/2016   • Paroxysmal atrial fibrillation 05/15/2016   • Prostatism 05/15/2016   • Ulcerative colitis (CMS/HCC) 05/15/2016   • Transverse myelitis (CMS/[facility]) 05/15/2016   • Testosterone deficiency 05/15/2016   • Paresthesia 05/15/2016   • Lumbosacral neuritis 05/15/2016   • 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   • Hypertension 05/15/2016   • Hyperlipidemia 05/15/2016   • Generalized osteoarthritis 05/15/2016   • CKD (chronic kidney disease), 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   • Hyperglycemia 05/28/2016   • Shoulder pain, left 09/26/2016   • Preventative health care 11/29/2016   • Status post total shoulder arthroplasty, 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   • Knee osteoarthritis 01/01/2004     Resolved Ambulatory Problems     Diagnosis Date Noted   • Neck pain 05/15/2016   • Fatigue 05/15/2016   • Ulcerative enterocolitis (CMS/[facility]) 05/15/2016   • Impaired glucose tolerance 05/15/2016   • Gross hematuria 05/15/2016     Past Medical History:   Diagnosis Date   • Aortic valve insufficiency    • Benign neoplasm of connective and soft tissue of  pelvis    • Bilateral bunions    • Bladder cancer (CMS/HCC)    • DJD of left shoulder Adulthood   • HTN (hypertension)    • Mitral valve insufficiency    • Nephrolithiasis    • PAF (paroxysmal atrial fibrillation) (CMS/HCC)    • Prediabetes      Past Surgical History:   Procedure Laterality Date   • BICEPS TENDON REPAIR Left 2018    Procedure: BICEPS TENDODESIS;  Surgeon: Abner Humphrey MD;  Location:  [facility];  Service: Orthopedics   • COLONOSCOPY      Colitis in remission   • CYSTOSCOPY      Excision of bladder cancer   • INGUINAL HERNIA REPAIR Right    • KNEE SURGERY Right     Excision of cyst   • TONSILLECTOMY     • TOTAL SHOULDER ARTHROPLASTY Left 2018    Procedure: LEFT TOTAL SHOULDER ARTHROPLASTY;  Surgeon: Abner Humphrey MD;  Location:  [facility];  Service: Orthopedics     Family History   Problem Relation Age of Onset   • Alzheimer's disease Mother          age 89   • Arthritis Mother    • Hyperlipidemia Mother    • Angina Father    • Hypertension Father    • Pneumonia Father          age 70 - tobacco   • Aneurysm Father         abd aortic   • Unexplained death Brother    • Breast cancer Maternal Grandmother    • Arthritis Maternal Grandmother    • Diabetes Paternal Uncle      Social History     Socioeconomic History   • Marital status:      Spouse name: Not on file   • Number of children: Not on file   • Years of education: Not on file   • Highest education level: Not on file   Tobacco Use   • Smoking status: Never Smoker   • Smokeless tobacco: Never Used   Substance and Sexual Activity   • Alcohol use: Yes     Alcohol/week: 1.0 standard drinks     Types: 1 Cans of beer per week     Comment: OCC   • Drug use: No   Social History Narrative    Domestic life- lives in private home with wife        Rastafari- none listed        Sleep hygiene- sleeps 8 hours nightly good quality        Caffeine use- drinks 1 diet Mountain Dew daily and  "occasionally uses Excedrin        Exercise habits- disciplined exercise Program 5 days weekly with walking and weight lifting and yoga        Diet-   well-balanced American Heart Association - low in salt        Occupation- retired         Hearing-   no impairment         Vision- Corrects well with bifocal glasses        Driving- No limitations           Review of Systems  Review of Systems -  General ROS: negative for - chills, fever or night sweats  Cardiovascular ROS: no chest pain or dyspnea on exertion  Gastrointestinal ROS: no abdominal pain, change in bowel habits, or black or bloody stools  Genito-Urinary ROS: no trouble voiding or gross hematuria    Objective   Blood pressure 116/70, pulse 60, height 167.6 cm (66\"), weight 71.4 kg (157 lb 8 oz), SpO2 98 %.  Nursing note reviewed  Physical Exam  Const: NAD, A&Ox4, Pleasant, Cooperative  Eyes: EOMI, no conjunctivitis  ENT: No nasal discharge present, neck supple  Cardiac: Regular rate and rhythm, no cyanosis  Resp: Respiratory rate within normal limits, no increased work of breathing, no audible wheezing or retractions noted  GI: No distention or ascites  Procedures  Assessment/Plan     Problem List Items Addressed This Visit        Cardiovascular and Mediastinum    Hypertension - Primary    Relevant Orders    TSH Rfx On Abnormal To Free T4    Microalbumin / Creatinine Urine Ratio - Urine, Clean Catch    Hyperlipidemia    Relevant Orders    Lipid Panel       Genitourinary    CKD (chronic kidney disease), stage II    Relevant Orders    Comprehensive Metabolic Panel      Other Visit Diagnoses     Lipoma of posterior right shoulder        Therapeutic drug monitoring        Relevant Orders    Digoxin level    History of bladder cancer        BPH associated with nocturia        Relevant Orders    PSA DIAGNOSTIC    Encounter for monitoring digoxin therapy        Relevant Orders    Digoxin level        See patient diagnoses and orders along with " patient instructions for assessment, plan, and changes to care for patient.    Patient Instructions   1.  If BP not improved with stopping caffeine let me know. High nighttime levels may need to be monitored.    2.  Labs today      Return in about 6 months (around 1/13/2021) for Annual, Medicare Wellness.    Ambulatory progress note signed and attested to by Rell Ahuja D.O.

## 2020-07-13 NOTE — PATIENT INSTRUCTIONS
1.  If BP not improved with stopping caffeine let me know. High nighttime levels may need to be monitored.    2.  Labs today

## 2020-07-14 LAB
PSA SERPL-MCNC: 4.49 NG/ML (ref 0–4)
TSH SERPL DL<=0.05 MIU/L-ACNC: 2.25 UIU/ML (ref 0.27–4.2)

## 2020-07-20 RX ORDER — TAMSULOSIN HYDROCHLORIDE 0.4 MG/1
CAPSULE ORAL
Qty: 90 CAPSULE | Refills: 0 | Status: SHIPPED | OUTPATIENT
Start: 2020-07-20 | End: 2020-10-26

## 2020-07-24 ENCOUNTER — APPOINTMENT (OUTPATIENT)
Dept: PREADMISSION TESTING | Facility: HOSPITAL | Age: 80
End: 2020-07-24

## 2020-07-24 LAB
ANION GAP SERPL CALCULATED.3IONS-SCNC: 11 MMOL/L (ref 5–15)
APTT PPP: 27.3 SECONDS (ref 24–37)
BUN SERPL-MCNC: 42 MG/DL (ref 8–23)
BUN/CREAT SERPL: 26.1 (ref 7–25)
CALCIUM SPEC-SCNC: 10.6 MG/DL (ref 8.6–10.5)
CHLORIDE SERPL-SCNC: 102 MMOL/L (ref 98–107)
CO2 SERPL-SCNC: 29 MMOL/L (ref 22–29)
CREAT SERPL-MCNC: 1.61 MG/DL (ref 0.76–1.27)
DEPRECATED RDW RBC AUTO: 48.2 FL (ref 37–54)
ERYTHROCYTE [DISTWIDTH] IN BLOOD BY AUTOMATED COUNT: 14.1 % (ref 12.3–15.4)
GFR SERPL CREATININE-BSD FRML MDRD: 42 ML/MIN/1.73
GLUCOSE SERPL-MCNC: 125 MG/DL (ref 65–99)
HCT VFR BLD AUTO: 42.6 % (ref 37.5–51)
HGB BLD-MCNC: 13.7 G/DL (ref 13–17.7)
INR PPP: 0.97 (ref 0.85–1.16)
MCH RBC QN AUTO: 30.3 PG (ref 26.6–33)
MCHC RBC AUTO-ENTMCNC: 32.2 G/DL (ref 31.5–35.7)
MCV RBC AUTO: 94.2 FL (ref 79–97)
PLATELET # BLD AUTO: 302 10*3/MM3 (ref 140–450)
PMV BLD AUTO: 9.9 FL (ref 6–12)
POTASSIUM SERPL-SCNC: 4.8 MMOL/L (ref 3.5–5.2)
PROTHROMBIN TIME: 12.6 SECONDS (ref 11.5–14)
RBC # BLD AUTO: 4.52 10*6/MM3 (ref 4.14–5.8)
SODIUM SERPL-SCNC: 142 MMOL/L (ref 136–145)
WBC # BLD AUTO: 10.5 10*3/MM3 (ref 3.4–10.8)

## 2020-07-24 PROCEDURE — C9803 HOPD COVID-19 SPEC COLLECT: HCPCS

## 2020-07-24 PROCEDURE — 85027 COMPLETE CBC AUTOMATED: CPT | Performed by: SURGERY

## 2020-07-24 PROCEDURE — 85730 THROMBOPLASTIN TIME PARTIAL: CPT | Performed by: SURGERY

## 2020-07-24 PROCEDURE — 80048 BASIC METABOLIC PNL TOTAL CA: CPT | Performed by: SURGERY

## 2020-07-24 PROCEDURE — U0002 COVID-19 LAB TEST NON-CDC: HCPCS

## 2020-07-24 PROCEDURE — 85610 PROTHROMBIN TIME: CPT | Performed by: SURGERY

## 2020-07-24 PROCEDURE — 93010 ELECTROCARDIOGRAM REPORT: CPT | Performed by: INTERNAL MEDICINE

## 2020-07-24 PROCEDURE — U0004 COV-19 TEST NON-CDC HGH THRU: HCPCS

## 2020-07-24 PROCEDURE — 93005 ELECTROCARDIOGRAM TRACING: CPT

## 2020-07-24 PROCEDURE — 36415 COLL VENOUS BLD VENIPUNCTURE: CPT

## 2020-07-25 LAB
REF LAB TEST METHOD: NORMAL
SARS-COV-2 RNA PNL SPEC NAA+PROBE: NOT DETECTED

## 2020-07-27 ENCOUNTER — LAB REQUISITION (OUTPATIENT)
Dept: LAB | Facility: HOSPITAL | Age: 80
End: 2020-07-27

## 2020-07-27 DIAGNOSIS — R22.2 LOCALIZED SWELLING, MASS AND LUMP, TRUNK: ICD-10-CM

## 2020-07-27 PROCEDURE — 88304 TISSUE EXAM BY PATHOLOGIST: CPT | Performed by: SURGERY

## 2020-07-29 LAB
CYTO UR: NORMAL
LAB AP CASE REPORT: NORMAL
LAB AP CLINICAL INFORMATION: NORMAL
PATH REPORT.FINAL DX SPEC: NORMAL
PATH REPORT.GROSS SPEC: NORMAL

## 2020-08-10 RX ORDER — DIGOXIN 125 MCG
TABLET ORAL
Qty: 90 TABLET | Refills: 0 | Status: SHIPPED | OUTPATIENT
Start: 2020-08-10 | End: 2020-11-30

## 2020-08-18 RX ORDER — ATORVASTATIN CALCIUM 40 MG/1
TABLET, FILM COATED ORAL
Qty: 90 TABLET | Refills: 1 | Status: SHIPPED | OUTPATIENT
Start: 2020-08-18 | End: 2020-12-11

## 2020-09-01 NOTE — PROGRESS NOTES
[facility]  Office Progress Note  Nathan Velarde  1940  [address] [facility] [address] [facility] [address]       Visit Date: 09/03/20    PCP: Rell Ahuja DO  [address]  [facility] [address]    IDENTIFICATION: A 80 y.o. male  retired from [employer], originally from [place]    PROBLEM LIST:  1. PAF  a. CHADS2-VASc score =3:    b. Converted  to sinus rhythm following dig  c. Digitalis noncompliance with reversion to atrial fibrillation.  d. Has declined anticoagulation, on ASA 81  2. VHD  a. 8/27/2019 echo: EF 60%, mild MR, moderate to severe AI P1/2t 397 msec  b. 9/2020 EF 60% Mod AI  3. HTN  4. HLD, on atorvastatin  a. 4/9/2019   HDL 33 LDL 59  b. 7/13/2020   HDL 28 LDL 58  5. VHD  6. PreDM  a. 4/9/2019 A1c 5.7  7. 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  Ataxia, mid abdominal and bilateral lower extremity sensory deficit.  8. Hx of ulcerative colitis.  9. Hx of bladder cancer; no chemotherapy or radiation required.  a. Status post surgery, remote:  b.  TURP.  c. Herniorrhaphy.    d. Knee surgery (data deficit).      CC:   Chief Complaint   Patient presents with   • PAF       Allergies  Allergies   Allergen Reactions   • Sulfa Antibiotics Nausea Only and Other (See Comments)     Headache       Current Medications    Current Outpatient Medications:   •  Alpha-Lipoic Acid 600 MG capsule, Take 600 mg by mouth Daily., Disp: , Rfl:   •  atorvastatin (LIPITOR) 40 MG tablet, TAKE 1 TABLET NIGHTLY AT   BEDTIME, Disp: 90 tablet, Rfl: 1  •  Calcium Carbonate-Vitamin D (CALCIUM 600+D PO), Take 1 tablet by mouth Daily., Disp: , Rfl:   •  chlorthalidone (HYGROTON) 25 MG tablet, Take 12.5 mg by mouth 2 (Two) Times a Day., Disp: , Rfl:   •  cholecalciferol (VITAMIN D3) 1000 units tablet, Take 1,000 Units by mouth Daily., Disp: , Rfl:   •  coenzyme Q10 100 MG capsule, Take 100 mg by mouth Daily., Disp: , Rfl:   •  diclofenac (VOLTAREN) 75 MG EC tablet, Take 1 tablet by mouth  "Daily., Disp: 90 tablet, Rfl: 1  •  digoxin (LANOXIN) 125 MCG tablet, TAKE ONE TABLET BY MOUTH DAILY, Disp: 90 tablet, Rfl: 0  •  dilTIAZem CD (CARDIZEM CD) 180 MG 24 hr capsule, Take 180 mg by mouth Daily., Disp: , Rfl:   •  Fish Oil-Cholecalciferol (OMEGA-3 + D PO), Take 1 capsule by mouth Daily., Disp: , Rfl:   •  Glucosamine-Chondroit-Vit C-Mn (GLUCOSAMINE 1500 COMPLEX) capsule, Take 1 tablet by mouth Daily., Disp: , Rfl:   •  losartan (Cozaar) 50 MG tablet, Take 1 tablet by mouth 2 (Two) Times a Day., Disp: , Rfl:   •  Magnesium 250 MG tablet, Take 250 mg by mouth Daily., Disp: , Rfl:   •  mesalamine (DELZICOL) 400 MG capsule delayed-release delayed release capsule, TAKE 2 CAPSULES TWICE A DAY, Disp: 360 capsule, Rfl: 4  •  Multiple Vitamins-Minerals (MULTIVITAMIN ADULT PO), Take 1 tablet by mouth Daily., Disp: , Rfl:   •  potassium chloride (K-DUR) 10 MEQ CR tablet, TAKE 1 TABLET DAILY, Disp: 90 tablet, Rfl: 4  •  Saw Palmetto 160 MG capsule, Take 1 capsule by mouth Daily., Disp: , Rfl:   •  Selenium 200 MCG capsule, Take 200 mcg by mouth Daily., Disp: , Rfl:   •  tamsulosin (FLOMAX) 0.4 MG capsule 24 hr capsule, TAKE ONE CAPSULE BY MOUTH EVERY NIGHT AT BEDTIME, Disp: 90 capsule, Rfl: 0  •  vitamin E 400 UNIT capsule, Take 400 Units by mouth Daily., Disp: , Rfl:   •  Zinc 50 MG capsule, Take 50 mg by mouth Daily., Disp: , Rfl:       History of Present Illness   Nathan Velarde is a 80 y.o. year old male here for follow up.    Pt denies any chest pain, dyspnea, dyspnea on exertion, orthopnea, PND, palpitations, lower extremity edema, or claudication.  Some issues with nocturnal hypertension.  He states that typically until the evening systolic pressures 130 range at nighttime 160/170.    OBJECTIVE:  Vitals:    09/03/20 1343   BP: 128/60   BP Location: Right arm   Patient Position: Sitting   Pulse: 67   Temp: 97.3 °F (36.3 °C)   SpO2: 98%   Weight: 72.7 kg (160 lb 3.2 oz)   Height: 167.6 cm (66\")     Physical Exam "   Constitutional: He appears well-developed and well-nourished.   Neck: Normal range of motion. Neck supple. No hepatojugular reflux and no JVD present. Carotid bruit is not present. No tracheal deviation present. No thyromegaly present.   Cardiovascular: Normal rate, regular rhythm, S1 normal, S2 normal, intact distal pulses and normal pulses. PMI is not displaced. Exam reveals no gallop, no distant heart sounds, no friction rub, no midsystolic click and no opening snap.   Murmur heard.  Pulses:       Radial pulses are 2+ on the right side, and 2+ on the left side.        Dorsalis pedis pulses are 2+ on the right side, and 2+ on the left side.        Posterior tibial pulses are 2+ on the right side, and 2+ on the left side.   Pulmonary/Chest: Effort normal and breath sounds normal. He has no wheezes. He has no rales.   Abdominal: Soft. Bowel sounds are normal. He exhibits no mass. There is no tenderness. There is no guarding.       Diagnostic Data:  Procedures      ASSESSMENT:   Diagnosis Plan   1. Nonrheumatic aortic valve insufficiency     2. Essential hypertension  losartan (Cozaar) 50 MG tablet   3. Mixed hyperlipidemia         PLAN:  AI unchanged from previous year continue yearly echocardiogram and medical management    Hypertension uncontrolled increase losartan to 50 twice daily    Dyslipidemia on statin therapy    Rell Ahuja DO, thank you for referring Mr. Velarde for evaluation.  I have forwarded my electronically generated recommendations to you for review.  Please do not hesitate to call with any questions.      Giovanni Mcdowell MD, [facility]

## 2020-09-03 ENCOUNTER — HOSPITAL ENCOUNTER (OUTPATIENT)
Dept: CARDIOLOGY | Facility: HOSPITAL | Age: 80
Discharge: HOME | End: 2020-09-03
Admitting: PHYSICIAN ASSISTANT

## 2020-09-03 ENCOUNTER — OFFICE VISIT (OUTPATIENT)
Dept: CARDIOLOGY | Facility: CLINIC | Age: 80
End: 2020-09-03

## 2020-09-03 VITALS — WEIGHT: 157 LBS | BODY MASS INDEX: 25.23 KG/M2 | HEIGHT: 66 IN

## 2020-09-03 VITALS
DIASTOLIC BLOOD PRESSURE: 60 MMHG | TEMPERATURE: 97.3 F | OXYGEN SATURATION: 98 % | BODY MASS INDEX: 25.75 KG/M2 | SYSTOLIC BLOOD PRESSURE: 128 MMHG | WEIGHT: 160.2 LBS | HEIGHT: 66 IN | HEART RATE: 67 BPM

## 2020-09-03 DIAGNOSIS — I35.1 NONRHEUMATIC AORTIC VALVE INSUFFICIENCY: Primary | ICD-10-CM

## 2020-09-03 DIAGNOSIS — I10 ESSENTIAL HYPERTENSION: ICD-10-CM

## 2020-09-03 DIAGNOSIS — E78.2 MIXED HYPERLIPIDEMIA: ICD-10-CM

## 2020-09-03 LAB
BH CV ECHO MEAS - AI DEC SLOPE: 352.3 CM/SEC^2
BH CV ECHO MEAS - AI MAX PG: 83.8 MMHG
BH CV ECHO MEAS - AI MAX VEL: 457.6 CM/SEC
BH CV ECHO MEAS - AI P1/2T: 380.4 MSEC
BH CV ECHO MEAS - AO MAX PG (FULL): 0.61 MMHG
BH CV ECHO MEAS - AO MAX PG: 10.6 MMHG
BH CV ECHO MEAS - AO MEAN PG (FULL): 1.3 MMHG
BH CV ECHO MEAS - AO MEAN PG: 5.8 MMHG
BH CV ECHO MEAS - AO ROOT AREA (BSA CORRECTED): 2
BH CV ECHO MEAS - AO ROOT AREA: 10.6 CM^2
BH CV ECHO MEAS - AO ROOT DIAM: 3.7 CM
BH CV ECHO MEAS - AO V2 MAX: 162.9 CM/SEC
BH CV ECHO MEAS - AO V2 MEAN: 111.5 CM/SEC
BH CV ECHO MEAS - AO V2 VTI: 38.5 CM
BH CV ECHO MEAS - ASC AORTA: 3.4 CM
BH CV ECHO MEAS - AVA(I,A): 2.9 CM^2
BH CV ECHO MEAS - AVA(I,D): 2.9 CM^2
BH CV ECHO MEAS - AVA(V,A): 3.3 CM^2
BH CV ECHO MEAS - AVA(V,D): 3.3 CM^2
BH CV ECHO MEAS - BSA(HAYCOCK): 1.8 M^2
BH CV ECHO MEAS - BSA: 1.8 M^2
BH CV ECHO MEAS - BZI_BMI: 25.3 KILOGRAMS/M^2
BH CV ECHO MEAS - BZI_METRIC_HEIGHT: 167.6 CM
BH CV ECHO MEAS - BZI_METRIC_WEIGHT: 71.2 KG
BH CV ECHO MEAS - EDV(CUBED): 108.2 ML
BH CV ECHO MEAS - EDV(MOD-SP2): 142 ML
BH CV ECHO MEAS - EDV(MOD-SP4): 128 ML
BH CV ECHO MEAS - EDV(TEICH): 105.7 ML
BH CV ECHO MEAS - EF(CUBED): 82.3 %
BH CV ECHO MEAS - EF(MOD-BP): 69 %
BH CV ECHO MEAS - EF(MOD-SP2): 68.3 %
BH CV ECHO MEAS - EF(MOD-SP4): 66.4 %
BH CV ECHO MEAS - EF(TEICH): 75 %
BH CV ECHO MEAS - ESV(CUBED): 19.1 ML
BH CV ECHO MEAS - ESV(MOD-SP2): 45 ML
BH CV ECHO MEAS - ESV(MOD-SP4): 43 ML
BH CV ECHO MEAS - ESV(TEICH): 26.4 ML
BH CV ECHO MEAS - FS: 43.9 %
BH CV ECHO MEAS - IVS/LVPW: 0.93
BH CV ECHO MEAS - IVSD: 0.98 CM
BH CV ECHO MEAS - LA DIMENSION: 3.5 CM
BH CV ECHO MEAS - LA/AO: 0.96
BH CV ECHO MEAS - LAD MAJOR: 3.7 CM
BH CV ECHO MEAS - LAT PEAK E' VEL: 6.5 CM/SEC
BH CV ECHO MEAS - LATERAL E/E' RATIO: 11.1
BH CV ECHO MEAS - LV DIASTOLIC VOL/BSA (35-75): 70.9 ML/M^2
BH CV ECHO MEAS - LV MASS(C)D: 173.1 GRAMS
BH CV ECHO MEAS - LV MASS(C)DI: 95.9 GRAMS/M^2
BH CV ECHO MEAS - LV MAX PG: 10 MMHG
BH CV ECHO MEAS - LV MEAN PG: 4.5 MMHG
BH CV ECHO MEAS - LV SYSTOLIC VOL/BSA (12-30): 23.8 ML/M^2
BH CV ECHO MEAS - LV V1 MAX: 158.1 CM/SEC
BH CV ECHO MEAS - LV V1 MEAN: 97.7 CM/SEC
BH CV ECHO MEAS - LV V1 VTI: 33 CM
BH CV ECHO MEAS - LVIDD: 4.8 CM
BH CV ECHO MEAS - LVIDS: 2.7 CM
BH CV ECHO MEAS - LVLD AP2: 8.6 CM
BH CV ECHO MEAS - LVLD AP4: 7.9 CM
BH CV ECHO MEAS - LVLS AP2: 6.3 CM
BH CV ECHO MEAS - LVLS AP4: 6.2 CM
BH CV ECHO MEAS - LVOT AREA (M): 3.5 CM^2
BH CV ECHO MEAS - LVOT AREA: 3.4 CM^2
BH CV ECHO MEAS - LVOT DIAM: 2.1 CM
BH CV ECHO MEAS - LVPWD: 1.1 CM
BH CV ECHO MEAS - MED PEAK E' VEL: 7.1 CM/SEC
BH CV ECHO MEAS - MEDIAL E/E' RATIO: 10.1
BH CV ECHO MEAS - MV A MAX VEL: 84.5 CM/SEC
BH CV ECHO MEAS - MV DEC TIME: 0.2 SEC
BH CV ECHO MEAS - MV E MAX VEL: 73.5 CM/SEC
BH CV ECHO MEAS - MV E/A: 0.87
BH CV ECHO MEAS - PA ACC SLOPE: 607.8 CM/SEC^2
BH CV ECHO MEAS - PA ACC TIME: 0.14 SEC
BH CV ECHO MEAS - PA MAX PG: 4.8 MMHG
BH CV ECHO MEAS - PA PR(ACCEL): 14 MMHG
BH CV ECHO MEAS - PA V2 MAX: 109.9 CM/SEC
BH CV ECHO MEAS - SI(AO): 226.5 ML/M^2
BH CV ECHO MEAS - SI(CUBED): 49.3 ML/M^2
BH CV ECHO MEAS - SI(LVOT): 62.9 ML/M^2
BH CV ECHO MEAS - SI(MOD-SP2): 53.8 ML/M^2
BH CV ECHO MEAS - SI(MOD-SP4): 47.1 ML/M^2
BH CV ECHO MEAS - SI(TEICH): 43.9 ML/M^2
BH CV ECHO MEAS - SV(AO): 408.7 ML
BH CV ECHO MEAS - SV(CUBED): 89 ML
BH CV ECHO MEAS - SV(LVOT): 113.5 ML
BH CV ECHO MEAS - SV(MOD-SP2): 97 ML
BH CV ECHO MEAS - SV(MOD-SP4): 85 ML
BH CV ECHO MEAS - SV(TEICH): 79.3 ML
BH CV ECHO MEAS - TAPSE (>1.6): 3.1 CM2
BH CV ECHO MEASUREMENTS AVERAGE E/E' RATIO: 10.81
BH CV VAS BP RIGHT ARM: NORMAL MMHG
BH CV XLRA - RV BASE: 3.3 CM
BH CV XLRA - RV LENGTH: 6.7 CM
BH CV XLRA - RV MID: 2.7 CM
BH CV XLRA - TDI S': 14.9 CM/SEC
LV EF 2D ECHO EST: 65 %
MAXIMAL PREDICTED HEART RATE: 140 BPM
STRESS TARGET HR: 119 BPM

## 2020-09-03 PROCEDURE — 93306 TTE W/DOPPLER COMPLETE: CPT

## 2020-09-03 PROCEDURE — 99214 OFFICE O/P EST MOD 30 MIN: CPT | Performed by: INTERNAL MEDICINE

## 2020-09-03 PROCEDURE — 93306 TTE W/DOPPLER COMPLETE: CPT | Performed by: INTERNAL MEDICINE

## 2020-09-03 RX ORDER — CHLORTHALIDONE 25 MG/1
12.5 TABLET ORAL 2 TIMES DAILY
COMMUNITY
End: 2021-01-26

## 2020-09-03 RX ORDER — LOSARTAN POTASSIUM 50 MG/1
50 TABLET ORAL 2 TIMES DAILY
Start: 2020-09-03 | End: 2020-09-22

## 2020-09-21 DIAGNOSIS — I10 ESSENTIAL HYPERTENSION: ICD-10-CM

## 2020-09-22 RX ORDER — DILTIAZEM HYDROCHLORIDE 180 MG/1
CAPSULE, COATED, EXTENDED RELEASE ORAL
Qty: 90 CAPSULE | Refills: 1 | Status: SHIPPED | OUTPATIENT
Start: 2020-09-22 | End: 2020-12-11

## 2020-09-22 RX ORDER — LOSARTAN POTASSIUM 50 MG/1
TABLET ORAL
Qty: 180 TABLET | Refills: 1 | Status: SHIPPED | OUTPATIENT
Start: 2020-09-22 | End: 2021-05-25

## 2020-10-22 ENCOUNTER — OFFICE VISIT (OUTPATIENT)
Dept: GASTROENTEROLOGY | Facility: CLINIC | Age: 80
End: 2020-10-22

## 2020-10-22 VITALS
RESPIRATION RATE: 14 BRPM | BODY MASS INDEX: 26.36 KG/M2 | DIASTOLIC BLOOD PRESSURE: 52 MMHG | SYSTOLIC BLOOD PRESSURE: 102 MMHG | WEIGHT: 164 LBS | HEIGHT: 66 IN | HEART RATE: 45 BPM | OXYGEN SATURATION: 97 % | TEMPERATURE: 96.8 F

## 2020-10-22 DIAGNOSIS — K51.80 OTHER ULCERATIVE COLITIS WITHOUT COMPLICATION (HCC): Primary | ICD-10-CM

## 2020-10-22 PROCEDURE — 99214 OFFICE O/P EST MOD 30 MIN: CPT | Performed by: INTERNAL MEDICINE

## 2020-10-22 NOTE — PROGRESS NOTES
PCP: Rell Ahuja DO    Chief Complaint   Patient presents with   • Ulcerative Colitis       History of Present Illness:   HPI  Mr. Velarde presents for a follow up visit.  The patient presents with no complaint of diarrhea.  He has no problem with blood in stool.  The patient has a good appetite without any unexplained weight loss.  There is no history of nausea.  Mr. Velarde denies any abdominal pain.  He has no difficult or painful swallowing.  The patient denies breakthrough heartburn.  Mr. Velarde denies any bloating today.  There is no history of unexplained skin rash or joint pain.  Mr. Velarde denies any mouth ulcers.  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   • Prostatism 1995   • Transverse myelitis (CMS/HCC) 2014    Transient T10 with numbness and minimal ataxia - transient    • Ulcerative colitis (CMS/HCC) 1977    Well-controlled on mesalamine        Past Surgical History:   Procedure Laterality Date   • BICEPS TENDON REPAIR Left 5/7/2018    Procedure: BICEPS TENDODESIS;  Surgeon: Abner Humphrey MD;  Location: [facility];  Service: Orthopedics   • COLONOSCOPY  2015    Colitis in remission   • CYSTOSCOPY  1977    Excision of bladder cancer   • INGUINAL HERNIA REPAIR Right 1994   • KNEE SURGERY Right 1955    Excision of cyst   • TONSILLECTOMY  1943   •  TOTAL SHOULDER ARTHROPLASTY Left 5/7/2018    Procedure: LEFT TOTAL SHOULDER ARTHROPLASTY;  Surgeon: Abner Humphrey MD;  Location: [facility];  Service: Orthopedics         Current Outpatient Medications:   •  Alpha-Lipoic Acid 600 MG capsule, Take 600 mg by mouth Daily., Disp: , Rfl:   •  atorvastatin (LIPITOR) 40 MG tablet, TAKE 1 TABLET NIGHTLY AT   BEDTIME, Disp: 90 tablet, Rfl: 1  •  Calcium Carbonate-Vitamin D (CALCIUM 600+D PO), Take 1 tablet by mouth Daily., Disp: , Rfl:   •  chlorthalidone (HYGROTON) 25 MG tablet, Take 12.5 mg by mouth 2 (Two) Times a Day., Disp: , Rfl:   •  cholecalciferol (VITAMIN D3) 1000 units tablet, Take 1,000 Units by mouth Daily., Disp: , Rfl:   •  coenzyme Q10 100 MG capsule, Take 100 mg by mouth Daily., Disp: , Rfl:   •  diclofenac (VOLTAREN) 75 MG EC tablet, Take 1 tablet by mouth Daily., Disp: 90 tablet, Rfl: 1  •  digoxin (LANOXIN) 125 MCG tablet, TAKE ONE TABLET BY MOUTH DAILY, Disp: 90 tablet, Rfl: 0  •  dilTIAZem CD (CARDIZEM CD) 180 MG 24 hr capsule, TAKE 1 CAPSULE DAILY, Disp: 90 capsule, Rfl: 1  •  Fish Oil-Cholecalciferol (OMEGA-3 + D PO), Take 1 capsule by mouth Daily., Disp: , Rfl:   •  Glucosamine-Chondroit-Vit C-Mn (GLUCOSAMINE 1500 COMPLEX) capsule, Take 1 tablet by mouth Daily., Disp: , Rfl:   •  losartan (COZAAR) 50 MG tablet, TAKE ONE TABLET BY MOUTH TWICE A DAY, Disp: 180 tablet, Rfl: 1  •  Magnesium 250 MG tablet, Take 250 mg by mouth Daily., Disp: , Rfl:   •  mesalamine (DELZICOL) 400 MG capsule delayed-release delayed release capsule, TAKE 2 CAPSULES TWICE A DAY, Disp: 360 capsule, Rfl: 4  •  Multiple Vitamins-Minerals (MULTIVITAMIN ADULT PO), Take 1 tablet by mouth Daily., Disp: , Rfl:   •  potassium chloride (K-DUR) 10 MEQ CR tablet, TAKE 1 TABLET DAILY, Disp: 90 tablet, Rfl: 4  •  Saw Palmetto 160 MG capsule, Take 1 capsule by mouth Daily., Disp: , Rfl:   •  Selenium 200 MCG capsule, Take 200 mcg by mouth Daily., Disp: , Rfl:   •  tamsulosin (FLOMAX)  0.4 MG capsule 24 hr capsule, TAKE ONE CAPSULE BY MOUTH EVERY NIGHT AT BEDTIME, Disp: 90 capsule, Rfl: 0  •  vitamin E 400 UNIT capsule, Take 400 Units by mouth Daily., Disp: , Rfl:   •  Zinc 50 MG capsule, Take 50 mg by mouth Daily., Disp: , Rfl:     Allergies   Allergen Reactions   • Sulfa Antibiotics Nausea Only and Other (See Comments)     Headache       Family History   Problem Relation Age of Onset   • Alzheimer's disease Mother          age 89   • Arthritis Mother    • Hyperlipidemia Mother    • Angina Father    • Hypertension Father    • Pneumonia Father          age 70 - tobacco   • Aneurysm Father         abd aortic   • Unexplained death Brother    • Breast cancer Maternal Grandmother    • Arthritis Maternal Grandmother    • Diabetes Paternal Uncle        Social History     Socioeconomic History   • Marital status:      Spouse name: Not on file   • Number of children: Not on file   • Years of education: Not on file   • Highest education level: Not on file   Tobacco Use   • Smoking status: Never Smoker   • Smokeless tobacco: Never Used   Substance and Sexual Activity   • Alcohol use: Yes     Alcohol/week: 1.0 standard drinks     Types: 1 Cans of beer per week     Comment: OCC   • Drug use: No   Social History Narrative    Domestic life- lives in private home with wife        Jewish- none listed        Sleep hygiene- sleeps 8 hours nightly good quality        Caffeine use- drinks 1 diet Mountain Dew daily and occasionally uses Excedrin        Exercise habits- disciplined exercise Program 5 days weekly with walking and weight lifting and yoga        Diet-   well-balanced American Heart Association - low in salt        Occupation- retired         Hearing-   no impairment         Vision- Corrects well with bifocal glasses        Driving- No limitations           Review of Systems   Constitutional: Negative for appetite change, fatigue, fever and unexpected weight change.    HENT: Negative for dental problem, mouth sores, postnasal drip, sneezing, trouble swallowing and voice change.    Eyes: Negative for pain, redness and itching.   Respiratory: Negative for cough, shortness of breath and wheezing.    Cardiovascular: Negative for chest pain, palpitations and leg swelling.   Gastrointestinal: Negative for abdominal distention, abdominal pain, anal bleeding, blood in stool, constipation, diarrhea, nausea, rectal pain and vomiting.   Endocrine: Negative for cold intolerance, heat intolerance, polydipsia and polyuria.   Genitourinary: Negative for dysuria, enuresis, flank pain, hematuria and urgency.   Musculoskeletal: Negative for arthralgias, back pain, joint swelling and myalgias.   Skin: Negative for color change, pallor and rash.   Allergic/Immunologic: Negative for environmental allergies, food allergies and immunocompromised state.   Neurological: Negative for dizziness, tremors, seizures, facial asymmetry, numbness and headaches.   Psychiatric/Behavioral: Negative for behavioral problems, dysphoric mood, hallucinations and self-injury.   All other systems reviewed and are negative.      Vitals:    10/22/20 1321   BP: 102/52   Pulse: (!) 45   Resp: 14   Temp: 96.8 °F (36 °C)   SpO2: 97%       Physical Exam  Vitals signs reviewed.   Constitutional:       General: He is not in acute distress.     Appearance: Normal appearance.   HENT:      Head: Normocephalic and atraumatic.      Nose: Nose normal.      Mouth/Throat:      Mouth: Mucous membranes are moist.      Pharynx: No oropharyngeal exudate.   Eyes:      General: No scleral icterus.     Extraocular Movements: Extraocular movements intact.   Neck:      Musculoskeletal: Normal range of motion. No neck rigidity.   Cardiovascular:      Rate and Rhythm: Normal rate and regular rhythm.      Heart sounds: No murmur. No gallop.    Pulmonary:      Effort: Pulmonary effort is normal.      Breath sounds: No wheezing or rales.   Abdominal:       General: Abdomen is flat. Bowel sounds are normal. There is no distension.      Palpations: Abdomen is soft.      Comments: Diastases recti   Musculoskeletal: Normal range of motion.         General: No swelling.   Skin:     General: Skin is warm and dry.      Coloration: Skin is not jaundiced or pale.   Neurological:      General: No focal deficit present.      Mental Status: He is alert and oriented to person, place, and time.   Psychiatric:         Mood and Affect: Mood normal.         Thought Content: Thought content normal.         Judgment: Judgment normal.         Diagnoses and all orders for this visit:    1. Other ulcerative colitis without complication (CMS/HCC) (Primary)    The patient is taking mesalamine medication daily without any adverse effect.  He has formed bowel movements without any blood in the stool.  The patient has a good appetite and denies any abdominal pain.      Plan: Will continue mesalamine 2 by mouth twice daily.           Discussed performing a colonoscopy.  The patient is doing well at this time and the decision was made to not proceed unless there is a change clinically.  He will follow-up in the office in 6 months.

## 2020-10-27 RX ORDER — TAMSULOSIN HYDROCHLORIDE 0.4 MG/1
CAPSULE ORAL
Qty: 90 CAPSULE | Refills: 1 | Status: SHIPPED | OUTPATIENT
Start: 2020-10-27 | End: 2021-04-26

## 2020-11-03 RX ORDER — DICLOFENAC SODIUM 75 MG/1
TABLET, DELAYED RELEASE ORAL
Qty: 90 TABLET | Refills: 1 | Status: SHIPPED | OUTPATIENT
Start: 2020-11-03 | End: 2021-05-03

## 2020-11-30 RX ORDER — DIGOXIN 125 MCG
TABLET ORAL
Qty: 90 TABLET | Refills: 0 | Status: SHIPPED | OUTPATIENT
Start: 2020-11-30 | End: 2021-02-22

## 2020-12-11 RX ORDER — ATORVASTATIN CALCIUM 40 MG/1
TABLET, FILM COATED ORAL
Qty: 90 TABLET | Refills: 1 | Status: SHIPPED | OUTPATIENT
Start: 2020-12-11 | End: 2021-05-03

## 2020-12-11 RX ORDER — POTASSIUM CHLORIDE 750 MG/1
TABLET, FILM COATED, EXTENDED RELEASE ORAL
Qty: 90 TABLET | Refills: 4 | Status: SHIPPED | OUTPATIENT
Start: 2020-12-11 | End: 2022-01-06

## 2020-12-11 RX ORDER — DILTIAZEM HYDROCHLORIDE 180 MG/1
CAPSULE, COATED, EXTENDED RELEASE ORAL
Qty: 90 CAPSULE | Refills: 1 | Status: SHIPPED | OUTPATIENT
Start: 2020-12-11 | End: 2021-07-06

## 2021-01-15 ENCOUNTER — OFFICE VISIT (OUTPATIENT)
Dept: FAMILY MEDICINE CLINIC | Facility: CLINIC | Age: 81
End: 2021-01-15

## 2021-01-15 ENCOUNTER — LAB (OUTPATIENT)
Dept: LAB | Facility: HOSPITAL | Age: 81
End: 2021-01-15

## 2021-01-15 ENCOUNTER — APPOINTMENT (OUTPATIENT)
Dept: LAB | Facility: HOSPITAL | Age: 81
End: 2021-01-15

## 2021-01-15 VITALS
OXYGEN SATURATION: 98 % | WEIGHT: 164.6 LBS | BODY MASS INDEX: 26.45 KG/M2 | HEART RATE: 68 BPM | HEIGHT: 66 IN | SYSTOLIC BLOOD PRESSURE: 142 MMHG | DIASTOLIC BLOOD PRESSURE: 62 MMHG

## 2021-01-15 DIAGNOSIS — R73.9 HYPERGLYCEMIA: ICD-10-CM

## 2021-01-15 DIAGNOSIS — I10 ESSENTIAL HYPERTENSION: ICD-10-CM

## 2021-01-15 DIAGNOSIS — R73.9 HYPERGLYCEMIA: Primary | ICD-10-CM

## 2021-01-15 DIAGNOSIS — N18.2 CKD (CHRONIC KIDNEY DISEASE), STAGE II: ICD-10-CM

## 2021-01-15 DIAGNOSIS — Z00.00 PREVENTATIVE HEALTH CARE: ICD-10-CM

## 2021-01-15 DIAGNOSIS — E78.00 PURE HYPERCHOLESTEROLEMIA: ICD-10-CM

## 2021-01-15 DIAGNOSIS — Z00.00 MEDICARE ANNUAL WELLNESS VISIT, SUBSEQUENT: ICD-10-CM

## 2021-01-15 LAB
ALBUMIN SERPL-MCNC: 4.4 G/DL (ref 3.5–5.2)
ALBUMIN/GLOB SERPL: 1.5 G/DL
ALP SERPL-CCNC: 110 U/L (ref 39–117)
ALT SERPL W P-5'-P-CCNC: 36 U/L (ref 1–41)
ANION GAP SERPL CALCULATED.3IONS-SCNC: 10.7 MMOL/L (ref 5–15)
AST SERPL-CCNC: 38 U/L (ref 1–40)
BACTERIA UR QL AUTO: ABNORMAL /HPF
BASOPHILS # BLD AUTO: 0.05 10*3/MM3 (ref 0–0.2)
BASOPHILS NFR BLD AUTO: 0.5 % (ref 0–1.5)
BILIRUB SERPL-MCNC: 0.4 MG/DL (ref 0–1.2)
BILIRUB UR QL STRIP: NEGATIVE
BUN SERPL-MCNC: 40 MG/DL (ref 8–23)
BUN/CREAT SERPL: 27.2 (ref 7–25)
CALCIUM SPEC-SCNC: 10.2 MG/DL (ref 8.6–10.5)
CHLORIDE SERPL-SCNC: 105 MMOL/L (ref 98–107)
CHOLEST SERPL-MCNC: 102 MG/DL (ref 0–200)
CLARITY UR: CLEAR
CO2 SERPL-SCNC: 25.3 MMOL/L (ref 22–29)
COLOR UR: ABNORMAL
CREAT SERPL-MCNC: 1.47 MG/DL (ref 0.76–1.27)
DEPRECATED RDW RBC AUTO: 43.4 FL (ref 37–54)
EOSINOPHIL # BLD AUTO: 0.29 10*3/MM3 (ref 0–0.4)
EOSINOPHIL NFR BLD AUTO: 2.8 % (ref 0.3–6.2)
ERYTHROCYTE [DISTWIDTH] IN BLOOD BY AUTOMATED COUNT: 13.2 % (ref 12.3–15.4)
GFR SERPL CREATININE-BSD FRML MDRD: 46 ML/MIN/1.73
GLOBULIN UR ELPH-MCNC: 2.9 GM/DL
GLUCOSE SERPL-MCNC: 130 MG/DL (ref 65–99)
GLUCOSE UR STRIP-MCNC: NEGATIVE MG/DL
HBA1C MFR BLD: 6.4 %
HCT VFR BLD AUTO: 39.7 % (ref 37.5–51)
HDLC SERPL-MCNC: 25 MG/DL (ref 40–60)
HGB BLD-MCNC: 13.3 G/DL (ref 13–17.7)
HGB UR QL STRIP.AUTO: NEGATIVE
HYALINE CASTS UR QL AUTO: ABNORMAL /LPF
IMM GRANULOCYTES # BLD AUTO: 0.06 10*3/MM3 (ref 0–0.05)
IMM GRANULOCYTES NFR BLD AUTO: 0.6 % (ref 0–0.5)
KETONES UR QL STRIP: ABNORMAL
LDLC SERPL CALC-MCNC: 53 MG/DL (ref 0–100)
LDLC/HDLC SERPL: 2.02 {RATIO}
LEUKOCYTE ESTERASE UR QL STRIP.AUTO: NEGATIVE
LYMPHOCYTES # BLD AUTO: 1.23 10*3/MM3 (ref 0.7–3.1)
LYMPHOCYTES NFR BLD AUTO: 11.7 % (ref 19.6–45.3)
MCH RBC QN AUTO: 30.1 PG (ref 26.6–33)
MCHC RBC AUTO-ENTMCNC: 33.5 G/DL (ref 31.5–35.7)
MCV RBC AUTO: 89.8 FL (ref 79–97)
MONOCYTES # BLD AUTO: 0.98 10*3/MM3 (ref 0.1–0.9)
MONOCYTES NFR BLD AUTO: 9.3 % (ref 5–12)
NEUTROPHILS NFR BLD AUTO: 7.88 10*3/MM3 (ref 1.7–7)
NEUTROPHILS NFR BLD AUTO: 75.1 % (ref 42.7–76)
NITRITE UR QL STRIP: NEGATIVE
NRBC BLD AUTO-RTO: 0 /100 WBC (ref 0–0.2)
PH UR STRIP.AUTO: 5.5 [PH] (ref 5–8)
PLATELET # BLD AUTO: 320 10*3/MM3 (ref 140–450)
PMV BLD AUTO: 10.5 FL (ref 6–12)
POTASSIUM SERPL-SCNC: 4.8 MMOL/L (ref 3.5–5.2)
PROT SERPL-MCNC: 7.3 G/DL (ref 6–8.5)
PROT UR QL STRIP: ABNORMAL
RBC # BLD AUTO: 4.42 10*6/MM3 (ref 4.14–5.8)
RBC # UR: ABNORMAL /HPF
REF LAB TEST METHOD: ABNORMAL
SODIUM SERPL-SCNC: 141 MMOL/L (ref 136–145)
SP GR UR STRIP: 1.03 (ref 1–1.03)
SQUAMOUS #/AREA URNS HPF: ABNORMAL /HPF
TRIGL SERPL-MCNC: 133 MG/DL (ref 0–150)
TSH SERPL DL<=0.05 MIU/L-ACNC: 3.4 UIU/ML (ref 0.27–4.2)
UROBILINOGEN UR QL STRIP: ABNORMAL
VLDLC SERPL-MCNC: 24 MG/DL (ref 5–40)
WBC # BLD AUTO: 10.49 10*3/MM3 (ref 3.4–10.8)
WBC UR QL AUTO: ABNORMAL /HPF

## 2021-01-15 PROCEDURE — 83036 HEMOGLOBIN GLYCOSYLATED A1C: CPT | Performed by: FAMILY MEDICINE

## 2021-01-15 PROCEDURE — G0439 PPPS, SUBSEQ VISIT: HCPCS | Performed by: FAMILY MEDICINE

## 2021-01-15 PROCEDURE — 84443 ASSAY THYROID STIM HORMONE: CPT

## 2021-01-15 PROCEDURE — 85025 COMPLETE CBC W/AUTO DIFF WBC: CPT

## 2021-01-15 PROCEDURE — 1126F AMNT PAIN NOTED NONE PRSNT: CPT | Performed by: FAMILY MEDICINE

## 2021-01-15 PROCEDURE — 81001 URINALYSIS AUTO W/SCOPE: CPT

## 2021-01-15 PROCEDURE — 99397 PER PM REEVAL EST PAT 65+ YR: CPT | Performed by: FAMILY MEDICINE

## 2021-01-15 PROCEDURE — 80061 LIPID PANEL: CPT

## 2021-01-15 PROCEDURE — 1159F MED LIST DOCD IN RCRD: CPT | Performed by: FAMILY MEDICINE

## 2021-01-15 PROCEDURE — 80053 COMPREHEN METABOLIC PANEL: CPT

## 2021-01-26 RX ORDER — CHLORTHALIDONE 25 MG/1
TABLET ORAL
Qty: 90 TABLET | Refills: 10 | Status: SHIPPED | OUTPATIENT
Start: 2021-01-26 | End: 2022-04-22

## 2021-02-15 ENCOUNTER — PATIENT OUTREACH (OUTPATIENT)
Dept: PHARMACY | Facility: HOSPITAL | Age: 81
End: 2021-02-15

## 2021-02-15 NOTE — OUTREACH NOTE
I attempted to call patient today regarding medication adherence to losartan. No answer. I will try again at a later date.    Allegra Matt, PharmD  02/15/21

## 2021-02-22 RX ORDER — DIGOXIN 125 MCG
TABLET ORAL
Qty: 90 TABLET | Refills: 0 | Status: SHIPPED | OUTPATIENT
Start: 2021-02-22 | End: 2021-06-07

## 2021-03-26 RX ORDER — MESALAMINE 400 MG/1
800 CAPSULE, DELAYED RELEASE ORAL 2 TIMES DAILY
Qty: 360 CAPSULE | Refills: 4 | Status: SHIPPED | OUTPATIENT
Start: 2021-03-26 | End: 2022-04-04

## 2021-04-21 ENCOUNTER — OFFICE VISIT (OUTPATIENT)
Dept: GASTROENTEROLOGY | Facility: CLINIC | Age: 81
End: 2021-04-21

## 2021-04-21 VITALS
WEIGHT: 156.6 LBS | BODY MASS INDEX: 25.17 KG/M2 | HEART RATE: 67 BPM | HEIGHT: 66 IN | OXYGEN SATURATION: 97 % | TEMPERATURE: 97.1 F | DIASTOLIC BLOOD PRESSURE: 82 MMHG | SYSTOLIC BLOOD PRESSURE: 142 MMHG

## 2021-04-21 DIAGNOSIS — K51.80 OTHER ULCERATIVE COLITIS WITHOUT COMPLICATION (HCC): Primary | ICD-10-CM

## 2021-04-21 PROCEDURE — 99213 OFFICE O/P EST LOW 20 MIN: CPT | Performed by: INTERNAL MEDICINE

## 2021-04-21 NOTE — PROGRESS NOTES
Patient Name: Nathan Velarde  YOB: 1940   Medical Record number: [id number]     PCP: Rell Ahuja DO    Chief Complaint   Patient presents with   • Follow-up     6 month    History of ulcerative colitis.    History of Present Illness:   HPI  Mr. Velarde returns to the office for follow-up.  The patient is doing well at this time.  He received the Covid vaccine and tolerated this well.  The patient has regular bowel movements without any blood.  He denies any abdominal pain.  There is no history of nausea.  Mr. Velarde has a good appetite.  The patient denies night sweats, fever or chills.  There is no history of unexplained skin rash or mouth ulcers.  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   • Prostatism 1995   • Transverse myelitis (CMS/HCC) 2014    Transient T10 with numbness and minimal ataxia - transient    • Ulcerative colitis (CMS/HCC) 1977    Well-controlled on mesalamine        Past Surgical History:   Procedure Laterality Date   • BICEPS TENDON REPAIR Left 5/7/2018    Procedure: BICEPS TENDODESIS;  Surgeon: Abner Humphrey MD;  Location: [facility];  Service: Orthopedics   • COLONOSCOPY  2015    Colitis in remission   • CYSTOSCOPY  1977    Excision of bladder cancer   • INGUINAL HERNIA REPAIR Right 1994   • KNEE SURGERY Right 1955     Excision of cyst   • TONSILLECTOMY  1943   • TOTAL SHOULDER ARTHROPLASTY Left 5/7/2018    Procedure: LEFT TOTAL SHOULDER ARTHROPLASTY;  Surgeon: Abner Humphrey MD;  Location: [facility];  Service: Orthopedics         Current Outpatient Medications:   •  Alpha-Lipoic Acid 600 MG capsule, Take 600 mg by mouth Daily., Disp: , Rfl:   •  atorvastatin (LIPITOR) 40 MG tablet, TAKE 1 TABLET NIGHTLY AT   BEDTIME, Disp: 90 tablet, Rfl: 1  •  Calcium Carbonate-Vitamin D (CALCIUM 600+D PO), Take 1 tablet by mouth Daily., Disp: , Rfl:   •  chlorthalidone (HYGROTON) 25 MG tablet, TAKE ONE TABLET BY MOUTH DAILY, Disp: 90 tablet, Rfl: 10  •  cholecalciferol (VITAMIN D3) 1000 units tablet, Take 1,000 Units by mouth Daily., Disp: , Rfl:   •  coenzyme Q10 100 MG capsule, Take 100 mg by mouth Daily., Disp: , Rfl:   •  diclofenac (VOLTAREN) 75 MG EC tablet, TAKE 1 TABLET DAILY, Disp: 90 tablet, Rfl: 1  •  digoxin (LANOXIN) 125 MCG tablet, TAKE ONE TABLET BY MOUTH DAILY, Disp: 90 tablet, Rfl: 0  •  dilTIAZem CD (CARDIZEM CD) 180 MG 24 hr capsule, TAKE 1 CAPSULE DAILY, Disp: 90 capsule, Rfl: 1  •  Fish Oil-Cholecalciferol (OMEGA-3 + D PO), Take 1 capsule by mouth Daily., Disp: , Rfl:   •  Glucosamine-Chondroit-Vit C-Mn (GLUCOSAMINE 1500 COMPLEX) capsule, Take 1 tablet by mouth Daily., Disp: , Rfl:   •  losartan (COZAAR) 50 MG tablet, TAKE ONE TABLET BY MOUTH TWICE A DAY (Patient taking differently: Take 25 mg by mouth Daily. 1 1/2 tablet), Disp: 180 tablet, Rfl: 1  •  Magnesium 250 MG tablet, Take 250 mg by mouth Daily., Disp: , Rfl:   •  mesalamine (DELZICOL) 400 MG capsule delayed-release delayed release capsule, Take 2 capsules by mouth 2 (Two) Times a Day., Disp: 360 capsule, Rfl: 4  •  Multiple Vitamins-Minerals (MULTIVITAMIN ADULT PO), Take 1 tablet by mouth Daily., Disp: , Rfl:   •  potassium chloride 10 MEQ CR tablet, TAKE 1 TABLET DAILY, Disp: 90 tablet, Rfl: 4  •  Saw Palmetto 160 MG capsule, Take 1 capsule by mouth Daily.,  Disp: , Rfl:   •  Selenium 200 MCG capsule, Take 200 mcg by mouth Daily., Disp: , Rfl:   •  tamsulosin (FLOMAX) 0.4 MG capsule 24 hr capsule, TAKE ONE CAPSULE BY MOUTH EVERY NIGHT AT BEDTIME, Disp: 90 capsule, Rfl: 1  •  vitamin E 400 UNIT capsule, Take 400 Units by mouth Daily., Disp: , Rfl:   •  Zinc 50 MG capsule, Take 50 mg by mouth Daily., Disp: , Rfl:     Allergies   Allergen Reactions   • Sulfa Antibiotics Nausea Only and Other (See Comments)     Headache       Family History   Problem Relation Age of Onset   • Alzheimer's disease Mother          age 89   • Arthritis Mother    • Hyperlipidemia Mother    • Angina Father    • Hypertension Father    • Pneumonia Father          age 70 - tobacco   • Aneurysm Father         abd aortic   • Unexplained death Brother    • Breast cancer Maternal Grandmother    • Arthritis Maternal Grandmother    • Diabetes Paternal Uncle        Social History     Socioeconomic History   • Marital status:      Spouse name: Not on file   • Number of children: Not on file   • Years of education: Not on file   • Highest education level: Not on file   Tobacco Use   • Smoking status: Never Smoker   • Smokeless tobacco: Never Used   Substance and Sexual Activity   • Alcohol use: Yes     Alcohol/week: 1.0 standard drinks     Types: 1 Cans of beer per week     Comment: OCC   • Drug use: No       Review of Systems   Constitutional: Negative for activity change, appetite change, fatigue, fever and unexpected weight change.   HENT: Negative for dental problem, hearing loss, mouth sores, postnasal drip, sneezing, trouble swallowing and voice change.    Eyes: Negative for pain, redness, itching and visual disturbance.   Respiratory: Negative for cough, choking, chest tightness, shortness of breath and wheezing.    Cardiovascular: Negative for chest pain, palpitations and leg swelling.   Gastrointestinal: Negative for abdominal distention, abdominal pain, anal bleeding, blood in stool,  constipation, diarrhea, nausea, rectal pain and vomiting.   Endocrine: Negative for cold intolerance, heat intolerance, polydipsia, polyphagia and polyuria.   Genitourinary: Negative.  Negative for dysuria, enuresis, flank pain, hematuria and urgency.   Musculoskeletal: Negative for arthralgias, back pain, gait problem, joint swelling and myalgias.   Skin: Negative for color change, pallor and rash.   Allergic/Immunologic: Negative for environmental allergies, food allergies and immunocompromised state.   Neurological: Negative for dizziness, tremors, seizures, facial asymmetry, speech difficulty, numbness and headaches.   Hematological: Negative for adenopathy.   Psychiatric/Behavioral: Negative for behavioral problems, confusion, dysphoric mood, hallucinations and self-injury.       Vitals:    04/21/21 1424   BP: 142/82   Pulse: 67   Temp: 97.1 °F (36.2 °C)   SpO2: 97%       Physical Exam  Vitals reviewed.   Constitutional:       General: He is not in acute distress.     Appearance: Normal appearance.   HENT:      Head: Normocephalic and atraumatic.      Nose: Nose normal.      Mouth/Throat:      Mouth: Mucous membranes are moist.      Pharynx: Oropharynx is clear.   Eyes:      General: No scleral icterus.     Extraocular Movements: Extraocular movements intact.   Cardiovascular:      Rate and Rhythm: Normal rate and regular rhythm.      Pulses: Normal pulses.      Heart sounds: No gallop.    Pulmonary:      Effort: Pulmonary effort is normal.      Breath sounds: No wheezing or rales.   Abdominal:      Palpations: Abdomen is soft.      Tenderness: There is no abdominal tenderness. There is no guarding.      Comments: Diastases recti   Musculoskeletal:         General: No swelling. Normal range of motion.      Cervical back: Normal range of motion. No rigidity.   Skin:     General: Skin is dry.      Coloration: Skin is not jaundiced.   Neurological:      Mental Status: He is alert and oriented to person, place, and  time.   Psychiatric:         Mood and Affect: Mood normal.         Thought Content: Thought content normal.         Judgment: Judgment normal.         Diagnoses and all orders for this visit:    1. Other ulcerative colitis without complication (CMS/HCC) (Primary)    The patient is clinically doing well on mesalamine medication daily.  There are no clinical signs suggestive of active colitis.      Plan: The patient will mesalamine 2 capsules twice daily.           The patient will follow up in the office in 6 months.

## 2021-04-26 RX ORDER — TAMSULOSIN HYDROCHLORIDE 0.4 MG/1
CAPSULE ORAL
Qty: 90 CAPSULE | Refills: 0 | Status: SHIPPED | OUTPATIENT
Start: 2021-04-26 | End: 2021-07-15

## 2021-05-03 RX ORDER — DICLOFENAC SODIUM 75 MG/1
TABLET, DELAYED RELEASE ORAL
Qty: 90 TABLET | Refills: 1 | Status: SHIPPED | OUTPATIENT
Start: 2021-05-03 | End: 2021-11-08

## 2021-05-03 RX ORDER — ATORVASTATIN CALCIUM 40 MG/1
TABLET, FILM COATED ORAL
Qty: 90 TABLET | Refills: 1 | Status: SHIPPED | OUTPATIENT
Start: 2021-05-03 | End: 2021-11-08

## 2021-05-23 DIAGNOSIS — I10 ESSENTIAL HYPERTENSION: ICD-10-CM

## 2021-05-25 RX ORDER — LOSARTAN POTASSIUM 50 MG/1
TABLET ORAL
Qty: 180 TABLET | Refills: 0 | Status: SHIPPED | OUTPATIENT
Start: 2021-05-25 | End: 2021-09-29

## 2021-06-03 ENCOUNTER — TRANSCRIBE ORDERS (OUTPATIENT)
Dept: ADMINISTRATIVE | Facility: HOSPITAL | Age: 81
End: 2021-06-03

## 2021-06-03 DIAGNOSIS — M19.011 PRIMARY OSTEOARTHRITIS OF RIGHT SHOULDER: Primary | ICD-10-CM

## 2021-06-07 RX ORDER — DIGOXIN 125 MCG
TABLET ORAL
Qty: 90 TABLET | Refills: 1 | Status: SHIPPED | OUTPATIENT
Start: 2021-06-07 | End: 2021-11-30

## 2021-06-08 ENCOUNTER — HOSPITAL ENCOUNTER (OUTPATIENT)
Dept: CT IMAGING | Facility: HOSPITAL | Age: 81
Discharge: HOME | End: 2021-06-08

## 2021-06-08 ENCOUNTER — PRE-ADMISSION TESTING (OUTPATIENT)
Dept: PREADMISSION TESTING | Facility: HOSPITAL | Age: 81
End: 2021-06-08

## 2021-06-08 VITALS — HEIGHT: 66 IN | BODY MASS INDEX: 24.94 KG/M2 | WEIGHT: 155.2 LBS

## 2021-06-08 DIAGNOSIS — M19.011 PRIMARY OSTEOARTHRITIS OF RIGHT SHOULDER: ICD-10-CM

## 2021-06-08 LAB
ANION GAP SERPL CALCULATED.3IONS-SCNC: 10 MMOL/L (ref 5–15)
BUN SERPL-MCNC: 31 MG/DL (ref 8–23)
BUN/CREAT SERPL: 24.6 (ref 7–25)
CALCIUM SPEC-SCNC: 10.7 MG/DL (ref 8.6–10.5)
CHLORIDE SERPL-SCNC: 105 MMOL/L (ref 98–107)
CO2 SERPL-SCNC: 27 MMOL/L (ref 22–29)
CREAT SERPL-MCNC: 1.26 MG/DL (ref 0.76–1.27)
DEPRECATED RDW RBC AUTO: 47.8 FL (ref 37–54)
ERYTHROCYTE [DISTWIDTH] IN BLOOD BY AUTOMATED COUNT: 14 % (ref 12.3–15.4)
GFR SERPL CREATININE-BSD FRML MDRD: 55 ML/MIN/1.73
GLUCOSE SERPL-MCNC: 149 MG/DL (ref 65–99)
HBA1C MFR BLD: 6.3 % (ref 4.8–5.6)
HCT VFR BLD AUTO: 40.8 % (ref 37.5–51)
HGB BLD-MCNC: 13.1 G/DL (ref 13–17.7)
MCH RBC QN AUTO: 30 PG (ref 26.6–33)
MCHC RBC AUTO-ENTMCNC: 32.1 G/DL (ref 31.5–35.7)
MCV RBC AUTO: 93.4 FL (ref 79–97)
PLATELET # BLD AUTO: 286 10*3/MM3 (ref 140–450)
PMV BLD AUTO: 9.8 FL (ref 6–12)
POTASSIUM SERPL-SCNC: 4.1 MMOL/L (ref 3.5–5.2)
QT INTERVAL: 410 MS
QTC INTERVAL: 419 MS
RBC # BLD AUTO: 4.37 10*6/MM3 (ref 4.14–5.8)
SODIUM SERPL-SCNC: 142 MMOL/L (ref 136–145)
WBC # BLD AUTO: 8.54 10*3/MM3 (ref 3.4–10.8)

## 2021-06-08 PROCEDURE — 73200 CT UPPER EXTREMITY W/O DYE: CPT

## 2021-06-08 PROCEDURE — 93010 ELECTROCARDIOGRAM REPORT: CPT | Performed by: INTERNAL MEDICINE

## 2021-06-08 PROCEDURE — 36415 COLL VENOUS BLD VENIPUNCTURE: CPT

## 2021-06-08 PROCEDURE — 85027 COMPLETE CBC AUTOMATED: CPT

## 2021-06-08 PROCEDURE — 83036 HEMOGLOBIN GLYCOSYLATED A1C: CPT

## 2021-06-08 PROCEDURE — 80048 BASIC METABOLIC PNL TOTAL CA: CPT

## 2021-06-08 PROCEDURE — 93005 ELECTROCARDIOGRAM TRACING: CPT

## 2021-06-08 RX ORDER — LANOLIN ALCOHOL/MO/W.PET/CERES
3 CREAM (GRAM) TOPICAL NIGHTLY
COMMUNITY

## 2021-06-08 RX ORDER — ACETAMINOPHEN, ASPIRIN AND CAFFEINE 250; 250; 65 MG/1; MG/1; MG/1
1 TABLET, FILM COATED ORAL EVERY 6 HOURS PRN
COMMUNITY

## 2021-06-08 NOTE — PAT
Patient instructed to drink 20 ounces (or until full) of Gatorade and it needs to be completed 1 hour before given arrival time for procedure (NO RED Gatorade)    Patient verbalized understanding.    Patient was given instructions on benzoyl peroxide.     Per Anesthesia Request, patient instructed not to take their ACE/ARB medications on the AM of surgery.

## 2021-06-17 ENCOUNTER — HOSPITAL ENCOUNTER (OUTPATIENT)
Dept: GENERAL RADIOLOGY | Facility: HOSPITAL | Age: 81
Discharge: HOME | End: 2021-06-17
Admitting: ORTHOPAEDIC SURGERY

## 2021-06-17 ENCOUNTER — TRANSCRIBE ORDERS (OUTPATIENT)
Dept: ADMINISTRATIVE | Facility: HOSPITAL | Age: 81
End: 2021-06-17

## 2021-06-17 DIAGNOSIS — Z01.811 PRE-OP CHEST EXAM: Primary | ICD-10-CM

## 2021-06-17 PROCEDURE — 71046 X-RAY EXAM CHEST 2 VIEWS: CPT

## 2021-06-18 ENCOUNTER — APPOINTMENT (OUTPATIENT)
Dept: PREADMISSION TESTING | Facility: HOSPITAL | Age: 81
End: 2021-06-18

## 2021-06-18 PROCEDURE — C9803 HOPD COVID-19 SPEC COLLECT: HCPCS

## 2021-06-18 PROCEDURE — U0004 COV-19 TEST NON-CDC HGH THRU: HCPCS

## 2021-06-19 LAB — SARS-COV-2 RNA NOSE QL NAA+PROBE: NOT DETECTED

## 2021-06-20 ENCOUNTER — ANESTHESIA EVENT (OUTPATIENT)
Dept: PERIOP | Facility: HOSPITAL | Age: 81
End: 2021-06-20

## 2021-06-20 RX ORDER — FAMOTIDINE 10 MG/ML
20 INJECTION, SOLUTION INTRAVENOUS ONCE
Status: CANCELLED | OUTPATIENT
Start: 2021-06-20 | End: 2021-06-20

## 2021-06-21 ENCOUNTER — ANESTHESIA (OUTPATIENT)
Dept: PERIOP | Facility: HOSPITAL | Age: 81
End: 2021-06-21

## 2021-06-21 ENCOUNTER — HOSPITAL ENCOUNTER (OUTPATIENT)
Facility: HOSPITAL | Age: 81
Discharge: HOME | End: 2021-06-21
Attending: ORTHOPAEDIC SURGERY | Admitting: ORTHOPAEDIC SURGERY

## 2021-06-21 ENCOUNTER — ANESTHESIA EVENT CONVERTED (OUTPATIENT)
Dept: ANESTHESIOLOGY | Facility: HOSPITAL | Age: 81
End: 2021-06-21

## 2021-06-21 ENCOUNTER — APPOINTMENT (OUTPATIENT)
Dept: GENERAL RADIOLOGY | Facility: HOSPITAL | Age: 81
End: 2021-06-21

## 2021-06-21 VITALS
HEART RATE: 57 BPM | RESPIRATION RATE: 16 BRPM | TEMPERATURE: 97.9 F | SYSTOLIC BLOOD PRESSURE: 164 MMHG | OXYGEN SATURATION: 84 % | DIASTOLIC BLOOD PRESSURE: 64 MMHG

## 2021-06-21 LAB — POTASSIUM SERPL-SCNC: 3.7 MMOL/L (ref 3.5–5.2)

## 2021-06-21 PROCEDURE — 84132 ASSAY OF SERUM POTASSIUM: CPT | Performed by: ANESTHESIOLOGY

## 2021-06-21 PROCEDURE — 25010000002 ROPIVACAINE PER 1 MG: Performed by: NURSE ANESTHETIST, CERTIFIED REGISTERED

## 2021-06-21 PROCEDURE — 25010000002 FENTANYL CITRATE (PF) 50 MCG/ML SOLUTION: Performed by: NURSE ANESTHETIST, CERTIFIED REGISTERED

## 2021-06-21 PROCEDURE — C1776 JOINT DEVICE (IMPLANTABLE): HCPCS | Performed by: ORTHOPAEDIC SURGERY

## 2021-06-21 PROCEDURE — C1713 ANCHOR/SCREW BN/BN,TIS/BN: HCPCS | Performed by: ORTHOPAEDIC SURGERY

## 2021-06-21 PROCEDURE — 97535 SELF CARE MNGMENT TRAINING: CPT

## 2021-06-21 PROCEDURE — 25010000002 NEOSTIGMINE 10 MG/10ML SOLUTION: Performed by: NURSE ANESTHETIST, CERTIFIED REGISTERED

## 2021-06-21 PROCEDURE — 25010000002 VANCOMYCIN PER 500 MG: Performed by: ORTHOPAEDIC SURGERY

## 2021-06-21 PROCEDURE — L3670 SO ACRO/CLAV CAN WEB PRE OTS: HCPCS | Performed by: ORTHOPAEDIC SURGERY

## 2021-06-21 PROCEDURE — 25010000003 LIDOCAINE 1 % SOLUTION: Performed by: NURSE ANESTHETIST, CERTIFIED REGISTERED

## 2021-06-21 PROCEDURE — C1889 IMPLANT/INSERT DEVICE, NOC: HCPCS | Performed by: ORTHOPAEDIC SURGERY

## 2021-06-21 PROCEDURE — 25010000002 DEXAMETHASONE PER 1 MG: Performed by: NURSE ANESTHETIST, CERTIFIED REGISTERED

## 2021-06-21 PROCEDURE — 73030 X-RAY EXAM OF SHOULDER: CPT

## 2021-06-21 PROCEDURE — 25010000002 CEFAZOLIN PER 500 MG: Performed by: ORTHOPAEDIC SURGERY

## 2021-06-21 PROCEDURE — 97110 THERAPEUTIC EXERCISES: CPT

## 2021-06-21 PROCEDURE — 97165 OT EVAL LOW COMPLEX 30 MIN: CPT

## 2021-06-21 PROCEDURE — 76942 ECHO GUIDE FOR BIOPSY: CPT | Performed by: ORTHOPAEDIC SURGERY

## 2021-06-21 PROCEDURE — 25010000002 ONDANSETRON PER 1 MG: Performed by: NURSE ANESTHETIST, CERTIFIED REGISTERED

## 2021-06-21 PROCEDURE — 25010000002 PROPOFOL 10 MG/ML EMULSION: Performed by: NURSE ANESTHETIST, CERTIFIED REGISTERED

## 2021-06-21 DEVICE — IMPLANTABLE DEVICE
Type: IMPLANTABLE DEVICE | Site: SHOULDER | Status: FUNCTIONAL
Brand: EQUINOXE

## 2021-06-21 DEVICE — C-WIRE PAK DOUBLE ENDED ORTHOPAEDIC WIRE, SPADE, .062" (1.57 MM)
Type: IMPLANTABLE DEVICE | Site: SHOULDER | Status: FUNCTIONAL
Brand: C-WIRE

## 2021-06-21 DEVICE — SUT FW #2 W/TPR NDL 1/2 CIR 38IN 97CM 26.5MM BLU: Type: IMPLANTABLE DEVICE | Site: SHOULDER | Status: FUNCTIONAL

## 2021-06-21 DEVICE — IMPLANTABLE DEVICE: Type: IMPLANTABLE DEVICE | Site: SHOULDER | Status: FUNCTIONAL

## 2021-06-21 RX ORDER — TRANEXAMIC ACID 10 MG/ML
1000 INJECTION, SOLUTION INTRAVENOUS ONCE
Status: COMPLETED | OUTPATIENT
Start: 2021-06-21 | End: 2021-06-21

## 2021-06-21 RX ORDER — PROMETHAZINE HYDROCHLORIDE 25 MG/1
25 TABLET ORAL ONCE AS NEEDED
Status: DISCONTINUED | OUTPATIENT
Start: 2021-06-21 | End: 2021-06-21 | Stop reason: HOSPADM

## 2021-06-21 RX ORDER — OXYCODONE HYDROCHLORIDE 5 MG/1
5 TABLET ORAL EVERY 4 HOURS PRN
Status: CANCELLED | OUTPATIENT
Start: 2021-06-21 | End: 2021-07-01

## 2021-06-21 RX ORDER — MAGNESIUM HYDROXIDE 1200 MG/15ML
LIQUID ORAL AS NEEDED
Status: DISCONTINUED | OUTPATIENT
Start: 2021-06-21 | End: 2021-06-21 | Stop reason: HOSPADM

## 2021-06-21 RX ORDER — ACETAMINOPHEN 500 MG
1000 TABLET ORAL ONCE
Status: COMPLETED | OUTPATIENT
Start: 2021-06-21 | End: 2021-06-21

## 2021-06-21 RX ORDER — CEFAZOLIN SODIUM 2 G/100ML
2 INJECTION, SOLUTION INTRAVENOUS ONCE
Status: COMPLETED | OUTPATIENT
Start: 2021-06-21 | End: 2021-06-21

## 2021-06-21 RX ORDER — NEOSTIGMINE METHYLSULFATE 1 MG/ML
INJECTION, SOLUTION INTRAVENOUS AS NEEDED
Status: DISCONTINUED | OUTPATIENT
Start: 2021-06-21 | End: 2021-06-21 | Stop reason: SURG

## 2021-06-21 RX ORDER — EPHEDRINE SULFATE 50 MG/ML
5 INJECTION, SOLUTION INTRAVENOUS ONCE AS NEEDED
Status: DISCONTINUED | OUTPATIENT
Start: 2021-06-21 | End: 2021-06-21 | Stop reason: HOSPADM

## 2021-06-21 RX ORDER — FAMOTIDINE 20 MG/1
20 TABLET, FILM COATED ORAL ONCE
Status: COMPLETED | OUTPATIENT
Start: 2021-06-21 | End: 2021-06-21

## 2021-06-21 RX ORDER — OXYCODONE HYDROCHLORIDE 5 MG/1
5 TABLET ORAL EVERY 4 HOURS PRN
Qty: 25 TABLET | Refills: 0 | Status: SHIPPED | OUTPATIENT
Start: 2021-06-21 | End: 2021-09-10 | Stop reason: ALTCHOICE

## 2021-06-21 RX ORDER — PROPOFOL 10 MG/ML
VIAL (ML) INTRAVENOUS AS NEEDED
Status: DISCONTINUED | OUTPATIENT
Start: 2021-06-21 | End: 2021-06-21 | Stop reason: SURG

## 2021-06-21 RX ORDER — BUPIVACAINE HYDROCHLORIDE 2.5 MG/ML
INJECTION, SOLUTION EPIDURAL; INFILTRATION; INTRACAUDAL
Status: COMPLETED | OUTPATIENT
Start: 2021-06-21 | End: 2021-06-21

## 2021-06-21 RX ORDER — FENTANYL CITRATE 50 UG/ML
INJECTION, SOLUTION INTRAMUSCULAR; INTRAVENOUS
Status: DISCONTINUED | OUTPATIENT
Start: 2021-06-21 | End: 2021-06-22

## 2021-06-21 RX ORDER — GLYCOPYRROLATE 0.2 MG/ML
INJECTION INTRAMUSCULAR; INTRAVENOUS AS NEEDED
Status: DISCONTINUED | OUTPATIENT
Start: 2021-06-21 | End: 2021-06-21 | Stop reason: SURG

## 2021-06-21 RX ORDER — SODIUM CHLORIDE 0.9 % (FLUSH) 0.9 %
10 SYRINGE (ML) INJECTION AS NEEDED
Status: DISCONTINUED | OUTPATIENT
Start: 2021-06-21 | End: 2021-06-21 | Stop reason: HOSPADM

## 2021-06-21 RX ORDER — ONDANSETRON 2 MG/ML
INJECTION INTRAMUSCULAR; INTRAVENOUS AS NEEDED
Status: DISCONTINUED | OUTPATIENT
Start: 2021-06-21 | End: 2021-06-21 | Stop reason: SURG

## 2021-06-21 RX ORDER — LIDOCAINE HYDROCHLORIDE 10 MG/ML
0.5 INJECTION, SOLUTION EPIDURAL; INFILTRATION; INTRACAUDAL; PERINEURAL ONCE AS NEEDED
Status: COMPLETED | OUTPATIENT
Start: 2021-06-21 | End: 2021-06-21

## 2021-06-21 RX ORDER — VANCOMYCIN HYDROCHLORIDE 1 G/200ML
1000 INJECTION, SOLUTION INTRAVENOUS ONCE
Status: COMPLETED | OUTPATIENT
Start: 2021-06-21 | End: 2021-06-21

## 2021-06-21 RX ORDER — LIDOCAINE HYDROCHLORIDE 10 MG/ML
INJECTION, SOLUTION INFILTRATION; PERINEURAL AS NEEDED
Status: DISCONTINUED | OUTPATIENT
Start: 2021-06-21 | End: 2021-06-21 | Stop reason: SURG

## 2021-06-21 RX ORDER — MIDAZOLAM HYDROCHLORIDE 1 MG/ML
0.5 INJECTION INTRAMUSCULAR; INTRAVENOUS
Status: DISCONTINUED | OUTPATIENT
Start: 2021-06-21 | End: 2021-06-21 | Stop reason: HOSPADM

## 2021-06-21 RX ORDER — EPHEDRINE SULFATE 50 MG/ML
INJECTION, SOLUTION INTRAVENOUS AS NEEDED
Status: DISCONTINUED | OUTPATIENT
Start: 2021-06-21 | End: 2021-06-21 | Stop reason: SURG

## 2021-06-21 RX ORDER — PROMETHAZINE HYDROCHLORIDE 25 MG/1
25 SUPPOSITORY RECTAL ONCE AS NEEDED
Status: DISCONTINUED | OUTPATIENT
Start: 2021-06-21 | End: 2021-06-21 | Stop reason: HOSPADM

## 2021-06-21 RX ORDER — SODIUM CHLORIDE, SODIUM LACTATE, POTASSIUM CHLORIDE, CALCIUM CHLORIDE 600; 310; 30; 20 MG/100ML; MG/100ML; MG/100ML; MG/100ML
9 INJECTION, SOLUTION INTRAVENOUS CONTINUOUS
Status: DISCONTINUED | OUTPATIENT
Start: 2021-06-21 | End: 2021-06-21 | Stop reason: HOSPADM

## 2021-06-21 RX ORDER — SODIUM CHLORIDE 450 MG/100ML
50 INJECTION, SOLUTION INTRAVENOUS CONTINUOUS
Status: CANCELLED | OUTPATIENT
Start: 2021-06-21

## 2021-06-21 RX ORDER — NALOXONE HCL 0.4 MG/ML
0.1 VIAL (ML) INJECTION
Status: CANCELLED | OUTPATIENT
Start: 2021-06-21

## 2021-06-21 RX ORDER — DEXAMETHASONE SODIUM PHOSPHATE 4 MG/ML
INJECTION, SOLUTION INTRA-ARTICULAR; INTRALESIONAL; INTRAMUSCULAR; INTRAVENOUS; SOFT TISSUE AS NEEDED
Status: DISCONTINUED | OUTPATIENT
Start: 2021-06-21 | End: 2021-06-21 | Stop reason: SURG

## 2021-06-21 RX ORDER — ROCURONIUM BROMIDE 10 MG/ML
INJECTION, SOLUTION INTRAVENOUS AS NEEDED
Status: DISCONTINUED | OUTPATIENT
Start: 2021-06-21 | End: 2021-06-21 | Stop reason: SURG

## 2021-06-21 RX ORDER — FENTANYL CITRATE 50 UG/ML
50 INJECTION, SOLUTION INTRAMUSCULAR; INTRAVENOUS
Status: DISCONTINUED | OUTPATIENT
Start: 2021-06-21 | End: 2021-06-21 | Stop reason: HOSPADM

## 2021-06-21 RX ORDER — ACETAMINOPHEN 325 MG/1
650 TABLET ORAL EVERY 4 HOURS PRN
Status: CANCELLED | OUTPATIENT
Start: 2021-06-21

## 2021-06-21 RX ORDER — SODIUM CHLORIDE 0.9 % (FLUSH) 0.9 %
10 SYRINGE (ML) INJECTION EVERY 12 HOURS SCHEDULED
Status: DISCONTINUED | OUTPATIENT
Start: 2021-06-21 | End: 2021-06-21 | Stop reason: HOSPADM

## 2021-06-21 RX ADMIN — ONDANSETRON 4 MG: 2 INJECTION INTRAMUSCULAR; INTRAVENOUS at 11:10

## 2021-06-21 RX ADMIN — ACETAMINOPHEN 1000 MG: 500 TABLET ORAL at 07:54

## 2021-06-21 RX ADMIN — ROPIVACAINE HYDROCHLORIDE 6 ML/HR: 5 INJECTION, SOLUTION EPIDURAL; INFILTRATION; PERINEURAL at 11:34

## 2021-06-21 RX ADMIN — FAMOTIDINE 20 MG: 20 TABLET ORAL at 07:55

## 2021-06-21 RX ADMIN — BUPIVACAINE HYDROCHLORIDE 5 ML: 2.5 INJECTION, SOLUTION EPIDURAL; INFILTRATION; INTRACAUDAL at 09:34

## 2021-06-21 RX ADMIN — SODIUM CHLORIDE, POTASSIUM CHLORIDE, SODIUM LACTATE AND CALCIUM CHLORIDE 9 ML/HR: 600; 310; 30; 20 INJECTION, SOLUTION INTRAVENOUS at 07:34

## 2021-06-21 RX ADMIN — PROPOFOL 150 MG: 10 INJECTION, EMULSION INTRAVENOUS at 09:20

## 2021-06-21 RX ADMIN — TRANEXAMIC ACID 1000 MG: 10 INJECTION, SOLUTION INTRAVENOUS at 11:10

## 2021-06-21 RX ADMIN — EPHEDRINE SULFATE 10 MG: 50 INJECTION INTRAVENOUS at 09:34

## 2021-06-21 RX ADMIN — FENTANYL CITRATE 100 MCG: 50 INJECTION, SOLUTION INTRAMUSCULAR; INTRAVENOUS at 08:07

## 2021-06-21 RX ADMIN — DEXAMETHASONE SODIUM PHOSPHATE 8 MG: 4 INJECTION, SOLUTION INTRA-ARTICULAR; INTRALESIONAL; INTRAMUSCULAR; INTRAVENOUS; SOFT TISSUE at 09:20

## 2021-06-21 RX ADMIN — CEFAZOLIN 2 G: 10 INJECTION, POWDER, FOR SOLUTION INTRAVENOUS at 08:33

## 2021-06-21 RX ADMIN — GLYCOPYRROLATE 0.4 MG: 0.4 INJECTION INTRAMUSCULAR; INTRAVENOUS at 11:28

## 2021-06-21 RX ADMIN — BUPIVACAINE HYDROCHLORIDE 15 ML: 2.5 INJECTION, SOLUTION EPIDURAL; INFILTRATION; INTRACAUDAL at 08:11

## 2021-06-21 RX ADMIN — VANCOMYCIN HYDROCHLORIDE 1000 MG: 1 INJECTION, SOLUTION INTRAVENOUS at 08:55

## 2021-06-21 RX ADMIN — LIDOCAINE HYDROCHLORIDE 50 MG: 10 INJECTION, SOLUTION INFILTRATION; PERINEURAL at 09:20

## 2021-06-21 RX ADMIN — TRANEXAMIC ACID 1000 MG: 10 INJECTION, SOLUTION INTRAVENOUS at 09:28

## 2021-06-21 RX ADMIN — LIDOCAINE HYDROCHLORIDE 0.2 ML: 10 INJECTION, SOLUTION EPIDURAL; INFILTRATION; INTRACAUDAL; PERINEURAL at 07:34

## 2021-06-21 RX ADMIN — ROCURONIUM BROMIDE 50 MG: 10 INJECTION, SOLUTION INTRAVENOUS at 09:20

## 2021-06-21 RX ADMIN — EPHEDRINE SULFATE 15 MG: 50 INJECTION INTRAVENOUS at 09:26

## 2021-06-21 RX ADMIN — NEOSTIGMINE 4 MG: 1 INJECTION INTRAVENOUS at 11:28

## 2021-06-21 NOTE — H&P
Pre-Op H&P  Nathan Velarde  [id number]  1940      Chief complaint: Right shoulder pain      Subjective:  Patient is a 80 y.o.male presents for scheduled surgery by Dr. Humphrey. She anticipates a TOTAL SHOULDER ARTHROPLASTY, POSSIBLY REVERSE, BICEPS TENODESIS - RIGHT today. His shoulder has been painful with limited ROM for about a year. He denies use of assistive device for ambulation or recent falls. He tried steroid injections without lasting benefits.       Review of Systems:  Constitutional-- No fever, chills or sweats. No fatigue.  CV-- No chest pain, palpitation or syncope. +HTN, HLD, PAF  Resp-- No SOB, cough, hemoptysis  Skin--No rashes or lesions      Allergies:   Allergies   Allergen Reactions   • Sulfa Antibiotics Nausea Only and Other (See Comments)     Headache         Home Meds:  Medications Prior to Admission   Medication Sig Dispense Refill Last Dose   • Alpha-Lipoic Acid 600 MG capsule Take 600 mg by mouth Daily.   6/21/2021 at 0530   • atorvastatin (LIPITOR) 40 MG tablet TAKE 1 TABLET NIGHTLY AT   BEDTIME (Patient taking differently: Take 40 mg by mouth Every Night.) 90 tablet 1 6/20/2021 at 2200   • Calcium Carbonate-Vitamin D (CALCIUM 600+D PO) Take 1 tablet by mouth Daily.   6/20/2021 at Unknown time   • chlorthalidone (HYGROTON) 25 MG tablet TAKE ONE TABLET BY MOUTH DAILY (Patient taking differently: Take 25 mg by mouth Daily.) 90 tablet 10 6/21/2021 at 0530   • cholecalciferol (VITAMIN D3) 1000 units tablet Take 1,000 Units by mouth Daily.   6/20/2021 at 1500   • coenzyme Q10 100 MG capsule Take 100 mg by mouth Daily.   6/20/2021 at 2200   • digoxin (LANOXIN) 125 MCG tablet TAKE ONE TABLET BY MOUTH DAILY (Patient taking differently: Take 125 mcg by mouth Daily.) 90 tablet 1 6/21/2021 at 0530   • dilTIAZem CD (CARDIZEM CD) 180 MG 24 hr capsule TAKE 1 CAPSULE DAILY (Patient taking differently: Take 180 mg by mouth Every Night.) 90 capsule 1 6/20/2021 at 2200   • Fish Oil-Cholecalciferol  (OMEGA-3 + D PO) Take 1 capsule by mouth Daily.   6/20/2021 at 2200   • Glucosamine-Chondroit-Vit C-Mn (GLUCOSAMINE 1500 COMPLEX) capsule Take 1 tablet by mouth Daily.   6/20/2021 at 2200   • losartan (COZAAR) 50 MG tablet TAKE ONE TABLET BY MOUTH TWICE A DAY (Patient taking differently: Take 37.5 mg by mouth Daily. Patient says it is prescribed as 50 mg a day but tolerates 37.5 mg a day better) 180 tablet 0 6/20/2021 at 2200   • Magnesium 250 MG tablet Take 250 mg by mouth Daily.   6/20/2021 at Unknown time   • melatonin 3 MG tablet Take 3 mg by mouth Every Night.   6/20/2021 at 2200   • mesalamine (DELZICOL) 400 MG capsule delayed-release delayed release capsule Take 2 capsules by mouth 2 (Two) Times a Day. 360 capsule 4 6/21/2021 at 0530   • Multiple Vitamins-Minerals (MULTIVITAMIN ADULT PO) Take 1 tablet by mouth Daily.   6/20/2021 at 2200   • potassium chloride 10 MEQ CR tablet TAKE 1 TABLET DAILY (Patient taking differently: Take 10 mEq by mouth.) 90 tablet 4 6/20/2021 at 2200   • Saw Palmetto 160 MG capsule Take 1 capsule by mouth Daily.   6/20/2021 at 2200   • Selenium 200 MCG capsule Take 200 mcg by mouth Daily.   6/21/2021 at 0530   • tamsulosin (FLOMAX) 0.4 MG capsule 24 hr capsule TAKE ONE CAPSULE BY MOUTH EVERY NIGHT AT BEDTIME (Patient taking differently: 1 capsule Daily.) 90 capsule 0 6/20/2021 at 2200   • vitamin E 400 UNIT capsule Take 400 Units by mouth Daily.   6/21/2021 at 0530   • Zinc 50 MG capsule Take 50 mg by mouth Daily.   6/21/2021 at 0530   • aspirin-acetaminophen-caffeine (EXCEDRIN MIGRAINE) 250-250-65 MG per tablet Take 1 tablet by mouth Every 6 (Six) Hours As Needed for Headache.   6/16/2021   • diclofenac (VOLTAREN) 75 MG EC tablet TAKE 1 TABLET DAILY (Patient taking differently: Take 75 mg by mouth.) 90 tablet 1 6/16/2021         PMH:   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    • Bilateral  bunions     LEFT SIDE ONLY   • Bladder cancer (CMS/HCC) 1977    curative exfulguration   • DJD of left shoulder Adulthood    Severely advanced   • Generalized osteoarthritis Adulthood    Moderately advanced disease   • HTN (hypertension) 1990   • Hyperlipidemia Adulthood    Response to atorvastatin   • Knee osteoarthritis 2004   • Mitral valve insufficiency    • Nephrolithiasis 2000    Documented on IVP   • PAF (paroxysmal atrial fibrillation) (CMS/HCC) 2008    Mild disease - patient declines anticoagulation   • Prediabetes 2015    Hemoglobin A1c 6.2%.   • Prostatism 1995   • Transverse myelitis (CMS/HCC) 2014    Transient T10 with numbness and minimal ataxia - transient    • Ulcerative colitis (CMS/HCC) 1977    Well-controlled on mesalamine    • Wears glasses      PSH:    Past Surgical History:   Procedure Laterality Date   • BICEPS TENDON REPAIR Left 5/7/2018    Procedure: BICEPS TENDODESIS;  Surgeon: Abner Humphrey MD;  Location:  [email or web address];  Service: Orthopedics   • BLADDER SURGERY     • CATARACT EXTRACTION, BILATERAL     • COLONOSCOPY  2015    Colitis in remission   • CYSTOSCOPY  1977    Excision of bladder cancer   • INGUINAL HERNIA REPAIR Right 1994   • KNEE SURGERY Right 1955    Excision of cyst   • LIPOMA EXCISION     • TONSILLECTOMY  1943   • TOTAL SHOULDER ARTHROPLASTY Left 5/7/2018    Procedure: LEFT TOTAL SHOULDER ARTHROPLASTY;  Surgeon: Abner Humphrey MD;  Location:  [facility];  Service: Orthopedics       Immunization History:  Influenza: 2020  Pneumococcal: UTD  Tetanus: UTD  Covid x2: 2021    Social History:   Tobacco:   Social History     Tobacco Use   Smoking Status Never Smoker   Smokeless Tobacco Never Used      Alcohol:     Social History     Substance and Sexual Activity   Alcohol Use Yes   • Alcohol/week: 1.0 standard drinks   • Types: 1 Cans of beer per week    Comment: OCC         Physical Exam:BP (!) 189/71 (BP Location: Right arm, Patient Position: Lying)   Pulse 79   Temp 96.8  °F (36 °C) (Temporal)   Resp 18   SpO2 96%       General Appearance:    Alert, cooperative, no distress, appears stated age   Head:    Normocephalic, without obvious abnormality, atraumatic   Lungs:     Clear to auscultation bilaterally, respirations unlabored    Heart:   Regular rate and rhythm, S1 and S2 normal    Abdomen:    Soft without tenderness   Extremities:   Extremities normal, atraumatic, no cyanosis or edema   Skin:   Skin color, texture, turgor normal, no rashes or lesions   Neurologic:   Grossly intact     Results Review:     LABS:  Lab Results   Component Value Date    WBC 8.54 06/08/2021    HGB 13.1 06/08/2021    HCT 40.8 06/08/2021    MCV 93.4 06/08/2021     06/08/2021    NEUTROABS 7.88 (H) 01/15/2021    GLUCOSE 149 (H) 06/08/2021    BUN 31 (H) 06/08/2021    CREATININE 1.26 06/08/2021    EGFRIFNONA 55 (L) 06/08/2021     06/08/2021    K 4.1 06/08/2021     06/08/2021    CO2 27.0 06/08/2021    MG 2.3 06/05/2018    CALCIUM 10.7 (H) 06/08/2021    ALBUMIN 4.40 01/15/2021    AST 38 01/15/2021    ALT 36 01/15/2021    BILITOT 0.4 01/15/2021       RADIOLOGY:  6/8/21 CT RUE:  IMPRESSION:  Advanced acromioclavicular and glenohumeral arthrosis  changes are present with narrowing, sclerosis, small osteophytes and  subchondral cystic change. There is also some heterotopic ossification  noted immediately inferior to the coracoid. There is otherwise no  unexpected soft tissue mass or focal fluid collection. No acute fracture  or dislocation. No suspicious lytic or sclerotic osseous lesions.     I reviewed the patient's new clinical results.    Cancer Staging (if applicable)  Cancer Patient: __ yes __no __unknown; If yes, clinical stage T:__ N:__M:__, stage group or __N/A      Impression: Right shoulder pain      Plan: TOTAL SHOULDER ARTHROPLASTY, POSSIBLY REVERSE, BICEPS TENODESIS - RIGHT      Ailyn Srinivas, AYAH   6/21/2021   07:49 EDT

## 2021-06-21 NOTE — ANESTHESIA PREPROCEDURE EVALUATION
" Anesthesia Evaluation     Patient summary reviewed and Nursing notes reviewed   no history of anesthetic complications:  NPO Solid Status: > 8 hours  NPO Liquid Status: > 2 hours           Airway   Mallampati: I  TM distance: >3 FB  Neck ROM: full  No difficulty expected  Dental - normal exam     Pulmonary - negative pulmonary ROS and normal exam   Cardiovascular     ECG reviewed  Rhythm: regular  Rate: normal    (+) hypertension, valvular problems/murmurs AI, dysrhythmias Atrial Fib, murmur, hyperlipidemia,     ROS comment: Interpretation Summary 9/3/20    · Estimated EF = 65%.  · Moderate to severe aortic valve regurgitation is present.  · Left ventricular systolic function is normal.  · Left ventricular diastolic dysfunction (grade I) consistent with impaired relaxation.        Neuro/Psych  (+) numbness,     (-) seizures, TIA, CVA    ROS Comment: H/o transverse myelitis  GI/Hepatic/Renal/Endo    (+)   renal disease CRI, diabetes mellitus (\"pre diabetes; diet controlled\"),   (-) GERD, hepatitis, liver disease, no thyroid disorder    Musculoskeletal     Abdominal    Substance History      OB/GYN          Other   arthritis,    history of cancer (bladder cancer)                  Anesthesia Plan    ASA 3     general with block     intravenous induction     Anesthetic plan, all risks, benefits, and alternatives have been provided, discussed and informed consent has been obtained with: patient.  Use of blood products discussed with consented to blood products.   Plan discussed with CRNA.      "

## 2021-06-21 NOTE — PLAN OF CARE
Goal Outcome Evaluation:  Plan of Care Reviewed With: patient, spouse        Progress: improving  Outcome Summary: OT eval complete. Pt had prior TSA by Dr. Humphrey and wife with good recall. OT reviewed all sling management as well as wear and care, shoulder precautions, axilla care, monty dressing, and care of nerve catheter during ADLS. Wife with good teach back on all sling management and assisted pt to on pullover katharine via monty dressing technique. Pt tolerated PROM , IR to chest, ER to 30 and PROM elbow/wrist/hand x10 reps. Pt with increased numbess from block and minimal hand movement. Pt ambulated 250 feet with CGA and no AE. Pt with noted LOB however able to correct. Per pt and wife this is baseline function for pt. OT recommended if pt returns home this date wife should take gait belt and use. O2 satuartions decreaed to 88% during mobility but quickly impoved with cues for PLB. Pt did pass mobility screen and will not need PT eval. Recommend d/c home with assist from wife when medically ready.

## 2021-06-21 NOTE — OP NOTE
DATE OF OPERATION: 06/21/21  PREOPERATIVE DIAGNOSIS: Primary osteoarthritis of right shoulder [796604]    POSTOPERATIVE DIAGNOSES:  1. Primary osteoarthritis of right shoulder [586548]  2. Biceps tenosynovitis.    PROCEDURES PERFORMED:  1.  Right reverse total shoulder arthroplasty.    2.  Right biceps tenodesis.  3.  Computer-assisted intraoperative image-less navigation   SURGEON: Abner Humphrey MD  ASSISTANTS:  1. Byron Figueroa MD, PGY-5.    ANESTHESIA: General plus block.    ESTIMATED BLOOD LOSS:50mL.    COMPLICATIONS: None.    DISPOSITION: Recovery room in stable condition.    IMPLANTS: [employer] Equinoxe reverse total shoulder system, 13 mm stem press-fit, 0 metal liner tray, 0 x 42 polyethylene tray, right 8 degree posterior augment baseplate with 4 screws with locking caps, and a 42 mm glenosphere.    INDICATIONS: This is a 80-year-old male with right shoulder pain and limited function and motion secondary to arthropathy. They have failed conservative treatment and after a discussion of risks, benefits, and alternatives, wished to proceed with shoulder arthroplasty.  DESCRIPTION OF PROCEDURE: On the day of surgery, the patient identified the right shoulder as the correct operative extremity. This was initialed by the surgeon with the patients's acknowledgment. The patient underwent placement of an interscalene block and was taken to the operating room and placed in the supine position. Upon induction of adequate anesthesia, the patient was brought up to the beach chair position and the shoulder and upper extremity were prepped and draped in the usual sterile fashion. Timeout confirmed the correct patient and operative extremity as well as that antibiotics were on board. A standard deltopectoral approach to the shoulder was carried out. It was carried sharply through the skin and subcutaneous tissue. Medial and lateral flaps were developed over the deltopectoral fascia. The cephalic vein was identified and  mobilized laterally with the deltoid. The subdeltoid and subpectoral spaces were mobilized and a blunt retractor was placed deep to this. The clavipectoral fascia was opened on the lateral edge of the conjoined tendon and the retractor was moved deep to this. The leading edge of the pectoralis was released exposing the long head of the biceps. This was tenosynovitic. It was tenodesed to the pectoralis and released proximal to this. The 3 sisters were identified and coagulated. A subscapularis tenotomy was performed 1 cm medial to the lesser tuberosity and rotator interval was released to the glenoid exposing the humeral head. The inferior capsule was released directly off the humerus to allow greater than 90° of external rotation. The anatomic neck was exposed and the humeral head osteotomy was performed in approximately 25° of retroversion. The remainder of the osteophytes were removed. The canal was then entered, reamed, and broached. The final stem impacted in in approximately 25° of retroversion.  A small nondisplaced crack was noted on the medial calcar that did not propagate and remained stable throughout the whole procedure even after impacting the implants later.  A head protector was placed. The humerus was subluxed posteriorly. The glenoid exposed. Circumferential labral excision and capsular release were performed. A 270° mobilization of the subscapularis was carried out as well.  By preoperative planning, he had 25 degrees of retroversion and given his age the decision was made to proceed with a reverse shoulder arthroplasty.  The navigation tracker was placed on the coracoid and calibrations performed. Using the navigation system, a centering hole was drilled and the glenoid was reamed to the preoperative plan. The large central hole for the baseplate was drilled and the cage glenoid baseplate impacted in. Next, the inferior screw hole was drilled and a screw placed with excellent purchase.  Next, an  eden-inferior screw was placed, then a postero-inferior screw, then a superior screw placed with excellent purchase in all.  Locking caps were placed. The glenosphere was then inserted and locked into place with a set screw.  The humerus was carefully subluxed back anteriorly. A liner tray and polyethylene were placed and trialing was carried out. The appropriate final sizes were chosen and locked into place.  The shoulder was then reduced.  This allowed nearly full passive range of motion with no instability. The joint was copiously irrigated with orthopedic irrigation mixed with Betadine after the final implants were assembled and locked into place.  The subscapularis was repaired using #2 FiberWire.  The repair was stable to approximately 50 degrees.  Passive range range of motion will be full elevation but external rotation will be limited to 30° in the perioperative period. The deltopectoral interval was approximated with 0 Vicryl, the subcutaneous tissue with 2-0 Vicryl, and the skin with Monocryl and Dermabond. A sterile dressing was placed. Anesthesia was reversed and the patient was taken to the recovery room in stable condition. All instrument, needle, and sponge counts were correct.      Abner Humphrey MD*

## 2021-06-21 NOTE — ANESTHESIA PROCEDURE NOTES
RIGHT interscalene cath       Patient reassessed immediately prior to procedure    Patient location during procedure: pre-op  Start time: 6/21/2021 8:03 AM  Stop time: 6/21/2021 8:15 AM  Reason for block: at surgeon's request and post-op pain management  Performed by  CRNA: Kenneth Champagne CRNA  Assisted by: Mason Chaparro CRNA  Preanesthetic Checklist  Completed: patient identified, IV checked, site marked, risks and benefits discussed, surgical consent, monitors and equipment checked, pre-op evaluation and timeout performed  Prep:  Pt Position: left lateral decubitus  Sterile barriers:cap, gloves, mask and sterile barriers  Prep: ChloraPrep  Patient monitoring: blood pressure monitoring, continuous pulse oximetry and EKG  Procedure  Sedation:yes  Performed under: local infiltration  Guidance:ultrasound guided  Images:still images obtained, printed/placed on chart    Laterality:right  Block Type:interscalene  Injection Technique:catheter  Needle Type:Tuohy and echogenic  Needle Gauge:18 G  Resistance on Injection: none  Catheter Size:20 G (20g)  Cath Depth at skin: 12 cm    Medications Used: fentaNYL citrate (PF) (SUBLIMAZE) injection, 100 mcg  bupivacaine PF (MARCAINE) 0.25 % injection, 15 mL  Med admintered at 6/21/2021 8:12 AM      Medications  Preservative Free Saline:5ml    Post Assessment  Injection Assessment: negative aspiration for heme, no paresthesia on injection and incremental injection  Patient Tolerance:comfortable throughout block  Complications:no  Additional Notes  Procedure:                 The pt was placed in semifowlers position with a slight tilt of the thorax contralateral to the insertion site.  The Insertion Site was prepped and draped in sterile fashion.  The pt was anesthetized with  IV Sedation( see meds) and  Skin and cutaneous tissue was infiltrated and anesthetized with 1% Lidocaine 3 mls via a 25g needle.  Utilizing ultrasound guidance, a BBraun 4 inch 18 g Contiplex echogenic  touhy needle was advanced in-plane.  Hydro dissection of tissue was achieved with Normal saline. Major vessels(carotid and Internal Jugular) where visualized as the brachial plexus was approached at the approximate level of C-7/ T-1.  Cervical 5 and Branches of Cervical 6 nerve roots where visualized and the needle tip was placed posterior at the level of C-6 roots.  LA spread was visualized and injection was made incrementally every 5 mls with aspiration. Injection pressure was normal or little, there was no intraneural injection, no vascular injection.      The BBraun 20 g wire stylet catheter was then placed under US guidance on the posterior aspect of the Brachial Plexus. The tuohy was removed and the location of catheter was confirmed with NS injection visualized with US . The skin was sealed with exofin tissue adhesive at catheter insertion site.  Skin was prepped with benzoin and the catheter was secured with steristrips and a CHG tegaderm. Appropriate labels applied. Thank You.

## 2021-06-21 NOTE — THERAPY EVALUATION
Patient Name: Nathan Velarde  : 1940    MRN: [id number]                              Today's Date: 2021       Admit Date: 2021    Visit Dx: No diagnosis found.  Patient Active Problem List   Diagnosis   • Anemia   • Aortic valve disorder   • Ataxic gait   • Paroxysmal atrial fibrillation   • Prostatism   • Ulcerative colitis (CMS/HCC)   • Transverse myelitis (CMS/HCC)   • Testosterone deficiency   • Paresthesia   • Lumbosacral neuritis   • Osteoarthritis of lumbar spine   • Hypertension   • Hyperlipidemia   • Generalized osteoarthritis   • CKD (chronic kidney disease), stage II   • Hyperglycemia   • Shoulder pain, left   • Preventative health care   • Status post total shoulder arthroplasty, left   • Knee osteoarthritis     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   • Prostatism    • Transverse myelitis (CMS/HCC)     Transient T10 with numbness and minimal ataxia - transient    • Ulcerative colitis (CMS/HCC)     Well-controlled on mesalamine    • Wears glasses      Past Surgical History:   Procedure Laterality Date   • BICEPS TENDON REPAIR Left 2018    Procedure: BICEPS TENDODESIS;  Surgeon: Abner Humphrey MD;  [facility];  Service: Orthopedics   • BLADDER SURGERY     • CATARACT EXTRACTION, BILATERAL     • COLONOSCOPY   2015    Colitis in remission   • CYSTOSCOPY  1977    Excision of bladder cancer   • INGUINAL HERNIA REPAIR Right 1994   • KNEE SURGERY Right 1955    Excision of cyst   • LIPOMA EXCISION     • TONSILLECTOMY  1943   • TOTAL SHOULDER ARTHROPLASTY Left 5/7/2018    Procedure: LEFT TOTAL SHOULDER ARTHROPLASTY;  Surgeon: Abner Humphrey MD;  Location: [facility] OR;  Service: Orthopedics     General Information     Row Name 06/21/21 1435          OT Time and Intention    Document Type  evaluation  -HK     Mode of Treatment  occupational therapy  -     Row Name 06/21/21 1435          General Information    Patient Profile Reviewed  yes  -HK     Prior Level of Function  independent:;all household mobility;gait;transfer;bed mobility;ADL's  -HK     Existing Precautions/Restrictions  fall;non-weight bearing;right;shoulder;other (see comments) R UE in sling; NWB RUE; R interscalene nerve catheter.  -HK     Barriers to Rehab  none identified  -HK     Row Name 06/21/21 1435          Living Environment    Lives With  spouse  -     Row Name 06/21/21 1435          Home Main Entrance    Number of Stairs, Main Entrance  one  -HK     Stair Railings, Main Entrance  none  -HK     Row Name 06/21/21 1435          Stairs Within Home, Primary    Number of Stairs, Within Home, Primary  none  -HK     Stair Railings, Within Home, Primary  none  -HK     Stairs Comment, Within Home, Primary  Pt has walk in shower  -     Row Name 06/21/21 1435          Cognition    Orientation Status (Cognition)  oriented x 4  -HK     Row Name 06/21/21 1435          Safety Issues, Functional Mobility    Safety Issues Affecting Function (Mobility)  safety precautions follow-through/compliance;safety precaution awareness  -HK     Impairments Affecting Function (Mobility)  balance  -HK     Comment, Safety Issues/Impairments (Mobility)  Pt presents with mild balance deficits. However per pt and wife this is baseline function for pt. OT educated wife and pt on  safety concerns and need for assist until anesthesia wears off.  -       User Key  (r) = Recorded By, (t) = Taken By, (c) = Cosigned By    Initials Name Provider Type    HK Trudi Bustamante, OT Occupational Therapist          Mobility/ADL's     Row Name 06/21/21 1437          Bed Mobility    Bed Mobility  scooting/bridging;supine-sit;sit-supine  -HK     Scooting/Bridging [place] (Bed Mobility)  supervision;verbal cues  -     Supine-Sit [place] (Bed Mobility)  supervision;verbal cues  -HK     Sit-Supine [place] (Bed Mobility)  supervision;verbal cues  -HK     Bed Mobility, Safety Issues  decreased use of arms for pushing/pulling  -     Assistive Device (Bed Mobility)  head of bed elevated  -     Comment (Bed Mobility)  Pt and wife educated on completion of bed mobility while maintaining shoulder precautions. Pt completes all bed mobility with supervision.  -     Row Name 06/21/21 1437          Transfers    Transfers  sit-stand transfer  -     Sit-Stand [place] (Transfers)  independent  -     Row Name 06/21/21 1437          Sit-Stand Transfer    Assistive Device (Sit-Stand Transfers)  other (see comments) none  -     Row Name 06/21/21 143          Functional Mobility    Functional Mobility- Ind. Level  contact guard assist;verbal cues required  -     Functional Mobility- Device  -- none  -     Functional Mobility- Comment  Pt slightly off balance in beginning of ambulation however improved with time. Per wife and pt pt has some balance deficits at baseline from a hx of transverse myelitis. Pt ambulates with CGA and use of gait belt. Wife educated to take gait belt to assist pt if needed. Pt did pass mobility screen and OT to d/c PT order.  -     Row Name 06/21/21 1437          Activities of Daily Living    BADL Assessment/Intervention  lower body dressing;upper body dressing;bathing  -     Row Name 06/21/21 1437          Mobility    Extremity Weight-bearing Status  right upper  extremity  -HK     Right Upper Extremity (Weight-bearing Status)  (S) non weight-bearing (NWB)  -HK     Row Name 06/21/21 1437          Lower Body Dressing Assessment/Training    [place] Level (Lower Body Dressing)  don;pants/bottoms;maximum assist (25% patient effort)  -HK     Position (Lower Body Dressing)  edge of bed sitting;unsupported standing  -HK     Comment (Lower Body Dressing)  Pt maxA for all LBD  -HK     Row Name 06/21/21 1437          Upper Body Dressing Assessment/Training    [place] Level (Upper Body Dressing)  doff;don;other (see comments);dependent (less than 25% patient effort);pull-over garment;moderate assist (50% patient effort) sling  -HK     Comment (Upper Body Dressing)  Pt and wife educated on sling management as well as wear and care, shoulder precautions, axilla care, monty dressing and care of nerve catheter during ADLS. Wife with excellent recall on sling mangement from prior surgery. Wife has cut several of pts shirts and reports that is how they managed all dressing. OT verbally educated pt and wife on monty dressing tehcnique however wife was adamant pt only wear shirts she has cut the R arm out of.  -HK     Row Name 06/21/21 1437          Bathing Assessment/Intervention    Comment (Bathing)  Pt and wife educated on completion of R axilla care. Wife with good recall from prior surgery. Pt declined to complete but reports he feels competent in completion.  -       User Key  (r) = Recorded By, (t) = Taken By, (c) = Cosigned By    Initials Name Provider Type    HK Trudi Bustamante, OT Occupational Therapist        Obj/Interventions     Row Name 06/21/21 1442          Sensory Assessment (Somatosensory)    Sensory Assessment (Somatosensory)  right UE  -HK     Right UE Sensory Assessment  light touch awareness;general sensation;impaired  -HK     Row Name 06/21/21 1442          Vision Assessment/Intervention    Visual Impairment/Limitations  WFL  -HK     Row Name 06/21/21 1446           Range of Motion Comprehensive    General Range of Motion  no range of motion deficits identified  -     Comment, General Range of Motion  R UE in sling; L UE WFL for eval  -     Row Name 06/21/21 1442          Strength Comprehensive (MMT)    General Manual Muscle Testing (MMT) Assessment  no strength deficits identified  -     Comment, General Manual Muscle Testing (MMT) Assessment  R UE in sling; L UE WFL for eval  -     Row Name 06/21/21 1442          Shoulder (Therapeutic Exercise)    Shoulder (Therapeutic Exercise)  AROM (active range of motion);PROM (passive range of motion)  -     Shoulder AROM (Therapeutic Exercise)  bilateral;scapular retraction;supine;10 repetitions  -     Shoulder PROM (Therapeutic Exercise)  right;flexion;extension;external rotation;internal rotation;supine;10 repetitions Tolerated grossly PROM FE to 160, IR to chest, ER to 30.  -[facility] Name 06/21/21 1442          Elbow/Forearm (Therapeutic Exercise)    Elbow/Forearm (Therapeutic Exercise)  PROM (passive range of motion)  -     Elbow/Forearm PROM (Therapeutic Exercise)  right;flexion;extension;supination;pronation;10 repetitions  -[facility] Name 06/21/21 1442          Wrist (Therapeutic Exercise)    Wrist (Therapeutic Exercise)  PROM (passive range of motion)  -     Wrist PROM (Therapeutic Exercise)  right;flexion;extension;10 repetitions  -[facility] Name 06/21/21 1442          Hand (Therapeutic Exercise)    Hand (Therapeutic Exercise)  PROM (passive range of motion)  -     Hand PROM (Therapeutic Exercise)  right;finger flexion;finger extension;10 repetitions  -[facility] Name 06/21/21 1442          Motor Skills    Motor Skills  coordination  -     Coordination  fine motor deficit;right;upper extremity;moderate impairment  -[facility] Name 06/21/21 1442          Balance    Balance Assessment  sitting static balance;sitting dynamic balance;standing static balance;standing dynamic balance  -     Static Sitting  Balance  WNL;unsupported;sitting, edge of bed  -HK     Dynamic Sitting Balance  WNL;unsupported;sitting, edge of bed  -HK     Static Standing Balance  WFL;unsupported;standing  -HK     Dynamic Standing Balance  WFL;unsupported;standing  -HK     Balance Interventions  occupation based/functional task;sitting  -HK     Row Name 06/21/21 1442          Therapeutic Exercise    Therapeutic Exercise  elbow/forearm;shoulder;wrist;hand  -HK       User Key  (r) = Recorded By, (t) = Taken By, (c) = Cosigned By    Initials Name Provider Type    HK Trudi Bustamante, OT Occupational Therapist        Goals/Plan     Row Name 06/21/21 1450          Bed Mobility Goal 1 (OT)    Activity/Assistive Device (Bed Mobility Goal 1, OT)  sit to supine/supine to sit;scooting  -HK     [place] Level/Cues Needed (Bed Mobility Goal 1, OT)  supervision required  -HK     Time Frame (Bed Mobility Goal 1, OT)  by discharge;long term goal (LTG)  -HK     Progress/Outcomes (Bed Mobility Goal 1, OT)  goal met  -     Row Name 06/21/21 1450          Transfer Goal 1 (OT)    Activity/Assistive Device (Transfer Goal 1, OT)  sit-to-stand/stand-to-sit  -HK     [place] Level/Cues Needed (Transfer Goal 1, OT)  contact guard assist;verbal cues required  -HK     Time Frame (Transfer Goal 1, OT)  by discharge;long term goal (LTG)  -HK     Progress/Outcome (Transfer Goal 1, OT)  goal met  -     Row Name 06/21/21 1450          Dressing Goal 1 (OT)    Activity/Device (Dressing Goal 1, OT)  upper body dressing;other (see comments) Wife will be able to dof/don sling  -HK     [place]/Cues Needed (Dressing Goal 1, OT)  supervision required;verbal cues required  -HK     Time Frame (Dressing Goal 1, OT)  by discharge;long term goal (LTG)  -HK     Progress/Outcome (Dressing Goal 1, OT)  goal met  -     Row Name 06/21/21 1450          ROM Goal 1 (OT)    ROM Goal 1 (OT)  Pt and wife will be able to adequately demonstrate R UE HEP per surgeons preference.  -HK      Time Frame (ROM Goal 1, OT)  by discharge;long term goal (LTG)  -HK     Progress/Outcome (ROM Goal 1, OT)  goal ongoing  -     Row Name 06/21/21 1450          Therapy Assessment/Plan (OT)    Planned Therapy Interventions (OT)  occupation/activity based interventions;orthotic fabrication/fitting/training;transfer/mobility retraining;ROM/therapeutic exercise;BADL retraining  -       User Key  (r) = Recorded By, (t) = Taken By, (c) = Cosigned By    Initials Name Provider Type    HK Trudi Bustamante, OT Occupational Therapist        Clinical Impression     Row Name 06/21/21 1444          Pain Assessment    Additional Documentation  Pain Scale: Numbers Pre/Post-Treatment (Group)  -     Row Name 06/21/21 1444          Pain Scale: Numbers Pre/Post-Treatment    Pretreatment Pain Rating  0/10 - no pain  -HK     Posttreatment Pain Rating  0/10 - no pain  -HK     Row Name 06/21/21 1444          Plan of Care Review    Plan of Care Reviewed With  patient;spouse  -HK     Progress  improving  -HK     Outcome Summary  OT eval complete. Pt had prior TSA by Dr. Humphrey and wife with good recall. OT reviewed all sling management as well as wear and care, shoulder precautions, axilla care, monty dressing, and care of nerve catheter during ADLS. Wife with good teach back on all sling management and assisted pt to on pullover katharine via monty dressing technique. Pt tolerated PROM , IR to chest, ER to 30 and PROM elbow/wrist/hand x10 reps. Pt with increased numbess from block and minimal hand movement. Pt ambulated 250 feet with CGA and no AE. Pt with noted LOB however able to correct. Per pt and wife this is baseline function for pt. OT recommended if pt returns home this date wife should take gait belt and use. O2 satuartions decreaed to 88% during mobility but quickly impoved with cues for PLB. Pt did pass mobility screen and will not need PT eval. Recommend d/c home with assist from wife when medically ready.  -     Row Name  06/21/21 1444          Therapy Assessment/Plan (OT)    Patient/Family Therapy Goal Statement (OT)  Pt would like to improve and return home.  -HK     Rehab Potential (OT)  good, to achieve stated therapy goals  -HK     Criteria for Skilled Therapeutic Interventions Met (OT)  yes;skilled treatment is necessary  -HK     Therapy Frequency (OT)  daily  -HK     Row Name 06/21/21 1444          Therapy Plan Review/Discharge Plan (OT)    Anticipated Discharge Disposition (OT)  home with assist  -HK     Row Name 06/21/21 1444          Vital Signs    Pre Systolic BP Rehab  -- RN cleared for tx; VSS  -HK     Pre SpO2 (%)  96  -HK     O2 Delivery Pre Treatment  room air  -HK     Intra SpO2 (%)  88  -HK     O2 Delivery Intra Treatment  room air  -HK     Post SpO2 (%)  95  -HK     O2 Delivery Post Treatment  room air  -HK     Pre Patient Position  Supine  -HK     Intra Patient Position  Standing  -HK     Post Patient Position  Supine  -HK     Row Name 06/21/21 1444          Positioning and Restraints    Pre-Treatment Position  in bed  -HK     Post Treatment Position  bed  -HK     In Bed  notified nsg;supine;call light within reach;encouraged to call for assist;with family/caregiver  -       User Key  (r) = Recorded By, (t) = Taken By, (c) = Cosigned By    Initials Name Provider Type    HK Trudi Bustamante, OT Occupational Therapist        Outcome Measures     Row Name 06/21/21 1451          How much help from another is currently needed...    Putting on and taking off regular lower body clothing?  2  -HK     Bathing (including washing, rinsing, and drying)  2  -HK     Toileting (which includes using toilet bed pan or urinal)  3  -HK     Putting on and taking off regular upper body clothing  3  -HK     Taking care of personal grooming (such as brushing teeth)  3  -HK     Eating meals  3  -HK     AM-PAC 6 Clicks Score (OT)  16  -HK     Row Name 06/21/21 1451          How much help from another person do you currently need...     Turning from your back to your side while in flat bed without using bedrails?  4  -HK     Moving from lying on back to sitting on the side of a flat bed without bedrails?  4  -HK     Moving to and from a bed to a chair (including a wheelchair)?  4  -HK     Standing up from a chair using your arms (e.g., wheelchair, bedside chair)?  4  -HK     Climbing 3-5 steps with a railing?  3  -HK     To walk in hospital room?  4  -HK     AM-PAC 6 Clicks Score (PT)  23  -HK     Row Name 06/21/21 1451          Functional Assessment    Outcome Measure Options  AM-PAC 6 Clicks Daily Activity (OT);AM-PAC 6 Clicks Basic Mobility (PT)  -HK       User Key  (r) = Recorded By, (t) = Taken By, (c) = Cosigned By    Initials Name Provider Type    Trudi Medrano OT Occupational Therapist        Occupational Therapy Education                 Title: PT OT SLP Therapies (In Progress)     Topic: Occupational Therapy (Done)     Point: ADL training (Done)     Description:   Instruct learner(s) on proper safety adaptation and remediation techniques during self care or transfers.   Instruct in proper use of assistive devices.              Learning Progress Summary           Patient Acceptance, E,TB,D,H, VU,DU by  at 6/21/2021 1452   Family Acceptance, E,TB,D,H, VU,DU by  at 6/21/2021 1452                   Point: Home exercise program (Done)     Description:   Instruct learner(s) on appropriate technique for monitoring, assisting and/or progressing therapeutic exercises/activities.              Learning Progress Summary           Patient Acceptance, E,TB,D,H, VU,DU by  at 6/21/2021 1452   Family Acceptance, E,TB,D,H, VU,DU by  at 6/21/2021 1452                   Point: Precautions (Done)     Description:   Instruct learner(s) on prescribed precautions during self-care and functional transfers.              Learning Progress Summary           Patient Acceptance, E,TB,D,H, VU,DU by  at 6/21/2021 1452   Family Acceptance, E,TB,D,H, VU,DU  by  at 6/21/2021 1452                   Point: Body mechanics (Done)     Description:   Instruct learner(s) on proper positioning and spine alignment during self-care, functional mobility activities and/or exercises.              Learning Progress Summary           Patient Acceptance, E,TB,D,H, VU,DU by  at 6/21/2021 1452   Family Acceptance, E,TB,D,H, VU,DU by  at 6/21/2021 1452                               User Key     Initials Effective Dates Name Provider Type Discipline     06/16/21 -  Trudi Bustamante, OT Occupational Therapist OT              OT Recommendation and Plan  Planned Therapy Interventions (OT): occupation/activity based interventions, orthotic fabrication/fitting/training, transfer/mobility retraining, ROM/therapeutic exercise, BADL retraining  Therapy Frequency (OT): daily  Plan of Care Review  Plan of Care Reviewed With: patient, spouse  Progress: improving  Outcome Summary: OT eval complete. Pt had prior TSA by Dr. Humphrey and wife with good recall. OT reviewed all sling management as well as wear and care, shoulder precautions, axilla care, monty dressing, and care of nerve catheter during ADLS. Wife with good teach back on all sling management and assisted pt to on pullover katharine via monty dressing technique. Pt tolerated PROM , IR to chest, ER to 30 and PROM elbow/wrist/hand x10 reps. Pt with increased numbess from block and minimal hand movement. Pt ambulated 250 feet with CGA and no AE. Pt with noted LOB however able to correct. Per pt and wife this is baseline function for pt. OT recommended if pt returns home this date wife should take gait belt and use. O2 satuartions decreaed to 88% during mobility but quickly impoved with cues for PLB. Pt did pass mobility screen and will not need PT eval. Recommend d/c home with assist from wife when medically ready.     Time Calculation:   Time Calculation- OT     Row Name 06/21/21 1453             Time Calculation- OT    OT Start Time  1312   -HK      OT Received On  06/21/21  -HK      OT Goal Re-Cert Due Date  07/01/21  -HK         Timed Charges    61124 - OT Therapeutic Exercise Minutes  20  -HK      72535 - OT Self Care/Mgmt Minutes  15  -HK         Untimed Charges    OT Eval/Re-eval Minutes  56  -HK         Total Minutes    Timed Charges Total Minutes  35  -HK      Untimed Charges Total Minutes  56  -HK       Total Minutes  91  -HK        User Key  (r) = Recorded By, (t) = Taken By, (c) = Cosigned By    Initials Name Provider Type     Trudi Bustamante OT Occupational Therapist        Therapy Charges for Today     Code Description Service Date Service Provider Modifiers Qty    96697685301 HC OT SELF CARE/MGMT/TRAIN EA 15 MIN 6/21/2021 Trudi Bustamante OT GO 1    79445144004 HC OT THER PROC EA 15 MIN 6/21/2021 Trudi Bustamante OT GO 1    53474408081 HC OT EVAL LOW COMPLEXITY 4 6/21/2021 Trudi Bustamante OT GO 1               Trudi Bustamante OT  6/21/2021

## 2021-06-21 NOTE — ANESTHESIA PROCEDURE NOTES
Airway  Urgency: elective    Date/Time: 6/21/2021 9:22 AM  Airway not difficult    General Information and Staff    Patient location during procedure: OR  CRNA: Mason Chaparro CRNA    Indications and Patient Condition  Indications for airway management: airway protection    Preoxygenated: yes  MILS not maintained throughout  Mask difficulty assessment: 1 - vent by mask    Final Airway Details  Final airway type: endotracheal airway      Successful airway: ETT  Cuffed: yes   Successful intubation technique: direct laryngoscopy  Endotracheal tube insertion site: oral  Blade: Clarence  Blade size: 3  ETT size (mm): 7.0  Cormack-Lehane Classification: grade I - full view of glottis  Placement verified by: chest auscultation and capnometry   Cuff volume (mL): 5  Measured from: lips  ETT/EBT  to lips (cm): 20  Number of attempts at approach: 1  Assessment: lips, teeth, and gum same as pre-op and atraumatic intubation    Additional Comments  Negative epigastric sounds, Breath sound equal bilaterally with symmetric chest rise and fall

## 2021-06-21 NOTE — ANESTHESIA POSTPROCEDURE EVALUATION
Patient: Nathan Velarde    Procedure Summary     Date: 06/21/21 [facility] / [facility]    Anesthesia Start: 0917 Anesthesia Stop: 1140    Procedure: NAVIGATED REVERSE SHOULDER ARTHROPLASTY RIGHT, BICEPS TENODESIS - RIGHT (Right Shoulder) Diagnosis:       Primary osteoarthritis of right shoulder      Right bicipital tenosynovitis      (Primary osteoarthritis of right shoulder [057130])    Surgeons: Abner Humphrey MD Provider: Moira Pavon MD    Anesthesia Type: general with block ASA Status: 3          Anesthesia Type: general with block    Vitals  No vitals data found for the desired time range.          Post Anesthesia Care and Evaluation    Patient location during evaluation: PACU  Patient participation: complete - patient participated  Level of consciousness: awake and alert  Pain management: adequate  Airway patency: patent  Anesthetic complications: No anesthetic complications  PONV Status: none  Cardiovascular status: hemodynamically stable and acceptable  Respiratory status: nonlabored ventilation, acceptable and nasal cannula  Hydration status: acceptable

## 2021-06-22 ENCOUNTER — ANESTHESIA EVENT CONVERTED (OUTPATIENT)
Dept: ANESTHESIOLOGY | Facility: HOSPITAL | Age: 81
End: 2021-06-22

## 2021-06-22 ENCOUNTER — NURSE TRIAGE (OUTPATIENT)
Dept: CALL CENTER | Facility: HOSPITAL | Age: 81
End: 2021-06-22

## 2021-06-22 RX ORDER — FENTANYL CITRATE 50 UG/ML
INJECTION, SOLUTION INTRAMUSCULAR; INTRAVENOUS AS NEEDED
Status: DISCONTINUED | OUTPATIENT
Start: 2021-06-22 | End: 2021-06-22 | Stop reason: SURG

## 2021-06-22 NOTE — PROGRESS NOTES
[facility]    Acute pain service Inpatient Progress Note    Patient Name: Nathan Velarde  :  1940  MRN:  [id number]        Acute Pain  Service Inpatient Progress Note:    Analgesia:Excellent  Pain Score:0/10  LOC: alert and awake  Resp Status: room air  Cardiac: VS stable  Side Effects:None  Catheter Site:clean, dressing intact and dry  Catheter type: Peripheral nerve cath with Arrow pump.  Infusion rate: 6ml/hr  Catheter Plan:Catheter to remain Insitu and Continue catheter infusion rate unchanged    Pt stretched out the cathete last night. He came in to hospital to get the catheter evaluated. The catheter was salvageable under the dressing. Catheter was trimmed and re-redressed in a sterile manner and Arrow pump re-attached.   Pt states no pain today.

## 2021-06-22 NOTE — ADDENDUM NOTE
Addendum  created 06/22/21 0815 by Alexia Duarte CRNA    Child order released for a procedure order, Clinical Note Signed, Delete clinical note, Diagnosis association updated, Intraprocedure Blocks edited, LDA properties accepted, Order Canceled from Note, Pend clinical note

## 2021-06-22 NOTE — TELEPHONE ENCOUNTER
"    Reason for Disposition  • [1] Caller has URGENT question AND [2] triager unable to answer question    Additional Information  • Negative: Sounds like a life-threatening emergency to the triager  • Negative: Chest pain  • Negative: Difficulty breathing  • Negative: Acting confused (e.g., disoriented, slurred speech) or excessively sleepy  • Negative: Surgical incision symptoms and questions  • Negative: [1] Discomfort (pain, burning or stinging) when passing urine AND [2] male  • Negative: [1] Discomfort (pain, burning or stinging) when passing urine AND [2] female  • Negative: Constipation  • Negative: New or worsening leg (calf, thigh) pain  • Negative: New or worsening leg swelling  • Negative: Dizziness is severe, or persists > 24 hours after surgery  • Negative: Pain, redness, swelling, or pus at IV Site  • Negative: Symptoms arising from use of a urinary catheter (Madrigal or Coude)  • Negative: Cast problems or questions  • Negative: Medication question  • Negative: [1] Widespread rash AND [2] bright red, sunburn-like  • Negative: [1] SEVERE headache AND [2] after spinal (epidural) anesthesia  • Negative: [1] Vomiting AND [2] persists > 4 hours  • Negative: [1] Vomiting AND [2] abdomen looks much more swollen than usual  • Negative: [1] Drinking very little AND [2] dehydration suspected (e.g., no urine > 12 hours, very dry mouth, very lightheaded)  • Negative: Patient sounds very sick or weak to the triager  • Negative: Sounds like a serious complication to the triager  • Negative: Fever > 100.4 F (38.0 C)  • Negative: [1] SEVERE post-op pain (e.g., excruciating, pain scale 8-10) AND [2] not controlled with pain medications    Answer Assessment - Initial Assessment Questions  1. SYMPTOM: \"What's the main symptom you're concerned about?\" (e.g., pain, fever, vomiting)       Pulled out \"nerve catheter\"  2. ONSET: \"When did removal  start?\"      10 min agio  3. SURGERY: \"What surgery was performed?\"      " "Yesterday 7/21  4. DATE of SURGERY: \"When was surgery performed?\"      Shoulder surgery  5. ANESTHESIA: \" What type of anesthesia did you have?\" (e.g., general, spinal, epidural, local)     General I think pt and wife did not know, they talked about a \"nerve catheter\" that was in his neck.  6. PAIN: \"Is there any pain?\" If so, ask: \"How bad is it?\"  (Scale 1-10; or mild, moderate, severe)      None now  7. FEVER: \"Do you have a fever?\" If so, ask: \"What is your temperature, how was it measured, and when did it start?\"      no  8. VOMITING: \"Is there any vomiting?\" If yes, ask: \"How many times?\"      no  9. BLEEDING: \"Is there any bleeding?\" If so, ask: \"How much?\" and \"Where?\"      no  10. OTHER SYMPTOMS: \"Do you have any other symptoms?\" (e.g., drainage from wound, painful urination, constipation)        *no tape and tubing came out.    Protocols used: POST-OP SYMPTOMS AND QUESTIONS-ADULT-AH      "

## 2021-06-22 NOTE — ADDENDUM NOTE
Addendum  created 06/22/21 0835 by Mason Chaparro, CRNA    Child order released for a procedure order, Clinical Note Signed, Intraprocedure Blocks edited

## 2021-06-22 NOTE — ANESTHESIA PROCEDURE NOTES
Peripheral Block      Patient reassessed immediately prior to procedure    Patient location during procedure: pre-op  Reason for block: at surgeon's request and post-op pain management  Performed by  CRNA: Kenneth Champagne CRNA  Assisted by: Mason Chaparro CRNA  Preanesthetic Checklist  Completed: patient identified, IV checked, site marked, risks and benefits discussed, surgical consent, monitors and equipment checked, pre-op evaluation and timeout performed  Prep:  Pt Position: left lateral decubitus  Sterile barriers:cap, gloves, mask and sterile barriers  Prep: ChloraPrep  Patient monitoring: blood pressure monitoring, continuous pulse oximetry and EKG  Procedure  Sedation:yes  Performed under: local infiltration  Guidance:ultrasound guided  Images:still images obtained, printed/placed on chart    Laterality:right  Block Type:interscalene  Injection Technique:catheter  Needle Type:Tuohy and echogenic  Needle Gauge:18 G  Resistance on Injection: none  Catheter Size:20 G (20g)  Cath Depth at skin: 9 cm          Post Assessment  Injection Assessment: negative aspiration for heme, no paresthesia on injection and incremental injection  Patient Tolerance:comfortable throughout block  Complications:no  Additional Notes  Procedure:                 The pt was placed in semifowlers position with a slight tilt of the thorax contralateral to the insertion site.  The Insertion Site was prepped and draped in sterile fashion.  The pt was anesthetized with  IV Sedation( see meds) and  Skin and cutaneous tissue was infiltrated and anesthetized with 1% Lidocaine 3 mls via a 25g needle.  Utilizing ultrasound guidance, a BBraun 4 inch 18 g Contiplex echogenic touhy needle was advanced in-plane.  Hydro dissection of tissue was achieved with Normal saline. Major vessels(carotid and Internal Jugular) where visualized as the brachial plexus was approached at the approximate level of C-7/ T-1.  Cervical 5 and Branches of Cervical 6  nerve roots where visualized and the needle tip was placed posterior at the level of C-6 roots.  LA spread was visualized and injection was made incrementally every 5 mls with aspiration. Injection pressure was normal or little, there was no intraneural injection, no vascular injection.      The BBraun 20 g wire stylet catheter was then placed under US guidance on the posterior aspect of the Brachial Plexus. The tuohy was removed and the location of catheter was confirmed with NS injection visualized with US . The skin was sealed with exofin tissue adhesive at catheter insertion site.  Skin was prepped with benzoin and the catheter was secured with steristrips and a CHG tegaderm. Appropriate labels applied. Thank You.

## 2021-06-22 NOTE — ANESTHESIA PROCEDURE NOTES
Peripheral Block      Patient reassessed immediately prior to procedure    Patient location during procedure: pre-op  Start time: 6/21/2021 8:03 AM  Stop time: 6/21/2021 8:11 AM  Reason for block: at surgeon's request and post-op pain management  Performed by  CRNA: Kenneth Champagne CRNA  Assisted by: Mason Chaparro CRNA  Preanesthetic Checklist  Completed: patient identified, IV checked, site marked, risks and benefits discussed, surgical consent, monitors and equipment checked, pre-op evaluation and timeout performed  Prep:  Pt Position: left lateral decubitus  Sterile barriers:cap, gloves, mask and sterile barriers  Prep: ChloraPrep  Patient monitoring: blood pressure monitoring, continuous pulse oximetry and EKG  Procedure  Sedation:yes  Performed under: local infiltration  Guidance:ultrasound guided  Images:still images obtained, printed/placed on chart    Laterality:right  Block Type:interscalene  Injection Technique:catheter  Needle Type:Tuohy and echogenic  Needle Gauge:18 G  Resistance on Injection: none  Catheter Size:20 G (20g)  Cath Depth at skin: 9 cm    Medications Used: fentaNYL citrate (PF) (SUBLIMAZE) injection, 100 mcg  bupivacaine PF (MARCAINE) 0.25 % injection, 15 mL  Med admintered at 6/21/2021 8:11 AM      Medications  Preservative Free Saline:5ml    Post Assessment  Injection Assessment: negative aspiration for heme, no paresthesia on injection and incremental injection  Patient Tolerance:comfortable throughout block  Complications:no  Additional Notes  Procedure:                 The pt was placed in semifowlers position with a slight tilt of the thorax contralateral to the insertion site.  The Insertion Site was prepped and draped in sterile fashion.  The pt was anesthetized with  IV Sedation( see meds) and  Skin and cutaneous tissue was infiltrated and anesthetized with 1% Lidocaine 3 mls via a 25g needle.  Utilizing ultrasound guidance, a BBraun 4 inch 18 g Contiplex echogenic touhy  needle was advanced in-plane.  Hydro dissection of tissue was achieved with Normal saline. Major vessels(carotid and Internal Jugular) where visualized as the brachial plexus was approached at the approximate level of C-7/ T-1.  Cervical 5 and Branches of Cervical 6 nerve roots where visualized and the needle tip was placed posterior at the level of C-6 roots.  LA spread was visualized and injection was made incrementally every 5 mls with aspiration. Injection pressure was normal or little, there was no intraneural injection, no vascular injection.      The BBraun 20 g wire stylet catheter was then placed under US guidance on the posterior aspect of the Brachial Plexus. The tuohy was removed and the location of catheter was confirmed with NS injection visualized with US . The skin was sealed with exofin tissue adhesive at catheter insertion site.  Skin was prepped with benzoin and the catheter was secured with steristrips and a CHG tegaderm. Appropriate labels applied. Thank You.

## 2021-06-23 NOTE — PROGRESS NOTES
[facility]    Nerve Cath Post Op Call    Patient Name: Nathan Velarde  :  1940  MRN:  [id number]  Date of Discharge: 2021    Nerve Cath Post Op Call:    Catheter Plan:Patient called, No answer. Message left to call [facility] pain service for any questions or complaints  Patient/Family instructed to call ON CALL anesthesia provider for any questions or problems.  Patient Follow Up:

## 2021-06-24 NOTE — PROGRESS NOTES
[facility]    Nerve Cath Post Op Call    Patient Name: Nathan Velarde  :  1940  MRN:  [id number]  Date of Discharge: 2021    Nerve Cath Post Op Call:    Analgesia:Excellent  Pain Score:0/10  Side Effects:None  Catheter Site:clean  Patient Controlled ON Q pump infusion rate: 6ml/hr  Catheter Plan:Will continue with plan at home without changes and The patient was instructed to call ON CALL Anesthesia provider for any questions or problems  Patient/Family instructed to call ON CALL anesthesia provider for any questions or problems.  Patient Follow Up:

## 2021-06-25 NOTE — PROGRESS NOTES
[facility]    Nerve Cath Post Op Call    Patient Name: Nathan Velarde  :  1940  MRN:  [id number]  Date of Discharge: 2021    Nerve Cath Post Op Call:    Analgesia:Excellent (hand is numb)  Pain Score:0/10  Side Effects:None  Catheter Site:clean  Patient Controlled ON Q pump infusion rate: 6ml/hr  Catheter Plan:Will continue with plan at home without changes and The patient was instructed to call ON CALL Anesthesia provider for any questions or problems  Patient/Family instructed to call ON CALL anesthesia provider for any questions or problems.  Patient Follow Up:

## 2021-06-26 NOTE — PROGRESS NOTES
[facility]    Nerve Cath Post Op Call    Patient Name: Nathan Velarde  :  1940  MRN:  [id number]  Date of Discharge: 2021    Nerve Cath Post Op Call:    Analgesia:Excellent  Pain Score:0/10  Side Effects:None  Catheter Site:clean  Catheter Plan:Patient/Family member report nerve catheter previously discontinued, tip intact  Patient/Family instructed to call ON CALL anesthesia provider for any questions or problems.  Patient Follow Up:

## 2021-07-06 RX ORDER — DILTIAZEM HYDROCHLORIDE 180 MG/1
180 CAPSULE, COATED, EXTENDED RELEASE ORAL NIGHTLY
Qty: 90 CAPSULE | Refills: 0 | Status: SHIPPED | OUTPATIENT
Start: 2021-07-06 | End: 2021-11-08

## 2021-07-15 RX ORDER — TAMSULOSIN HYDROCHLORIDE 0.4 MG/1
CAPSULE ORAL
Qty: 90 CAPSULE | Refills: 0 | Status: SHIPPED | OUTPATIENT
Start: 2021-07-15 | End: 2021-11-08

## 2021-08-05 ENCOUNTER — OFFICE VISIT (OUTPATIENT)
Dept: FAMILY MEDICINE CLINIC | Facility: CLINIC | Age: 81
End: 2021-08-05

## 2021-08-05 ENCOUNTER — LAB (OUTPATIENT)
Dept: LAB | Facility: HOSPITAL | Age: 81
End: 2021-08-05

## 2021-08-05 VITALS
SYSTOLIC BLOOD PRESSURE: 110 MMHG | HEART RATE: 66 BPM | DIASTOLIC BLOOD PRESSURE: 66 MMHG | BODY MASS INDEX: 23.11 KG/M2 | RESPIRATION RATE: 20 BRPM | HEIGHT: 66 IN | OXYGEN SATURATION: 96 % | WEIGHT: 143.8 LBS

## 2021-08-05 DIAGNOSIS — N18.2 CKD (CHRONIC KIDNEY DISEASE), STAGE II: ICD-10-CM

## 2021-08-05 DIAGNOSIS — K85.90 ACUTE PANCREATITIS, UNSPECIFIED COMPLICATION STATUS, UNSPECIFIED PANCREATITIS TYPE: ICD-10-CM

## 2021-08-05 DIAGNOSIS — R73.9 HYPERGLYCEMIA: ICD-10-CM

## 2021-08-05 DIAGNOSIS — E55.9 VITAMIN D DEFICIENCY: ICD-10-CM

## 2021-08-05 DIAGNOSIS — R68.81 EARLY SATIETY: ICD-10-CM

## 2021-08-05 DIAGNOSIS — R63.4 WEIGHT LOSS, UNINTENTIONAL: Primary | ICD-10-CM

## 2021-08-05 DIAGNOSIS — R63.4 WEIGHT LOSS, UNINTENTIONAL: ICD-10-CM

## 2021-08-05 DIAGNOSIS — D73.4 SPLENIC CYST: ICD-10-CM

## 2021-08-05 DIAGNOSIS — E83.52 HYPERCALCEMIA: ICD-10-CM

## 2021-08-05 LAB
ALBUMIN SERPL-MCNC: 4.7 G/DL (ref 3.5–5.2)
ALBUMIN/GLOB SERPL: 1.5 G/DL
ALP SERPL-CCNC: 96 U/L (ref 39–117)
ALT SERPL W P-5'-P-CCNC: 26 U/L (ref 1–41)
ANION GAP SERPL CALCULATED.3IONS-SCNC: 13.7 MMOL/L (ref 5–15)
AST SERPL-CCNC: 24 U/L (ref 1–40)
BILIRUB SERPL-MCNC: 0.4 MG/DL (ref 0–1.2)
BILIRUB UR QL STRIP: NEGATIVE
BUN SERPL-MCNC: 31 MG/DL (ref 8–23)
BUN/CREAT SERPL: 24.8 (ref 7–25)
CALCIUM SPEC-SCNC: 10.8 MG/DL (ref 8.6–10.5)
CHLORIDE SERPL-SCNC: 103 MMOL/L (ref 98–107)
CK SERPL-CCNC: 73 U/L (ref 20–200)
CLARITY UR: CLEAR
CO2 SERPL-SCNC: 26.3 MMOL/L (ref 22–29)
COLOR UR: ABNORMAL
CREAT SERPL-MCNC: 1.25 MG/DL (ref 0.76–1.27)
GFR SERPL CREATININE-BSD FRML MDRD: 56 ML/MIN/1.73
GLOBULIN UR ELPH-MCNC: 3.1 GM/DL
GLUCOSE SERPL-MCNC: 105 MG/DL (ref 65–99)
GLUCOSE UR STRIP-MCNC: NEGATIVE MG/DL
HBA1C MFR BLD: 5.6 % (ref 4.8–5.6)
HGB UR QL STRIP.AUTO: NEGATIVE
KETONES UR QL STRIP: ABNORMAL
LEUKOCYTE ESTERASE UR QL STRIP.AUTO: NEGATIVE
LIPASE SERPL-CCNC: 638 U/L (ref 13–60)
MAGNESIUM SERPL-MCNC: 2.3 MG/DL (ref 1.6–2.4)
NITRITE UR QL STRIP: NEGATIVE
PH UR STRIP.AUTO: 6 [PH] (ref 5–8)
PHOSPHATE SERPL-MCNC: 3.7 MG/DL (ref 2.5–4.5)
POTASSIUM SERPL-SCNC: 4.7 MMOL/L (ref 3.5–5.2)
PROT SERPL-MCNC: 7.8 G/DL (ref 6–8.5)
PROT UR QL STRIP: ABNORMAL
SODIUM SERPL-SCNC: 143 MMOL/L (ref 136–145)
SP GR UR STRIP: 1.03 (ref 1–1.03)
T4 FREE SERPL-MCNC: 1.23 NG/DL (ref 0.93–1.7)
TSH SERPL DL<=0.05 MIU/L-ACNC: 2.08 UIU/ML (ref 0.27–4.2)
URATE SERPL-MCNC: 4.1 MG/DL (ref 3.4–7)
UROBILINOGEN UR QL STRIP: ABNORMAL

## 2021-08-05 PROCEDURE — 84100 ASSAY OF PHOSPHORUS: CPT

## 2021-08-05 PROCEDURE — 83735 ASSAY OF MAGNESIUM: CPT

## 2021-08-05 PROCEDURE — 84443 ASSAY THYROID STIM HORMONE: CPT

## 2021-08-05 PROCEDURE — 82306 VITAMIN D 25 HYDROXY: CPT

## 2021-08-05 PROCEDURE — 83690 ASSAY OF LIPASE: CPT

## 2021-08-05 PROCEDURE — 84439 ASSAY OF FREE THYROXINE: CPT

## 2021-08-05 PROCEDURE — 84550 ASSAY OF BLOOD/URIC ACID: CPT

## 2021-08-05 PROCEDURE — 83036 HEMOGLOBIN GLYCOSYLATED A1C: CPT

## 2021-08-05 PROCEDURE — 99214 OFFICE O/P EST MOD 30 MIN: CPT | Performed by: FAMILY MEDICINE

## 2021-08-05 PROCEDURE — 80053 COMPREHEN METABOLIC PANEL: CPT

## 2021-08-05 PROCEDURE — 82746 ASSAY OF FOLIC ACID SERUM: CPT

## 2021-08-05 PROCEDURE — 85025 COMPLETE CBC W/AUTO DIFF WBC: CPT

## 2021-08-05 PROCEDURE — 81003 URINALYSIS AUTO W/O SCOPE: CPT

## 2021-08-05 PROCEDURE — 84630 ASSAY OF ZINC: CPT

## 2021-08-05 PROCEDURE — 82550 ASSAY OF CK (CPK): CPT

## 2021-08-05 NOTE — PROGRESS NOTES
Subjective   Nathan Velarde is a 80 y.o. male.     Chief Complaint   Patient presents with   • Weight Loss       Weight Loss  This is a new problem. Episode onset: 4 months ago. The problem has been unchanged. Associated symptoms include anorexia, fatigue, nausea and weakness. Pertinent negatives include no abdominal pain, change in bowel habit, chills, congestion, coughing, fever, headaches, joint swelling, myalgias, swollen glands or vomiting. Associated symptoms comments: High HR response to exercise up to 170s. He has tried nothing for the symptoms.        Nathan Velarde presents today for   Chief Complaint   Patient presents with   • Weight Loss      Nathan is present today for an acute same day visit regarding weight loss. He reports that his wife is supposed to be here today. He reports that he had his sling taken off today. He began losing his appetite and losing weight approximately 3 to 4 months ago. He started feeling not hungry so he would lose some weight and then gain weight, but he has been steadily losing weight for the last few weeks. He has lost at least 10 pounds. He used to weigh approximately 150 pounds. In 06/2020, he was 155 pounds and today he is 143 pounds. He denies any abdominal pain. He reports his bowel movements are great now. He believes he may have Crohn's disease.     The patient reports feeling generally tired. When he exercises, his heart rate goes up to 168 or 170. He exercised a few weeks ago and his heart rate came down very slowly. He does not feel bad when it gets that high. He denies any fever, chills, night sweats, joint swelling, or muscle aches. He does report having a headache this morning. He denies any cough or congestion. He denies any urinary changes. He denies any numbness or tingling in his extremities.    He saw Dr. Sarah in 04/2020 for ulcerative colitis. He had an ultrasound on his stomach because he started to feel full and bloated. He denies any pain. He does not  get nauseated when he drinks water. He reports anything he eats makes him not feel nauseated all the time. Last night he ate a bag and started to feel nauseated.    He denies taking any new medications or supplements.    US abdomen 5/8/21:  No acute sonographic findings within the visualized abdomen,  however, bilateral renal cortical cysts as well as a subcentimeter cyst within the normal-sized spleen identified of indeterminate significance may warrant attention on follow up given slightly lobular margination of the spleen as well may represent epithelioid cysts with prior injury or insult associated. No ascites.    This patient is accompanied by their self who contributes to the history of their care.    The following portions of the patient's history were reviewed and updated as appropriate: allergies, current medications, past family history, past medical history, past social history, past surgical history and problem list.    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   • Hypertension 05/15/2016   • Hyperlipidemia 05/15/2016   • Generalized osteoarthritis 05/15/2016   • CKD (chronic kidney disease), 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   • Hyperglycemia 05/28/2016   • Shoulder pain, left 09/26/2016   • Preventative health care 11/29/2016   • Status post total shoulder arthroplasty, 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   • Knee osteoarthritis 01/01/2004     Resolved Ambulatory Problems     Diagnosis Date Noted   • Neck pain 05/15/2016   • Fatigue 05/15/2016   • Ulcerative enterocolitis (CMS/HCC) 05/15/2016   • Impaired glucose tolerance 05/15/2016   •  Gross hematuria 05/15/2016     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    • Bilateral bunions    • Bladder cancer (CMS/[facility])    • DJD of left shoulder Adulthood   • HTN (hypertension)    • Mitral valve insufficiency    • Nephrolithiasis    • PAF (paroxysmal atrial fibrillation) (CMS/[facility])    • Prediabetes    • Wears glasses      Past Surgical History:   Procedure Laterality Date   • BICEPS TENDON REPAIR Left 2018    Procedure: BICEPS TENDODESIS;  Surgeon: Abner Humphrey MD;  Location:  [facility];  Service: Orthopedics   • BLADDER SURGERY     • CATARACT EXTRACTION, BILATERAL     • COLONOSCOPY      Colitis in remission   • CYSTOSCOPY      Excision of bladder cancer   • INGUINAL HERNIA REPAIR Right    • KNEE SURGERY Right     Excision of cyst   • LIPOMA EXCISION     • TONSILLECTOMY     • TOTAL SHOULDER ARTHROPLASTY Left 2018    Procedure: LEFT TOTAL SHOULDER ARTHROPLASTY;  Surgeon: Abner Humphrey MD;  Location:  [facility] OR;  Service: Orthopedics   • TOTAL SHOULDER ARTHROPLASTY Right 2021    Procedure: NAVIGATED REVERSE SHOULDER ARTHROPLASTY RIGHT, BICEPS TENODESIS - RIGHT;  Surgeon: Abner Humphrey MD;  Location:  [facility] OR;  Service: Orthopedics;  Laterality: Right;     Family History   Problem Relation Age of Onset   • Alzheimer's disease Mother          age 89   • Arthritis Mother    • Hyperlipidemia Mother    • Angina Father    • Hypertension Father    • Pneumonia Father          age 70 - tobacco   • Aneurysm Father         abd aortic   • Unexplained death Brother    • Breast cancer Maternal Grandmother    • Arthritis Maternal Grandmother    • Diabetes Paternal Uncle      Social History     Socioeconomic History   • Marital status:      Spouse name: Not on file   • Number of children: Not on file   • Years of education: Not on file   • Highest  "education level: Not on file   Tobacco Use   • Smoking status: Never Smoker   • Smokeless tobacco: Never Used   Vaping Use   • Vaping Use: Never used   Substance and Sexual Activity   • Alcohol use: Yes     Alcohol/week: 1.0 standard drinks     Types: 1 Cans of beer per week     Comment: OCC   • Drug use: No   • Sexual activity: Defer       Review of Systems   Constitutional: Positive for fatigue and weight loss. Negative for chills and fever.   HENT: Negative for congestion.    Respiratory: Negative for cough.    Gastrointestinal: Positive for anorexia and nausea. Negative for abdominal pain, change in bowel habit and vomiting.   Musculoskeletal: Negative for joint swelling and myalgias.   Neurological: Positive for weakness. Negative for headaches.     See HPI    Objective   Blood pressure 110/66, pulse 66, resp. rate 20, height 167.6 cm (65.98\"), weight 65.2 kg (143 lb 12.8 oz), SpO2 96 %.  Nursing note reviewed  Physical Exam  Constitutional:       Appearance: Normal appearance. He is normal weight. He is not ill-appearing, toxic-appearing or diaphoretic.   HENT:      Mouth/Throat:      Mouth: Mucous membranes are moist.      Pharynx: No oropharyngeal exudate.   Eyes:      General: No scleral icterus.     Conjunctiva/sclera: Conjunctivae normal.      Pupils: Pupils are equal, round, and reactive to light.   Neck:      Vascular: No carotid bruit.   Cardiovascular:      Rate and Rhythm: Normal rate and regular rhythm.      Pulses: Normal pulses.      Heart sounds: Normal heart sounds.   Pulmonary:      Effort: Pulmonary effort is normal.      Breath sounds: Normal breath sounds.   Abdominal:      General: Abdomen is flat. Bowel sounds are normal. There is no distension.      Palpations: Abdomen is soft. There is no mass.      Tenderness: There is no abdominal tenderness. There is no guarding.   Lymphadenopathy:      Head:      Right side of head: No submental, submandibular, tonsillar, preauricular, posterior " auricular or occipital adenopathy.      Left side of head: No submental, submandibular, tonsillar, preauricular, posterior auricular or occipital adenopathy.      Cervical: No cervical adenopathy.      Right cervical: No superficial, deep or posterior cervical adenopathy.     Left cervical: No superficial, deep or posterior cervical adenopathy.      Upper Body:      Right upper body: No supraclavicular, axillary or epitrochlear adenopathy.      Left upper body: No supraclavicular, axillary or epitrochlear adenopathy.   Neurological:      Mental Status: He is alert.       Procedures  Assessment/Plan   Problem List Items Addressed This Visit        Endocrine and Metabolic    Hyperglycemia    Relevant Orders    CBC & Differential    Hemoglobin A1c    Comprehensive Metabolic Panel    CK    Lipase    Uric Acid    T4, Free    TSH    Urinalysis With Microscopic If Indicated (No Culture) - Urine, Clean Catch    Folate    Vitamin D 25 Hydroxy    Phosphorus    Magnesium    Zinc       Genitourinary and Reproductive     CKD (chronic kidney disease), stage II    Relevant Orders    CBC & Differential    Hemoglobin A1c    Comprehensive Metabolic Panel    CK    Lipase    Uric Acid    T4, Free    TSH    Urinalysis With Microscopic If Indicated (No Culture) - Urine, Clean Catch    Folate    Vitamin D 25 Hydroxy    Phosphorus    Magnesium    Zinc      Other Visit Diagnoses     Weight loss, unintentional    -  Primary    Relevant Orders    CBC & Differential    Hemoglobin A1c    Comprehensive Metabolic Panel    CK    Lipase    Uric Acid    T4, Free    TSH    Urinalysis With Microscopic If Indicated (No Culture) - Urine, Clean Catch    Folate    Vitamin D 25 Hydroxy    Phosphorus    Magnesium    Zinc    CT Abdomen Pelvis With Contrast    Early satiety        Relevant Orders    CBC & Differential    Hemoglobin A1c    Comprehensive Metabolic Panel    CK    Lipase    Uric Acid    T4, Free    TSH    Urinalysis With Microscopic If Indicated  (No Culture) - Urine, Clean Catch    Folate    Vitamin D 25 Hydroxy    Phosphorus    Magnesium    Zinc    CT Abdomen Pelvis With Contrast    Vitamin D deficiency        Relevant Orders    Vitamin D 25 Hydroxy    Splenic cyst        Relevant Orders    CT Abdomen Pelvis With Contrast          1. Unintentional weight loss  - The patient has associated symptoms of mild nausea and early satiety as well as fatigue.  - I recommend labs. He had a small cyst on his spleen on ultrasound in 04/2020. Given the early satiety, I would certainly recommend repeat imaging of the abdomen.  - We will check other labs as indicated above.    2. Diabetes  - This may be playing a small part.  - He will follow up after his CT scan.    There are no Patient Instructions on file for this visit.    No follow-ups on file.    Ambulatory progress note signed and attested to by Rell Ahuja D.O.       Transcribed from ambient dictation for Rell Ahuja DO by Cristo Arroyo.  08/05/21   16:03 EDT    I have personally performed the services described in this document as transcribed by the above individual, and it is both accurate and complete.  Rell Ahuja DO  8/5/2021  16:20 EDT

## 2021-08-06 ENCOUNTER — LAB (OUTPATIENT)
Dept: LAB | Facility: HOSPITAL | Age: 81
End: 2021-08-06

## 2021-08-06 ENCOUNTER — TELEPHONE (OUTPATIENT)
Dept: FAMILY MEDICINE CLINIC | Facility: CLINIC | Age: 81
End: 2021-08-06

## 2021-08-06 DIAGNOSIS — R63.4 WEIGHT LOSS, UNINTENTIONAL: ICD-10-CM

## 2021-08-06 DIAGNOSIS — K85.90 ACUTE PANCREATITIS, UNSPECIFIED COMPLICATION STATUS, UNSPECIFIED PANCREATITIS TYPE: ICD-10-CM

## 2021-08-06 DIAGNOSIS — E83.52 HYPERCALCEMIA: Primary | ICD-10-CM

## 2021-08-06 DIAGNOSIS — N18.2 CKD (CHRONIC KIDNEY DISEASE), STAGE II: ICD-10-CM

## 2021-08-06 DIAGNOSIS — R68.81 EARLY SATIETY: ICD-10-CM

## 2021-08-06 DIAGNOSIS — R73.9 HYPERGLYCEMIA: ICD-10-CM

## 2021-08-06 DIAGNOSIS — E83.52 HYPERCALCEMIA: ICD-10-CM

## 2021-08-06 LAB
25(OH)D3 SERPL-MCNC: 82.2 NG/ML (ref 30–100)
BASOPHILS # BLD AUTO: 0.06 10*3/MM3 (ref 0–0.2)
BASOPHILS # BLD AUTO: 0.07 10*3/MM3 (ref 0–0.2)
BASOPHILS NFR BLD AUTO: 0.5 % (ref 0–1.5)
BASOPHILS NFR BLD AUTO: 0.7 % (ref 0–1.5)
CA-I BLD-MCNC: 5.6 MG/DL (ref 4.6–5.4)
CA-I SERPL ISE-MCNC: 1.39 MMOL/L (ref 1.15–1.35)
DEPRECATED RDW RBC AUTO: 42.9 FL (ref 37–54)
DEPRECATED RDW RBC AUTO: 45.6 FL (ref 37–54)
EOSINOPHIL # BLD AUTO: 0.27 10*3/MM3 (ref 0–0.4)
EOSINOPHIL # BLD AUTO: 0.5 10*3/MM3 (ref 0–0.4)
EOSINOPHIL NFR BLD AUTO: 2.3 % (ref 0.3–6.2)
EOSINOPHIL NFR BLD AUTO: 5.3 % (ref 0.3–6.2)
ERYTHROCYTE [DISTWIDTH] IN BLOOD BY AUTOMATED COUNT: 13.1 % (ref 12.3–15.4)
ERYTHROCYTE [DISTWIDTH] IN BLOOD BY AUTOMATED COUNT: 13.1 % (ref 12.3–15.4)
FOLATE SERPL-MCNC: >20 NG/ML (ref 4.78–24.2)
HCT VFR BLD AUTO: 39.4 % (ref 37.5–51)
HCT VFR BLD AUTO: 44.7 % (ref 37.5–51)
HGB BLD-MCNC: 12.9 G/DL (ref 13–17.7)
HGB BLD-MCNC: 13.7 G/DL (ref 13–17.7)
IMM GRANULOCYTES # BLD AUTO: 0.03 10*3/MM3 (ref 0–0.05)
IMM GRANULOCYTES # BLD AUTO: 0.04 10*3/MM3 (ref 0–0.05)
IMM GRANULOCYTES NFR BLD AUTO: 0.3 % (ref 0–0.5)
IMM GRANULOCYTES NFR BLD AUTO: 0.4 % (ref 0–0.5)
LYMPHOCYTES # BLD AUTO: 1.65 10*3/MM3 (ref 0.7–3.1)
LYMPHOCYTES # BLD AUTO: 1.69 10*3/MM3 (ref 0.7–3.1)
LYMPHOCYTES NFR BLD AUTO: 13.8 % (ref 19.6–45.3)
LYMPHOCYTES NFR BLD AUTO: 18 % (ref 19.6–45.3)
MCH RBC QN AUTO: 28.8 PG (ref 26.6–33)
MCH RBC QN AUTO: 29.5 PG (ref 26.6–33)
MCHC RBC AUTO-ENTMCNC: 30.6 G/DL (ref 31.5–35.7)
MCHC RBC AUTO-ENTMCNC: 32.7 G/DL (ref 31.5–35.7)
MCV RBC AUTO: 90 FL (ref 79–97)
MCV RBC AUTO: 93.9 FL (ref 79–97)
MONOCYTES # BLD AUTO: 0.81 10*3/MM3 (ref 0.1–0.9)
MONOCYTES # BLD AUTO: 0.82 10*3/MM3 (ref 0.1–0.9)
MONOCYTES NFR BLD AUTO: 6.9 % (ref 5–12)
MONOCYTES NFR BLD AUTO: 8.6 % (ref 5–12)
NEUTROPHILS NFR BLD AUTO: 6.27 10*3/MM3 (ref 1.7–7)
NEUTROPHILS NFR BLD AUTO: 67 % (ref 42.7–76)
NEUTROPHILS NFR BLD AUTO: 76.2 % (ref 42.7–76)
NEUTROPHILS NFR BLD AUTO: 9.1 10*3/MM3 (ref 1.7–7)
NRBC BLD AUTO-RTO: 0 /100 WBC (ref 0–0.2)
NRBC BLD AUTO-RTO: 0.2 /100 WBC (ref 0–0.2)
PLATELET # BLD AUTO: 358 10*3/MM3 (ref 140–450)
PLATELET # BLD AUTO: 384 10*3/MM3 (ref 140–450)
PMV BLD AUTO: 10 FL (ref 6–12)
PMV BLD AUTO: 10.2 FL (ref 6–12)
PTH-INTACT SERPL-MCNC: 24.6 PG/ML (ref 15–65)
RBC # BLD AUTO: 4.38 10*6/MM3 (ref 4.14–5.8)
RBC # BLD AUTO: 4.76 10*6/MM3 (ref 4.14–5.8)
WBC # BLD AUTO: 11.93 10*3/MM3 (ref 3.4–10.8)
WBC # BLD AUTO: 9.38 10*3/MM3 (ref 3.4–10.8)

## 2021-08-06 PROCEDURE — 85025 COMPLETE CBC W/AUTO DIFF WBC: CPT

## 2021-08-06 PROCEDURE — 82330 ASSAY OF CALCIUM: CPT

## 2021-08-06 PROCEDURE — 83970 ASSAY OF PARATHORMONE: CPT

## 2021-08-06 NOTE — TELEPHONE ENCOUNTER
Caller: Reshma Henriquez    Relationship: Emergency Contact    Best call back number:  [phone]    Caller requesting test results: PATIENT'S WIFE   SHE IS ON THE  VERBAL     What test was performed: BLOOD WORK     When was the test performed: 8-5-21    Additional notes: PLEASE CALL WITH RESULTS

## 2021-08-06 NOTE — TELEPHONE ENCOUNTER
His test results suggest pancreatitis, which would explain his symptoms. The cause of the pancreatic inflammation is unclear, and the CT I ordered yesterday will give us some more information. In the meantime I'd like to check a couple additional labs which have been ordered. For the time being I'd like him to avoid fatty or greasy foods and stick with a very bland diet. Giving the pancreas a rest is the main way to treat.

## 2021-08-10 LAB — ZINC SERPL-MCNC: 149 UG/DL (ref 44–115)

## 2021-08-25 ENCOUNTER — HOSPITAL ENCOUNTER (OUTPATIENT)
Dept: CT IMAGING | Facility: HOSPITAL | Age: 81
Discharge: HOME | End: 2021-08-25
Admitting: FAMILY MEDICINE

## 2021-08-25 DIAGNOSIS — D73.4 SPLENIC CYST: ICD-10-CM

## 2021-08-25 DIAGNOSIS — R63.4 WEIGHT LOSS, UNINTENTIONAL: ICD-10-CM

## 2021-08-25 DIAGNOSIS — R68.81 EARLY SATIETY: ICD-10-CM

## 2021-08-25 PROCEDURE — 74177 CT ABD & PELVIS W/CONTRAST: CPT

## 2021-08-25 PROCEDURE — 25010000002 IOPAMIDOL 61 % SOLUTION: Performed by: FAMILY MEDICINE

## 2021-08-25 RX ADMIN — IOPAMIDOL 95 ML: 612 INJECTION, SOLUTION INTRAVENOUS at 14:30

## 2021-08-30 ENCOUNTER — TELEPHONE (OUTPATIENT)
Dept: FAMILY MEDICINE CLINIC | Facility: CLINIC | Age: 81
End: 2021-08-30

## 2021-08-30 DIAGNOSIS — R63.4 WEIGHT LOSS, UNINTENTIONAL: ICD-10-CM

## 2021-08-30 DIAGNOSIS — K85.90 ACUTE PANCREATITIS, UNSPECIFIED COMPLICATION STATUS, UNSPECIFIED PANCREATITIS TYPE: Primary | ICD-10-CM

## 2021-08-30 DIAGNOSIS — E83.52 HYPERCALCEMIA: ICD-10-CM

## 2021-09-09 NOTE — PROGRESS NOTES
[facility] Cardiology  Office Progress Note  Nathan Velarde  1940  [address] [facility] [address]       Visit Date: 09/10/21    PCP: Rell Ahuja DO  [address]  [facility] [address]    IDENTIFICATION: A 81 y.o. male retired from [employer], originally from [place]    PROBLEM LIST:   1. PAF  a. CHADS2-VASc score =3:    b. Converted  to sinus rhythm following dig  c. Digitalis noncompliance with reversion to atrial fibrillation.  d. Has declined anticoagulation, on ASA 81  2. VHD  a. 8/27/2019 echo: EF 60%, mild MR, moderate to severe AI P1/2t 397 msec  b. 9/2020 EF 60% Mod AI  3. HTN  4. HLD, on atorvastatin  a. 4/9/2019   HDL 33 LDL 59  b. 7/13/2020   HDL 28 LDL 58  c. 1/21 , , HDL 25, LDL 33  5. 8/21 asymptomatic pancreatitis- eval pending  6. PreDM  a. 6/21 A1c 6.3  b. 8/21 A1c 5.6  7. 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  Ataxia, mid abdominal and bilateral lower extremity sensory deficit.  8. CKD stage II  9. Hx of ulcerative colitis.  10. Hx of bladder cancer; no chemotherapy or radiation required.  a. Status post surgery, remote:  b.  TURP.  c. Herniorrhaphy.    d. Knee surgery (data deficit).  e. Total right shoulder arthroplasty with biceps tenodesis - Dr. Humphrey      CC:   Chief Complaint   Patient presents with   • Nonrheumatic aortic valve insufficiency       Allergies  Allergies   Allergen Reactions   • Sulfa Antibiotics Nausea Only and Other (See Comments)     Headache       Current Medications    Current Outpatient Medications:   •  Alpha-Lipoic Acid 600 MG capsule, Take 600 mg by mouth Daily., Disp: , Rfl:   •  aspirin-acetaminophen-caffeine (EXCEDRIN MIGRAINE) 250-250-65 MG per tablet, Take 1 tablet by mouth Every 6 (Six) Hours As Needed for Headache., Disp: , Rfl:   •  atorvastatin (LIPITOR) 40 MG tablet, TAKE 1 TABLET NIGHTLY AT   BEDTIME (Patient taking differently: Take 40 mg by mouth Every Night.), Disp: 90 tablet,  Rfl: 1  •  Calcium Carbonate-Vitamin D (CALCIUM 600+D PO), Take 1 tablet by mouth Daily., Disp: , Rfl:   •  chlorthalidone (HYGROTON) 25 MG tablet, TAKE ONE TABLET BY MOUTH DAILY (Patient taking differently: Take 25 mg by mouth Daily.), Disp: 90 tablet, Rfl: 10  •  cholecalciferol (VITAMIN D3) 1000 units tablet, Take 1,000 Units by mouth Daily., Disp: , Rfl:   •  coenzyme Q10 100 MG capsule, Take 100 mg by mouth Daily., Disp: , Rfl:   •  diclofenac (VOLTAREN) 75 MG EC tablet, TAKE 1 TABLET DAILY (Patient taking differently: Take 75 mg by mouth.), Disp: 90 tablet, Rfl: 1  •  digoxin (LANOXIN) 125 MCG tablet, TAKE ONE TABLET BY MOUTH DAILY (Patient taking differently: Take 125 mcg by mouth Daily.), Disp: 90 tablet, Rfl: 1  •  dilTIAZem CD (CARDIZEM CD) 180 MG 24 hr capsule, Take 1 capsule by mouth Every Night., Disp: 90 capsule, Rfl: 0  •  Fish Oil-Cholecalciferol (OMEGA-3 + D PO), Take 1 capsule by mouth Daily., Disp: , Rfl:   •  Glucosamine-Chondroit-Vit C-Mn (GLUCOSAMINE 1500 COMPLEX) capsule, Take 1 tablet by mouth Daily., Disp: , Rfl:   •  losartan (COZAAR) 50 MG tablet, TAKE ONE TABLET BY MOUTH TWICE A DAY (Patient taking differently: Take 37.5 mg by mouth Daily. Patient says it is prescribed as 50 mg a day but tolerates 37.5 mg a day better), Disp: 180 tablet, Rfl: 0  •  Magnesium 250 MG tablet, Take 250 mg by mouth Daily., Disp: , Rfl:   •  melatonin 3 MG tablet, Take 3 mg by mouth Every Night., Disp: , Rfl:   •  mesalamine (DELZICOL) 400 MG capsule delayed-release delayed release capsule, Take 2 capsules by mouth 2 (Two) Times a Day., Disp: 360 capsule, Rfl: 4  •  Multiple Vitamins-Minerals (MULTIVITAMIN ADULT PO), Take 1 tablet by mouth Daily., Disp: , Rfl:   •  potassium chloride 10 MEQ CR tablet, TAKE 1 TABLET DAILY (Patient taking differently: Take 10 mEq by mouth.), Disp: 90 tablet, Rfl: 4  •  Saw Palmetto 160 MG capsule, Take 1 capsule by mouth Daily., Disp: , Rfl:   •  Selenium 200 MCG capsule, Take  "200 mcg by mouth Daily., Disp: , Rfl:   •  tamsulosin (FLOMAX) 0.4 MG capsule 24 hr capsule, TAKE ONE CAPSULE BY MOUTH EVERY NIGHT AT BEDTIME, Disp: 90 capsule, Rfl: 0  •  vitamin E 400 UNIT capsule, Take 400 Units by mouth Daily., Disp: , Rfl:   •  Zinc 50 MG capsule, Take 50 mg by mouth Daily., Disp: , Rfl:   •  oxyCODONE (ROXICODONE) 5 MG immediate release tablet, Take 1 tablet by mouth Every 4 (Four) Hours As Needed for Moderate Pain ., Disp: 25 tablet, Rfl: 0      History of Present Illness   Nathan Velarde is a 81 y.o. year old male here for follow up.  Today, the patient reports that he is doing well from a cardiac standpoint.  He states that he regularly exercises performing aerobic and muscular activities.  He does not report any cardiac issues/physical limitations while performing these exercises.  Notably, the patient states that he sometimes feels like he cannot take a deep breath particularly when he is lying down.  He denies this perceived 'shortness of breath while lying down' occurring frequently. He denies any PND and/or orthopnea.    Pt denies any chest pain, dyspnea, dyspnea on exertion, orthopnea, PND, palpitations, lower extremity edema, or claudication.     Today he states that he was previously evaluated for recurrent and pervasive nausea with associated weight loss as of August of this year.  Evaluation revealed that patient had an elevated lipase level but no noted abnormality appreciated upon abdominal CT(beyond renal cysts and mild calcification of abdominal aorta and iliac vessels).    Notably, the patient denies any more recent episodes of nausea and weight loss.  The patient states that since evaluation on 8/5/2021 the patient has maintained his current weight and has had improvement in his appetite.    OBJECTIVE:  Vitals:    09/10/21 1421   BP: 110/54   BP Location: Left arm   Patient Position: Sitting   Pulse: 61   SpO2: 98%   Weight: 66.7 kg (147 lb)   Height: 167.6 cm (66\")     Body " mass index is 23.73 kg/m².    Vitals and nursing note reviewed.   Constitutional:       General: Awake.      Appearance: Healthy appearance. Well-developed and not in distress.   Neck:      Thyroid: No thyromegaly.      Vascular: Carotid bruit present. No JVD.      Trachea: No tracheal deviation.   Pulmonary:      Effort: Pulmonary effort is normal.      Breath sounds: Normal breath sounds. No wheezing. No rales.   Cardiovascular:      Normal rate. Regular rhythm. Normal S1. Normal S2.      Murmurs: There is a diastolic murmur.      No gallop. No click. No rub.   Pulses:     Intact distal pulses.   Edema:     Peripheral edema absent.   Abdominal:      General: Bowel sounds are normal.      Palpations: Abdomen is soft. There is no abdominal mass.      Tenderness: There is no abdominal tenderness. There is no guarding.   Musculoskeletal:      Cervical back: Normal range of motion and neck supple. Skin:     General: Skin is warm and dry.   Neurological:      Mental Status: Alert, oriented to person, place, and time and oriented to person, place and time.   Psychiatric:         Attention and Perception: Attention normal.         Mood and Affect: Mood normal.         Speech: Speech normal.         Behavior: Behavior normal. Behavior is cooperative.         Diagnostic Data:  Procedures      ASSESSMENT:   Diagnosis Plan   1. Aortic valve disorder     2. Paroxysmal atrial fibrillation (CMS/HCC)     3. Essential hypertension     4. Mixed hyperlipidemia     5. Nausea     6. Elevated lipase     7. Bruit of left carotid artery         PLAN:  1. aortic valve disorder -defer echocardiogram at this time due to historical integrity appreciated on previous echoes.  Have low threshold for future echo if new symptoms/worsening of symptoms occur.  2.  Paroxysmal atrial fibrillation-patient denies any recent episodes of palpitations/signs and symptoms of atrial fibrillation.  3.  Hypertension -patient's in clinic blood pressure is  110/54.  Continue current medical regimen.  4.  Hyperlipidemia-current lipid panel suggest proper control.  Continue current statin therapy.  5.  Nausea and elevated lipase -previous CT evaluation did not reveal any abnormalities associated with previously noted nausea and elevated lipase.  This nausea  could possibly be due to digoxin toxicity.  If these episodes/lab findings continue to occur, recommend discontinuation of digoxin therapy.  Patient follows his heart rate trends otherwise on his smart watch  6.  Carotid bruit -perform carotid artery ultrasound to assess for arterial tortuosity/stenosis.      Scribe: Mason Snell PA-C for Dr. Giovanni Mcdowell M.D. [facility]

## 2021-09-10 ENCOUNTER — OFFICE VISIT (OUTPATIENT)
Dept: CARDIOLOGY | Facility: CLINIC | Age: 81
End: 2021-09-10

## 2021-09-10 VITALS
HEIGHT: 66 IN | BODY MASS INDEX: 23.63 KG/M2 | HEART RATE: 61 BPM | DIASTOLIC BLOOD PRESSURE: 54 MMHG | WEIGHT: 147 LBS | SYSTOLIC BLOOD PRESSURE: 110 MMHG | OXYGEN SATURATION: 98 %

## 2021-09-10 DIAGNOSIS — I35.9 AORTIC VALVE DISORDER: Primary | ICD-10-CM

## 2021-09-10 DIAGNOSIS — I10 ESSENTIAL HYPERTENSION: ICD-10-CM

## 2021-09-10 DIAGNOSIS — R11.0 NAUSEA: ICD-10-CM

## 2021-09-10 DIAGNOSIS — I48.0 PAROXYSMAL ATRIAL FIBRILLATION (HCC): ICD-10-CM

## 2021-09-10 DIAGNOSIS — E78.2 MIXED HYPERLIPIDEMIA: ICD-10-CM

## 2021-09-10 DIAGNOSIS — R74.8 ELEVATED LIPASE: ICD-10-CM

## 2021-09-10 DIAGNOSIS — R09.89 BRUIT OF LEFT CAROTID ARTERY: ICD-10-CM

## 2021-09-10 PROCEDURE — 99214 OFFICE O/P EST MOD 30 MIN: CPT | Performed by: INTERNAL MEDICINE

## 2021-09-20 ENCOUNTER — HOSPITAL ENCOUNTER (OUTPATIENT)
Dept: CARDIOLOGY | Facility: HOSPITAL | Age: 81
Discharge: HOME | End: 2021-09-20

## 2021-09-20 VITALS — HEIGHT: 66 IN | WEIGHT: 147 LBS | BODY MASS INDEX: 23.63 KG/M2

## 2021-09-20 DIAGNOSIS — R09.89 BRUIT OF LEFT CAROTID ARTERY: ICD-10-CM

## 2021-09-20 DIAGNOSIS — E78.2 MIXED HYPERLIPIDEMIA: ICD-10-CM

## 2021-09-20 DIAGNOSIS — I10 ESSENTIAL HYPERTENSION: ICD-10-CM

## 2021-09-20 PROCEDURE — 93880 EXTRACRANIAL BILAT STUDY: CPT

## 2021-09-20 PROCEDURE — 93880 EXTRACRANIAL BILAT STUDY: CPT | Performed by: INTERNAL MEDICINE

## 2021-09-21 ENCOUNTER — TELEPHONE (OUTPATIENT)
Dept: CARDIOLOGY | Facility: CLINIC | Age: 81
End: 2021-09-21

## 2021-09-21 LAB
BH CV XLRA MEAS LEFT DIST CCA EDV: 9.8 CM/SEC
BH CV XLRA MEAS LEFT DIST CCA PSV: 121 CM/SEC
BH CV XLRA MEAS LEFT DIST ICA EDV: 23.6 CM/SEC
BH CV XLRA MEAS LEFT DIST ICA PSV: 96.3 CM/SEC
BH CV XLRA MEAS LEFT ICA/CCA RATIO: 0.7
BH CV XLRA MEAS LEFT MID CCA EDV: 2 CM/SEC
BH CV XLRA MEAS LEFT MID CCA PSV: 98.2 CM/SEC
BH CV XLRA MEAS LEFT MID ICA EDV: 14.7 CM/SEC
BH CV XLRA MEAS LEFT MID ICA PSV: 71.7 CM/SEC
BH CV XLRA MEAS LEFT PROX CCA EDV: 11.8 CM/SEC
BH CV XLRA MEAS LEFT PROX CCA PSV: 78.6 CM/SEC
BH CV XLRA MEAS LEFT PROX ECA EDV: 0.98 CM/SEC
BH CV XLRA MEAS LEFT PROX ECA PSV: 141 CM/SEC
BH CV XLRA MEAS LEFT PROX ICA EDV: 17.7 CM/SEC
BH CV XLRA MEAS LEFT PROX ICA PSV: 85.4 CM/SEC
BH CV XLRA MEAS LEFT PROX SCLA EDV: 0 CM/SEC
BH CV XLRA MEAS LEFT PROX SCLA PSV: 116 CM/SEC
BH CV XLRA MEAS LEFT VERTEBRAL A EDV: 6.6 CM/SEC
BH CV XLRA MEAS LEFT VERTEBRAL A PSV: 36.4 CM/SEC
BH CV XLRA MEAS RIGHT DIST CCA EDV: 4.9 CM/SEC
BH CV XLRA MEAS RIGHT DIST CCA PSV: 119 CM/SEC
BH CV XLRA MEAS RIGHT DIST ICA EDV: 23.6 CM/SEC
BH CV XLRA MEAS RIGHT DIST ICA PSV: 102 CM/SEC
BH CV XLRA MEAS RIGHT ICA/CCA RATIO: 0.9
BH CV XLRA MEAS RIGHT MID CCA EDV: 4.9 CM/SEC
BH CV XLRA MEAS RIGHT MID CCA PSV: 106 CM/SEC
BH CV XLRA MEAS RIGHT MID ICA EDV: 13.8 CM/SEC
BH CV XLRA MEAS RIGHT MID ICA PSV: 96.3 CM/SEC
BH CV XLRA MEAS RIGHT PROX CCA EDV: 2 CM/SEC
BH CV XLRA MEAS RIGHT PROX CCA PSV: 80.5 CM/SEC
BH CV XLRA MEAS RIGHT PROX ECA EDV: 0.98 CM/SEC
BH CV XLRA MEAS RIGHT PROX ECA PSV: 153 CM/SEC
BH CV XLRA MEAS RIGHT PROX ICA EDV: 13.8 CM/SEC
BH CV XLRA MEAS RIGHT PROX ICA PSV: 103 CM/SEC
BH CV XLRA MEAS RIGHT PROX SCLA EDV: 2 CM/SEC
BH CV XLRA MEAS RIGHT PROX SCLA PSV: 167 CM/SEC
BH CV XLRA MEAS RIGHT VERTEBRAL A EDV: 3 CM/SEC
BH CV XLRA MEAS RIGHT VERTEBRAL A PSV: 76.6 CM/SEC
LEFT ARM BP: NORMAL MMHG
MAXIMAL PREDICTED HEART RATE: 139 BPM
RIGHT ARM BP: NORMAL MMHG
STRESS TARGET HR: 118 BPM

## 2021-09-21 NOTE — TELEPHONE ENCOUNTER
Spoke with patient and advised per  that Carotid duplex showed minimal plaque not requiring any interventions. Pt verbalized understanding

## 2021-09-25 ENCOUNTER — HOSPITAL ENCOUNTER (OUTPATIENT)
Dept: CARDIOLOGY | Facility: HOSPITAL | Age: 81
End: 2021-09-25

## 2021-09-25 DIAGNOSIS — I10 ESSENTIAL HYPERTENSION: ICD-10-CM

## 2021-09-27 NOTE — TELEPHONE ENCOUNTER
Rx Refill Note  Requested Prescriptions     Pending Prescriptions Disp Refills   • losartan (COZAAR) 50 MG tablet [Pharmacy Med Name: LOSARTAN POTASSIUM 50 MG TAB] 180 tablet 0     Sig: TAKE ONE TABLET BY MOUTH TWICE A DAY      Last office visit with prescribing clinician: 8/5/2021      Next office visit with prescribing clinician: Visit date not found   23}  Annette Rao MA  09/27/21, 11:23 EDT     Last fill: 05/25/2021    Patient taking differently: Take 37.5 mg by mouth Daily. Patient says it is prescribed as 50 mg a day but tolerates 37.5 mg a day better

## 2021-09-29 DIAGNOSIS — I10 ESSENTIAL HYPERTENSION: ICD-10-CM

## 2021-09-29 RX ORDER — LOSARTAN POTASSIUM 50 MG/1
TABLET ORAL
Qty: 180 TABLET | Refills: 0 | Status: SHIPPED | OUTPATIENT
Start: 2021-09-29 | End: 2022-01-25

## 2021-09-29 RX ORDER — LOSARTAN POTASSIUM 50 MG/1
50 TABLET ORAL 2 TIMES DAILY
Qty: 180 TABLET | Refills: 0 | Status: CANCELLED | OUTPATIENT
Start: 2021-09-29

## 2021-10-28 ENCOUNTER — OFFICE VISIT (OUTPATIENT)
Dept: GASTROENTEROLOGY | Facility: CLINIC | Age: 81
End: 2021-10-28

## 2021-10-28 VITALS
OXYGEN SATURATION: 98 % | HEIGHT: 66 IN | BODY MASS INDEX: 24.3 KG/M2 | WEIGHT: 151.2 LBS | SYSTOLIC BLOOD PRESSURE: 122 MMHG | HEART RATE: 56 BPM | DIASTOLIC BLOOD PRESSURE: 58 MMHG | TEMPERATURE: 97.1 F

## 2021-10-28 DIAGNOSIS — K51.80 OTHER ULCERATIVE COLITIS WITHOUT COMPLICATION (HCC): Primary | ICD-10-CM

## 2021-10-28 PROCEDURE — 99213 OFFICE O/P EST LOW 20 MIN: CPT | Performed by: INTERNAL MEDICINE

## 2021-10-28 NOTE — PROGRESS NOTES
Patient Name: Nathan Velarde   YOB: 1940   Medical Record number: [id number]     PCP: Rell Ahuja DO    Chief Complaint   Patient presents with   • Follow-up     6 month   Follow-up of ulcerative colitis.    History of Present Illness:   HPI  Mr. Velarde returns the office for follow-up.  The patient is doing well at this time.  He has regular bowel habits without any blood in the stool.  The patient has abdominal pain.  He has no issues with nausea.  Mr. Velarde did have some gastrointestinal symptoms a few months ago and work-up with a CT scan was unremarkable.  The patient did have orthopedic surgery in the summer and he has recovered well.  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    • Bilateral bunions     LEFT SIDE ONLY   • Bladder cancer (HCC) 1977    curative exfulguration   • DJD of left shoulder Adulthood    Severely advanced   • Generalized osteoarthritis Adulthood    Moderately advanced disease   • HTN (hypertension) 1990   • Hyperlipidemia Adulthood    Response to atorvastatin   • Knee osteoarthritis 2004   • Mitral valve insufficiency    • Nephrolithiasis 2000    Documented on IVP   • PAF (paroxysmal atrial fibrillation) (HCC) 2008    Mild disease - patient declines anticoagulation   • Prediabetes 2015    Hemoglobin A1c 6.2%.   • Prostatism 1995   • Transverse myelitis (HCC) 2014    Transient T10 with numbness and minimal ataxia - transient    • Ulcerative colitis (HCC) 1977    Well-controlled on mesalamine    • Wears glasses        Past Surgical History:   Procedure Laterality Date   • BICEPS TENDON REPAIR Left 5/7/2018    Procedure: BICEPS TENDODESIS;  Surgeon: Abner Humphrey MD;  Location: [facility];  Service: Orthopedics   • BLADDER SURGERY     • CATARACT EXTRACTION, BILATERAL     • COLONOSCOPY  2015    Colitis in remission   • CYSTOSCOPY  1977    Excision of bladder cancer   • INGUINAL  HERNIA REPAIR Right 1994   • KNEE SURGERY Right 1955    Excision of cyst   • LIPOMA EXCISION     • TONSILLECTOMY  1943   • TOTAL SHOULDER ARTHROPLASTY Left 5/7/2018    Procedure: LEFT TOTAL SHOULDER ARTHROPLASTY;  Surgeon: Abner Humphrey MD;  Location: [facility] OR;  Service: Orthopedics   • TOTAL SHOULDER ARTHROPLASTY Right 6/21/2021    Procedure: NAVIGATED REVERSE SHOULDER ARTHROPLASTY RIGHT, BICEPS TENODESIS - RIGHT;  Surgeon: Abner Humphrey MD;  Location: [facility] OR;  Service: Orthopedics;  Laterality: Right;         Current Outpatient Medications:   •  Alpha-Lipoic Acid 600 MG capsule, Take 600 mg by mouth Daily., Disp: , Rfl:   •  aspirin-acetaminophen-caffeine (EXCEDRIN MIGRAINE) 250-250-65 MG per tablet, Take 1 tablet by mouth Every 6 (Six) Hours As Needed for Headache., Disp: , Rfl:   •  atorvastatin (LIPITOR) 40 MG tablet, TAKE 1 TABLET NIGHTLY AT   BEDTIME (Patient taking differently: Take 40 mg by mouth Every Night.), Disp: 90 tablet, Rfl: 1  •  Calcium Carbonate-Vitamin D (CALCIUM 600+D PO), Take 1 tablet by mouth Daily., Disp: , Rfl:   •  chlorthalidone (HYGROTON) 25 MG tablet, TAKE ONE TABLET BY MOUTH DAILY (Patient taking differently: Take 25 mg by mouth Daily.), Disp: 90 tablet, Rfl: 10  •  cholecalciferol (VITAMIN D3) 1000 units tablet, Take 1,000 Units by mouth Daily., Disp: , Rfl:   •  coenzyme Q10 100 MG capsule, Take 100 mg by mouth Daily., Disp: , Rfl:   •  diclofenac (VOLTAREN) 75 MG EC tablet, TAKE 1 TABLET DAILY (Patient taking differently: Take 75 mg by mouth.), Disp: 90 tablet, Rfl: 1  •  digoxin (LANOXIN) 125 MCG tablet, TAKE ONE TABLET BY MOUTH DAILY (Patient taking differently: Take 125 mcg by mouth Daily.), Disp: 90 tablet, Rfl: 1  •  dilTIAZem CD (CARDIZEM CD) 180 MG 24 hr capsule, Take 1 capsule by mouth Every Night., Disp: 90 capsule, Rfl: 0  •  Fish Oil-Cholecalciferol (OMEGA-3 + D PO), Take 1 capsule by mouth Daily., Disp: , Rfl:   •  Glucosamine-Chondroit-Vit C-Mn  (GLUCOSAMINE 1500 COMPLEX) capsule, Take 1 tablet by mouth Daily., Disp: , Rfl:   •  losartan (COZAAR) 50 MG tablet, TAKE ONE TABLET BY MOUTH TWICE A DAY, Disp: 180 tablet, Rfl: 0  •  Magnesium 250 MG tablet, Take 250 mg by mouth Daily., Disp: , Rfl:   •  melatonin 3 MG tablet, Take 3 mg by mouth Every Night., Disp: , Rfl:   •  mesalamine (DELZICOL) 400 MG capsule delayed-release delayed release capsule, Take 2 capsules by mouth 2 (Two) Times a Day., Disp: 360 capsule, Rfl: 4  •  Multiple Vitamins-Minerals (MULTIVITAMIN ADULT PO), Take 1 tablet by mouth Daily., Disp: , Rfl:   •  potassium chloride 10 MEQ CR tablet, TAKE 1 TABLET DAILY (Patient taking differently: Take 10 mEq by mouth.), Disp: 90 tablet, Rfl: 4  •  Saw Palmetto 160 MG capsule, Take 1 capsule by mouth Daily., Disp: , Rfl:   •  Selenium 200 MCG capsule, Take 200 mcg by mouth Daily., Disp: , Rfl:   •  tamsulosin (FLOMAX) 0.4 MG capsule 24 hr capsule, TAKE ONE CAPSULE BY MOUTH EVERY NIGHT AT BEDTIME, Disp: 90 capsule, Rfl: 0  •  vitamin E 400 UNIT capsule, Take 400 Units by mouth Daily., Disp: , Rfl:   •  Zinc 50 MG capsule, Take 50 mg by mouth Daily., Disp: , Rfl:     Allergies   Allergen Reactions   • Sulfa Antibiotics Nausea Only and Other (See Comments)     Headache       Family History   Problem Relation Age of Onset   • Alzheimer's disease Mother          age 89   • Arthritis Mother    • Hyperlipidemia Mother    • Angina Father    • Hypertension Father    • Pneumonia Father          age 70 - tobacco   • Aneurysm Father         abd aortic   • Unexplained death Brother    • Breast cancer Maternal Grandmother    • Arthritis Maternal Grandmother    • Diabetes Paternal Uncle        Social History     Socioeconomic History   • Marital status:    Tobacco Use   • Smoking status: Never Smoker   • Smokeless tobacco: Never Used   Vaping Use   • Vaping Use: Never used   Substance and Sexual Activity   • Alcohol use: Yes     Alcohol/week: 1.0  standard drink     Types: 1 Cans of beer per week     Comment: OCC   • Drug use: No   • Sexual activity: Defer       Review of Systems   Constitutional: Negative for activity change, appetite change, fatigue, fever and unexpected weight change.   HENT: Negative for hearing loss, trouble swallowing and voice change.    Eyes: Negative for visual disturbance.   Respiratory: Negative for cough, choking, chest tightness and shortness of breath.    Cardiovascular: Negative for chest pain.   Gastrointestinal: Negative for abdominal distention, abdominal pain, anal bleeding, blood in stool, constipation, diarrhea, nausea, rectal pain and vomiting.   Endocrine: Negative for polydipsia and polyphagia.   Genitourinary: Negative.    Musculoskeletal: Negative for gait problem and joint swelling.   Skin: Negative for color change and rash.   Allergic/Immunologic: Negative for food allergies.   Neurological: Negative for dizziness, seizures and speech difficulty.   Hematological: Negative for adenopathy.   Psychiatric/Behavioral: Negative for confusion.       Vitals:    10/28/21 1419   BP: 122/58   Pulse: 56   Temp: 97.1 °F (36.2 °C)   SpO2: 98%       Physical Exam  Vitals reviewed.   Constitutional:       Appearance: Normal appearance.   HENT:      Head: Normocephalic and atraumatic.      Nose: Nose normal.      Mouth/Throat:      Mouth: Mucous membranes are moist.      Pharynx: Oropharynx is clear.   Eyes:      General: No scleral icterus.     Extraocular Movements: Extraocular movements intact.   Cardiovascular:      Rate and Rhythm: Normal rate and regular rhythm.      Heart sounds: Murmur heard.   No gallop.    Pulmonary:      Breath sounds: Normal breath sounds. No wheezing or rales.   Abdominal:      General: Bowel sounds are normal.      Palpations: Abdomen is soft.      Tenderness: There is no abdominal tenderness. There is no guarding.      Comments: Diastases recti   Musculoskeletal:         General: Normal range of  motion.      Cervical back: Normal range of motion and neck supple.   Skin:     General: Skin is dry.      Coloration: Skin is not jaundiced.   Neurological:      Mental Status: He is alert and oriented to person, place, and time.   Psychiatric:         Mood and Affect: Mood normal.         Thought Content: Thought content normal.         Judgment: Judgment normal.         Diagnoses and all orders for this visit:    1. Other ulcerative colitis without complication (HCC) (Primary)    The patient denies any problems with diarrhea or bleeding.  He is still taking the mesalamine on a regular basis.  The patient has normal creatinine at this time.      Plan: Will follow-up in 6 months.

## 2021-11-08 RX ORDER — DILTIAZEM HYDROCHLORIDE 180 MG/1
CAPSULE, COATED, EXTENDED RELEASE ORAL
Qty: 90 CAPSULE | Refills: 0 | Status: SHIPPED | OUTPATIENT
Start: 2021-11-08 | End: 2022-01-06

## 2021-11-08 RX ORDER — TAMSULOSIN HYDROCHLORIDE 0.4 MG/1
CAPSULE ORAL
Qty: 90 CAPSULE | Refills: 0 | Status: SHIPPED | OUTPATIENT
Start: 2021-11-08 | End: 2022-01-25

## 2021-11-08 RX ORDER — DICLOFENAC SODIUM 75 MG/1
TABLET, DELAYED RELEASE ORAL
Qty: 90 TABLET | Refills: 1 | Status: SHIPPED | OUTPATIENT
Start: 2021-11-08 | End: 2022-04-04

## 2021-11-08 RX ORDER — ATORVASTATIN CALCIUM 40 MG/1
TABLET, FILM COATED ORAL
Qty: 90 TABLET | Refills: 1 | Status: SHIPPED | OUTPATIENT
Start: 2021-11-08 | End: 2022-09-06

## 2021-11-08 NOTE — TELEPHONE ENCOUNTER
Rx Refill Note  Requested Prescriptions     Pending Prescriptions Disp Refills   • dilTIAZem CD (CARDIZEM CD) 180 MG 24 hr capsule [Pharmacy Med Name: DILTIAZEM CD CAP 180MG/24] 90 capsule 0     Sig: TAKE 1 CAPSULE NIGHTLY      Last office visit with prescribing clinician: 8/5/2021  Next office visit with prescribing clinician: Visit date not found     Annette Rao MA  11/08/21, 08:05 EST     Last fill: 07/06/2021

## 2021-11-08 NOTE — TELEPHONE ENCOUNTER
Rx Refill Note  Requested Prescriptions     Pending Prescriptions Disp Refills   • diclofenac (VOLTAREN) 75 MG EC tablet [Pharmacy Med Name: DICLOFEN SOD TAB 75MG EC] 90 tablet 1     Sig: TAKE 1 TABLET DAILY   • atorvastatin (LIPITOR) 40 MG tablet [Pharmacy Med Name: ATORVASTATIN TAB 40MG] 90 tablet 1     Sig: TAKE 1 TABLET NIGHTLY AT   BEDTIME      Last office visit with prescribing clinician: 8/5/2021      Next office visit with prescribing clinician: 11/7/2021            PAMELA GOLDMAN MA  11/08/21, 08:27 EST

## 2021-11-08 NOTE — TELEPHONE ENCOUNTER
Rx Refill Note  Requested Prescriptions     Pending Prescriptions Disp Refills   • tamsulosin (FLOMAX) 0.4 MG capsule 24 hr capsule [Pharmacy Med Name: TAMSULOSIN HCL 0.4 MG CAPSULE] 90 capsule 0     Sig: TAKE ONE CAPSULE BY MOUTH EVERY NIGHT AT BEDTIME      Last office visit with prescribing clinician: 8/5/2021      Next office visit with prescribing clinician: Visit date not found            Sharon Armenta MA  11/08/21, 08:48 EST

## 2021-11-30 RX ORDER — DIGOXIN 125 MCG
TABLET ORAL
Qty: 90 TABLET | Refills: 1 | Status: SHIPPED | OUTPATIENT
Start: 2021-11-30 | End: 2022-06-13

## 2021-11-30 NOTE — TELEPHONE ENCOUNTER
Rx Refill Note  Requested Prescriptions     Pending Prescriptions Disp Refills   • digoxin (LANOXIN) 125 MCG tablet [Pharmacy Med Name: DIGOXIN 125 MCG TABLET] 90 tablet 1     Sig: TAKE ONE TABLET BY MOUTH DAILY      Last office visit with prescribing clinician: 8/5/2021      Next office visit with prescribing clinician: Visit date not found            Sharon Armenta MA  11/30/21, 15:25 EST

## 2022-01-06 RX ORDER — DILTIAZEM HYDROCHLORIDE 180 MG/1
CAPSULE, COATED, EXTENDED RELEASE ORAL
Qty: 90 CAPSULE | Refills: 0 | Status: SHIPPED | OUTPATIENT
Start: 2022-01-06 | End: 2022-04-04

## 2022-01-06 RX ORDER — POTASSIUM CHLORIDE 750 MG/1
TABLET, FILM COATED, EXTENDED RELEASE ORAL
Qty: 90 TABLET | Refills: 1 | Status: SHIPPED | OUTPATIENT
Start: 2022-01-06 | End: 2022-08-02

## 2022-01-06 NOTE — TELEPHONE ENCOUNTER
Rx Refill Note  Requested Prescriptions     Pending Prescriptions Disp Refills   • dilTIAZem CD (CARDIZEM CD) 180 MG 24 hr capsule [Pharmacy Med Name: DILTIAZEM CD CAP 180MG/24] 90 capsule 0     Sig: TAKE 1 CAPSULE NIGHTLY      Last office visit with prescribing clinician: 8/5/2021     Next office visit with prescribing clinician: Visit date not found   }  Annette Rao MA  01/06/22, 07:50 EST     Last fill: 11/8/2021

## 2022-01-06 NOTE — TELEPHONE ENCOUNTER
Rx Refill Note  Requested Prescriptions     Pending Prescriptions Disp Refills   • potassium chloride 10 MEQ CR tablet [Pharmacy Med Name: POT CHLOR YADIRA TAB 10MEQ ER] 90 tablet 3     Sig: TAKE 1 TABLET DAILY      Last office visit with prescribing clinician: 8/5/2021      Next office visit with prescribing clinician: Visit date not found   }  Annette Rao MA  01/06/22, 08:04 EST     Last fill: 12/11/2020

## 2022-01-23 DIAGNOSIS — I10 ESSENTIAL HYPERTENSION: ICD-10-CM

## 2022-01-24 NOTE — TELEPHONE ENCOUNTER
Rx Refill Note  Requested Prescriptions     Pending Prescriptions Disp Refills   • tamsulosin (FLOMAX) 0.4 MG capsule 24 hr capsule [Pharmacy Med Name: TAMSULOSIN HCL 0.4 MG CAPSULE] 90 capsule 0     Sig: TAKE ONE CAPSULE BY MOUTH EVERY NIGHT AT BEDTIME   • losartan (COZAAR) 50 MG tablet [Pharmacy Med Name: LOSARTAN POTASSIUM 50 MG TAB] 180 tablet 0     Sig: TAKE ONE TABLET BY MOUTH TWICE A DAY      Last office visit with prescribing clinician: 8/5/2021      Next office visit with prescribing clinician: Visit date not found   23}  Annette Rao MA  01/24/22, 09:24 EST     Last fill: 11/8/2021-Tamsulosin  Last fill: 09/29/2021-Losartan

## 2022-01-25 RX ORDER — TAMSULOSIN HYDROCHLORIDE 0.4 MG/1
CAPSULE ORAL
Qty: 90 CAPSULE | Refills: 0 | Status: SHIPPED | OUTPATIENT
Start: 2022-01-25 | End: 2022-04-25

## 2022-01-25 RX ORDER — LOSARTAN POTASSIUM 50 MG/1
TABLET ORAL
Qty: 180 TABLET | Refills: 0 | Status: SHIPPED | OUTPATIENT
Start: 2022-01-25 | End: 2022-02-17

## 2022-02-10 ENCOUNTER — TELEPHONE (OUTPATIENT)
Dept: FAMILY MEDICINE CLINIC | Facility: CLINIC | Age: 82
End: 2022-02-10

## 2022-02-10 NOTE — TELEPHONE ENCOUNTER
CALLED AND LEFT MESSAGE WITH PATIENTS WIFE TO HAVE HIM RETURN CALL TO SCHEDULE [employer] APPOINTMENT     OK FOR HUB TO RELAY MESSAGE AND SCHEDULE

## 2022-02-17 ENCOUNTER — OFFICE VISIT (OUTPATIENT)
Dept: FAMILY MEDICINE CLINIC | Facility: CLINIC | Age: 82
End: 2022-02-17

## 2022-02-17 VITALS
SYSTOLIC BLOOD PRESSURE: 130 MMHG | DIASTOLIC BLOOD PRESSURE: 68 MMHG | RESPIRATION RATE: 16 BRPM | OXYGEN SATURATION: 96 % | HEART RATE: 61 BPM | HEIGHT: 66 IN | BODY MASS INDEX: 24.43 KG/M2 | WEIGHT: 152 LBS

## 2022-02-17 DIAGNOSIS — R73.03 PREDIABETES: ICD-10-CM

## 2022-02-17 DIAGNOSIS — I10 ESSENTIAL HYPERTENSION: ICD-10-CM

## 2022-02-17 DIAGNOSIS — R42 DIZZINESS: Primary | ICD-10-CM

## 2022-02-17 DIAGNOSIS — E78.00 PURE HYPERCHOLESTEROLEMIA: ICD-10-CM

## 2022-02-17 DIAGNOSIS — E55.9 VITAMIN D DEFICIENCY: ICD-10-CM

## 2022-02-17 PROCEDURE — 99214 OFFICE O/P EST MOD 30 MIN: CPT | Performed by: FAMILY MEDICINE

## 2022-02-17 RX ORDER — LOSARTAN POTASSIUM 50 MG/1
75 TABLET ORAL DAILY
Qty: 180 TABLET | Refills: 0 | Status: SHIPPED | OUTPATIENT
Start: 2022-02-17 | End: 2022-08-02

## 2022-02-17 NOTE — PATIENT INSTRUCTIONS
1.  Possibilities include autonomic dysfunction, paroxysmal atrial fibrillation    2.  If the symptoms reoccur, take 1 motrin/advil in the morning with your Excedrin before you exercise    3.  Also monitor your heart rate during the times you have symptoms

## 2022-02-17 NOTE — PROGRESS NOTES
Established Patient Office Visit      Patient Name: Nathan Velarde  : 1940   MRN: [id number]   Care Team: Patient Care Team:  Rell Ahuja DO as PCP - General (Family Medicine)  Ry Feliciano MD as Consulting Physician (Gastroenterology)    Chief Complaint:    Chief Complaint   Patient presents with   • Weight Loss   • Hyperglycemia       History of Present Illness: Nathan Velarde is a 81 y.o. male who is here today for chief complaint.    HPI    Answers for HPI/ROS submitted by the patient on 2022  What is the primary reason for your visit?: Other  Please describe your symptoms.: Dizzy/ light headed  Have you had these symptoms before?: Yes  How long have you been having these symptoms?: Greater than 2 weeks  Please list any medications you are currently taking for this condition.: None  Please describe any probable cause for these symptoms. : After doing exercise that includes cardio.    The patient presents today for a 6-month follow-up on weight loss. He was last seen in 2021, at which time he was complaining of unintentional weight loss, mild nausea, and early satiety. He had a small splenic cyst on ultrasound in 2020. Recommended labs and repeat imaging of his spleen. The CT scan was very reassuring with regard to the cyst, stable, likely lifelong.    The patient reports that when he exercises, especially if he is doing cardio, he gets lightheaded and dizzy. On Wednesday, he felt a little lightheaded, he went to the gym and his blood pressure was 102/46 mmHg. His blood pressure seems to be normal during the day is has been in the 170s. He is exercising 5 days a week and reports that lately it has happened almost every time he exercises. He does take caffeine every day in his Excedrin capsules. He does not take Advil. The patient does the same routine before the gym. His resting pulse rate is 52 or around 50 when he is sitting doing nothing, but it gets up into the 60's/70's when  he is active. The patient has a history of atrial fibrillation. He has not noticed any bleeds in the past. He denies any shortness of breath and his lungs seem to be fine.      This patient is accompanied by their self who contributes to the history of their care.    The following portions of the patient's history were reviewed and updated as appropriate: allergies, current medications, past family history, past medical history, past social history, past surgical history and problem list.    Subjective      Review of Systems:   Review of Systems - See HPI    Past Medical History:   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    • Benign prostatic hyperplasia    • Bilateral bunions     LEFT SIDE ONLY   • Bladder cancer (HCC) 1977    curative exfulguration   • DJD of left shoulder Adulthood    Severely advanced   • Generalized osteoarthritis Adulthood    Moderately advanced disease   • HTN (hypertension) 1990   • Hyperlipidemia Adulthood    Response to atorvastatin   • Inflammatory bowel disease 1977   • Knee osteoarthritis 2004   • Mitral valve insufficiency    • Nephrolithiasis 2000    Documented on IVP   • PAF (paroxysmal atrial fibrillation) ([facility]) 2008    Mild disease - patient declines anticoagulation   • Prediabetes 2015    Hemoglobin A1c 6.2%.   • Prostatism 1995   • Transverse myelitis ([facility]) 2014    Transient T10 with numbness and minimal ataxia - transient    • Ulcerative colitis ([facility]) 1977    Well-controlled on mesalamine    • Wears glasses        Past Surgical History:   Past Surgical History:   Procedure Laterality Date   • BICEPS TENDON REPAIR Left 5/7/2018    Procedure: BICEPS TENDODESIS;  Surgeon: Abner Humphrey MD;  Location: [facility];  Service: Orthopedics   • BLADDER SURGERY     • CATARACT EXTRACTION, BILATERAL     • COLONOSCOPY  2015    Colitis in remission   • CYSTOSCOPY  1977    Excision of bladder cancer    • INGUINAL HERNIA REPAIR Right    • KNEE SURGERY Right     Excision of cyst   • LIPOMA EXCISION     • TONSILLECTOMY     • TOTAL SHOULDER ARTHROPLASTY Left 2018    Procedure: LEFT TOTAL SHOULDER ARTHROPLASTY;  Surgeon: Abner Humphrey MD;  Location: [facility] OR;  Service: Orthopedics   • TOTAL SHOULDER ARTHROPLASTY Right 2021    Procedure: NAVIGATED REVERSE SHOULDER ARTHROPLASTY RIGHT, BICEPS TENODESIS - RIGHT;  Surgeon: Abner Humphrey MD;  Location: [facility] OR;  Service: Orthopedics;  Laterality: Right;       Family History:   Family History   Problem Relation Age of Onset   • Alzheimer's disease Mother          age 89   • Arthritis Mother            • Hyperlipidemia Mother    • Angina Father    • Hypertension Father    • Pneumonia Father          age 70 - tobacco   • Aneurysm Father         abd aortic   • Vision loss Father         AMD   • Unexplained death Brother    • Breast cancer Maternal Grandmother    • Arthritis Maternal Grandmother            • Diabetes Paternal Uncle        Social History:   Social History     Socioeconomic History   • Marital status:    Tobacco Use   • Smoking status: Never Smoker   • Smokeless tobacco: Never Used   Vaping Use   • Vaping Use: Never used   Substance and Sexual Activity   • Alcohol use: Not Currently     Alcohol/week: 2.0 standard drinks     Types: 2 Cans of beer per week     Comment: OCC   • Drug use: Never   • Sexual activity: Yes     Partners: Female     Birth control/protection: None       Tobacco History:   Social History     Tobacco Use   Smoking Status Never Smoker   Smokeless Tobacco Never Used       Medications:     Current Outpatient Medications:   •  Alpha-Lipoic Acid 600 MG capsule, Take 600 mg by mouth Daily., Disp: , Rfl:   •  aspirin-acetaminophen-caffeine (EXCEDRIN MIGRAINE) 250-250-65 MG per tablet, Take 1 tablet by mouth Every 6 (Six) Hours As Needed for Headache., Disp: , Rfl:   •   atorvastatin (LIPITOR) 40 MG tablet, TAKE 1 TABLET NIGHTLY AT   BEDTIME, Disp: 90 tablet, Rfl: 1  •  Calcium Carbonate-Vitamin D (CALCIUM 600+D PO), Take 1 tablet by mouth Daily., Disp: , Rfl:   •  chlorthalidone (HYGROTON) 25 MG tablet, TAKE ONE TABLET BY MOUTH DAILY (Patient taking differently: Take 25 mg by mouth Daily.), Disp: 90 tablet, Rfl: 10  •  cholecalciferol (VITAMIN D3) 1000 units tablet, Take 1,000 Units by mouth Daily., Disp: , Rfl:   •  coenzyme Q10 100 MG capsule, Take 100 mg by mouth Daily., Disp: , Rfl:   •  diclofenac (VOLTAREN) 75 MG EC tablet, TAKE 1 TABLET DAILY, Disp: 90 tablet, Rfl: 1  •  digoxin (LANOXIN) 125 MCG tablet, TAKE ONE TABLET BY MOUTH DAILY, Disp: 90 tablet, Rfl: 1  •  dilTIAZem CD (CARDIZEM CD) 180 MG 24 hr capsule, TAKE 1 CAPSULE NIGHTLY, Disp: 90 capsule, Rfl: 0  •  Fish Oil-Cholecalciferol (OMEGA-3 + D PO), Take 1 capsule by mouth Daily., Disp: , Rfl:   •  Glucosamine-Chondroit-Vit C-Mn (GLUCOSAMINE 1500 COMPLEX) capsule, Take 1 tablet by mouth Daily., Disp: , Rfl:   •  losartan (COZAAR) 50 MG tablet, Take 1.5 tablets by mouth Daily., Disp: 180 tablet, Rfl: 0  •  Magnesium 250 MG tablet, Take 250 mg by mouth Daily., Disp: , Rfl:   •  melatonin 3 MG tablet, Take 3 mg by mouth Every Night., Disp: , Rfl:   •  mesalamine (DELZICOL) 400 MG capsule delayed-release delayed release capsule, Take 2 capsules by mouth 2 (Two) Times a Day., Disp: 360 capsule, Rfl: 4  •  Multiple Vitamins-Minerals (MULTIVITAMIN ADULT PO), Take 1 tablet by mouth Daily., Disp: , Rfl:   •  potassium chloride 10 MEQ CR tablet, TAKE 1 TABLET DAILY, Disp: 90 tablet, Rfl: 1  •  Saw Palmetto 160 MG capsule, Take 1 capsule by mouth Daily., Disp: , Rfl:   •  Selenium 200 MCG capsule, Take 200 mcg by mouth Daily., Disp: , Rfl:   •  tamsulosin (FLOMAX) 0.4 MG capsule 24 hr capsule, TAKE ONE CAPSULE BY MOUTH EVERY NIGHT AT BEDTIME, Disp: 90 capsule, Rfl: 0  •  vitamin E 400 UNIT capsule, Take 400 Units by mouth Daily.,  "Disp: , Rfl:   •  Zinc 50 MG capsule, Take 50 mg by mouth Daily., Disp: , Rfl:     Allergies:   Allergies   Allergen Reactions   • Sulfa Antibiotics Nausea Only and Other (See Comments)     Headache       Objective   Objective     Physical Exam:  Vital Signs:   Vitals:    02/17/22 1607   BP: 130/68   Pulse: 61   Resp: 16   SpO2: 96%   Weight: 68.9 kg (152 lb)   Height: 167 cm (65.75\")     Body mass index is 24.72 kg/m².     Physical Exam  Nursing note reviewed  Const: NAD, A&Ox4, Pleasant, Cooperative  Eyes: EOMI, no conjunctivitis  ENT: No nasal discharge present, neck supple  Cardiac: Regular rate and rhythm, no cyanosis  Resp: Respiratory rate within normal limits, no increased work of breathing, no audible wheezing or retractions noted  GI: No distention or ascites  MSK: Motor and sensation grossly intact in bilateral upper extremities  Neurologic: CN II-XII grossly intact  Psych: Appropriate mood and behavior.  Skin: Warm, dry  Procedures/Radiology     Procedures  No radiology results for the last 7 days     Assessment/Plan   Assessment / Plan      Assessment/Plan:   Problems Addressed This Visit  Diagnoses and all orders for this visit:    1. Dizziness (Primary)  -     CBC (No Diff); Future  -     Comprehensive Metabolic Panel; Future  -     Urinalysis With Microscopic If Indicated (No Culture) - Urine, Clean Catch; Future    2. Essential hypertension  -     losartan (COZAAR) 50 MG tablet; Take 1.5 tablets by mouth Daily.  Dispense: 180 tablet; Refill: 0    3. Vitamin D deficiency  -     Vitamin D 25 Hydroxy; Future    4. Pure hypercholesterolemia  -     Lipid Panel; Future    5. Prediabetes  -     Hemoglobin A1c; Future      Problem List Items Addressed This Visit        Cardiac and Vasculature    Hyperlipidemia    Relevant Orders    Lipid Panel (Completed)      Other Visit Diagnoses     Dizziness    -  Primary    Relevant Orders    CBC (No Diff) (Completed)    Comprehensive Metabolic Panel (Completed)    " Urinalysis With Microscopic If Indicated (No Culture) - Urine, Clean Catch (Completed)    Essential hypertension        Relevant Medications    losartan (COZAAR) 50 MG tablet    Vitamin D deficiency        Relevant Orders    Vitamin D 25 Hydroxy (Completed)    Prediabetes        Relevant Orders    Hemoglobin A1c (Completed)          Patient Instructions   1.  Possibilities include autonomic dysfunction, paroxysmal atrial fibrillation    2.  If the symptoms reoccur, take 1 motrin/advil in the morning with your Excedrin before you exercise    3.  Also monitor your heart rate during the times you have symptoms      Follow Up:   Return in about 6 months (around 8/17/2022) for Medicare Wellness.    I spent 35 minutes caring for Nathan on this date of service. This time includes time spent by me in the following activities:preparing for the visit, reviewing tests, counseling and educating the patient/family/caregiver, ordering medications, tests, or procedures and documenting information in the medical record    DO KRYSTYNA Hillman RD  [facility]  [address]  [facility] [address]    Fax [phone]  Phone [phone]

## 2022-02-18 ENCOUNTER — LAB (OUTPATIENT)
Dept: LAB | Facility: HOSPITAL | Age: 82
End: 2022-02-18

## 2022-02-18 DIAGNOSIS — E78.00 PURE HYPERCHOLESTEROLEMIA: ICD-10-CM

## 2022-02-18 DIAGNOSIS — E55.9 VITAMIN D DEFICIENCY: ICD-10-CM

## 2022-02-18 DIAGNOSIS — R42 DIZZINESS: ICD-10-CM

## 2022-02-18 DIAGNOSIS — R73.03 PREDIABETES: ICD-10-CM

## 2022-02-18 LAB
25(OH)D3 SERPL-MCNC: 66.9 NG/ML
ALBUMIN SERPL-MCNC: 4.3 G/DL (ref 3.5–5.2)
ALBUMIN/GLOB SERPL: 1.4 G/DL
ALP SERPL-CCNC: 111 U/L (ref 39–117)
ALT SERPL W P-5'-P-CCNC: 40 U/L (ref 1–41)
ANION GAP SERPL CALCULATED.3IONS-SCNC: 13.6 MMOL/L (ref 5–15)
AST SERPL-CCNC: 32 U/L (ref 1–40)
BILIRUB SERPL-MCNC: 0.3 MG/DL (ref 0–1.2)
BILIRUB UR QL STRIP: NEGATIVE
BUN SERPL-MCNC: 40 MG/DL (ref 8–23)
BUN/CREAT SERPL: 27.4 (ref 7–25)
CALCIUM SPEC-SCNC: 10.2 MG/DL (ref 8.6–10.5)
CHLORIDE SERPL-SCNC: 105 MMOL/L (ref 98–107)
CHOLEST SERPL-MCNC: 98 MG/DL (ref 0–200)
CLARITY UR: CLEAR
CO2 SERPL-SCNC: 23.4 MMOL/L (ref 22–29)
COLOR UR: ABNORMAL
CREAT SERPL-MCNC: 1.46 MG/DL (ref 0.76–1.27)
DEPRECATED RDW RBC AUTO: 42.6 FL (ref 37–54)
ERYTHROCYTE [DISTWIDTH] IN BLOOD BY AUTOMATED COUNT: 13.2 % (ref 12.3–15.4)
GFR SERPL CREATININE-BSD FRML MDRD: 46 ML/MIN/1.73
GLOBULIN UR ELPH-MCNC: 3 GM/DL
GLUCOSE SERPL-MCNC: 117 MG/DL (ref 65–99)
GLUCOSE UR STRIP-MCNC: NEGATIVE MG/DL
HBA1C MFR BLD: 6 % (ref 4.8–5.6)
HCT VFR BLD AUTO: 38.8 % (ref 37.5–51)
HDLC SERPL-MCNC: 30 MG/DL (ref 40–60)
HGB BLD-MCNC: 12.6 G/DL (ref 13–17.7)
HGB UR QL STRIP.AUTO: NEGATIVE
KETONES UR QL STRIP: NEGATIVE
LDLC SERPL CALC-MCNC: 51 MG/DL (ref 0–100)
LDLC/HDLC SERPL: 1.71 {RATIO}
LEUKOCYTE ESTERASE UR QL STRIP.AUTO: NEGATIVE
MCH RBC QN AUTO: 29 PG (ref 26.6–33)
MCHC RBC AUTO-ENTMCNC: 32.5 G/DL (ref 31.5–35.7)
MCV RBC AUTO: 89.2 FL (ref 79–97)
NITRITE UR QL STRIP: NEGATIVE
PH UR STRIP.AUTO: 5.5 [PH] (ref 5–8)
PLATELET # BLD AUTO: 332 10*3/MM3 (ref 140–450)
PMV BLD AUTO: 10.2 FL (ref 6–12)
POTASSIUM SERPL-SCNC: 4.4 MMOL/L (ref 3.5–5.2)
PROT SERPL-MCNC: 7.3 G/DL (ref 6–8.5)
PROT UR QL STRIP: NEGATIVE
RBC # BLD AUTO: 4.35 10*6/MM3 (ref 4.14–5.8)
SODIUM SERPL-SCNC: 142 MMOL/L (ref 136–145)
SP GR UR STRIP: 1.02 (ref 1–1.03)
TRIGL SERPL-MCNC: 84 MG/DL (ref 0–150)
UROBILINOGEN UR QL STRIP: ABNORMAL
VLDLC SERPL-MCNC: 17 MG/DL (ref 5–40)
WBC NRBC COR # BLD: 9.59 10*3/MM3 (ref 3.4–10.8)

## 2022-02-18 PROCEDURE — 81003 URINALYSIS AUTO W/O SCOPE: CPT

## 2022-02-18 PROCEDURE — 80053 COMPREHEN METABOLIC PANEL: CPT

## 2022-02-18 PROCEDURE — 82306 VITAMIN D 25 HYDROXY: CPT

## 2022-02-18 PROCEDURE — 85027 COMPLETE CBC AUTOMATED: CPT

## 2022-02-18 PROCEDURE — 80061 LIPID PANEL: CPT

## 2022-02-18 PROCEDURE — 83036 HEMOGLOBIN GLYCOSYLATED A1C: CPT

## 2022-04-04 RX ORDER — DICLOFENAC SODIUM 75 MG/1
TABLET, DELAYED RELEASE ORAL
Qty: 90 TABLET | Refills: 1 | Status: SHIPPED | OUTPATIENT
Start: 2022-04-04 | End: 2022-09-06

## 2022-04-04 RX ORDER — DILTIAZEM HYDROCHLORIDE 180 MG/1
CAPSULE, COATED, EXTENDED RELEASE ORAL
Qty: 90 CAPSULE | Refills: 0 | Status: SHIPPED | OUTPATIENT
Start: 2022-04-04 | End: 2022-08-02

## 2022-04-04 RX ORDER — MESALAMINE 400 MG/1
CAPSULE, DELAYED RELEASE ORAL
Qty: 360 CAPSULE | Refills: 3 | Status: SHIPPED | OUTPATIENT
Start: 2022-04-04 | End: 2023-03-22 | Stop reason: SDUPTHER

## 2022-04-04 NOTE — TELEPHONE ENCOUNTER
Rx Refill Note  Requested Prescriptions     Pending Prescriptions Disp Refills   • dilTIAZem CD (CARDIZEM CD) 180 MG 24 hr capsule [Pharmacy Med Name: DILTIAZEM CD CAP 180MG/24] 90 capsule 0     Sig: TAKE 1 CAPSULE NIGHTLY      Last office visit with prescribing clinician: Visit date not found      Next office visit with prescribing clinician: Visit date not found            Mitzi Cormier MA  04/04/22, 09:15 EDT

## 2022-04-04 NOTE — TELEPHONE ENCOUNTER
Rx Refill Note  Requested Prescriptions     Pending Prescriptions Disp Refills   • diclofenac (VOLTAREN) 75 MG EC tablet [Pharmacy Med Name: DICLOFEN SOD TAB 75MG EC] 90 tablet 1     Sig: TAKE 1 TABLET DAILY   • mesalamine (DELZICOL) 400 MG capsule delayed-release delayed release capsule [Pharmacy Med Name: MESALAMINE CAP 400MG DR] 360 capsule 3     Sig: TAKE 2 CAPSULES TWICE A DAY      Last office visit with prescribing clinician: 2/17/2022      Next office visit with prescribing clinician: 4/14/2022            Sharon Armenta MA  04/04/22, 09:56 EDT

## 2022-04-06 ENCOUNTER — OFFICE VISIT (OUTPATIENT)
Dept: GASTROENTEROLOGY | Facility: CLINIC | Age: 82
End: 2022-04-06

## 2022-04-06 VITALS
SYSTOLIC BLOOD PRESSURE: 124 MMHG | OXYGEN SATURATION: 98 % | DIASTOLIC BLOOD PRESSURE: 58 MMHG | HEART RATE: 52 BPM | WEIGHT: 154.4 LBS | BODY MASS INDEX: 24.81 KG/M2 | TEMPERATURE: 96.3 F | HEIGHT: 66 IN

## 2022-04-06 DIAGNOSIS — R79.89 ELEVATED SERUM CREATININE: ICD-10-CM

## 2022-04-06 DIAGNOSIS — K51.80 OTHER ULCERATIVE COLITIS WITHOUT COMPLICATION: Primary | ICD-10-CM

## 2022-04-06 DIAGNOSIS — N28.1 BILATERAL RENAL CYSTS: ICD-10-CM

## 2022-04-06 PROCEDURE — 99213 OFFICE O/P EST LOW 20 MIN: CPT | Performed by: INTERNAL MEDICINE

## 2022-04-06 NOTE — PROGRESS NOTES
Patient Name: Nathan Velarde   YOB: 1940   Medical Record number: [id number]     PCP: Rell Ahuja DO    Chief Complaint   Patient presents with   • Follow-up     6 MONTH   Follow-up for ulcerative colitis.    History of Present Illness:   HPI  Mr. Velarde presents to the office for a follow-up visit.  The patient is doing well at this time.  He has regular formed stool without any blood.  The patient denies any abdominal pain.  He has no issue with nausea or vomiting.  There is no history of unexplained weight loss.  The patient denies night sweats, fever or chills.  Mr. Velarde continues to take the Delzicol medication daily.  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    • Benign prostatic hyperplasia    • Bilateral bunions     LEFT SIDE ONLY   • Bladder cancer (HCC) 1977    curative exfulguration   • DJD of left shoulder Adulthood    Severely advanced   • Generalized osteoarthritis Adulthood    Moderately advanced disease   • HTN (hypertension) 1990   • Hyperlipidemia Adulthood    Response to atorvastatin   • Inflammatory bowel disease 1977   • Knee osteoarthritis 2004   • Mitral valve insufficiency    • Nephrolithiasis 2000    Documented on IVP   • PAF (paroxysmal atrial fibrillation) ([facility]) 2008    Mild disease - patient declines anticoagulation   • Prediabetes 2015    Hemoglobin A1c 6.2%.   • Prostatism 1995   • Transverse myelitis ([facility]) 2014    Transient T10 with numbness and minimal ataxia - transient    • Ulcerative colitis ([facility]) 1977    Well-controlled on mesalamine    • Wears glasses        Past Surgical History:   Procedure Laterality Date   • BICEPS TENDON REPAIR Left 5/7/2018    Procedure: BICEPS TENDODESIS;  Surgeon: Abner Humphrey MD;  Location: [facility];  Service: Orthopedics   • BLADDER SURGERY     • CATARACT EXTRACTION, BILATERAL     • COLONOSCOPY  2015    Colitis in remission   •  CYSTOSCOPY  1977    Excision of bladder cancer   • INGUINAL HERNIA REPAIR Right 1994   • KNEE SURGERY Right 1955    Excision of cyst   • LIPOMA EXCISION     • TONSILLECTOMY  1943   • TOTAL SHOULDER ARTHROPLASTY Left 5/7/2018    Procedure: LEFT TOTAL SHOULDER ARTHROPLASTY;  Surgeon: Abner Humphrey MD;  Location: [facility] OR;  Service: Orthopedics   • TOTAL SHOULDER ARTHROPLASTY Right 6/21/2021    Procedure: NAVIGATED REVERSE SHOULDER ARTHROPLASTY RIGHT, BICEPS TENODESIS - RIGHT;  Surgeon: Abner Humphrey MD;  Location:  [facility];  Service: Orthopedics;  Laterality: Right;         Current Outpatient Medications:   •  Alpha-Lipoic Acid 600 MG capsule, Take 600 mg by mouth Daily., Disp: , Rfl:   •  aspirin-acetaminophen-caffeine (EXCEDRIN MIGRAINE) 250-250-65 MG per tablet, Take 1 tablet by mouth Every 6 (Six) Hours As Needed for Headache., Disp: , Rfl:   •  atorvastatin (LIPITOR) 40 MG tablet, TAKE 1 TABLET NIGHTLY AT   BEDTIME, Disp: 90 tablet, Rfl: 1  •  Calcium Carbonate-Vitamin D (CALCIUM 600+D PO), Take 1 tablet by mouth Daily., Disp: , Rfl:   •  chlorthalidone (HYGROTON) 25 MG tablet, TAKE ONE TABLET BY MOUTH DAILY (Patient taking differently: Take 25 mg by mouth Daily.), Disp: 90 tablet, Rfl: 10  •  cholecalciferol (VITAMIN D3) 1000 units tablet, Take 1,000 Units by mouth Daily., Disp: , Rfl:   •  coenzyme Q10 100 MG capsule, Take 100 mg by mouth Daily., Disp: , Rfl:   •  diclofenac (VOLTAREN) 75 MG EC tablet, TAKE 1 TABLET DAILY, Disp: 90 tablet, Rfl: 1  •  digoxin (LANOXIN) 125 MCG tablet, TAKE ONE TABLET BY MOUTH DAILY, Disp: 90 tablet, Rfl: 1  •  dilTIAZem CD (CARDIZEM CD) 180 MG 24 hr capsule, TAKE 1 CAPSULE NIGHTLY, Disp: 90 capsule, Rfl: 0  •  Fish Oil-Cholecalciferol (OMEGA-3 + D PO), Take 1 capsule by mouth Daily., Disp: , Rfl:   •  Glucosamine-Chondroit-Vit C-Mn (GLUCOSAMINE 1500 COMPLEX) capsule, Take 1 tablet by mouth Daily., Disp: , Rfl:   •  losartan (COZAAR) 50 MG tablet, Take 1.5  tablets by mouth Daily., Disp: 180 tablet, Rfl: 0  •  Magnesium 250 MG tablet, Take 250 mg by mouth Daily., Disp: , Rfl:   •  melatonin 3 MG tablet, Take 3 mg by mouth Every Night., Disp: , Rfl:   •  mesalamine (DELZICOL) 400 MG capsule delayed-release delayed release capsule, TAKE 2 CAPSULES TWICE A DAY, Disp: 360 capsule, Rfl: 3  •  Multiple Vitamins-Minerals (MULTIVITAMIN ADULT PO), Take 1 tablet by mouth Daily., Disp: , Rfl:   •  potassium chloride 10 MEQ CR tablet, TAKE 1 TABLET DAILY, Disp: 90 tablet, Rfl: 1  •  Saw Palmetto 160 MG capsule, Take 1 capsule by mouth Daily., Disp: , Rfl:   •  Selenium 200 MCG capsule, Take 200 mcg by mouth Daily., Disp: , Rfl:   •  tamsulosin (FLOMAX) 0.4 MG capsule 24 hr capsule, TAKE ONE CAPSULE BY MOUTH EVERY NIGHT AT BEDTIME, Disp: 90 capsule, Rfl: 0  •  vitamin E 400 UNIT capsule, Take 400 Units by mouth Daily., Disp: , Rfl:   •  Zinc 50 MG capsule, Take 50 mg by mouth Daily., Disp: , Rfl:     Allergies   Allergen Reactions   • Sulfa Antibiotics Nausea Only and Other (See Comments)     Headache       Family History   Problem Relation Age of Onset   • Alzheimer's disease Mother          age 89   • Arthritis Mother            • Hyperlipidemia Mother    • Angina Father    • Hypertension Father    • Pneumonia Father          age 70 - tobacco   • Aneurysm Father         abd aortic   • Vision loss Father         AMD   • Unexplained death Brother    • Breast cancer Maternal Grandmother    • Arthritis Maternal Grandmother            • Diabetes Paternal Uncle        Social History     Socioeconomic History   • Marital status:    Tobacco Use   • Smoking status: Never Smoker   • Smokeless tobacco: Never Used   Vaping Use   • Vaping Use: Never used   Substance and Sexual Activity   • Alcohol use: Yes     Alcohol/week: 2.0 standard drinks     Types: 2 Cans of beer per week     Comment: OCC   • Drug use: Never   • Sexual activity: Yes     Partners: Female      Birth control/protection: None       Review of Systems   Constitutional: Negative for activity change, appetite change, fatigue, fever and unexpected weight change.   HENT: Negative for dental problem, hearing loss, mouth sores, postnasal drip, sneezing, trouble swallowing and voice change.    Eyes: Negative for pain, redness, itching and visual disturbance.   Respiratory: Negative for cough, choking, chest tightness, shortness of breath and wheezing.    Cardiovascular: Negative for chest pain, palpitations and leg swelling.   Gastrointestinal: Negative for abdominal distention, abdominal pain, anal bleeding, blood in stool, constipation, diarrhea, nausea, rectal pain and vomiting.   Endocrine: Negative for cold intolerance, heat intolerance, polydipsia, polyphagia and polyuria.   Genitourinary: Negative.  Negative for dysuria, enuresis, flank pain, hematuria and urgency.   Musculoskeletal: Negative for arthralgias, back pain, gait problem, joint swelling and myalgias.   Skin: Negative for color change, pallor and rash.   Allergic/Immunologic: Negative for environmental allergies, food allergies and immunocompromised state.   Neurological: Negative for dizziness, tremors, seizures, facial asymmetry, speech difficulty, numbness and headaches.   Hematological: Negative for adenopathy.   Psychiatric/Behavioral: Negative for behavioral problems, confusion, dysphoric mood, hallucinations and self-injury.       Vitals:    04/06/22 1426   BP: 124/58   Pulse: 52   Temp: 96.3 °F (35.7 °C)   SpO2: 98%       Physical Exam  Vitals reviewed.   Constitutional:       Appearance: Normal appearance.   HENT:      Head: Normocephalic and atraumatic.      Nose: Nose normal.      Mouth/Throat:      Mouth: Mucous membranes are moist.      Pharynx: Oropharynx is clear.   Eyes:      General: No scleral icterus.     Extraocular Movements: Extraocular movements intact.   Cardiovascular:      Rate and Rhythm: Normal rate and regular rhythm.       Heart sounds: Murmur heard.     No gallop.   Pulmonary:      Breath sounds: Normal breath sounds. No wheezing or rales.   Abdominal:      General: Bowel sounds are normal.      Palpations: Abdomen is soft.      Tenderness: There is no abdominal tenderness. There is no guarding.      Comments: Diastases recti   Musculoskeletal:         General: No swelling or tenderness.      Cervical back: Normal range of motion and neck supple.   Skin:     General: Skin is dry.      Coloration: Skin is not jaundiced.   Neurological:      Mental Status: He is alert and oriented to person, place, and time.   Psychiatric:         Mood and Affect: Mood normal.         Thought Content: Thought content normal.         Judgment: Judgment normal.         Diagnoses and all orders for this visit:    1. Other ulcerative colitis without complication (HCC) (Primary)    2. Elevated serum creatinine    3. Bilateral renal cysts    The patient has a history of ulcerative colitis that is not pancolitis in distribution.  The last examination demonstrated quiescent disease.  He has been maintained on mesalamine.  The patient does not desire any further colon examinations.  The serum creatinine was elevated in February.  This value has fluctuated over the last couple of years.  He reports good urine output.      Plan: The patient will continue Delzicol.            Will follow-up in the office in 6 months.            The patient will follow up with Dr. Ahuja regarding the creatinine.

## 2022-04-14 ENCOUNTER — OFFICE VISIT (OUTPATIENT)
Dept: FAMILY MEDICINE CLINIC | Facility: CLINIC | Age: 82
End: 2022-04-14

## 2022-04-14 VITALS
WEIGHT: 153 LBS | HEART RATE: 46 BPM | DIASTOLIC BLOOD PRESSURE: 62 MMHG | TEMPERATURE: 96.8 F | SYSTOLIC BLOOD PRESSURE: 160 MMHG | HEIGHT: 67 IN | BODY MASS INDEX: 24.01 KG/M2 | OXYGEN SATURATION: 98 %

## 2022-04-14 DIAGNOSIS — Z00.00 MEDICARE ANNUAL WELLNESS VISIT, SUBSEQUENT: Primary | ICD-10-CM

## 2022-04-14 DIAGNOSIS — I10 PRIMARY HYPERTENSION: ICD-10-CM

## 2022-04-14 DIAGNOSIS — R73.9 HYPERGLYCEMIA: ICD-10-CM

## 2022-04-14 DIAGNOSIS — E78.2 MIXED HYPERLIPIDEMIA: ICD-10-CM

## 2022-04-14 DIAGNOSIS — I35.9 AORTIC VALVE DISORDER: ICD-10-CM

## 2022-04-14 DIAGNOSIS — N18.2 CKD (CHRONIC KIDNEY DISEASE), STAGE II: ICD-10-CM

## 2022-04-14 DIAGNOSIS — Z00.00 PREVENTATIVE HEALTH CARE: ICD-10-CM

## 2022-04-14 DIAGNOSIS — R00.1 BRADYCARDIA: ICD-10-CM

## 2022-04-14 DIAGNOSIS — Z13.6 ENCOUNTER FOR SCREENING FOR CARDIOVASCULAR DISORDERS: ICD-10-CM

## 2022-04-14 PROCEDURE — G0439 PPPS, SUBSEQ VISIT: HCPCS | Performed by: FAMILY MEDICINE

## 2022-04-14 PROCEDURE — 1170F FXNL STATUS ASSESSED: CPT | Performed by: FAMILY MEDICINE

## 2022-04-14 PROCEDURE — 99397 PER PM REEVAL EST PAT 65+ YR: CPT | Performed by: FAMILY MEDICINE

## 2022-04-14 PROCEDURE — 1159F MED LIST DOCD IN RCRD: CPT | Performed by: FAMILY MEDICINE

## 2022-04-14 PROCEDURE — 1126F AMNT PAIN NOTED NONE PRSNT: CPT | Performed by: FAMILY MEDICINE

## 2022-04-14 PROCEDURE — 93000 ELECTROCARDIOGRAM COMPLETE: CPT | Performed by: FAMILY MEDICINE

## 2022-04-14 NOTE — PATIENT INSTRUCTIONS
Advance Care Planning and Advance Directives     You make decisions on a daily basis - decisions about where you want to live, your career, your home, your life. Perhaps one of the most important decisions you face is your choice for future medical care. Take time to talk with your family and your healthcare team and start planning today.  Advance Care Planning is a process that can help you:  Understand possible future healthcare decisions in light of your own experiences  Reflect on those decision in light of your goals and values  Discuss your decisions with those closest to you and the healthcare professionals that care for you  Make a plan by creating a document that reflects your wishes    Surrogate Decision Maker  In the event of a medical emergency, which has left you unable to communicate or to make your own decisions, you would need someone to make decisions for you.  It is important to discuss your preferences for medical treatment with this person while you are in good health.     Qualities of a surrogate decision maker:  Willing to take on this role and responsibility  Knows what you want for future medical care  Willing to follow your wishes even if they don't agree with them  Able to make difficult medical decisions under stressful circumstances    Advance Directives  These are legal documents you can create that will guide your healthcare team and decision maker(s) when needed. These documents can be stored in the electronic medical record.    Living Will - a legal document to guide your care if you have a terminal condition or a serious illness and are unable to communicate. States vary by statute in document names/types, but most forms may include one or more of the following:        -  Directions regarding life-prolonging treatments        -  Directions regarding artificially provided nutrition/hydration        -  Choosing a healthcare decision maker        -  Direction regarding organ/tissue  donation    Durable Power of  for Healthcare - this document names an -in-fact to make medical decisions for you, but it may also allow this person to make personal and financial decisions for you. Please seek the advice of an  if you need this type of document.    **Advance Directives are not required and no one may discriminate against you if you do not sign one.    Medical Orders  Many states allow specific forms/orders signed by your physician to record your wishes for medical treatment in your current state of health. This form, signed in personal communication with your physician, addresses resuscitation and other medical interventions that you may or may not want.      For more information or to schedule a time with a [facility] Advance Care Planning Facilitator contact: [facility] [place] or call [phone] and someone will contact you directly.  You are due for Shingrix vaccination series ( the newest shingles vaccine).  It is a two shot series spaced 2-6 months apart. Please get this vaccine series started at your earliest convenience at your local pharmacy to help avoid shingles outbreak. It is more effective than the old Zostavax vaccine and is recommended even if you have had the Zostavax vaccine in the past.  Once the Shingrix series is completed, it does not need to be repeated.   For more information, please look at the website below:  [facility] Shingrix Vaccine Information      Medicare Wellness  Personal Prevention Plan of Service     Date of Office Visit:    Encounter Provider:  Rell Ahuja DO  Place of Service:  [facility]  Patient Name: Nathan Velarde  :  1940    As part of the Medicare Wellness portion of your visit today, we are providing you with this personalized preventive plan of services (PPPS). This plan is based upon recommendations of the [place] Preventive Services Task Force (USPSTF) and the Advisory  Committee on Immunization Practices (ACIP).    This lists the preventive care services that should be considered, and provides dates of when you are due. Items listed as completed are up-to-date and do not require any further intervention.    Health Maintenance   Topic Date Due    ZOSTER VACCINE (1 of 2) Never done    Pneumococcal Vaccine 65+ (2 - PCV) 03/16/2011    INFLUENZA VACCINE  08/01/2022    LIPID PANEL  02/18/2023    ANNUAL WELLNESS VISIT  04/14/2023    COLORECTAL CANCER SCREENING  10/01/2025    TDAP/TD VACCINES (3 - Td or Tdap) 12/20/2027    COVID-19 Vaccine  Completed       Orders Placed This Encounter   Procedures    Adult Transthoracic Echo Complete W/ Cont if Necessary Per Protocol     Standing Status:   Future     Standing Expiration Date:   4/14/2023     Order Specific Question:   Reason for exam?     Answer:   Arrhythmia       Return in about 6 months (around 10/14/2022).

## 2022-04-14 NOTE — PROGRESS NOTES
QUICK REFERENCE INFORMATION:  The ABCs of the Annual Wellness Visit    Medicare Subsequent Wellness Visit    Chief Complaint   Patient presents with   • Medicare Wellness-subsequent   • Slow Heart Rate     He has been experiencing low HR for the past couple of weeks. He reports his resting heart rate in 46. He states he feels well        HPI     Nathan Velarde is a 81 y.o. male presenting for subsequent annual wellness visit.     The patient reports that his heart rate has been low for a few weeks. He states that his Fitbit shows his heart rate gets low, and it is pretty accurate. He states that it is not able to measure his sleep rate.  According to his Fitbit right now his heart rate is up to 66. He states that when he is resting, it is usually in the 40s. He states that he is feeling fine. He states that he does feel a little lightheaded after exercise, but that goes away after a few minutes. He states that his heart rate averages around 120. He states that every once in a while if he looks at his Fitbit again, it can get up as high as 150. The patient asks if he still needs to continue taking digoxin. He states that he does not have another appointment with Dr. Mcdowell. He states that when he is walking around and doing regular activities, it stays in the upper 50s to 60s. He states that when he is sitting still, not doing anything, then it goes down into the 40s.    He states that he went to see Dr. Feliciano the other day, and he was told that his creatinine was high all the time. He states that he has a history of hypertension.    This patient is accompanied by their self who contributes to the history of their care.    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    • Benign prostatic hyperplasia    • Bilateral bunions     LEFT SIDE ONLY   • Bladder cancer (HCC) 1977    curative exfulguration   • DJD of left shoulder Adulthood     Severely advanced   • Generalized osteoarthritis Adulthood    Moderately advanced disease   • HTN (hypertension)    • Hyperlipidemia Adulthood    Response to atorvastatin   • Inflammatory bowel disease    • Knee osteoarthritis    • Mitral valve insufficiency    • Nephrolithiasis     Documented on IVP   • PAF (paroxysmal atrial fibrillation) (HCC)     Mild disease - patient declines anticoagulation   • Prediabetes     Hemoglobin A1c 6.2%.   • Prostatism    • Transverse myelitis (HCC)     Transient T10 with numbness and minimal ataxia - transient    • Ulcerative colitis (HCC)     Well-controlled on mesalamine    • Wears glasses       Past Surgical History:   Procedure Laterality Date   • BICEPS TENDON REPAIR Left 2018    Procedure: BICEPS TENDODESIS;  Surgeon: Abner Humphrey MD;  Location:  [facility];  Service: Orthopedics   • BLADDER SURGERY     • CATARACT EXTRACTION, BILATERAL     • COLONOSCOPY      Colitis in remission   • CYSTOSCOPY      Excision of bladder cancer   • INGUINAL HERNIA REPAIR Right    • KNEE SURGERY Right     Excision of cyst   • LIPOMA EXCISION     • TONSILLECTOMY     • TOTAL SHOULDER ARTHROPLASTY Left 2018    Procedure: LEFT TOTAL SHOULDER ARTHROPLASTY;  Surgeon: Abner Humphrey MD;  Location:  [facility];  Service: Orthopedics   • TOTAL SHOULDER ARTHROPLASTY Right 2021    Procedure: NAVIGATED REVERSE SHOULDER ARTHROPLASTY RIGHT, BICEPS TENODESIS - RIGHT;  Surgeon: Abner Humphrey MD;  Location:  [facility];  Service: Orthopedics;  Laterality: Right;     Family History   Problem Relation Age of Onset   • Alzheimer's disease Mother          age 89   • Arthritis Mother            • Hyperlipidemia Mother    • Angina Father    • Hypertension Father    • Pneumonia Father          age 70 - tobacco   • Aneurysm Father         abd aortic   • Vision loss Father         AMD   • Unexplained death Brother    •  Breast cancer Maternal Grandmother    • Arthritis Maternal Grandmother            • Diabetes Paternal Uncle       Social History     Socioeconomic History   • Marital status:    Tobacco Use   • Smoking status: Never Smoker   • Smokeless tobacco: Never Used   Vaping Use   • Vaping Use: Never used   Substance and Sexual Activity   • Alcohol use: Yes     Alcohol/week: 2.0 standard drinks     Types: 2 Cans of beer per week     Comment: OCC   • Drug use: Never   • Sexual activity: Yes     Partners: Female     Birth control/protection: None      Allergies   Allergen Reactions   • Sulfa Antibiotics Nausea Only and Other (See Comments)     Headache      Outpatient Medications Prior to Visit   Medication Sig Dispense Refill   • aspirin-acetaminophen-caffeine (EXCEDRIN MIGRAINE) 250-250-65 MG per tablet Take 1 tablet by mouth Every 6 (Six) Hours As Needed for Headache.     • atorvastatin (LIPITOR) 40 MG tablet TAKE 1 TABLET NIGHTLY AT   BEDTIME 90 tablet 1   • Calcium Carbonate-Vitamin D (CALCIUM 600+D PO) Take 1 tablet by mouth Daily.     • chlorthalidone (HYGROTON) 25 MG tablet TAKE ONE TABLET BY MOUTH DAILY (Patient taking differently: Take 25 mg by mouth Daily.) 90 tablet 10   • cholecalciferol (VITAMIN D3) 1000 units tablet Take 1,000 Units by mouth Daily.     • coenzyme Q10 100 MG capsule Take 100 mg by mouth Daily.     • diclofenac (VOLTAREN) 75 MG EC tablet TAKE 1 TABLET DAILY 90 tablet 1   • digoxin (LANOXIN) 125 MCG tablet TAKE ONE TABLET BY MOUTH DAILY 90 tablet 1   • dilTIAZem CD (CARDIZEM CD) 180 MG 24 hr capsule TAKE 1 CAPSULE NIGHTLY 90 capsule 0   • Fish Oil-Cholecalciferol (OMEGA-3 + D PO) Take 1 capsule by mouth Daily.     • Glucosamine-Chondroit-Vit C-Mn (GLUCOSAMINE 1500 COMPLEX) capsule Take 1 tablet by mouth Daily.     • losartan (COZAAR) 50 MG tablet Take 1.5 tablets by mouth Daily. 180 tablet 0   • Magnesium 250 MG tablet Take 250 mg by mouth Daily.     • melatonin 3 MG tablet Take 3 mg  "by mouth Every Night.     • mesalamine (DELZICOL) 400 MG capsule delayed-release delayed release capsule TAKE 2 CAPSULES TWICE A  capsule 3   • Multiple Vitamins-Minerals (MULTIVITAMIN ADULT PO) Take 1 tablet by mouth Daily.     • potassium chloride 10 MEQ CR tablet TAKE 1 TABLET DAILY 90 tablet 1   • Saw Palmetto 160 MG capsule Take 1 capsule by mouth Daily.     • Selenium 200 MCG capsule Take 200 mcg by mouth Daily.     • tamsulosin (FLOMAX) 0.4 MG capsule 24 hr capsule TAKE ONE CAPSULE BY MOUTH EVERY NIGHT AT BEDTIME 90 capsule 0   • vitamin E 400 UNIT capsule Take 400 Units by mouth Daily.     • Zinc 50 MG capsule Take 50 mg by mouth Daily.     • Alpha-Lipoic Acid 600 MG capsule Take 600 mg by mouth Daily.       No facility-administered medications prior to visit.       Reviewed use of high risk medication in the elderly: yes  Reviewed for potential of harmful drug interactions in the elderly: yes    The following portions of the patient's history were reviewed and updated as appropriate: allergies, current medications, past family history, past medical history, past social history, past surgical history and problem list.    Review of Systems   Review of Systems -  See Annual Exam ROS scanned into chart    Vitals:    04/14/22 1025   BP: 160/62   Pulse: (!) 46   Temp: 96.8 °F (36 °C)   SpO2: 98%   Weight: 69.4 kg (153 lb)   Height: 171 cm (67.32\")   PainSc: 0-No pain       Objective    Physical Exam   Const: NAD, A&Ox4, Pleasant, Cooperative  Eyes: EOMI, no conjunctivitis  ENT: No nasal discharge present, neck supple  Cardiac: Regular ra  te and rhythm, no cyanosis  Resp: Respiratory rate within normal limits, no increased work of breathing, no audible wheezing or retractions noted  GI: No distention or ascites  MSK: Motor and sensation grossly intact in bilateral upper extremities  Neurologic: CN II-XII grossly intact  Psych: Appropriate mood and behavior.  HEALTH RISK ASSESSMENT    1940    Recent " Hospitalizations:  No hospitalization(s) within the last year..      Current Medical Providers:  Patient Care Team:  Rell Ahuja DO as PCP - General (Family Medicine)  Ry Feliciano MD as Consulting Physician (Gastroenterology)      Smoking Status:  Social History     Tobacco Use   Smoking Status Never Smoker   Smokeless Tobacco Never Used       Alcohol Consumption:  Social History     Substance and Sexual Activity   Alcohol Use Yes   • Alcohol/week: 2.0 standard drinks   • Types: 2 Cans of beer per week    Comment: OCC       Depression Screen:   PHQ-2/PHQ-9 Depression Screening 4/14/2022   Retired PHQ-9 Total Score -   Retired Total Score -   Little Interest or Pleasure in Doing Things 0-->not at all   Feeling Down, Depressed or Hopeless 0-->not at all   PHQ-9: Brief Depression Severity Measure Score 0       Health Habits and Functional and Cognitive Screening:  Functional & Cognitive Status 4/14/2022   Do you have difficulty preparing food and eating? No   Do you have difficulty bathing yourself, getting dressed or grooming yourself? No   Do you have difficulty using the toilet? No   Do you have difficulty moving around from place to place? No   Do you have trouble with steps or getting out of a bed or a chair? No   Current Diet Well Balanced Diet   Dental Exam Up to date   Eye Exam Up to date   Exercise (times per week) 5 times per week   Current Exercises Include Cardiovascular Workout   Current Exercise Activities Include -   Do you need help using the phone?  No   Are you deaf or do you have serious difficulty hearing?  No   Do you need help with transportation? No   Do you need help shopping? No   Do you need help preparing meals?  No   Do you need help with housework?  No   Do you need help with laundry? No   Do you need help taking your medications? No   Do you need help managing money? No   Do you ever drive or ride in a car without wearing a seat belt? No   Have you felt unusual stress,  anger or loneliness in the last month? No   Who do you live with? Spouse   If you need help, do you have trouble finding someone available to you? No   Have you been bothered in the last four weeks by sexual problems? No   Do you have difficulty concentrating, remembering or making decisions? No         Does the patient have evidence of cognitive impairment? No        Aspirin use counseling? Taking ASA appropriately as indicated      Recent Lab Results:  CMP:  Lab Results   Component Value Date    BUN 40 (H) 02/18/2022    CREATININE 1.46 (H) 02/18/2022    EGFRIFNONA 46 (L) 02/18/2022    BCR 27.4 (H) 02/18/2022     02/18/2022    K 4.4 02/18/2022    CO2 23.4 02/18/2022    CALCIUM 10.2 02/18/2022    PROTENTOTREF 7.1 07/16/2014    ALBUMIN 4.30 02/18/2022    LABGLOBREF 3.2 07/16/2014    LABIL2 1.3 07/16/2014    BILITOT 0.3 02/18/2022    ALKPHOS 111 02/18/2022    AST 32 02/18/2022    ALT 40 02/18/2022     Lipid Panel:  Lab Results   Component Value Date    CHOL 98 02/18/2022    TRIG 84 02/18/2022    HDL 30 (L) 02/18/2022    VLDL 17 02/18/2022    LDLHDL 1.71 02/18/2022     HbA1c:  Lab Results   Component Value Date    HGBA1C 6.00 (H) 02/18/2022       Visual Acuity:  No exam data present    Age-appropriate Screening Schedule:  Refer to the list below for future screening recommendations based on patient's age, sex and/or medical conditions. Orders for these recommended tests are listed in the plan section. The patient has been provided with a written plan.    Health Maintenance   Topic Date Due   • ZOSTER VACCINE (1 of 2) Never done   • INFLUENZA VACCINE  08/01/2022   • LIPID PANEL  02/18/2023   • TDAP/TD VACCINES (3 - Td or Tdap) 12/20/2027          Advance Care Planning:  ACP discussion was held with the patient during this visit. Patient has an advance directive (not in EMR), copy requested.    Identification of Risk Factors:  Risk factors include: Cardiovascular risk.    Compared to one year ago, the patient feels  his physical health is better.  Compared to one year ago, the patient feels his mental health is the same.      Diagnoses and all orders for this visit:    1. Medicare annual wellness visit, subsequent (Primary)    2. Preventative health care    3. CKD (chronic kidney disease), stage II    4. Hyperglycemia    5. Primary hypertension    6. Mixed hyperlipidemia    7. Bradycardia  -     Adult Transthoracic Echo Complete W/ Cont if Necessary Per Protocol; Future    8. Aortic valve disorder  -     Adult Transthoracic Echo Complete W/ Cont if Necessary Per Protocol; Future    9. Encounter for screening for cardiovascular disorders   -     Adult Transthoracic Echo Complete W/ Cont if Necessary Per Protocol; Future        Procedure     ECG 12 Lead    Date/Time: 4/14/2022 12:16 PM  Performed by: Rell Ahuja DO  Authorized by: Rell Ahuja DO   Comparison: compared with previous ECG   Similar to previous ECG  Rhythm: sinus bradycardia  Rate: bradycardic  Conduction: conduction normal  QRS axis: normal    Clinical impression: non-specific ECG               Patient Self-Management and Personalized Health Advice  The patient has been provided with information about: diet, exercise and weight management and preventive services including:   · Annual Wellness Visit (AWV).    Diagnoses and all orders for this visit:    1. Medicare annual wellness visit, subsequent (Primary)    2. Preventative health care    3. CKD (chronic kidney disease), stage II    4. Hyperglycemia    5. Primary hypertension    6. Mixed hyperlipidemia    7. Bradycardia  -     Adult Transthoracic Echo Complete W/ Cont if Necessary Per Protocol; Future    8. Aortic valve disorder  -     Adult Transthoracic Echo Complete W/ Cont if Necessary Per Protocol; Future    9. Encounter for screening for cardiovascular disorders   -     Adult Transthoracic Echo Complete W/ Cont if Necessary Per Protocol; Future        Problem List Items Addressed This Visit         Cardiac and Vasculature    Aortic valve disorder    Overview     1. Echo 8-23-19: Moderate to severe aortic valve regurgitation is present.             Relevant Orders    Adult Transthoracic Echo Complete W/ Cont if Necessary Per Protocol    Hypertension    Hyperlipidemia       Endocrine and Metabolic    Hyperglycemia       Genitourinary and Reproductive     CKD (chronic kidney disease), stage II       Health Encounters    Preventative health care      Other Visit Diagnoses     Medicare annual wellness visit, subsequent    -  Primary    Bradycardia        Relevant Orders    Adult Transthoracic Echo Complete W/ Cont if Necessary Per Protocol    Encounter for screening for cardiovascular disorders         Relevant Orders    Adult Transthoracic Echo Complete W/ Cont if Necessary Per Protocol        1. Bradycardia.  - At his last appointment with Dr. Mcdowell, they deferred the echocardiogram given his stability over time. However, with the new change, I think it is best to proceed with an echocardiogram. EKG today is not acutely concerning, but he will have the echocardiogram completed and follow up with Dr. Mcdowell if the bradycardia continues.    Patient Instructions         Advance Care Planning and Advance Directives     You make decisions on a daily basis - decisions about where you want to live, your career, your home, your life. Perhaps one of the most important decisions you face is your choice for future medical care. Take time to talk with your family and your healthcare team and start planning today.  Advance Care Planning is a process that can help you:  · Understand possible future healthcare decisions in light of your own experiences  · Reflect on those decision in light of your goals and values  · Discuss your decisions with those closest to you and the healthcare professionals that care for you  · Make a plan by creating a document that reflects your wishes    Surrogate Decision Maker  In the event  of a medical emergency, which has left you unable to communicate or to make your own decisions, you would need someone to make decisions for you.  It is important to discuss your preferences for medical treatment with this person while you are in good health.     Qualities of a surrogate decision maker:  • Willing to take on this role and responsibility  • Knows what you want for future medical care  • Willing to follow your wishes even if they don't agree with them  • Able to make difficult medical decisions under stressful circumstances    Advance Directives  These are legal documents you can create that will guide your healthcare team and decision maker(s) when needed. These documents can be stored in the electronic medical record.    · Living Will - a legal document to guide your care if you have a terminal condition or a serious illness and are unable to communicate. States vary by statute in document names/types, but most forms may include one or more of the following:        -  Directions regarding life-prolonging treatments        -  Directions regarding artificially provided nutrition/hydration        -  Choosing a healthcare decision maker        -  Direction regarding organ/tissue donation    · Durable Power of  for Healthcare - this document names an -in-fact to make medical decisions for you, but it may also allow this person to make personal and financial decisions for you. Please seek the advice of an  if you need this type of document.    **Advance Directives are not required and no one may discriminate against you if you do not sign one.    Medical Orders  Many states allow specific forms/orders signed by your physician to record your wishes for medical treatment in your current state of health. This form, signed in personal communication with your physician, addresses resuscitation and other medical interventions that you may or may not want.      For more information or to  schedule a time with a [facility] Advance Care Planning Facilitator contact: [facility] or call [phone] and someone will contact you directly.  You are due for Shingrix vaccination series ( the newest shingles vaccine).  It is a two shot series spaced 2-6 months apart. Please get this vaccine series started at your earliest convenience at your local pharmacy to help avoid shingles outbreak. It is more effective than the old Zostavax vaccine and is recommended even if you have had the Zostavax vaccine in the past.  Once the Shingrix series is completed, it does not need to be repeated.   For more information, please look at the website below:  [facility] Shingrix Vaccine Information      Medicare Wellness  Personal Prevention Plan of Service     Date of Office Visit:    Encounter Provider:  Rell Ahuja DO  Place of Service:  [facility]  Patient Name: Nathan Velarde  :  1940    As part of the Medicare Wellness portion of your visit today, we are providing you with this personalized preventive plan of services (PPPS). This plan is based upon recommendations of the [place] Preventive Services Task Force (USPSTF) and the Advisory Committee on Immunization Practices (ACIP).    This lists the preventive care services that should be considered, and provides dates of when you are due. Items listed as completed are up-to-date and do not require any further intervention.    Health Maintenance   Topic Date Due   • ZOSTER VACCINE (1 of 2) Never done   • Pneumococcal Vaccine 65+ (2 - PCV) 2011   • INFLUENZA VACCINE  2022   • LIPID PANEL  2023   • ANNUAL WELLNESS VISIT  2023   • COLORECTAL CANCER SCREENING  10/01/2025   • TDAP/TD VACCINES (3 - Td or Tdap) 2027   • COVID-19 Vaccine  Completed       Orders Placed This Encounter   Procedures   • Adult Transthoracic Echo Complete W/ Cont if Necessary Per Protocol     Standing Status:    Future     Standing Expiration Date:   4/14/2023     Order Specific Question:   Reason for exam?     Answer:   Arrhythmia       Return in about 6 months (around 10/14/2022).            The wellness exam has been reviewed in detail.  The patient has been fully counseled on preventative guidelines for vaccines, cancer screenings, and other health maintenance needs.  Functional testing has been performed to assess capacity for independent living and need for other medical interventions.  Screening for depression was completed via a validated screening model as indicated above, 15 minutes was spent in total on depression screening.  The patient was counseled on maintaining a lifestyle to promote good health and to minimize chronic diseases, including 15 minutes spent screening for alcohol misuse and counseling on safe and appropriate alcohol intake and overall risk reduction as indicated above.  The patient has been assisted with scheduling healthcare procedures for the coming year and given a written document outlining these recommendations.    Follow Up:  Return in about 6 months (around 10/14/2022).     An After Visit Summary and PPPS with all of these plans were given to the patient.           Transcribed from ambient dictation for Rell Ahuja DO by Roxi Sims.  04/14/22   13:01 EDT    Patient verbalized consent to the visit recording.  I have personally performed the services described in this document as transcribed by the above individual, and it is both accurate and complete.  Lio Bocanegra  4/18/2022  13:02 EDT

## 2022-04-22 ENCOUNTER — HOSPITAL ENCOUNTER (OUTPATIENT)
Dept: GENERAL RADIOLOGY | Facility: HOSPITAL | Age: 82
Discharge: HOME | End: 2022-04-22
Admitting: PHYSICIAN ASSISTANT

## 2022-04-22 ENCOUNTER — TELEPHONE (OUTPATIENT)
Dept: FAMILY MEDICINE CLINIC | Facility: CLINIC | Age: 82
End: 2022-04-22

## 2022-04-22 ENCOUNTER — APPOINTMENT (OUTPATIENT)
Dept: CT IMAGING | Facility: HOSPITAL | Age: 82
End: 2022-04-22

## 2022-04-22 ENCOUNTER — HOSPITAL ENCOUNTER (INPATIENT)
Facility: HOSPITAL | Age: 82
LOS: 2 days | Discharge: HOME | End: 2022-04-24
Attending: EMERGENCY MEDICINE | Admitting: HOSPITALIST

## 2022-04-22 ENCOUNTER — APPOINTMENT (OUTPATIENT)
Dept: GENERAL RADIOLOGY | Facility: HOSPITAL | Age: 82
End: 2022-04-22

## 2022-04-22 ENCOUNTER — OFFICE VISIT (OUTPATIENT)
Dept: FAMILY MEDICINE CLINIC | Facility: CLINIC | Age: 82
End: 2022-04-22

## 2022-04-22 VITALS
DIASTOLIC BLOOD PRESSURE: 60 MMHG | RESPIRATION RATE: 16 BRPM | TEMPERATURE: 97.5 F | WEIGHT: 155.4 LBS | BODY MASS INDEX: 24.39 KG/M2 | OXYGEN SATURATION: 92 % | HEART RATE: 59 BPM | HEIGHT: 67 IN | SYSTOLIC BLOOD PRESSURE: 145 MMHG

## 2022-04-22 DIAGNOSIS — D72.829 LEUKOCYTOSIS, UNSPECIFIED TYPE: ICD-10-CM

## 2022-04-22 DIAGNOSIS — R09.02 HYPOXIA: Primary | ICD-10-CM

## 2022-04-22 DIAGNOSIS — J20.9 ACUTE BRONCHITIS, UNSPECIFIED ORGANISM: ICD-10-CM

## 2022-04-22 DIAGNOSIS — J18.9 ATYPICAL PNEUMONIA: ICD-10-CM

## 2022-04-22 DIAGNOSIS — J20.9 ACUTE BRONCHITIS, UNSPECIFIED ORGANISM: Primary | ICD-10-CM

## 2022-04-22 DIAGNOSIS — R79.89 POSITIVE D DIMER: ICD-10-CM

## 2022-04-22 DIAGNOSIS — J12.9 VIRAL PNEUMONIA: ICD-10-CM

## 2022-04-22 DIAGNOSIS — J96.01 ACUTE RESPIRATORY FAILURE WITH HYPOXIA: ICD-10-CM

## 2022-04-22 DIAGNOSIS — N28.9 RENAL INSUFFICIENCY: ICD-10-CM

## 2022-04-22 LAB
ALBUMIN SERPL-MCNC: 4.1 G/DL (ref 3.5–5.2)
ALBUMIN/GLOB SERPL: 1.4 G/DL
ALP SERPL-CCNC: 105 U/L (ref 39–117)
ALT SERPL W P-5'-P-CCNC: 32 U/L (ref 1–41)
ANION GAP SERPL CALCULATED.3IONS-SCNC: 11 MMOL/L (ref 5–15)
AST SERPL-CCNC: 34 U/L (ref 1–40)
B PARAPERT DNA SPEC QL NAA+PROBE: NOT DETECTED
B PERT DNA SPEC QL NAA+PROBE: NOT DETECTED
BASOPHILS # BLD AUTO: 0.05 10*3/MM3 (ref 0–0.2)
BASOPHILS NFR BLD AUTO: 0.4 % (ref 0–1.5)
BILIRUB SERPL-MCNC: 0.5 MG/DL (ref 0–1.2)
BUN SERPL-MCNC: 36 MG/DL (ref 8–23)
BUN/CREAT SERPL: 26.9 (ref 7–25)
C PNEUM DNA NPH QL NAA+NON-PROBE: NOT DETECTED
CALCIUM SPEC-SCNC: 9.1 MG/DL (ref 8.6–10.5)
CHLORIDE SERPL-SCNC: 106 MMOL/L (ref 98–107)
CO2 SERPL-SCNC: 23 MMOL/L (ref 22–29)
CREAT SERPL-MCNC: 1.34 MG/DL (ref 0.76–1.27)
D DIMER PPP FEU-MCNC: 0.86 MCGFEU/ML (ref 0.01–0.5)
DEPRECATED RDW RBC AUTO: 49.7 FL (ref 37–54)
EGFRCR SERPLBLD CKD-EPI 2021: 53.2 ML/MIN/1.73
EOSINOPHIL # BLD AUTO: 0.34 10*3/MM3 (ref 0–0.4)
EOSINOPHIL NFR BLD AUTO: 2.4 % (ref 0.3–6.2)
ERYTHROCYTE [DISTWIDTH] IN BLOOD BY AUTOMATED COUNT: 14.6 % (ref 12.3–15.4)
FLUAV RNA RESP QL NAA+PROBE: NOT DETECTED
FLUAV SUBTYP SPEC NAA+PROBE: NOT DETECTED
FLUBV RNA ISLT QL NAA+PROBE: NOT DETECTED
FLUBV RNA RESP QL NAA+PROBE: NOT DETECTED
GLOBULIN UR ELPH-MCNC: 3 GM/DL
GLUCOSE SERPL-MCNC: 126 MG/DL (ref 65–99)
HADV DNA SPEC NAA+PROBE: NOT DETECTED
HCOV 229E RNA SPEC QL NAA+PROBE: NOT DETECTED
HCOV HKU1 RNA SPEC QL NAA+PROBE: NOT DETECTED
HCOV NL63 RNA SPEC QL NAA+PROBE: NOT DETECTED
HCOV OC43 RNA SPEC QL NAA+PROBE: NOT DETECTED
HCT VFR BLD AUTO: 37.3 % (ref 37.5–51)
HGB BLD-MCNC: 12 G/DL (ref 13–17.7)
HMPV RNA NPH QL NAA+NON-PROBE: NOT DETECTED
HOLD SPECIMEN: NORMAL
HPIV1 RNA ISLT QL NAA+PROBE: NOT DETECTED
HPIV2 RNA SPEC QL NAA+PROBE: NOT DETECTED
HPIV3 RNA NPH QL NAA+PROBE: NOT DETECTED
HPIV4 P GENE NPH QL NAA+PROBE: NOT DETECTED
IMM GRANULOCYTES # BLD AUTO: 0.06 10*3/MM3 (ref 0–0.05)
IMM GRANULOCYTES NFR BLD AUTO: 0.4 % (ref 0–0.5)
LYMPHOCYTES # BLD AUTO: 0.65 10*3/MM3 (ref 0.7–3.1)
LYMPHOCYTES NFR BLD AUTO: 4.6 % (ref 19.6–45.3)
M PNEUMO IGG SER IA-ACNC: NOT DETECTED
MCH RBC QN AUTO: 29.6 PG (ref 26.6–33)
MCHC RBC AUTO-ENTMCNC: 32.2 G/DL (ref 31.5–35.7)
MCV RBC AUTO: 91.9 FL (ref 79–97)
MONOCYTES # BLD AUTO: 0.89 10*3/MM3 (ref 0.1–0.9)
MONOCYTES NFR BLD AUTO: 6.4 % (ref 5–12)
NEUTROPHILS NFR BLD AUTO: 11.99 10*3/MM3 (ref 1.7–7)
NEUTROPHILS NFR BLD AUTO: 85.8 % (ref 42.7–76)
NRBC BLD AUTO-RTO: 0 /100 WBC (ref 0–0.2)
NT-PROBNP SERPL-MCNC: 561 PG/ML (ref 0–1800)
PLATELET # BLD AUTO: 228 10*3/MM3 (ref 140–450)
PMV BLD AUTO: 10.2 FL (ref 6–12)
POTASSIUM SERPL-SCNC: 4.1 MMOL/L (ref 3.5–5.2)
PROCALCITONIN SERPL-MCNC: 0.08 NG/ML (ref 0–0.25)
PROT SERPL-MCNC: 7.1 G/DL (ref 6–8.5)
QT INTERVAL: 402 MS
QTC INTERVAL: 434 MS
RBC # BLD AUTO: 4.06 10*6/MM3 (ref 4.14–5.8)
RHINOVIRUS RNA SPEC NAA+PROBE: DETECTED
RSV RNA NPH QL NAA+NON-PROBE: NOT DETECTED
SARS-COV-2 RNA NPH QL NAA+NON-PROBE: NOT DETECTED
SARS-COV-2 RNA RESP QL NAA+PROBE: NOT DETECTED
SODIUM SERPL-SCNC: 140 MMOL/L (ref 136–145)
TROPONIN T SERPL-MCNC: 0.01 NG/ML (ref 0–0.03)
WBC NRBC COR # BLD: 13.98 10*3/MM3 (ref 3.4–10.8)
WHOLE BLOOD HOLD SPECIMEN: NORMAL
WHOLE BLOOD HOLD SPECIMEN: NORMAL

## 2022-04-22 PROCEDURE — 84484 ASSAY OF TROPONIN QUANT: CPT | Performed by: EMERGENCY MEDICINE

## 2022-04-22 PROCEDURE — 71275 CT ANGIOGRAPHY CHEST: CPT

## 2022-04-22 PROCEDURE — 0202U NFCT DS 22 TRGT SARS-COV-2: CPT | Performed by: EMERGENCY MEDICINE

## 2022-04-22 PROCEDURE — 0 IOPAMIDOL PER 1 ML: Performed by: EMERGENCY MEDICINE

## 2022-04-22 PROCEDURE — 99284 EMERGENCY DEPT VISIT MOD MDM: CPT

## 2022-04-22 PROCEDURE — 25010000002 DEXAMETHASONE SODIUM PHOSPHATE 100 MG/10ML SOLUTION: Performed by: EMERGENCY MEDICINE

## 2022-04-22 PROCEDURE — U0004 COV-19 TEST NON-CDC HGH THRU: HCPCS | Performed by: PHYSICIAN ASSISTANT

## 2022-04-22 PROCEDURE — 94640 AIRWAY INHALATION TREATMENT: CPT

## 2022-04-22 PROCEDURE — 85379 FIBRIN DEGRADATION QUANT: CPT | Performed by: EMERGENCY MEDICINE

## 2022-04-22 PROCEDURE — 71046 X-RAY EXAM CHEST 2 VIEWS: CPT

## 2022-04-22 PROCEDURE — 87636 SARSCOV2 & INF A&B AMP PRB: CPT | Performed by: EMERGENCY MEDICINE

## 2022-04-22 PROCEDURE — 99214 OFFICE O/P EST MOD 30 MIN: CPT | Performed by: PHYSICIAN ASSISTANT

## 2022-04-22 PROCEDURE — 94799 UNLISTED PULMONARY SVC/PX: CPT

## 2022-04-22 PROCEDURE — 99223 1ST HOSP IP/OBS HIGH 75: CPT | Performed by: HOSPITALIST

## 2022-04-22 PROCEDURE — 25010000002 METHYLPREDNISOLONE PER 40 MG: Performed by: NURSE PRACTITIONER

## 2022-04-22 PROCEDURE — 99285 EMERGENCY DEPT VISIT HI MDM: CPT

## 2022-04-22 PROCEDURE — 85025 COMPLETE CBC W/AUTO DIFF WBC: CPT

## 2022-04-22 PROCEDURE — 83880 ASSAY OF NATRIURETIC PEPTIDE: CPT | Performed by: EMERGENCY MEDICINE

## 2022-04-22 PROCEDURE — 93005 ELECTROCARDIOGRAM TRACING: CPT | Performed by: EMERGENCY MEDICINE

## 2022-04-22 PROCEDURE — 93005 ELECTROCARDIOGRAM TRACING: CPT

## 2022-04-22 PROCEDURE — 25010000002 HEPARIN (PORCINE) PER 1000 UNITS: Performed by: NURSE PRACTITIONER

## 2022-04-22 PROCEDURE — 84145 PROCALCITONIN (PCT): CPT | Performed by: EMERGENCY MEDICINE

## 2022-04-22 PROCEDURE — 80053 COMPREHEN METABOLIC PANEL: CPT | Performed by: EMERGENCY MEDICINE

## 2022-04-22 PROCEDURE — 36415 COLL VENOUS BLD VENIPUNCTURE: CPT

## 2022-04-22 RX ORDER — MELATONIN
1000 DAILY
Status: DISCONTINUED | OUTPATIENT
Start: 2022-04-22 | End: 2022-04-24 | Stop reason: HOSPADM

## 2022-04-22 RX ORDER — CHLORTHALIDONE 25 MG/1
TABLET ORAL
Qty: 90 TABLET | Refills: 10 | Status: SHIPPED | OUTPATIENT
Start: 2022-04-22

## 2022-04-22 RX ORDER — ACETAMINOPHEN 325 MG/1
650 TABLET ORAL EVERY 4 HOURS PRN
Status: DISCONTINUED | OUTPATIENT
Start: 2022-04-22 | End: 2022-04-24 | Stop reason: HOSPADM

## 2022-04-22 RX ORDER — METHYLPREDNISOLONE SODIUM SUCCINATE 40 MG/ML
40 INJECTION, POWDER, LYOPHILIZED, FOR SOLUTION INTRAMUSCULAR; INTRAVENOUS EVERY 8 HOURS
Status: DISCONTINUED | OUTPATIENT
Start: 2022-04-22 | End: 2022-04-24 | Stop reason: HOSPADM

## 2022-04-22 RX ORDER — HEPARIN SODIUM 5000 [USP'U]/ML
5000 INJECTION, SOLUTION INTRAVENOUS; SUBCUTANEOUS EVERY 8 HOURS SCHEDULED
Status: DISCONTINUED | OUTPATIENT
Start: 2022-04-22 | End: 2022-04-24 | Stop reason: HOSPADM

## 2022-04-22 RX ORDER — POTASSIUM CHLORIDE 1.5 G/1.77G
40 POWDER, FOR SOLUTION ORAL AS NEEDED
Status: DISCONTINUED | OUTPATIENT
Start: 2022-04-22 | End: 2022-04-24 | Stop reason: HOSPADM

## 2022-04-22 RX ORDER — DEXTROMETHORPHAN HYDROBROMIDE AND PROMETHAZINE HYDROCHLORIDE 15; 6.25 MG/5ML; MG/5ML
5 SYRUP ORAL 4 TIMES DAILY PRN
Qty: 180 ML | Refills: 0 | Status: SHIPPED | OUTPATIENT
Start: 2022-04-22 | End: 2022-05-02 | Stop reason: SDUPTHER

## 2022-04-22 RX ORDER — DEXTROMETHORPHAN POLISTIREX 30 MG/5ML
60 SUSPENSION ORAL EVERY 12 HOURS SCHEDULED
Status: DISCONTINUED | OUTPATIENT
Start: 2022-04-22 | End: 2022-04-24 | Stop reason: HOSPADM

## 2022-04-22 RX ORDER — CHLORTHALIDONE 25 MG/1
25 TABLET ORAL DAILY
Status: DISCONTINUED | OUTPATIENT
Start: 2022-04-22 | End: 2022-04-24 | Stop reason: HOSPADM

## 2022-04-22 RX ORDER — GUAIFENESIN 600 MG/1
600 TABLET, EXTENDED RELEASE ORAL 2 TIMES DAILY
Qty: 30 TABLET | Refills: 0 | Status: SHIPPED | OUTPATIENT
Start: 2022-04-22 | End: 2022-10-06

## 2022-04-22 RX ORDER — MAGNESIUM SULFATE HEPTAHYDRATE 40 MG/ML
2 INJECTION, SOLUTION INTRAVENOUS AS NEEDED
Status: DISCONTINUED | OUTPATIENT
Start: 2022-04-22 | End: 2022-04-24 | Stop reason: HOSPADM

## 2022-04-22 RX ORDER — MESALAMINE 400 MG/1
800 CAPSULE, DELAYED RELEASE ORAL 2 TIMES DAILY
Status: DISCONTINUED | OUTPATIENT
Start: 2022-04-22 | End: 2022-04-24 | Stop reason: HOSPADM

## 2022-04-22 RX ORDER — MAGNESIUM SULFATE HEPTAHYDRATE 40 MG/ML
4 INJECTION, SOLUTION INTRAVENOUS AS NEEDED
Status: DISCONTINUED | OUTPATIENT
Start: 2022-04-22 | End: 2022-04-24 | Stop reason: HOSPADM

## 2022-04-22 RX ORDER — GUAIFENESIN 600 MG/1
600 TABLET, EXTENDED RELEASE ORAL 2 TIMES DAILY
Status: DISCONTINUED | OUTPATIENT
Start: 2022-04-22 | End: 2022-04-24 | Stop reason: HOSPADM

## 2022-04-22 RX ORDER — TAMSULOSIN HYDROCHLORIDE 0.4 MG/1
0.4 CAPSULE ORAL DAILY
Status: DISCONTINUED | OUTPATIENT
Start: 2022-04-23 | End: 2022-04-23

## 2022-04-22 RX ORDER — ATORVASTATIN CALCIUM 40 MG/1
40 TABLET, FILM COATED ORAL NIGHTLY
Status: DISCONTINUED | OUTPATIENT
Start: 2022-04-22 | End: 2022-04-24 | Stop reason: HOSPADM

## 2022-04-22 RX ORDER — IPRATROPIUM BROMIDE AND ALBUTEROL SULFATE 2.5; .5 MG/3ML; MG/3ML
3 SOLUTION RESPIRATORY (INHALATION)
Status: DISCONTINUED | OUTPATIENT
Start: 2022-04-22 | End: 2022-04-24 | Stop reason: HOSPADM

## 2022-04-22 RX ORDER — ACETAMINOPHEN 160 MG/5ML
650 SOLUTION ORAL EVERY 4 HOURS PRN
Status: DISCONTINUED | OUTPATIENT
Start: 2022-04-22 | End: 2022-04-24 | Stop reason: HOSPADM

## 2022-04-22 RX ORDER — ONDANSETRON 4 MG/1
4 TABLET, FILM COATED ORAL EVERY 6 HOURS PRN
Status: DISCONTINUED | OUTPATIENT
Start: 2022-04-22 | End: 2022-04-24 | Stop reason: HOSPADM

## 2022-04-22 RX ORDER — NICOTINE POLACRILEX 4 MG
15 LOZENGE BUCCAL
Status: DISCONTINUED | OUTPATIENT
Start: 2022-04-22 | End: 2022-04-24 | Stop reason: HOSPADM

## 2022-04-22 RX ORDER — ONDANSETRON 2 MG/ML
4 INJECTION INTRAMUSCULAR; INTRAVENOUS EVERY 6 HOURS PRN
Status: DISCONTINUED | OUTPATIENT
Start: 2022-04-22 | End: 2022-04-24 | Stop reason: HOSPADM

## 2022-04-22 RX ORDER — AMLODIPINE BESYLATE 5 MG/1
5 TABLET ORAL
Status: DISCONTINUED | OUTPATIENT
Start: 2022-04-22 | End: 2022-04-24 | Stop reason: HOSPADM

## 2022-04-22 RX ORDER — CHOLECALCIFEROL (VITAMIN D3) 125 MCG
5 CAPSULE ORAL NIGHTLY
Status: DISCONTINUED | OUTPATIENT
Start: 2022-04-22 | End: 2022-04-24 | Stop reason: HOSPADM

## 2022-04-22 RX ORDER — DEXTROSE MONOHYDRATE 25 G/50ML
25 INJECTION, SOLUTION INTRAVENOUS
Status: DISCONTINUED | OUTPATIENT
Start: 2022-04-22 | End: 2022-04-24 | Stop reason: HOSPADM

## 2022-04-22 RX ORDER — DOXYCYCLINE HYCLATE 100 MG/1
100 CAPSULE ORAL 2 TIMES DAILY
Qty: 14 CAPSULE | Refills: 0 | Status: SHIPPED | OUTPATIENT
Start: 2022-04-22 | End: 2022-04-24 | Stop reason: HOSPADM

## 2022-04-22 RX ORDER — SODIUM CHLORIDE 9 MG/ML
75 INJECTION, SOLUTION INTRAVENOUS CONTINUOUS
Status: DISCONTINUED | OUTPATIENT
Start: 2022-04-22 | End: 2022-04-24 | Stop reason: HOSPADM

## 2022-04-22 RX ORDER — DILTIAZEM HYDROCHLORIDE 180 MG/1
180 CAPSULE, COATED, EXTENDED RELEASE ORAL NIGHTLY
Status: DISCONTINUED | OUTPATIENT
Start: 2022-04-22 | End: 2022-04-24 | Stop reason: HOSPADM

## 2022-04-22 RX ORDER — HYDRALAZINE HYDROCHLORIDE 10 MG/1
10 TABLET, FILM COATED ORAL EVERY 8 HOURS PRN
Status: DISCONTINUED | OUTPATIENT
Start: 2022-04-22 | End: 2022-04-24 | Stop reason: HOSPADM

## 2022-04-22 RX ORDER — ALBUTEROL SULFATE 90 UG/1
2 AEROSOL, METERED RESPIRATORY (INHALATION) EVERY 4 HOURS PRN
Qty: 6.7 G | Refills: 0 | Status: SHIPPED | OUTPATIENT
Start: 2022-04-22 | End: 2022-05-27

## 2022-04-22 RX ORDER — DIGOXIN 125 MCG
125 TABLET ORAL DAILY
Status: DISCONTINUED | OUTPATIENT
Start: 2022-04-23 | End: 2022-04-24 | Stop reason: HOSPADM

## 2022-04-22 RX ORDER — SODIUM CHLORIDE 0.9 % (FLUSH) 0.9 %
10 SYRINGE (ML) INJECTION AS NEEDED
Status: DISCONTINUED | OUTPATIENT
Start: 2022-04-22 | End: 2022-04-24 | Stop reason: HOSPADM

## 2022-04-22 RX ORDER — AMOXICILLIN 250 MG
2 CAPSULE ORAL 2 TIMES DAILY
Status: DISCONTINUED | OUTPATIENT
Start: 2022-04-22 | End: 2022-04-24 | Stop reason: HOSPADM

## 2022-04-22 RX ORDER — IPRATROPIUM BROMIDE AND ALBUTEROL SULFATE 2.5; .5 MG/3ML; MG/3ML
3 SOLUTION RESPIRATORY (INHALATION) ONCE
Status: COMPLETED | OUTPATIENT
Start: 2022-04-22 | End: 2022-04-22

## 2022-04-22 RX ORDER — SODIUM CHLORIDE 9 MG/ML
125 INJECTION, SOLUTION INTRAVENOUS CONTINUOUS
Status: DISCONTINUED | OUTPATIENT
Start: 2022-04-22 | End: 2022-04-22

## 2022-04-22 RX ORDER — SODIUM CHLORIDE 0.9 % (FLUSH) 0.9 %
10 SYRINGE (ML) INJECTION EVERY 12 HOURS SCHEDULED
Status: DISCONTINUED | OUTPATIENT
Start: 2022-04-22 | End: 2022-04-24 | Stop reason: HOSPADM

## 2022-04-22 RX ORDER — POLYETHYLENE GLYCOL 3350 17 G/17G
17 POWDER, FOR SOLUTION ORAL DAILY PRN
Status: DISCONTINUED | OUTPATIENT
Start: 2022-04-22 | End: 2022-04-24 | Stop reason: HOSPADM

## 2022-04-22 RX ORDER — BISACODYL 5 MG/1
5 TABLET, DELAYED RELEASE ORAL DAILY PRN
Status: DISCONTINUED | OUTPATIENT
Start: 2022-04-22 | End: 2022-04-24 | Stop reason: HOSPADM

## 2022-04-22 RX ORDER — DOXYCYCLINE 100 MG/1
100 CAPSULE ORAL ONCE
Status: COMPLETED | OUTPATIENT
Start: 2022-04-22 | End: 2022-04-22

## 2022-04-22 RX ORDER — SODIUM CHLORIDE 0.9 % (FLUSH) 0.9 %
10 SYRINGE (ML) INJECTION AS NEEDED
Status: DISCONTINUED | OUTPATIENT
Start: 2022-04-22 | End: 2022-04-22

## 2022-04-22 RX ORDER — MULTIPLE VITAMINS W/ MINERALS TAB 9MG-400MCG
1 TAB ORAL DAILY
Status: DISCONTINUED | OUTPATIENT
Start: 2022-04-23 | End: 2022-04-24 | Stop reason: HOSPADM

## 2022-04-22 RX ORDER — DOXYCYCLINE 100 MG/1
100 CAPSULE ORAL EVERY 12 HOURS SCHEDULED
Status: DISCONTINUED | OUTPATIENT
Start: 2022-04-23 | End: 2022-04-24 | Stop reason: HOSPADM

## 2022-04-22 RX ORDER — POTASSIUM CHLORIDE 750 MG/1
40 CAPSULE, EXTENDED RELEASE ORAL AS NEEDED
Status: DISCONTINUED | OUTPATIENT
Start: 2022-04-22 | End: 2022-04-24 | Stop reason: HOSPADM

## 2022-04-22 RX ORDER — ACETAMINOPHEN 650 MG/1
650 SUPPOSITORY RECTAL EVERY 4 HOURS PRN
Status: DISCONTINUED | OUTPATIENT
Start: 2022-04-22 | End: 2022-04-24 | Stop reason: HOSPADM

## 2022-04-22 RX ORDER — POTASSIUM CHLORIDE 7.45 MG/ML
10 INJECTION INTRAVENOUS
Status: DISCONTINUED | OUTPATIENT
Start: 2022-04-22 | End: 2022-04-24 | Stop reason: HOSPADM

## 2022-04-22 RX ORDER — BISACODYL 10 MG
10 SUPPOSITORY, RECTAL RECTAL DAILY PRN
Status: DISCONTINUED | OUTPATIENT
Start: 2022-04-22 | End: 2022-04-24 | Stop reason: HOSPADM

## 2022-04-22 RX ADMIN — DEXTROMETHORPHAN POLISTIREX 60 MG: 30 SUSPENSION ORAL at 20:27

## 2022-04-22 RX ADMIN — IPRATROPIUM BROMIDE AND ALBUTEROL SULFATE 3 ML: .5; 3 SOLUTION RESPIRATORY (INHALATION) at 20:48

## 2022-04-22 RX ADMIN — Medication 5 MG: at 20:28

## 2022-04-22 RX ADMIN — SODIUM CHLORIDE 1000 ML: 9 INJECTION, SOLUTION INTRAVENOUS at 15:43

## 2022-04-22 RX ADMIN — DILTIAZEM HYDROCHLORIDE 180 MG: 180 CAPSULE, COATED, EXTENDED RELEASE ORAL at 20:28

## 2022-04-22 RX ADMIN — MESALAMINE 800 MG: 400 CAPSULE, DELAYED RELEASE ORAL at 20:28

## 2022-04-22 RX ADMIN — LOSARTAN POTASSIUM 50 MG: 25 TABLET, FILM COATED ORAL at 20:28

## 2022-04-22 RX ADMIN — ATORVASTATIN CALCIUM 40 MG: 40 TABLET, FILM COATED ORAL at 20:28

## 2022-04-22 RX ADMIN — DOXYCYCLINE 100 MG: 100 CAPSULE ORAL at 17:28

## 2022-04-22 RX ADMIN — Medication 1000 UNITS: at 20:28

## 2022-04-22 RX ADMIN — DEXAMETHASONE SODIUM PHOSPHATE 10 MG: 10 INJECTION, SOLUTION INTRAMUSCULAR; INTRAVENOUS at 15:27

## 2022-04-22 RX ADMIN — HEPARIN SODIUM 5000 UNITS: 5000 INJECTION, SOLUTION INTRAVENOUS; SUBCUTANEOUS at 20:27

## 2022-04-22 RX ADMIN — METHYLPREDNISOLONE SODIUM SUCCINATE 40 MG: 40 INJECTION, POWDER, FOR SOLUTION INTRAMUSCULAR; INTRAVENOUS at 20:28

## 2022-04-22 RX ADMIN — Medication 10 ML: at 20:29

## 2022-04-22 RX ADMIN — IPRATROPIUM BROMIDE AND ALBUTEROL SULFATE 3 ML: .5; 2.5 SOLUTION RESPIRATORY (INHALATION) at 15:28

## 2022-04-22 RX ADMIN — GUAIFENESIN 600 MG: 600 TABLET, EXTENDED RELEASE ORAL at 20:27

## 2022-04-22 RX ADMIN — IOPAMIDOL 80 ML: 755 INJECTION, SOLUTION INTRAVENOUS at 16:16

## 2022-04-22 RX ADMIN — SODIUM CHLORIDE 75 ML/HR: 9 INJECTION, SOLUTION INTRAVENOUS at 20:36

## 2022-04-22 RX ADMIN — AMLODIPINE BESYLATE 5 MG: 5 TABLET ORAL at 20:28

## 2022-04-22 RX ADMIN — SODIUM CHLORIDE 125 ML/HR: 9 INJECTION, SOLUTION INTRAVENOUS at 17:34

## 2022-04-22 NOTE — PROGRESS NOTES
"    Chief Complaint   Patient presents with   • Cough     X 1 day   • Fatigue     Since this morning   • runny nose x 1 day       HPI     Nathan Velarde is a pleasant 81 y.o. male who presents for evaluation of \"chief complaint.\"   C/o sore throat starting last night. Associated cough productive of yellow sputum, runny nose, and feeling a little weak. Taking deep breath triggers cough. Denies shortness of breath, chest pain, known sick contacts, and travel. He has been vaccinated for COVID. Sherrie chaoer helped symptoms last night.     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   • Prostatism 1995   • Transverse myelitis (HCC) 2014    Transient T10 with numbness and minimal ataxia - transient    • Ulcerative colitis (HCC) 1977    Well-controlled on mesalamine    • Wears glasses        Past Surgical History:   Procedure Laterality Date   • BICEPS TENDON REPAIR Left 5/7/2018    Procedure: BICEPS TENDODESIS;  Surgeon: Abner Humphrey MD;  Location: [facility];  Service: Orthopedics   • BLADDER SURGERY     • CATARACT EXTRACTION, BILATERAL     • COLONOSCOPY  2015    Colitis in remission   • CYSTOSCOPY  1977    Excision of bladder cancer   • INGUINAL HERNIA REPAIR Right 1994   • KNEE SURGERY Right 1955    Excision of cyst   • LIPOMA EXCISION     • " TONSILLECTOMY  1943   • TOTAL SHOULDER ARTHROPLASTY Left 2018    Procedure: LEFT TOTAL SHOULDER ARTHROPLASTY;  Surgeon: Abner Humphrey MD;  Location:  [facility];  Service: Orthopedics   • TOTAL SHOULDER ARTHROPLASTY Right 2021    Procedure: NAVIGATED REVERSE SHOULDER ARTHROPLASTY RIGHT, BICEPS TENODESIS - RIGHT;  Surgeon: Abner Humphrey MD;  Location:  [facility];  Service: Orthopedics;  Laterality: Right;       Family History   Problem Relation Age of Onset   • Alzheimer's disease Mother          age 89   • Arthritis Mother            • Hyperlipidemia Mother    • Angina Father    • Hypertension Father    • Pneumonia Father          age 70 - tobacco   • Aneurysm Father         abd aortic   • Vision loss Father         AMD   • Unexplained death Brother    • Breast cancer Maternal Grandmother    • Arthritis Maternal Grandmother            • Diabetes Paternal Uncle        Social History     Socioeconomic History   • Marital status:    Tobacco Use   • Smoking status: Never Smoker   • Smokeless tobacco: Never Used   Vaping Use   • Vaping Use: Never used   Substance and Sexual Activity   • Alcohol use: Yes     Alcohol/week: 2.0 standard drinks     Types: 2 Cans of beer per week     Comment: OCC   • Drug use: Never   • Sexual activity: Yes     Partners: Female     Birth control/protection: None       Allergies   Allergen Reactions   • Sulfa Antibiotics Nausea Only and Other (See Comments)     Headache       ROS    Review of Systems   Constitutional: Negative for chills and fever.   HENT: Positive for congestion, rhinorrhea and sore throat.    Respiratory: Positive for cough. Negative for shortness of breath.    Cardiovascular: Negative for chest pain.   Gastrointestinal: Negative for diarrhea, nausea and vomiting.   Musculoskeletal: Negative for myalgias.   Neurological: Negative for headache.       Vitals:    22 1000   BP: 145/60   Pulse: 59   Resp: 16   Temp: 97.5  °F (36.4 °C)   SpO2: 92%     Body mass index is 24.11 kg/m².    No current facility-administered medications for this visit.    Current Outpatient Medications:   •  Alpha-Lipoic Acid 600 MG capsule, Take 600 mg by mouth Daily., Disp: , Rfl:   •  aspirin-acetaminophen-caffeine (EXCEDRIN MIGRAINE) 250-250-65 MG per tablet, Take 1 tablet by mouth Every 6 (Six) Hours As Needed for Headache., Disp: , Rfl:   •  atorvastatin (LIPITOR) 40 MG tablet, TAKE 1 TABLET NIGHTLY AT   BEDTIME, Disp: 90 tablet, Rfl: 1  •  Calcium Carbonate-Vitamin D (CALCIUM 600+D PO), Take 1 tablet by mouth Daily., Disp: , Rfl:   •  chlorthalidone (HYGROTON) 25 MG tablet, TAKE ONE TABLET BY MOUTH DAILY, Disp: 90 tablet, Rfl: 10  •  cholecalciferol (VITAMIN D3) 1000 units tablet, Take 1,000 Units by mouth Daily., Disp: , Rfl:   •  coenzyme Q10 100 MG capsule, Take 100 mg by mouth Daily., Disp: , Rfl:   •  diclofenac (VOLTAREN) 75 MG EC tablet, TAKE 1 TABLET DAILY, Disp: 90 tablet, Rfl: 1  •  digoxin (LANOXIN) 125 MCG tablet, TAKE ONE TABLET BY MOUTH DAILY, Disp: 90 tablet, Rfl: 1  •  dilTIAZem CD (CARDIZEM CD) 180 MG 24 hr capsule, TAKE 1 CAPSULE NIGHTLY, Disp: 90 capsule, Rfl: 0  •  Fish Oil-Cholecalciferol (OMEGA-3 + D PO), Take 1 capsule by mouth Daily., Disp: , Rfl:   •  Glucosamine-Chondroit-Vit C-Mn (GLUCOSAMINE 1500 COMPLEX) capsule, Take 1 tablet by mouth Daily., Disp: , Rfl:   •  losartan (COZAAR) 50 MG tablet, Take 1.5 tablets by mouth Daily., Disp: 180 tablet, Rfl: 0  •  Magnesium 250 MG tablet, Take 250 mg by mouth Daily., Disp: , Rfl:   •  melatonin 3 MG tablet, Take 3 mg by mouth Every Night., Disp: , Rfl:   •  mesalamine (DELZICOL) 400 MG capsule delayed-release delayed release capsule, TAKE 2 CAPSULES TWICE A DAY, Disp: 360 capsule, Rfl: 3  •  Multiple Vitamins-Minerals (MULTIVITAMIN ADULT PO), Take 1 tablet by mouth Daily., Disp: , Rfl:   •  potassium chloride 10 MEQ CR tablet, TAKE 1 TABLET DAILY, Disp: 90 tablet, Rfl: 1  •  Saw  Palmetto 160 MG capsule, Take 1 capsule by mouth Daily., Disp: , Rfl:   •  Selenium 200 MCG capsule, Take 200 mcg by mouth Daily., Disp: , Rfl:   •  tamsulosin (FLOMAX) 0.4 MG capsule 24 hr capsule, TAKE ONE CAPSULE BY MOUTH EVERY NIGHT AT BEDTIME, Disp: 90 capsule, Rfl: 0  •  vitamin E 400 UNIT capsule, Take 400 Units by mouth Daily., Disp: , Rfl:   •  Zinc 50 MG capsule, Take 50 mg by mouth Daily., Disp: , Rfl:   •  albuterol sulfate  (90 Base) MCG/ACT inhaler, Inhale 2 puffs Every 4 (Four) Hours As Needed for Wheezing or Shortness of Air., Disp: 6.7 g, Rfl: 0  •  doxycycline (VIBRAMYCIN) 100 MG capsule, Take 1 capsule by mouth 2 (Two) Times a Day for 7 days., Disp: 14 capsule, Rfl: 0  •  guaiFENesin (Mucinex) 600 MG 12 hr tablet, Take 1 tablet by mouth 2 (Two) Times a Day., Disp: 30 tablet, Rfl: 0  •  promethazine-dextromethorphan (PROMETHAZINE-DM) 6.25-15 MG/5ML syrup, Take 5 mL by mouth 4 (Four) Times a Day As Needed for Cough., Disp: 180 mL, Rfl: 0    Facility-Administered Medications Ordered in Other Visits:   •  sodium chloride 0.9 % flush 10 mL, 10 mL, Intravenous, PRN, Terry Ramírez MD PE    Physical Exam  Vitals reviewed.   Constitutional:       General: He is not in acute distress.     Appearance: He is well-developed.   HENT:      Head: Normocephalic.      Right Ear: Tympanic membrane and ear canal normal. Tympanic membrane is not erythematous.      Left Ear: Tympanic membrane and ear canal normal. Tympanic membrane is not erythematous.      Nose:      Right Sinus: No maxillary sinus tenderness or frontal sinus tenderness.      Left Sinus: No maxillary sinus tenderness or frontal sinus tenderness.      Mouth/Throat:      Pharynx: Uvula midline. Posterior oropharyngeal erythema present.      Tonsils: No tonsillar exudate.   Eyes:      General:         Right eye: No discharge.         Left eye: No discharge.      Conjunctiva/sclera: Conjunctivae normal.   Cardiovascular:      Rate and  Rhythm: Normal rate and regular rhythm.      Heart sounds: Normal heart sounds.   Pulmonary:      Effort: Pulmonary effort is normal. No respiratory distress.      Breath sounds: No wheezing or rhonchi.      Comments: rhonchorous cough, few crackles left lower lobe  Chest:   Breasts:      Right: No supraclavicular adenopathy.      Left: No supraclavicular adenopathy.       Musculoskeletal:      Cervical back: Normal range of motion and neck supple.   Lymphadenopathy:      Cervical: No cervical adenopathy.      Upper Body:      Right upper body: No supraclavicular adenopathy.      Left upper body: No supraclavicular adenopathy.   Skin:     General: Skin is warm and dry.   Psychiatric:         Behavior: Behavior normal.          A/P    Problem List Items Addressed This Visit    None     Visit Diagnoses     Acute bronchitis, unspecified organism    -  Primary  -02 sat mildly reduced, patient declining significant shortness of breath  -Check chest x-ray today to evaluate for pneumonia. Ordered COVID test  -Tx doxycycline, mucinex, cough syrup discussed. Will send in albuterol inhaler for him for cough as well  -Patient was counseled to go to the ER if symptoms worsen or if he is short of breath     Relevant Medications    albuterol sulfate  (90 Base) MCG/ACT inhaler    promethazine-dextromethorphan (PROMETHAZINE-DM) 6.25-15 MG/5ML syrup    guaiFENesin (Mucinex) 600 MG 12 hr tablet    Other Relevant Orders    XR Chest PA & Lateral (Completed)    COVID-19 PCR, [facility], NP SWAB IN [facility]SP[facility] MEDIA/ORAL SWISH 24-30 HR TAT - Swab, Nasopharynx          Plan of care was reviewed with patient at the conclusion of today's visit. Education was provided regarding diagnoses, management, prescribed or recommended OTC products, and the importance of compliance with follow-up appointments. The patient was counseled regarding the risks, benefits, and possible side-effects of treatment. I advised the patient to keep  me informed of any acute changes in their status including new, worsening, or persistent symptoms. Patient expresses understanding and agreement with the management plan.        KELSEY Hull

## 2022-04-22 NOTE — TELEPHONE ENCOUNTER
----- Message from KELSEY Hull sent at 4/22/2022  1:57 PM EDT -----  Please notify the patient his chest x-ray was normal.  There was no evidence of pneumonia.  He should go ahead with the treatment plan we discussed at his visit.  Please let me know if he has any questions.

## 2022-04-22 NOTE — PATIENT INSTRUCTIONS
"Attain adequate rest and increase clear fluid intake.   Practice regular hand hygiene and cover your cough to help prevent spread of infection  Use warm salt water gargles, lozenges, and hot tea with honey as needed for throat comfort.   Use Mucinex 600 mg twice daily and \"ocean\" or \"simply saline\" brand sterile nasal spray twice daily.   Alternate taking Tylenol 500 mg every 4 hours as needed for headache, body aches, throat pain, and/or fever reduction  Quarantine at home until your COVID test returns  Seek care if symptoms are worsening particularly shortness of breath  "

## 2022-04-23 LAB
ALBUMIN SERPL-MCNC: 3.9 G/DL (ref 3.5–5.2)
ALBUMIN/GLOB SERPL: 1.3 G/DL
ALP SERPL-CCNC: 101 U/L (ref 39–117)
ALT SERPL W P-5'-P-CCNC: 36 U/L (ref 1–41)
ANION GAP SERPL CALCULATED.3IONS-SCNC: 13 MMOL/L (ref 5–15)
AST SERPL-CCNC: 43 U/L (ref 1–40)
BASOPHILS # BLD AUTO: 0.02 10*3/MM3 (ref 0–0.2)
BASOPHILS NFR BLD AUTO: 0.1 % (ref 0–1.5)
BILIRUB SERPL-MCNC: 0.4 MG/DL (ref 0–1.2)
BUN SERPL-MCNC: 26 MG/DL (ref 8–23)
BUN/CREAT SERPL: 25 (ref 7–25)
CALCIUM SPEC-SCNC: 9 MG/DL (ref 8.6–10.5)
CHLORIDE SERPL-SCNC: 105 MMOL/L (ref 98–107)
CO2 SERPL-SCNC: 22 MMOL/L (ref 22–29)
CREAT SERPL-MCNC: 1.04 MG/DL (ref 0.76–1.27)
DEPRECATED RDW RBC AUTO: 49.2 FL (ref 37–54)
EGFRCR SERPLBLD CKD-EPI 2021: 72.1 ML/MIN/1.73
EOSINOPHIL # BLD AUTO: 0.01 10*3/MM3 (ref 0–0.4)
EOSINOPHIL NFR BLD AUTO: 0.1 % (ref 0.3–6.2)
ERYTHROCYTE [DISTWIDTH] IN BLOOD BY AUTOMATED COUNT: 14.7 % (ref 12.3–15.4)
GLOBULIN UR ELPH-MCNC: 3 GM/DL
GLUCOSE BLDC GLUCOMTR-MCNC: 197 MG/DL (ref 70–130)
GLUCOSE BLDC GLUCOMTR-MCNC: 210 MG/DL (ref 70–130)
GLUCOSE BLDC GLUCOMTR-MCNC: 284 MG/DL (ref 70–130)
GLUCOSE SERPL-MCNC: 197 MG/DL (ref 65–99)
HBA1C MFR BLD: 5.6 % (ref 4.8–5.6)
HCT VFR BLD AUTO: 39.7 % (ref 37.5–51)
HGB BLD-MCNC: 12.8 G/DL (ref 13–17.7)
IMM GRANULOCYTES # BLD AUTO: 0.06 10*3/MM3 (ref 0–0.05)
IMM GRANULOCYTES NFR BLD AUTO: 0.4 % (ref 0–0.5)
LYMPHOCYTES # BLD AUTO: 0.49 10*3/MM3 (ref 0.7–3.1)
LYMPHOCYTES NFR BLD AUTO: 3.5 % (ref 19.6–45.3)
MAGNESIUM SERPL-MCNC: 2.2 MG/DL (ref 1.6–2.4)
MCH RBC QN AUTO: 29.3 PG (ref 26.6–33)
MCHC RBC AUTO-ENTMCNC: 32.2 G/DL (ref 31.5–35.7)
MCV RBC AUTO: 90.8 FL (ref 79–97)
MONOCYTES # BLD AUTO: 0.19 10*3/MM3 (ref 0.1–0.9)
MONOCYTES NFR BLD AUTO: 1.4 % (ref 5–12)
NEUTROPHILS NFR BLD AUTO: 13.06 10*3/MM3 (ref 1.7–7)
NEUTROPHILS NFR BLD AUTO: 94.5 % (ref 42.7–76)
NRBC BLD AUTO-RTO: 0 /100 WBC (ref 0–0.2)
PLATELET # BLD AUTO: 230 10*3/MM3 (ref 140–450)
PMV BLD AUTO: 10.3 FL (ref 6–12)
POTASSIUM SERPL-SCNC: 4.1 MMOL/L (ref 3.5–5.2)
PROT SERPL-MCNC: 6.9 G/DL (ref 6–8.5)
QT INTERVAL: 362 MS
QTC INTERVAL: 409 MS
RBC # BLD AUTO: 4.37 10*6/MM3 (ref 4.14–5.8)
SARS-COV-2 RNA NOSE QL NAA+PROBE: NOT DETECTED
SODIUM SERPL-SCNC: 140 MMOL/L (ref 136–145)
WBC NRBC COR # BLD: 13.83 10*3/MM3 (ref 3.4–10.8)

## 2022-04-23 PROCEDURE — 3E0333Z INTRODUCTION OF ANTI-INFLAMMATORY INTO PERIPHERAL VEIN, PERCUTANEOUS APPROACH: ICD-10-PCS | Performed by: HOSPITALIST

## 2022-04-23 PROCEDURE — 94799 UNLISTED PULMONARY SVC/PX: CPT

## 2022-04-23 PROCEDURE — 93005 ELECTROCARDIOGRAM TRACING: CPT | Performed by: HOSPITALIST

## 2022-04-23 PROCEDURE — 93010 ELECTROCARDIOGRAM REPORT: CPT | Performed by: INTERNAL MEDICINE

## 2022-04-23 PROCEDURE — 63710000001 INSULIN LISPRO (HUMAN) PER 5 UNITS: Performed by: NURSE PRACTITIONER

## 2022-04-23 PROCEDURE — 25010000002 HEPARIN (PORCINE) PER 1000 UNITS: Performed by: NURSE PRACTITIONER

## 2022-04-23 PROCEDURE — 80053 COMPREHEN METABOLIC PANEL: CPT | Performed by: NURSE PRACTITIONER

## 2022-04-23 PROCEDURE — 85025 COMPLETE CBC W/AUTO DIFF WBC: CPT | Performed by: NURSE PRACTITIONER

## 2022-04-23 PROCEDURE — 83735 ASSAY OF MAGNESIUM: CPT | Performed by: NURSE PRACTITIONER

## 2022-04-23 PROCEDURE — 94664 DEMO&/EVAL PT USE INHALER: CPT

## 2022-04-23 PROCEDURE — 25010000002 METHYLPREDNISOLONE PER 40 MG: Performed by: NURSE PRACTITIONER

## 2022-04-23 PROCEDURE — 83036 HEMOGLOBIN GLYCOSYLATED A1C: CPT | Performed by: NURSE PRACTITIONER

## 2022-04-23 PROCEDURE — 82962 GLUCOSE BLOOD TEST: CPT

## 2022-04-23 PROCEDURE — 94761 N-INVAS EAR/PLS OXIMETRY MLT: CPT

## 2022-04-23 PROCEDURE — 99233 SBSQ HOSP IP/OBS HIGH 50: CPT | Performed by: HOSPITALIST

## 2022-04-23 RX ORDER — TAMSULOSIN HYDROCHLORIDE 0.4 MG/1
0.4 CAPSULE ORAL NIGHTLY
Status: DISCONTINUED | OUTPATIENT
Start: 2022-04-23 | End: 2022-04-24 | Stop reason: HOSPADM

## 2022-04-23 RX ADMIN — Medication 5 MG: at 21:01

## 2022-04-23 RX ADMIN — METHYLPREDNISOLONE SODIUM SUCCINATE 40 MG: 40 INJECTION, POWDER, FOR SOLUTION INTRAMUSCULAR; INTRAVENOUS at 05:28

## 2022-04-23 RX ADMIN — MESALAMINE 800 MG: 400 CAPSULE, DELAYED RELEASE ORAL at 09:00

## 2022-04-23 RX ADMIN — SODIUM CHLORIDE 75 ML/HR: 9 INJECTION, SOLUTION INTRAVENOUS at 19:11

## 2022-04-23 RX ADMIN — AMLODIPINE BESYLATE 5 MG: 5 TABLET ORAL at 08:46

## 2022-04-23 RX ADMIN — METHYLPREDNISOLONE SODIUM SUCCINATE 40 MG: 40 INJECTION, POWDER, FOR SOLUTION INTRAMUSCULAR; INTRAVENOUS at 21:00

## 2022-04-23 RX ADMIN — IPRATROPIUM BROMIDE AND ALBUTEROL SULFATE 3 ML: .5; 3 SOLUTION RESPIRATORY (INHALATION) at 08:50

## 2022-04-23 RX ADMIN — MESALAMINE 800 MG: 400 CAPSULE, DELAYED RELEASE ORAL at 20:58

## 2022-04-23 RX ADMIN — GUAIFENESIN 600 MG: 600 TABLET, EXTENDED RELEASE ORAL at 08:46

## 2022-04-23 RX ADMIN — IPRATROPIUM BROMIDE AND ALBUTEROL SULFATE 3 ML: .5; 3 SOLUTION RESPIRATORY (INHALATION) at 20:06

## 2022-04-23 RX ADMIN — DILTIAZEM HYDROCHLORIDE 180 MG: 180 CAPSULE, COATED, EXTENDED RELEASE ORAL at 20:58

## 2022-04-23 RX ADMIN — CHLORTHALIDONE 25 MG: 25 TABLET ORAL at 08:59

## 2022-04-23 RX ADMIN — HEPARIN SODIUM 5000 UNITS: 5000 INJECTION, SOLUTION INTRAVENOUS; SUBCUTANEOUS at 05:28

## 2022-04-23 RX ADMIN — Medication 10 ML: at 09:01

## 2022-04-23 RX ADMIN — SODIUM CHLORIDE 75 ML/HR: 9 INJECTION, SOLUTION INTRAVENOUS at 05:30

## 2022-04-23 RX ADMIN — DOXYCYCLINE 100 MG: 100 CAPSULE ORAL at 17:12

## 2022-04-23 RX ADMIN — TAMSULOSIN HYDROCHLORIDE 0.4 MG: 0.4 CAPSULE ORAL at 20:58

## 2022-04-23 RX ADMIN — MULTIPLE VITAMINS W/ MINERALS TAB 1 TABLET: TAB at 08:45

## 2022-04-23 RX ADMIN — HEPARIN SODIUM 5000 UNITS: 5000 INJECTION, SOLUTION INTRAVENOUS; SUBCUTANEOUS at 14:14

## 2022-04-23 RX ADMIN — LOSARTAN POTASSIUM 50 MG: 25 TABLET, FILM COATED ORAL at 20:58

## 2022-04-23 RX ADMIN — GUAIFENESIN 600 MG: 600 TABLET, EXTENDED RELEASE ORAL at 20:58

## 2022-04-23 RX ADMIN — Medication 1000 UNITS: at 08:45

## 2022-04-23 RX ADMIN — INSULIN LISPRO 4 UNITS: 100 INJECTION, SOLUTION INTRAVENOUS; SUBCUTANEOUS at 17:12

## 2022-04-23 RX ADMIN — TAMSULOSIN HYDROCHLORIDE 0.4 MG: 0.4 CAPSULE ORAL at 08:45

## 2022-04-23 RX ADMIN — INSULIN LISPRO 2 UNITS: 100 INJECTION, SOLUTION INTRAVENOUS; SUBCUTANEOUS at 08:45

## 2022-04-23 RX ADMIN — IPRATROPIUM BROMIDE AND ALBUTEROL SULFATE 3 ML: .5; 3 SOLUTION RESPIRATORY (INHALATION) at 13:57

## 2022-04-23 RX ADMIN — HEPARIN SODIUM 5000 UNITS: 5000 INJECTION, SOLUTION INTRAVENOUS; SUBCUTANEOUS at 20:58

## 2022-04-23 RX ADMIN — ATORVASTATIN CALCIUM 40 MG: 40 TABLET, FILM COATED ORAL at 20:58

## 2022-04-23 RX ADMIN — Medication 10 ML: at 21:00

## 2022-04-23 RX ADMIN — METHYLPREDNISOLONE SODIUM SUCCINATE 40 MG: 40 INJECTION, POWDER, FOR SOLUTION INTRAMUSCULAR; INTRAVENOUS at 12:39

## 2022-04-23 RX ADMIN — INSULIN LISPRO 3 UNITS: 100 INJECTION, SOLUTION INTRAVENOUS; SUBCUTANEOUS at 12:39

## 2022-04-23 RX ADMIN — DEXTROMETHORPHAN POLISTIREX 60 MG: 30 SUSPENSION ORAL at 20:58

## 2022-04-23 RX ADMIN — DIGOXIN 125 MCG: 125 TABLET ORAL at 08:45

## 2022-04-23 RX ADMIN — DOXYCYCLINE 100 MG: 100 CAPSULE ORAL at 05:28

## 2022-04-23 RX ADMIN — DEXTROMETHORPHAN POLISTIREX 60 MG: 30 SUSPENSION ORAL at 08:46

## 2022-04-23 RX ADMIN — IPRATROPIUM BROMIDE AND ALBUTEROL SULFATE 3 ML: .5; 3 SOLUTION RESPIRATORY (INHALATION) at 15:51

## 2022-04-24 ENCOUNTER — READMISSION MANAGEMENT (OUTPATIENT)
Dept: CALL CENTER | Facility: HOSPITAL | Age: 82
End: 2022-04-24

## 2022-04-24 VITALS
DIASTOLIC BLOOD PRESSURE: 61 MMHG | TEMPERATURE: 97.1 F | OXYGEN SATURATION: 93 % | HEIGHT: 66 IN | WEIGHT: 154.5 LBS | HEART RATE: 69 BPM | SYSTOLIC BLOOD PRESSURE: 154 MMHG | BODY MASS INDEX: 24.83 KG/M2 | RESPIRATION RATE: 18 BRPM

## 2022-04-24 LAB
ALBUMIN SERPL-MCNC: 3.5 G/DL (ref 3.5–5.2)
ALBUMIN/GLOB SERPL: 1.3 G/DL
ALP SERPL-CCNC: 98 U/L (ref 39–117)
ALT SERPL W P-5'-P-CCNC: 96 U/L (ref 1–41)
ANION GAP SERPL CALCULATED.3IONS-SCNC: 11 MMOL/L (ref 5–15)
AST SERPL-CCNC: 94 U/L (ref 1–40)
BILIRUB SERPL-MCNC: 0.3 MG/DL (ref 0–1.2)
BUN SERPL-MCNC: 33 MG/DL (ref 8–23)
BUN/CREAT SERPL: 27.3 (ref 7–25)
CALCIUM SPEC-SCNC: 8.6 MG/DL (ref 8.6–10.5)
CHLORIDE SERPL-SCNC: 108 MMOL/L (ref 98–107)
CO2 SERPL-SCNC: 24 MMOL/L (ref 22–29)
CREAT SERPL-MCNC: 1.21 MG/DL (ref 0.76–1.27)
DEPRECATED RDW RBC AUTO: 49.1 FL (ref 37–54)
EGFRCR SERPLBLD CKD-EPI 2021: 60.2 ML/MIN/1.73
ERYTHROCYTE [DISTWIDTH] IN BLOOD BY AUTOMATED COUNT: 14.8 % (ref 12.3–15.4)
GLOBULIN UR ELPH-MCNC: 2.6 GM/DL
GLUCOSE BLDC GLUCOMTR-MCNC: 172 MG/DL (ref 70–130)
GLUCOSE BLDC GLUCOMTR-MCNC: 252 MG/DL (ref 70–130)
GLUCOSE SERPL-MCNC: 207 MG/DL (ref 65–99)
HCT VFR BLD AUTO: 33.4 % (ref 37.5–51)
HGB BLD-MCNC: 10.8 G/DL (ref 13–17.7)
MCH RBC QN AUTO: 29 PG (ref 26.6–33)
MCHC RBC AUTO-ENTMCNC: 32.3 G/DL (ref 31.5–35.7)
MCV RBC AUTO: 89.5 FL (ref 79–97)
PLATELET # BLD AUTO: 231 10*3/MM3 (ref 140–450)
PMV BLD AUTO: 10.8 FL (ref 6–12)
POTASSIUM SERPL-SCNC: 3.9 MMOL/L (ref 3.5–5.2)
PROT SERPL-MCNC: 6.1 G/DL (ref 6–8.5)
RBC # BLD AUTO: 3.73 10*6/MM3 (ref 4.14–5.8)
SODIUM SERPL-SCNC: 143 MMOL/L (ref 136–145)
WBC NRBC COR # BLD: 19.47 10*3/MM3 (ref 3.4–10.8)

## 2022-04-24 PROCEDURE — 63710000001 INSULIN LISPRO (HUMAN) PER 5 UNITS: Performed by: NURSE PRACTITIONER

## 2022-04-24 PROCEDURE — 85027 COMPLETE CBC AUTOMATED: CPT | Performed by: HOSPITALIST

## 2022-04-24 PROCEDURE — 82962 GLUCOSE BLOOD TEST: CPT

## 2022-04-24 PROCEDURE — 97161 PT EVAL LOW COMPLEX 20 MIN: CPT

## 2022-04-24 PROCEDURE — 94761 N-INVAS EAR/PLS OXIMETRY MLT: CPT

## 2022-04-24 PROCEDURE — 25010000002 HEPARIN (PORCINE) PER 1000 UNITS: Performed by: NURSE PRACTITIONER

## 2022-04-24 PROCEDURE — 80053 COMPREHEN METABOLIC PANEL: CPT | Performed by: HOSPITALIST

## 2022-04-24 PROCEDURE — 94664 DEMO&/EVAL PT USE INHALER: CPT

## 2022-04-24 PROCEDURE — 94799 UNLISTED PULMONARY SVC/PX: CPT

## 2022-04-24 PROCEDURE — 25010000002 METHYLPREDNISOLONE PER 40 MG: Performed by: NURSE PRACTITIONER

## 2022-04-24 PROCEDURE — 99239 HOSP IP/OBS DSCHRG MGMT >30: CPT | Performed by: NURSE PRACTITIONER

## 2022-04-24 RX ORDER — DOXYCYCLINE 100 MG/1
100 CAPSULE ORAL EVERY 12 HOURS SCHEDULED
Qty: 7 CAPSULE | Refills: 0 | Status: SHIPPED | OUTPATIENT
Start: 2022-04-24 | End: 2022-04-28

## 2022-04-24 RX ORDER — PREDNISONE 10 MG/1
TABLET ORAL
Qty: 21 TABLET | Refills: 0 | Status: SHIPPED | OUTPATIENT
Start: 2022-04-24 | End: 2022-05-02

## 2022-04-24 RX ORDER — IPRATROPIUM BROMIDE AND ALBUTEROL SULFATE 2.5; .5 MG/3ML; MG/3ML
3 SOLUTION RESPIRATORY (INHALATION)
Qty: 360 ML | Refills: 0 | Status: SHIPPED | OUTPATIENT
Start: 2022-04-24 | End: 2022-10-06

## 2022-04-24 RX ADMIN — METHYLPREDNISOLONE SODIUM SUCCINATE 40 MG: 40 INJECTION, POWDER, FOR SOLUTION INTRAMUSCULAR; INTRAVENOUS at 12:09

## 2022-04-24 RX ADMIN — INSULIN LISPRO 4 UNITS: 100 INJECTION, SOLUTION INTRAVENOUS; SUBCUTANEOUS at 12:09

## 2022-04-24 RX ADMIN — METHYLPREDNISOLONE SODIUM SUCCINATE 40 MG: 40 INJECTION, POWDER, FOR SOLUTION INTRAMUSCULAR; INTRAVENOUS at 05:52

## 2022-04-24 RX ADMIN — DIGOXIN 125 MCG: 125 TABLET ORAL at 08:23

## 2022-04-24 RX ADMIN — HEPARIN SODIUM 5000 UNITS: 5000 INJECTION, SOLUTION INTRAVENOUS; SUBCUTANEOUS at 05:53

## 2022-04-24 RX ADMIN — GUAIFENESIN 600 MG: 600 TABLET, EXTENDED RELEASE ORAL at 08:23

## 2022-04-24 RX ADMIN — MULTIPLE VITAMINS W/ MINERALS TAB 1 TABLET: TAB at 08:23

## 2022-04-24 RX ADMIN — MESALAMINE 800 MG: 400 CAPSULE, DELAYED RELEASE ORAL at 08:24

## 2022-04-24 RX ADMIN — AMLODIPINE BESYLATE 5 MG: 5 TABLET ORAL at 08:23

## 2022-04-24 RX ADMIN — SODIUM CHLORIDE 75 ML/HR: 9 INJECTION, SOLUTION INTRAVENOUS at 09:44

## 2022-04-24 RX ADMIN — CHLORTHALIDONE 25 MG: 25 TABLET ORAL at 08:26

## 2022-04-24 RX ADMIN — Medication 10 ML: at 08:24

## 2022-04-24 RX ADMIN — INSULIN LISPRO 2 UNITS: 100 INJECTION, SOLUTION INTRAVENOUS; SUBCUTANEOUS at 08:31

## 2022-04-24 RX ADMIN — DEXTROMETHORPHAN POLISTIREX 60 MG: 30 SUSPENSION ORAL at 08:23

## 2022-04-24 RX ADMIN — DOXYCYCLINE 100 MG: 100 CAPSULE ORAL at 05:52

## 2022-04-24 RX ADMIN — IPRATROPIUM BROMIDE AND ALBUTEROL SULFATE 3 ML: .5; 3 SOLUTION RESPIRATORY (INHALATION) at 07:10

## 2022-04-24 RX ADMIN — Medication 1000 UNITS: at 08:23

## 2022-04-24 NOTE — OUTREACH NOTE
Prep Survey    Flowsheet Row Responses   [facility] patient discharged from? [place]   Is LACE score < 7 ? No   Emergency Room discharge w/ pulse ox? No   Eligibility [facility]   Date of Admission 04/22/22   Date of Discharge 04/24/22   Discharge Disposition Home or Self Care   Discharge diagnosis Viral pneumonia    Does the patient have one of the following disease processes/diagnoses(primary or secondary)? COPD/Pneumonia   Does the patient have Home health ordered? No   Is there a DME ordered? No   Prep survey completed? Yes          SCAR Gutierrez Registered Nurse

## 2022-04-25 ENCOUNTER — TRANSITIONAL CARE MANAGEMENT TELEPHONE ENCOUNTER (OUTPATIENT)
Dept: CALL CENTER | Facility: HOSPITAL | Age: 82
End: 2022-04-25

## 2022-04-25 RX ORDER — TAMSULOSIN HYDROCHLORIDE 0.4 MG/1
CAPSULE ORAL
Qty: 90 CAPSULE | Refills: 0 | Status: SHIPPED | OUTPATIENT
Start: 2022-04-25 | End: 2022-08-01

## 2022-04-25 NOTE — OUTREACH NOTE
Call Center TCM Note    Flowsheet Row Responses   [facility] patient discharged from? [place]   Does the patient have one of the following disease processes/diagnoses(primary or secondary)? COPD/Pneumonia   Was the primary reason for admission: Pneumonia   TCM attempt successful? No  [no verbal release]   Unsuccessful attempts Attempt 1          Dawna Rivas RN    4/25/2022, 10:03 EDT

## 2022-04-25 NOTE — TELEPHONE ENCOUNTER
Rx Refill Note  Requested Prescriptions     Pending Prescriptions Disp Refills   • tamsulosin (FLOMAX) 0.4 MG capsule 24 hr capsule [Pharmacy Med Name: TAMSULOSIN HCL 0.4 MG CAPSULE] 90 capsule 0     Sig: TAKE ONE CAPSULE BY MOUTH EVERY NIGHT AT BEDTIME      Last office visit with prescribing clinician: 4/14/2022      Next office visit with prescribing clinician: 4/28/2022   Annette Rao MA  04/25/22, 13:27 EDT     Last fill: 0125/2022

## 2022-04-25 NOTE — OUTREACH NOTE
Case Management Call Center Follow-up    Flowsheet Row Responses   [facility] Call Center Tracking Reason? Other (specify in comments)   Other Tracking Comments Nebulizer machine equipment/order   Has the Call Center Case Management Follow-up issue been resolved? Yes   Follow-up Comments MSW spoke with pt. and pt. reports that he got the solution but no machine. Pt. reports that he would like for the order to be faxed to [facility] on [facility]. MSW faxed the order for the nebulizer machine.           TONI Carney    4/25/2022, 13:56 EDT

## 2022-04-25 NOTE — OUTREACH NOTE
Call Center TCM Note    Flowsheet Row Responses   [facility] patient discharged from? [place]   Does the patient have one of the following disease processes/diagnoses(primary or secondary)? COPD/Pneumonia   Was the primary reason for admission: Pneumonia   TCM attempt successful? Yes   Call start time 1316   Call end time 1323   Discharge diagnosis Viral pneumonia    Meds reviewed with patient/caregiver? Yes   Is the patient having any side effects they believe may be caused by any medication additions or changes? No   Does the patient have all medications ordered at discharge? Yes   Prescription comments *received neb solution but no machine will send a message to CM for assistance   Is the patient taking all medications as directed (includes completed medication regime)? Yes   Does the patient have a primary care provider?  Yes   Does the patient have an appointment with their PCP or specialist within 7 days of discharge? Yes   Comments regarding PCP Hospital PCP FOLLOW UP APPOINTMENT IS 4/28/22@1100am   Has the patient kept scheduled appointments due by today? N/A   Has home health visited the patient within 72 hours of discharge? N/A   What DME was ordered? Patient was discharged without a nebulizer machine and has new order for nebs--will alert CM team   Has all DME been delivered? No   Pulse Ox monitoring Intermittent   Pulse Ox device source Patient   O2 Sat comments Sats 96% on room air which are an improvement from previous readings of 88--92%   O2 Sat: education provided Sat levels, When to seek care   Did the patient receive a copy of their discharge instructions? Yes   Nursing interventions Reviewed instructions with patient   What is the patient's perception of their health status since discharge? Improving  [Patient improving--reviewed return s/s with him. ]   Nursing Interventions Nurse provided patient education   Is the patient/caregiver able to teach back the hierarchy of who to call/visit  for symptoms/problems? PCP, Specialist, Home health nurse, Urgent Care, ED, 911 Yes   Is the patient/caregiver able to teach back signs and symptoms of worsening condition: Fever/chills, Shortness of breath, Chest pain   Is the patient/caregiver able to teach back importance of completing antibiotic course of treatment? Yes   TCM call completed? Yes          Dawna Rivas RN    4/25/2022, 13:25 EDT

## 2022-04-25 NOTE — PROGRESS NOTES
Contacted patient to advise him that his nebulizer supplies to [facility]. He is aware of their contact info and will follow up with them to get more supplies.

## 2022-04-28 ENCOUNTER — LAB (OUTPATIENT)
Dept: LAB | Facility: HOSPITAL | Age: 82
End: 2022-04-28

## 2022-04-28 ENCOUNTER — OFFICE VISIT (OUTPATIENT)
Dept: FAMILY MEDICINE CLINIC | Facility: CLINIC | Age: 82
End: 2022-04-28

## 2022-04-28 VITALS
OXYGEN SATURATION: 95 % | HEIGHT: 66 IN | BODY MASS INDEX: 24.49 KG/M2 | HEART RATE: 89 BPM | DIASTOLIC BLOOD PRESSURE: 74 MMHG | SYSTOLIC BLOOD PRESSURE: 142 MMHG | WEIGHT: 152.4 LBS | RESPIRATION RATE: 16 BRPM

## 2022-04-28 DIAGNOSIS — N18.2 CKD (CHRONIC KIDNEY DISEASE), STAGE II: ICD-10-CM

## 2022-04-28 DIAGNOSIS — Z09 ENCOUNTER FOR EXAMINATION FOLLOWING TREATMENT AT HOSPITAL: Primary | ICD-10-CM

## 2022-04-28 DIAGNOSIS — J12.9 VIRAL PNEUMONIA: ICD-10-CM

## 2022-04-28 DIAGNOSIS — Z09 ENCOUNTER FOR EXAMINATION FOLLOWING TREATMENT AT HOSPITAL: ICD-10-CM

## 2022-04-28 LAB
ANION GAP SERPL CALCULATED.3IONS-SCNC: 15.4 MMOL/L (ref 5–15)
BASOPHILS # BLD AUTO: 0.03 10*3/MM3 (ref 0–0.2)
BASOPHILS NFR BLD AUTO: 0.2 % (ref 0–1.5)
BUN SERPL-MCNC: 36 MG/DL (ref 8–23)
BUN/CREAT SERPL: 28.6 (ref 7–25)
CALCIUM SPEC-SCNC: 10.2 MG/DL (ref 8.6–10.5)
CHLORIDE SERPL-SCNC: 100 MMOL/L (ref 98–107)
CO2 SERPL-SCNC: 23.6 MMOL/L (ref 22–29)
CREAT SERPL-MCNC: 1.26 MG/DL (ref 0.76–1.27)
DEPRECATED RDW RBC AUTO: 45.9 FL (ref 37–54)
EGFRCR SERPLBLD CKD-EPI 2021: 57.3 ML/MIN/1.73
EOSINOPHIL # BLD AUTO: 0.32 10*3/MM3 (ref 0–0.4)
EOSINOPHIL NFR BLD AUTO: 2.2 % (ref 0.3–6.2)
ERYTHROCYTE [DISTWIDTH] IN BLOOD BY AUTOMATED COUNT: 14.1 % (ref 12.3–15.4)
GLUCOSE SERPL-MCNC: 139 MG/DL (ref 65–99)
HCT VFR BLD AUTO: 41.6 % (ref 37.5–51)
HGB BLD-MCNC: 13.4 G/DL (ref 13–17.7)
IMM GRANULOCYTES # BLD AUTO: 0.31 10*3/MM3 (ref 0–0.05)
IMM GRANULOCYTES NFR BLD AUTO: 2.2 % (ref 0–0.5)
LYMPHOCYTES # BLD AUTO: 1.52 10*3/MM3 (ref 0.7–3.1)
LYMPHOCYTES NFR BLD AUTO: 10.7 % (ref 19.6–45.3)
MCH RBC QN AUTO: 28.6 PG (ref 26.6–33)
MCHC RBC AUTO-ENTMCNC: 32.2 G/DL (ref 31.5–35.7)
MCV RBC AUTO: 88.9 FL (ref 79–97)
MONOCYTES # BLD AUTO: 0.78 10*3/MM3 (ref 0.1–0.9)
MONOCYTES NFR BLD AUTO: 5.5 % (ref 5–12)
NEUTROPHILS NFR BLD AUTO: 11.27 10*3/MM3 (ref 1.7–7)
NEUTROPHILS NFR BLD AUTO: 79.2 % (ref 42.7–76)
NRBC BLD AUTO-RTO: 0.1 /100 WBC (ref 0–0.2)
PLATELET # BLD AUTO: 281 10*3/MM3 (ref 140–450)
PMV BLD AUTO: 10.5 FL (ref 6–12)
POTASSIUM SERPL-SCNC: 4 MMOL/L (ref 3.5–5.2)
RBC # BLD AUTO: 4.68 10*6/MM3 (ref 4.14–5.8)
SODIUM SERPL-SCNC: 139 MMOL/L (ref 136–145)
WBC NRBC COR # BLD: 14.23 10*3/MM3 (ref 3.4–10.8)

## 2022-04-28 PROCEDURE — 99495 TRANSJ CARE MGMT MOD F2F 14D: CPT | Performed by: FAMILY MEDICINE

## 2022-04-28 PROCEDURE — 80048 BASIC METABOLIC PNL TOTAL CA: CPT

## 2022-04-28 PROCEDURE — 85025 COMPLETE CBC W/AUTO DIFF WBC: CPT

## 2022-04-28 PROCEDURE — 1111F DSCHRG MED/CURRENT MED MERGE: CPT | Performed by: FAMILY MEDICINE

## 2022-04-28 RX ORDER — NEBULIZER AND COMPRESSOR
EACH MISCELLANEOUS AS NEEDED
COMMUNITY
Start: 2022-04-25 | End: 2022-05-23

## 2022-04-29 PROBLEM — R09.02 HYPOXIA: Status: ACTIVE | Noted: 2022-04-29

## 2022-05-02 ENCOUNTER — OFFICE VISIT (OUTPATIENT)
Dept: FAMILY MEDICINE CLINIC | Facility: CLINIC | Age: 82
End: 2022-05-02

## 2022-05-02 VITALS
SYSTOLIC BLOOD PRESSURE: 160 MMHG | BODY MASS INDEX: 24.36 KG/M2 | RESPIRATION RATE: 16 BRPM | HEIGHT: 66 IN | DIASTOLIC BLOOD PRESSURE: 64 MMHG | HEART RATE: 66 BPM | TEMPERATURE: 98.2 F | OXYGEN SATURATION: 97 % | WEIGHT: 151.6 LBS

## 2022-05-02 DIAGNOSIS — J12.9 VIRAL PNEUMONIA: Primary | ICD-10-CM

## 2022-05-02 PROCEDURE — 99213 OFFICE O/P EST LOW 20 MIN: CPT | Performed by: FAMILY MEDICINE

## 2022-05-02 RX ORDER — DEXTROMETHORPHAN HYDROBROMIDE AND PROMETHAZINE HYDROCHLORIDE 15; 6.25 MG/5ML; MG/5ML
5 SYRUP ORAL 4 TIMES DAILY PRN
Qty: 180 ML | Refills: 0 | Status: SHIPPED | OUTPATIENT
Start: 2022-05-02 | End: 2022-08-17

## 2022-05-02 RX ORDER — BENZONATATE 100 MG/1
100 CAPSULE ORAL 3 TIMES DAILY PRN
Qty: 30 CAPSULE | Refills: 0 | Status: SHIPPED | OUTPATIENT
Start: 2022-05-02 | End: 2022-05-12

## 2022-05-02 NOTE — PROGRESS NOTES
Established Patient Office Visit      Patient Name: Nathan Velarde  : 1940   MRN: [id number]   Care Team: Patient Care Team:  Rell Ahuja DO as PCP - General (Family Medicine)  Ry Feliciano MD as Consulting Physician (Gastroenterology)    Chief Complaint:    Chief Complaint   Patient presents with   • Cough     22 seen negative for Covid Flu  Has been taking brompheniramine/pseudoephedrine that was prescribed to his wife   • Pneumonia     Hospital last week        History of Present Illness: aNthan Velarde is a 81 y.o. male who is here today for chief complaint.    HPI    The patient presents today for ongoing shortness of breath and cough. He is accompanied by an adult female.    The patient reports that his cough has not resolved. He states that he is using a nebulizer. He denies any fever. He states that he sleeps on the couch. He has been taking Bromfed for his cough. He reports that he has not been taking it 4 times a day, but when he takes it, it does not help. He denies any swelling in his hands or feet. He reports that he has a tickle in his throat all the time. He reports that he has had some mucus come up 3 times since he got out of the hospital.    This patient is accompanied by his wife who contributes to the history of their care.    The following portions of the patient's history were reviewed and updated as appropriate: allergies, current medications, past family history, past medical history, past social history, past surgical history and problem list.    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   • Prostatism    • Transverse myelitis (HCC)     Transient T10 with numbness and minimal ataxia - transient    • Ulcerative colitis (HCC)     Well-controlled on mesalamine    • Wears glasses        Past Surgical History:   Past Surgical History:   Procedure Laterality Date   • BICEPS TENDON REPAIR Left 2018    Procedure: BICEPS TENDODESIS;  Surgeon: Abner Humphrey MD;  Location:  [facility];  Service: Orthopedics   • BLADDER SURGERY     • CATARACT EXTRACTION, BILATERAL     • COLONOSCOPY      Colitis in remission   • CYSTOSCOPY      Excision of bladder cancer   • INGUINAL HERNIA REPAIR Right    • KNEE SURGERY Right     Excision of cyst   • LIPOMA EXCISION     • TONSILLECTOMY     • TOTAL SHOULDER ARTHROPLASTY Left 2018    Procedure: LEFT TOTAL SHOULDER ARTHROPLASTY;  Surgeon: Abner Humphrey MD;  Location:  [facility];  Service: Orthopedics   • TOTAL SHOULDER ARTHROPLASTY Right 2021    Procedure: NAVIGATED REVERSE SHOULDER ARTHROPLASTY RIGHT, BICEPS TENODESIS - RIGHT;  Surgeon: Abner Humphrey MD;  Location:  [facility];  Service: Orthopedics;  Laterality: Right;       Family History:   Family History   Problem Relation Age of Onset   • Alzheimer's disease Mother          age 89   • Arthritis Mother            • Hyperlipidemia Mother    • Angina Father    • Hypertension Father    • Pneumonia Father          age 70 - tobacco   • Aneurysm Father         abd aortic   • Vision loss Father         AMD   • Unexplained death Brother    • Breast cancer Maternal  Grandmother    • Arthritis Maternal Grandmother            • Diabetes Paternal Uncle        Social History:   Social History     Socioeconomic History   • Marital status:    Tobacco Use   • Smoking status: Never Smoker   • Smokeless tobacco: Never Used   Vaping Use   • Vaping Use: Never used   Substance and Sexual Activity   • Alcohol use: Yes     Alcohol/week: 2.0 standard drinks     Types: 2 Cans of beer per week     Comment: OCC   • Drug use: Never   • Sexual activity: Yes     Partners: Female     Birth control/protection: None       Tobacco History:   Social History     Tobacco Use   Smoking Status Never Smoker   Smokeless Tobacco Never Used       Medications:     Current Outpatient Medications:   •  albuterol sulfate  (90 Base) MCG/ACT inhaler, Inhale 2 puffs Every 4 (Four) Hours As Needed for Wheezing or Shortness of Air., Disp: 6.7 g, Rfl: 0  •  Alpha-Lipoic Acid 600 MG capsule, Take 600 mg by mouth Daily., Disp: , Rfl:   •  aspirin-acetaminophen-caffeine (EXCEDRIN MIGRAINE) 250-250-65 MG per tablet, Take 1 tablet by mouth Every 6 (Six) Hours As Needed for Headache., Disp: , Rfl:   •  atorvastatin (LIPITOR) 40 MG tablet, TAKE 1 TABLET NIGHTLY AT   BEDTIME, Disp: 90 tablet, Rfl: 1  •  Calcium Carbonate-Vitamin D (CALCIUM 600+D PO), Take 1 tablet by mouth Daily., Disp: , Rfl:   •  chlorthalidone (HYGROTON) 25 MG tablet, TAKE ONE TABLET BY MOUTH DAILY, Disp: 90 tablet, Rfl: 10  •  cholecalciferol (VITAMIN D3) 1000 units tablet, Take 1,000 Units by mouth Daily., Disp: , Rfl:   •  coenzyme Q10 100 MG capsule, Take 100 mg by mouth Daily., Disp: , Rfl:   •  diclofenac (VOLTAREN) 75 MG EC tablet, TAKE 1 TABLET DAILY, Disp: 90 tablet, Rfl: 1  •  digoxin (LANOXIN) 125 MCG tablet, TAKE ONE TABLET BY MOUTH DAILY, Disp: 90 tablet, Rfl: 1  •  dilTIAZem CD (CARDIZEM CD) 180 MG 24 hr capsule, TAKE 1 CAPSULE NIGHTLY, Disp: 90 capsule, Rfl: 0  •  Fish Oil-Cholecalciferol (OMEGA-3 + D PO), Take 1 capsule  by mouth Daily., Disp: , Rfl:   •  Glucosamine-Chondroit-Vit C-Mn (GLUCOSAMINE 1500 COMPLEX) capsule, Take 1 tablet by mouth Daily., Disp: , Rfl:   •  guaiFENesin (Mucinex) 600 MG 12 hr tablet, Take 1 tablet by mouth 2 (Two) Times a Day., Disp: 30 tablet, Rfl: 0  •  ipratropium-albuterol (DUO-NEB) 0.5-2.5 mg/3 ml nebulizer, Inhale 3 mL by nebulization 4 (Four) Times a Day., Disp: 360 mL, Rfl: 0  •  losartan (COZAAR) 50 MG tablet, Take 1.5 tablets by mouth Daily., Disp: 180 tablet, Rfl: 0  •  Magnesium 250 MG tablet, Take 250 mg by mouth Daily., Disp: , Rfl:   •  melatonin 3 MG tablet, Take 3 mg by mouth Every Night., Disp: , Rfl:   •  mesalamine (DELZICOL) 400 MG capsule delayed-release delayed release capsule, TAKE 2 CAPSULES TWICE A DAY, Disp: 360 capsule, Rfl: 3  •  Multiple Vitamins-Minerals (MULTIVITAMIN ADULT PO), Take 1 tablet by mouth Daily., Disp: , Rfl:   •  Nebulizers (InnoSpire Elegance Nebulizer) misc, As Needed., Disp: , Rfl:   •  potassium chloride 10 MEQ CR tablet, TAKE 1 TABLET DAILY, Disp: 90 tablet, Rfl: 1  •  promethazine-dextromethorphan (PROMETHAZINE-DM) 6.25-15 MG/5ML syrup, Take 5 mL by mouth 4 (Four) Times a Day As Needed for Cough., Disp: 180 mL, Rfl: 0  •  Saw Palmetto 160 MG capsule, Take 1 capsule by mouth Daily., Disp: , Rfl:   •  Selenium 200 MCG capsule, Take 200 mcg by mouth Daily., Disp: , Rfl:   •  tamsulosin (FLOMAX) 0.4 MG capsule 24 hr capsule, TAKE ONE CAPSULE BY MOUTH EVERY NIGHT AT BEDTIME, Disp: 90 capsule, Rfl: 0  •  vitamin E 400 UNIT capsule, Take 400 Units by mouth Daily., Disp: , Rfl:   •  Zinc 50 MG capsule, Take 50 mg by mouth Daily., Disp: , Rfl:   •  benzonatate (TESSALON) 100 MG capsule, Take 1 capsule by mouth 3 (Three) Times a Day As Needed for Cough for up to 10 days., Disp: 30 capsule, Rfl: 0    Allergies:   Allergies   Allergen Reactions   • Sulfa Antibiotics Nausea Only and Other (See Comments)     Headache       Objective   Objective     Physical  "Exam:  Vital Signs:   Vitals:    05/02/22 1113   BP: 160/64   Pulse: 66   Resp: 16   Temp: 98.2 °F (36.8 °C)   SpO2: 97%   Weight: 68.8 kg (151 lb 9.6 oz)   Height: 167.6 cm (65.98\")     Body mass index is 24.48 kg/m².     Physical Exam  Nursing note reviewed  Const: NAD, A&Ox4, Pleasant, Cooperative  Eyes: EOMI, no conjunctivitis  ENT: No nasal discharge present, neck supple  Cardiac: Regular rate and rhythm, no cyanosis  Resp: Respiratory rate within normal limits, no increased work of breathing, no audible wheezing or retractions noted  GI: No distention or ascites  MSK: Motor and sensation grossly intact in bilateral upper extremities  Neurologic: CN II-XII grossly intact  Psych: Appropriate mood and behavior.  Skin: Warm, dry  Chest: Wheeze.  Procedures/Radiology     Procedures  No radiology results for the last 7 days     Assessment/Plan   Assessment / Plan      Assessment/Plan:   Problems Addressed This Visit  Diagnoses and all orders for this visit:    1. Viral pneumonia (Primary)  -     benzonatate (TESSALON) 100 MG capsule; Take 1 capsule by mouth 3 (Three) Times a Day As Needed for Cough for up to 10 days.  Dispense: 30 capsule; Refill: 0  -     promethazine-dextromethorphan (PROMETHAZINE-DM) 6.25-15 MG/5ML syrup; Take 5 mL by mouth 4 (Four) Times a Day As Needed for Cough.  Dispense: 180 mL; Refill: 0      Problem List Items Addressed This Visit        Pulmonary and Pneumonias    Viral pneumonia - Primary    Relevant Medications    benzonatate (TESSALON) 100 MG capsule    promethazine-dextromethorphan (PROMETHAZINE-DM) 6.25-15 MG/5ML syrup          1. Cough.  - I will send in a new prescription for promethazine DM.  - I will also send in a new prescription for Tessalon Perles.  - I advised the patient to discontinue Mucinex.    2. Shortness of breath and cough.  - I will send a new prescription for promethazine DM.      There are no Patient Instructions on file for this visit.    Follow Up:   No " follow-ups on file.        MDM     [facility] [address]  [facility]  [address]  [facility] [address]  Fax [phone]  Phone [phone]    Transcribed from ambient dictation for Rell Ahuja DO by ALISHA RIVERA.  05/02/22   14:30 EDT    Patient verbalized consent to the visit recording.

## 2022-05-03 ENCOUNTER — HOSPITAL ENCOUNTER (OUTPATIENT)
Dept: CARDIOLOGY | Facility: HOSPITAL | Age: 82
Discharge: HOME | End: 2022-05-03
Admitting: FAMILY MEDICINE

## 2022-05-03 VITALS
DIASTOLIC BLOOD PRESSURE: 61 MMHG | WEIGHT: 151 LBS | SYSTOLIC BLOOD PRESSURE: 120 MMHG | HEIGHT: 66 IN | BODY MASS INDEX: 24.27 KG/M2

## 2022-05-03 DIAGNOSIS — Z13.6 ENCOUNTER FOR SCREENING FOR CARDIOVASCULAR DISORDERS: ICD-10-CM

## 2022-05-03 DIAGNOSIS — I35.9 AORTIC VALVE DISORDER: ICD-10-CM

## 2022-05-03 DIAGNOSIS — R00.1 BRADYCARDIA: ICD-10-CM

## 2022-05-03 LAB
BH CV ECHO MEAS - AI P1/2T: 550.8 MSEC
BH CV ECHO MEAS - AO MAX PG: 16.3 MMHG
BH CV ECHO MEAS - AO MEAN PG: 8 MMHG
BH CV ECHO MEAS - AO ROOT DIAM: 3.5 CM
BH CV ECHO MEAS - AO V2 MAX: 201.7 CM/SEC
BH CV ECHO MEAS - AO V2 VTI: 43.3 CM
BH CV ECHO MEAS - AVA(I,D): 2.19 CM2
BH CV ECHO MEAS - EDV(CUBED): 113.4 ML
BH CV ECHO MEAS - EDV(MOD-SP2): 195 ML
BH CV ECHO MEAS - EDV(MOD-SP4): 172 ML
BH CV ECHO MEAS - EF(MOD-BP): 69 %
BH CV ECHO MEAS - EF(MOD-SP2): 70.8 %
BH CV ECHO MEAS - EF(MOD-SP4): 62.8 %
BH CV ECHO MEAS - ESV(CUBED): 29.2 ML
BH CV ECHO MEAS - ESV(MOD-SP2): 57 ML
BH CV ECHO MEAS - ESV(MOD-SP4): 64 ML
BH CV ECHO MEAS - FS: 36.4 %
BH CV ECHO MEAS - IVS/LVPW: 1.02 CM
BH CV ECHO MEAS - IVSD: 1.1 CM
BH CV ECHO MEAS - LA DIMENSION: 3.5 CM
BH CV ECHO MEAS - LAT PEAK E' VEL: 7.7 CM/SEC
BH CV ECHO MEAS - LV DIASTOLIC VOL/BSA (35-75): 92.9 CM2
BH CV ECHO MEAS - LV MASS(C)D: 194.1 GRAMS
BH CV ECHO MEAS - LV MAX PG: 5.1 MMHG
BH CV ECHO MEAS - LV MEAN PG: 3 MMHG
BH CV ECHO MEAS - LV SYSTOLIC VOL/BSA (12-30): 34.6 CM2
BH CV ECHO MEAS - LV V1 MAX: 112.5 CM/SEC
BH CV ECHO MEAS - LV V1 VTI: 27.3 CM
BH CV ECHO MEAS - LVIDD: 4.8 CM
BH CV ECHO MEAS - LVIDS: 3.1 CM
BH CV ECHO MEAS - LVOT AREA: 3.5 CM2
BH CV ECHO MEAS - LVOT DIAM: 2.1 CM
BH CV ECHO MEAS - LVPWD: 1.08 CM
BH CV ECHO MEAS - MED PEAK E' VEL: 5.36 CM/SEC
BH CV ECHO MEAS - MV A MAX VEL: 67.6 CM/SEC
BH CV ECHO MEAS - MV DEC TIME: 0.27 MSEC
BH CV ECHO MEAS - MV E MAX VEL: 62.7 CM/SEC
BH CV ECHO MEAS - MV E/A: 0.93
BH CV ECHO MEAS - MV MAX PG: 2.7 MMHG
BH CV ECHO MEAS - MV MEAN PG: 1 MMHG
BH CV ECHO MEAS - MV V2 VTI: 34.9 CM
BH CV ECHO MEAS - MVA(VTI): 2.7 CM2
BH CV ECHO MEAS - SI(MOD-SP2): 74.6 ML/M2
BH CV ECHO MEAS - SI(MOD-SP4): 58.4 ML/M2
BH CV ECHO MEAS - SV(LVOT): 94.6 ML
BH CV ECHO MEAS - SV(MOD-SP2): 138 ML
BH CV ECHO MEAS - SV(MOD-SP4): 108 ML
BH CV ECHO MEAS - TAPSE (>1.6): 3.2 CM
BH CV ECHO MEASUREMENTS AVERAGE E/E' RATIO: 9.6
BH CV XLRA - RV BASE: 3.8 CM
BH CV XLRA - RV LENGTH: 6.4 CM
BH CV XLRA - RV MID: 2.8 CM
BH CV XLRA - TDI S': 14.7 CM/SEC
LV EF 2D ECHO EST: 65 %
MAXIMAL PREDICTED HEART RATE: 139 BPM
STRESS TARGET HR: 118 BPM

## 2022-05-03 PROCEDURE — 93306 TTE W/DOPPLER COMPLETE: CPT

## 2022-05-03 PROCEDURE — 93306 TTE W/DOPPLER COMPLETE: CPT | Performed by: INTERNAL MEDICINE

## 2022-05-23 ENCOUNTER — LAB (OUTPATIENT)
Dept: LAB | Facility: HOSPITAL | Age: 82
End: 2022-05-23

## 2022-05-23 ENCOUNTER — HOSPITAL ENCOUNTER (OUTPATIENT)
Dept: GENERAL RADIOLOGY | Facility: HOSPITAL | Age: 82
Discharge: HOME | End: 2022-05-23

## 2022-05-23 ENCOUNTER — OFFICE VISIT (OUTPATIENT)
Dept: FAMILY MEDICINE CLINIC | Facility: CLINIC | Age: 82
End: 2022-05-23

## 2022-05-23 VITALS
HEIGHT: 66 IN | SYSTOLIC BLOOD PRESSURE: 126 MMHG | WEIGHT: 150.8 LBS | HEART RATE: 74 BPM | OXYGEN SATURATION: 97 % | DIASTOLIC BLOOD PRESSURE: 62 MMHG | BODY MASS INDEX: 24.23 KG/M2

## 2022-05-23 DIAGNOSIS — R05.3 CHRONIC COUGH: Primary | ICD-10-CM

## 2022-05-23 DIAGNOSIS — R05.3 CHRONIC COUGH: ICD-10-CM

## 2022-05-23 DIAGNOSIS — R06.01 ORTHOPNEA: ICD-10-CM

## 2022-05-23 LAB
ALBUMIN SERPL-MCNC: 4.4 G/DL (ref 3.5–5.2)
ALBUMIN/GLOB SERPL: 1.8 G/DL
ALP SERPL-CCNC: 102 U/L (ref 39–117)
ALT SERPL W P-5'-P-CCNC: 40 U/L (ref 1–41)
ANION GAP SERPL CALCULATED.3IONS-SCNC: 12.2 MMOL/L (ref 5–15)
AST SERPL-CCNC: 34 U/L (ref 1–40)
BASOPHILS # BLD AUTO: 0.05 10*3/MM3 (ref 0–0.2)
BASOPHILS NFR BLD AUTO: 0.5 % (ref 0–1.5)
BILIRUB SERPL-MCNC: 0.4 MG/DL (ref 0–1.2)
BUN SERPL-MCNC: 32 MG/DL (ref 8–23)
BUN/CREAT SERPL: 25 (ref 7–25)
CALCIUM SPEC-SCNC: 10.1 MG/DL (ref 8.6–10.5)
CHLORIDE SERPL-SCNC: 104 MMOL/L (ref 98–107)
CO2 SERPL-SCNC: 23.8 MMOL/L (ref 22–29)
CREAT SERPL-MCNC: 1.28 MG/DL (ref 0.76–1.27)
DEPRECATED RDW RBC AUTO: 42.9 FL (ref 37–54)
EGFRCR SERPLBLD CKD-EPI 2021: 56.2 ML/MIN/1.73
EOSINOPHIL # BLD AUTO: 0.83 10*3/MM3 (ref 0–0.4)
EOSINOPHIL NFR BLD AUTO: 7.8 % (ref 0.3–6.2)
ERYTHROCYTE [DISTWIDTH] IN BLOOD BY AUTOMATED COUNT: 13.6 % (ref 12.3–15.4)
EXPIRATION DATE: NORMAL
FLUAV AG UPPER RESP QL IA.RAPID: NOT DETECTED
FLUBV AG UPPER RESP QL IA.RAPID: NOT DETECTED
GLOBULIN UR ELPH-MCNC: 2.5 GM/DL
GLUCOSE SERPL-MCNC: 122 MG/DL (ref 65–99)
HCT VFR BLD AUTO: 41.1 % (ref 37.5–51)
HGB BLD-MCNC: 13.7 G/DL (ref 13–17.7)
IMM GRANULOCYTES # BLD AUTO: 0.05 10*3/MM3 (ref 0–0.05)
IMM GRANULOCYTES NFR BLD AUTO: 0.5 % (ref 0–0.5)
INTERNAL CONTROL: NORMAL
LYMPHOCYTES # BLD AUTO: 1.34 10*3/MM3 (ref 0.7–3.1)
LYMPHOCYTES NFR BLD AUTO: 12.6 % (ref 19.6–45.3)
Lab: NORMAL
MCH RBC QN AUTO: 29.3 PG (ref 26.6–33)
MCHC RBC AUTO-ENTMCNC: 33.3 G/DL (ref 31.5–35.7)
MCV RBC AUTO: 87.8 FL (ref 79–97)
MONOCYTES # BLD AUTO: 1.31 10*3/MM3 (ref 0.1–0.9)
MONOCYTES NFR BLD AUTO: 12.3 % (ref 5–12)
NEUTROPHILS NFR BLD AUTO: 66.3 % (ref 42.7–76)
NEUTROPHILS NFR BLD AUTO: 7.08 10*3/MM3 (ref 1.7–7)
NRBC BLD AUTO-RTO: 0 /100 WBC (ref 0–0.2)
NT-PROBNP SERPL-MCNC: 461 PG/ML (ref 0–1800)
PLATELET # BLD AUTO: 314 10*3/MM3 (ref 140–450)
PMV BLD AUTO: 9.9 FL (ref 6–12)
POTASSIUM SERPL-SCNC: 4 MMOL/L (ref 3.5–5.2)
PROT SERPL-MCNC: 6.9 G/DL (ref 6–8.5)
RBC # BLD AUTO: 4.68 10*6/MM3 (ref 4.14–5.8)
SARS-COV-2 AG UPPER RESP QL IA.RAPID: NOT DETECTED
SODIUM SERPL-SCNC: 140 MMOL/L (ref 136–145)
WBC NRBC COR # BLD: 10.66 10*3/MM3 (ref 3.4–10.8)

## 2022-05-23 PROCEDURE — 80053 COMPREHEN METABOLIC PANEL: CPT

## 2022-05-23 PROCEDURE — 83880 ASSAY OF NATRIURETIC PEPTIDE: CPT

## 2022-05-23 PROCEDURE — 99214 OFFICE O/P EST MOD 30 MIN: CPT | Performed by: INTERNAL MEDICINE

## 2022-05-23 PROCEDURE — 87428 SARSCOV & INF VIR A&B AG IA: CPT | Performed by: INTERNAL MEDICINE

## 2022-05-23 PROCEDURE — 71046 X-RAY EXAM CHEST 2 VIEWS: CPT

## 2022-05-23 PROCEDURE — 85025 COMPLETE CBC W/AUTO DIFF WBC: CPT

## 2022-05-23 RX ORDER — LORATADINE 10 MG/1
10 TABLET ORAL DAILY
Qty: 30 TABLET | Refills: 1 | Status: SHIPPED | OUTPATIENT
Start: 2022-05-23 | End: 2022-10-06

## 2022-05-23 RX ORDER — PREDNISONE 20 MG/1
40 TABLET ORAL DAILY
Qty: 10 TABLET | Refills: 0 | Status: SHIPPED | OUTPATIENT
Start: 2022-05-23 | End: 2022-05-24 | Stop reason: SDUPTHER

## 2022-05-23 RX ORDER — FLUTICASONE PROPIONATE 50 MCG
2 SPRAY, SUSPENSION (ML) NASAL DAILY
Qty: 15.8 ML | Refills: 1 | Status: SHIPPED | OUTPATIENT
Start: 2022-05-23 | End: 2022-10-06

## 2022-05-23 NOTE — PROGRESS NOTES
Chief Complaint   Patient presents with   • Cough     Pt states sx started a month ago. Pt was in the hospital a month ago for pneumonia and has had cough since. Pt states cough comes and goes. Pt states has congestion in his cough. Pt states always has a tickle in his throat and can feel it when he breaths. Pt states wheeze when lays down.        HPI:  Nathan Velarde is a 81 y.o. male who presents today for chronic cough for the past month.  Patient reports he was recently treated for pneumonia 1 month prior and has had a productive cough, mostly at night, since then.  He does cough occasionally throughout the day.  Worse when he lays down flat.  He has been sleeping with multiple pillows or in a recliner because of this.  Cough is productive.  Currently using Promethazine DM and Tessalon Perles.    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   • Prostatism    • Transverse myelitis (HCC)     Transient T10 with numbness and minimal ataxia - transient    • Ulcerative colitis (HCC)     Well-controlled on mesalamine    • Wears glasses       Family History   Problem Relation Age of Onset   • Alzheimer's disease Mother          age 89   • Arthritis Mother            • Hyperlipidemia Mother    • Angina Father    • Hypertension Father    • Pneumonia Father          age 70 - tobacco   • Aneurysm Father         abd aortic   • Vision loss Father         AMD   • Unexplained death Brother    • Breast cancer Maternal Grandmother    • Arthritis Maternal Grandmother            • Diabetes Paternal Uncle       Social History     Socioeconomic History   • Marital status:    Tobacco Use   • Smoking status: Never Smoker   • Smokeless tobacco: Never Used   Vaping Use   • Vaping Use: Never used   Substance and Sexual Activity   • Alcohol use: Yes     Alcohol/week: 2.0 standard drinks     Types: 2 Cans of beer per week     Comment: OCC   • Drug use: Never   • Sexual activity: Yes     Partners: Female     Birth control/protection: None      Allergies   Allergen Reactions   • Sulfa Antibiotics Nausea Only and Other (See Comments)     Headache      Immunization History   Administered Date(s) Administered   • COVID-19 (MODERNA) 1st, 2nd, 3rd Dose Only 2021, 2021, 10/23/2021   • COVID-19 (MODERNA) BOOSTER 2021, 2021, 10/23/2021   • FluMist 2-49yrs 10/11/2015   • Flublock Quad =>18yrs 10/16/2020   • Fluzone High Dose =>65 Years ([employer] ONLY) 2016, 10/16/2017, 2020   • Fluzone High-Dose 65+yrs 10/21/2021   • Pneumococcal Polysaccharide (PPSV23) 1999, 2010   • Td 2007   • Tdap 2017        PE:  Vitals:    22 1041   BP: 126/62   Pulse: 74   SpO2: 97%      Body mass index is 24.35 kg/m².    Gen Appearance: NAD  HEENT: Normocephalic,  PERRLA, no thyromegaly, trache midline  Heart: RRR, normal S1 and S2, no murmur  Lungs: CTA b/l, no wheezing, no crackles  Abdomen: Soft, non-tender, non-distended, no guarding and BSx4  MSK: Moves all extremities well, normal gait, no peripheral edema  Pulses: Palpable and equal b/l  Lymph nodes: No palpable lymphadenopathy   Neuro: No focal deficits      Current Outpatient Medications   Medication Sig Dispense Refill   • albuterol sulfate  (90 Base) MCG/ACT inhaler Inhale 2 puffs Every 4 (Four) Hours As Needed for Wheezing or Shortness of Air. 6.7 g 0   • Alpha-Lipoic Acid 600 MG capsule Take 600 mg by mouth Daily.     • aspirin-acetaminophen-caffeine (EXCEDRIN MIGRAINE) 250-250-65 MG per tablet Take 1 tablet by mouth Every 6 (Six) Hours As Needed for Headache.     • atorvastatin (LIPITOR) 40 MG tablet TAKE 1 TABLET NIGHTLY AT   BEDTIME 90 tablet 1   • Calcium Carbonate-Vitamin D (CALCIUM 600+D PO) Take 1 tablet by mouth Daily.     • chlorthalidone (HYGROTON) 25 MG tablet TAKE ONE TABLET BY MOUTH DAILY 90 tablet 10   • cholecalciferol (VITAMIN D3) 1000 units tablet Take 1,000 Units by mouth Daily.     • coenzyme Q10 100 MG capsule Take 100 mg by mouth Daily.     • diclofenac (VOLTAREN) 75 MG EC tablet TAKE 1 TABLET DAILY 90 tablet 1   • digoxin (LANOXIN) 125 MCG tablet TAKE ONE TABLET BY MOUTH DAILY 90 tablet 1   • dilTIAZem CD (CARDIZEM CD) 180 MG 24 hr capsule TAKE 1 CAPSULE NIGHTLY 90 capsule 0   • Fish Oil-Cholecalciferol (OMEGA-3 + D PO) Take 1 capsule by mouth Daily.     • Glucosamine-Chondroit-Vit C-Mn (GLUCOSAMINE 1500 COMPLEX) capsule Take 1 tablet by mouth Daily.     • guaiFENesin (Mucinex) 600 MG 12 hr tablet Take 1 tablet by mouth 2 (Two) Times a Day. 30 tablet 0   • ipratropium-albuterol (DUO-NEB) 0.5-2.5 mg/3 ml nebulizer Inhale 3 mL by nebulization 4 (Four) Times a Day. 360 mL 0   • losartan (COZAAR) 50 MG tablet Take 1.5 tablets by mouth Daily. 180 tablet 0   • Magnesium 250 MG tablet Take  250 mg by mouth Daily.     • melatonin 3 MG tablet Take 3 mg by mouth Every Night.     • mesalamine (DELZICOL) 400 MG capsule delayed-release delayed release capsule TAKE 2 CAPSULES TWICE A  capsule 3   • Multiple Vitamins-Minerals (MULTIVITAMIN ADULT PO) Take 1 tablet by mouth Daily.     • potassium chloride 10 MEQ CR tablet TAKE 1 TABLET DAILY 90 tablet 1   • promethazine-dextromethorphan (PROMETHAZINE-DM) 6.25-15 MG/5ML syrup Take 5 mL by mouth 4 (Four) Times a Day As Needed for Cough. 180 mL 0   • Saw Palmetto 160 MG capsule Take 1 capsule by mouth Daily.     • Selenium 200 MCG capsule Take 200 mcg by mouth Daily.     • tamsulosin (FLOMAX) 0.4 MG capsule 24 hr capsule TAKE ONE CAPSULE BY MOUTH EVERY NIGHT AT BEDTIME 90 capsule 0   • vitamin E 400 UNIT capsule Take 400 Units by mouth Daily.     • Zinc 50 MG capsule Take 50 mg by mouth Daily.     • fluticasone (Flonase) 50 MCG/ACT nasal spray 2 sprays into the nostril(s) as directed by provider Daily. 15.8 mL 1   • loratadine (Claritin) 10 MG tablet Take 1 tablet by mouth Daily. 30 tablet 1   • predniSONE (DELTASONE) 20 MG tablet Take 2 tablets by mouth Daily for 5 days. 10 tablet 0     No current facility-administered medications for this visit.      Suspect seasonal allergies/postnasal drip versus postviral cough.  Repeat blood work and chest x-ray today.  Recommend course of steroids and starting on seasonal allergy treatment.  Will refer to ENT if no improvement.  Reflux less likely but it may be worthwhile to try PPI for 30 days if no improvement on initial treatment.    Diagnoses and all orders for this visit:    1. Chronic cough (Primary)  -     POCT SARS-CoV-2 Antigen ALYSON + Flu  -     Cancel: XR Chest PA & Lateral; Future  -     CBC & Differential; Future  -     Comprehensive Metabolic Panel; Future  -     XR Chest PA & Lateral; Future  -     predniSONE (DELTASONE) 20 MG tablet; Take 2 tablets by mouth Daily for 5 days.  Dispense: 10 tablet; Refill:  0  -     loratadine (Claritin) 10 MG tablet; Take 1 tablet by mouth Daily.  Dispense: 30 tablet; Refill: 1  -     fluticasone (Flonase) 50 MCG/ACT nasal spray; 2 sprays into the nostril(s) as directed by provider Daily.  Dispense: 15.8 mL; Refill: 1    2. Orthopnea  -     CBC & Differential; Future  -     Comprehensive Metabolic Panel; Future  -     XR Chest PA & Lateral; Future  -     proBNP; Future         No follow-ups on file.     Dictated Utilizing Dragon Dictation    Please note that portions of this note were completed with a voice recognition program.    Part of this note may be an electronic transcription/translation of spoken language to printed text using the Dragon Dictation System.

## 2022-05-24 DIAGNOSIS — R05.3 CHRONIC COUGH: ICD-10-CM

## 2022-05-24 RX ORDER — PREDNISONE 20 MG/1
40 TABLET ORAL DAILY
Qty: 10 TABLET | Refills: 0 | Status: SHIPPED | OUTPATIENT
Start: 2022-05-24 | End: 2022-05-29

## 2022-05-24 NOTE — TELEPHONE ENCOUNTER
Caller: Reshma Henriquez    Relationship: Emergency Contact    Best call back number:[phone]    Requested Prescriptions:   Requested Prescriptions     Pending Prescriptions Disp Refills   • predniSONE (DELTASONE) 20 MG tablet 10 tablet 0     Sig: Take 2 tablets by mouth Daily for 5 days.        Pharmacy where request should be sent: [facility] [address] [place] [address] [address] AT [facility] [phone] [facility] [phone] FX     Additional details provided by patient:   STATED THAT RX WAS SENT TO MAIL SERVICE BUT NEED FOR IT TO BE SENT TO LOCAL PHARMACY.    Does the patient have less than a 3 day supply:  [x] Yes  [] No    Marleny Baez Rep   05/24/22 09:22 EDT

## 2022-05-27 RX ORDER — ALBUTEROL SULFATE 90 UG/1
AEROSOL, METERED RESPIRATORY (INHALATION)
Qty: 18 G | Refills: 1 | Status: SHIPPED | OUTPATIENT
Start: 2022-05-27 | End: 2022-10-06

## 2022-05-27 NOTE — TELEPHONE ENCOUNTER
Rx Refill Note  Requested Prescriptions     Pending Prescriptions Disp Refills   • albuterol sulfate  (90 Base) MCG/ACT inhaler [Pharmacy Med Name: ALBUTEROL HFA 90 MCG INHALER] 18 g      Sig: INHALE TWO PUFFS BY MOUTH EVERY 4 HOURS AS NEEDED FOR WHEEZING OR FOR SHORTNESS OF BREATH      Last office visit with prescribing clinician: 4/22/2022      Next office visit with prescribing clinician: Visit date not found            Mitzi Cormier MA  05/27/22, 12:01 EDT

## 2022-06-13 RX ORDER — DIGOXIN 125 MCG
TABLET ORAL
Qty: 90 TABLET | Refills: 1 | Status: SHIPPED | OUTPATIENT
Start: 2022-06-13 | End: 2022-12-05

## 2022-06-13 NOTE — TELEPHONE ENCOUNTER
Rx Refill Note  Requested Prescriptions     Pending Prescriptions Disp Refills   • digoxin (LANOXIN) 125 MCG tablet [Pharmacy Med Name: DIGOXIN 0.125 MG TABLET] 90 tablet 1     Sig: TAKE ONE TABLET BY MOUTH DAILY      Last office visit with prescribing clinician: 5/23/2022      Next office visit with prescribing clinician: 8/17/2022            Keely Russo MA  06/13/22, 08:17 EDT

## 2022-08-01 RX ORDER — TAMSULOSIN HYDROCHLORIDE 0.4 MG/1
CAPSULE ORAL
Qty: 90 CAPSULE | Refills: 0 | Status: SHIPPED | OUTPATIENT
Start: 2022-08-01 | End: 2022-10-31

## 2022-08-01 NOTE — TELEPHONE ENCOUNTER
Rx Refill Note  Requested Prescriptions     Pending Prescriptions Disp Refills   • tamsulosin (FLOMAX) 0.4 MG capsule 24 hr capsule [Pharmacy Med Name: TAMSULOSIN HCL 0.4 MG CAPSULE] 90 capsule 0     Sig: TAKE ONE CAPSULE BY MOUTH EVERY NIGHT AT BEDTIME      Last office visit with prescribing clinician: 5/2/2022      Next office visit with prescribing clinician: 8/17/2022            Ofelia Vu MA  08/01/22, 10:05 EDT

## 2022-08-02 DIAGNOSIS — I10 ESSENTIAL HYPERTENSION: ICD-10-CM

## 2022-08-02 RX ORDER — POTASSIUM CHLORIDE 750 MG/1
TABLET, FILM COATED, EXTENDED RELEASE ORAL
Qty: 90 TABLET | Refills: 1 | Status: SHIPPED | OUTPATIENT
Start: 2022-08-02

## 2022-08-02 RX ORDER — LOSARTAN POTASSIUM 50 MG/1
TABLET ORAL
Qty: 150 TABLET | Refills: 0 | Status: SHIPPED | OUTPATIENT
Start: 2022-08-02 | End: 2022-09-06

## 2022-08-02 RX ORDER — DILTIAZEM HYDROCHLORIDE 180 MG/1
CAPSULE, COATED, EXTENDED RELEASE ORAL
Qty: 90 CAPSULE | Refills: 0 | Status: SHIPPED | OUTPATIENT
Start: 2022-08-02 | End: 2022-12-06

## 2022-08-02 NOTE — TELEPHONE ENCOUNTER
Rx Refill Note  Requested Prescriptions     Pending Prescriptions Disp Refills   • potassium chloride 10 MEQ CR tablet [Pharmacy Med Name: POT CHLOR YADIRA TAB 10MEQ ER] 90 tablet 1     Sig: TAKE 1 TABLET DAILY   • losartan (COZAAR) 50 MG tablet [Pharmacy Med Name: LOSARTAN TAB 50MG] 150 tablet 0     Sig: TAKE 1 AND 1/2 TABLETS     DAILY      Last office visit with prescribing clinician: 5/2/2022      Next office visit with prescribing clinician: 8/17/2022            Keely Russo MA  08/02/22, 14:57 EDT

## 2022-08-02 NOTE — TELEPHONE ENCOUNTER
Rx Refill Note  Requested Prescriptions     Pending Prescriptions Disp Refills   • dilTIAZem CD (CARDIZEM CD) 180 MG 24 hr capsule [Pharmacy Med Name: DILTIAZEM CD CAP 180MG/24] 90 capsule 0     Sig: TAKE 1 CAPSULE NIGHTLY      Last office visit with prescribing clinician: 5/23/2022      Next office visit with prescribing clinician: 8/17/2022            Keely Russo MA  08/02/22, 14:55 EDT

## 2022-08-17 ENCOUNTER — LAB (OUTPATIENT)
Dept: LAB | Facility: HOSPITAL | Age: 82
End: 2022-08-17

## 2022-08-17 ENCOUNTER — OFFICE VISIT (OUTPATIENT)
Dept: FAMILY MEDICINE CLINIC | Facility: CLINIC | Age: 82
End: 2022-08-17

## 2022-08-17 VITALS
HEART RATE: 62 BPM | SYSTOLIC BLOOD PRESSURE: 134 MMHG | OXYGEN SATURATION: 98 % | WEIGHT: 152.2 LBS | BODY MASS INDEX: 24.46 KG/M2 | TEMPERATURE: 98.5 F | DIASTOLIC BLOOD PRESSURE: 78 MMHG | HEIGHT: 66 IN

## 2022-08-17 DIAGNOSIS — I10 PRIMARY HYPERTENSION: Primary | ICD-10-CM

## 2022-08-17 DIAGNOSIS — R73.9 HYPERGLYCEMIA: ICD-10-CM

## 2022-08-17 DIAGNOSIS — I10 PRIMARY HYPERTENSION: ICD-10-CM

## 2022-08-17 DIAGNOSIS — N18.2 CKD (CHRONIC KIDNEY DISEASE), STAGE II: ICD-10-CM

## 2022-08-17 DIAGNOSIS — R53.83 EASY FATIGABILITY: ICD-10-CM

## 2022-08-17 DIAGNOSIS — R00.2 PALPITATIONS: ICD-10-CM

## 2022-08-17 DIAGNOSIS — E55.9 VITAMIN D DEFICIENCY: ICD-10-CM

## 2022-08-17 PROBLEM — J12.9 VIRAL PNEUMONIA: Status: RESOLVED | Noted: 2022-04-22 | Resolved: 2022-08-17

## 2022-08-17 PROBLEM — J96.01 ACUTE RESPIRATORY FAILURE WITH HYPOXIA (HCC): Status: RESOLVED | Noted: 2022-04-22 | Resolved: 2022-08-17

## 2022-08-17 PROBLEM — R09.02 HYPOXIA: Status: RESOLVED | Noted: 2022-04-29 | Resolved: 2022-08-17

## 2022-08-17 LAB
ALBUMIN SERPL-MCNC: 4.4 G/DL (ref 3.5–5.2)
ALBUMIN UR-MCNC: 2.2 MG/DL
ALBUMIN/GLOB SERPL: 2 G/DL
ALP SERPL-CCNC: 89 U/L (ref 39–117)
ALT SERPL W P-5'-P-CCNC: 32 U/L (ref 1–41)
ANION GAP SERPL CALCULATED.3IONS-SCNC: 10.7 MMOL/L (ref 5–15)
AST SERPL-CCNC: 38 U/L (ref 1–40)
BILIRUB SERPL-MCNC: 0.5 MG/DL (ref 0–1.2)
BILIRUB UR QL STRIP: NEGATIVE
BUN SERPL-MCNC: 31 MG/DL (ref 8–23)
BUN/CREAT SERPL: 20 (ref 7–25)
CALCIUM SPEC-SCNC: 10.4 MG/DL (ref 8.6–10.5)
CHLORIDE SERPL-SCNC: 102 MMOL/L (ref 98–107)
CLARITY UR: CLEAR
CO2 SERPL-SCNC: 25.3 MMOL/L (ref 22–29)
COLOR UR: ABNORMAL
CREAT SERPL-MCNC: 1.55 MG/DL (ref 0.76–1.27)
CREAT UR-MCNC: 291.5 MG/DL
CREAT UR-MCNC: 291.5 MG/DL
DEPRECATED RDW RBC AUTO: 42.8 FL (ref 37–54)
EGFRCR SERPLBLD CKD-EPI 2021: 44.7 ML/MIN/1.73
ERYTHROCYTE [DISTWIDTH] IN BLOOD BY AUTOMATED COUNT: 12.9 % (ref 12.3–15.4)
GLOBULIN UR ELPH-MCNC: 2.2 GM/DL
GLUCOSE SERPL-MCNC: 96 MG/DL (ref 65–99)
GLUCOSE UR STRIP-MCNC: NEGATIVE MG/DL
HCT VFR BLD AUTO: 39.4 % (ref 37.5–51)
HGB BLD-MCNC: 12.8 G/DL (ref 13–17.7)
HGB UR QL STRIP.AUTO: NEGATIVE
KETONES UR QL STRIP: ABNORMAL
LEUKOCYTE ESTERASE UR QL STRIP.AUTO: NEGATIVE
MCH RBC QN AUTO: 29.5 PG (ref 26.6–33)
MCHC RBC AUTO-ENTMCNC: 32.5 G/DL (ref 31.5–35.7)
MCV RBC AUTO: 90.8 FL (ref 79–97)
MICROALBUMIN/CREAT UR: 7.5 MG/G
NITRITE UR QL STRIP: NEGATIVE
PH UR STRIP.AUTO: 5.5 [PH] (ref 5–8)
PLATELET # BLD AUTO: 257 10*3/MM3 (ref 140–450)
PMV BLD AUTO: 10.5 FL (ref 6–12)
POTASSIUM SERPL-SCNC: 5.3 MMOL/L (ref 3.5–5.2)
PROT ?TM UR-MCNC: 31.8 MG/DL
PROT SERPL-MCNC: 6.6 G/DL (ref 6–8.5)
PROT UR QL STRIP: ABNORMAL
PROT/CREAT UR: 109.1 MG/G CREA (ref 0–200)
RBC # BLD AUTO: 4.34 10*6/MM3 (ref 4.14–5.8)
SODIUM SERPL-SCNC: 138 MMOL/L (ref 136–145)
SP GR UR STRIP: 1.03 (ref 1–1.03)
UROBILINOGEN UR QL STRIP: ABNORMAL
WBC NRBC COR # BLD: 9.56 10*3/MM3 (ref 3.4–10.8)

## 2022-08-17 PROCEDURE — 82306 VITAMIN D 25 HYDROXY: CPT

## 2022-08-17 PROCEDURE — 84156 ASSAY OF PROTEIN URINE: CPT

## 2022-08-17 PROCEDURE — 83036 HEMOGLOBIN GLYCOSYLATED A1C: CPT

## 2022-08-17 PROCEDURE — 82043 UR ALBUMIN QUANTITATIVE: CPT

## 2022-08-17 PROCEDURE — 85027 COMPLETE CBC AUTOMATED: CPT

## 2022-08-17 PROCEDURE — 80053 COMPREHEN METABOLIC PANEL: CPT

## 2022-08-17 PROCEDURE — 99214 OFFICE O/P EST MOD 30 MIN: CPT | Performed by: FAMILY MEDICINE

## 2022-08-17 PROCEDURE — 82570 ASSAY OF URINE CREATININE: CPT

## 2022-08-17 PROCEDURE — 81003 URINALYSIS AUTO W/O SCOPE: CPT

## 2022-08-17 NOTE — PROGRESS NOTES
Established Patient Office Visit      Patient Name: Nathan Velarde  : 1940   MRN: [id number]   Care Team: Patient Care Team:  Rell Ahuja DO as PCP - General (Family Medicine)  Ry Feliciano MD as Consulting Physician (Gastroenterology)  Fernando Sanders MD as Consulting Physician (Otolaryngology)    Chief Complaint:    Chief Complaint   Patient presents with   • Follow-up       History of Present Illness: Nathan Velarde is a 81 y.o. male who is here today for chief complaint.    HPI    The patient presents today for a regular 6-month follow-up on hypertension.    He reports that he feels tired when he is working out. He states that he feels fine working in the gym, but if he goes to mow the lawn, he has to rest. He reports that it has been mostly during the summer.     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 He received an injection last month and is due for another injection in 1 week. He reports that it is doing well. He can drive still because his right eye is dry, and he can see pretty good.    He reports that he has not been checking his blood pressure at home. He reports that he does not trust his home monitor anymore.    He reports that his cough has improved. He reports that he occasionally feels his pulse, and it is not a regular pulse. He reports that it happens fairly frequently. He reports that it does not affect his exercise when he goes to the gym. He reports that he can do 1 hour of exercise, and then walk 1 mile. He denies feeling weak.    The following portions of the patient's history were reviewed and updated as appropriate: allergies, current medications, past family history, past medical history, past social history, past surgical history and problem list.    Subjective      Review of Systems:   Review of Systems - See HPI    Past Medical History:   Past Medical History:   Diagnosis Date   • Aortic valve insufficiency    • Benign neoplasm of  connective and soft tissue of pelvis    • Benign prostatic hyperplasia    • Bilateral bunions     LEFT SIDE ONLY   • Bladder cancer (HCC)     curative exfulguration   • DJD of left shoulder Adulthood    Severely advanced   • Generalized osteoarthritis Adulthood    Moderately advanced disease   • HTN (hypertension)    • Hyperlipidemia Adulthood    Response to atorvastatin   • Inflammatory bowel disease    • Knee osteoarthritis    • Mitral valve insufficiency    • PAF (paroxysmal atrial fibrillation) ([facility])     Mild disease - patient declines anticoagulation   • Prediabetes     Hemoglobin A1c 6.2%.   • Prostatism    • Transverse myelitis (HCC)     Transient T10 with numbness and minimal ataxia - transient    • Ulcerative colitis (HCC)     Well-controlled on mesalamine    • Wears glasses        Past Surgical History:   Past Surgical History:   Procedure Laterality Date   • BICEPS TENDON REPAIR Left 2018    Procedure: BICEPS TENDODESIS;  Surgeon: Abner Humphrey MD;  Location:  [facility] OR;  Service: Orthopedics   • BLADDER SURGERY     • CATARACT EXTRACTION, BILATERAL     • COLONOSCOPY      Colitis in remission   • CYSTOSCOPY      Excision of bladder cancer   • INGUINAL HERNIA REPAIR Right    • KNEE SURGERY Right     Excision of cyst   • LIPOMA EXCISION     • TONSILLECTOMY     • TOTAL SHOULDER ARTHROPLASTY Left 2018    Procedure: LEFT TOTAL SHOULDER ARTHROPLASTY;  Surgeon: Abner Humphrey MD;  Location:  [facility];  Service: Orthopedics   • TOTAL SHOULDER ARTHROPLASTY Right 2021    Procedure: NAVIGATED REVERSE SHOULDER ARTHROPLASTY RIGHT, BICEPS TENODESIS - RIGHT;  Surgeon: Abner Humphrey MD;  Location:  [facility] OR;  Service: Orthopedics;  Laterality: Right;       Family History:   Family History   Problem Relation Age of Onset   • Alzheimer's disease Mother          age 89   • Arthritis Mother            • Hyperlipidemia Mother     • Angina Father    • Hypertension Father    • Pneumonia Father          age 70 - tobacco   • Aneurysm Father         abd aortic   • Vision loss Father         AMD   • Unexplained death Brother    • Breast cancer Maternal Grandmother    • Arthritis Maternal Grandmother            • Diabetes Paternal Uncle        Social History:   Social History     Socioeconomic History   • Marital status:    Tobacco Use   • Smoking status: Never Smoker   • Smokeless tobacco: Never Used   Vaping Use   • Vaping Use: Never used   Substance and Sexual Activity   • Alcohol use: Yes     Alcohol/week: 2.0 standard drinks     Types: 2 Cans of beer per week     Comment: OCC   • Drug use: Never   • Sexual activity: Yes     Partners: Female     Birth control/protection: None       Tobacco History:   Social History     Tobacco Use   Smoking Status Never Smoker   Smokeless Tobacco Never Used       Medications:     Current Outpatient Medications:   •  albuterol sulfate  (90 Base) MCG/ACT inhaler, INHALE TWO PUFFS BY MOUTH EVERY 4 HOURS AS NEEDED FOR WHEEZING OR FOR SHORTNESS OF BREATH, Disp: 18 g, Rfl: 1  •  Alpha-Lipoic Acid 600 MG capsule, Take 600 mg by mouth Daily., Disp: , Rfl:   •  aspirin-acetaminophen-caffeine (EXCEDRIN MIGRAINE) 250-250-65 MG per tablet, Take 1 tablet by mouth Every 6 (Six) Hours As Needed for Headache., Disp: , Rfl:   •  atorvastatin (LIPITOR) 40 MG tablet, TAKE 1 TABLET NIGHTLY AT   BEDTIME, Disp: 90 tablet, Rfl: 1  •  Calcium Carbonate-Vitamin D (CALCIUM 600+D PO), Take 1 tablet by mouth Daily., Disp: , Rfl:   •  chlorthalidone (HYGROTON) 25 MG tablet, TAKE ONE TABLET BY MOUTH DAILY, Disp: 90 tablet, Rfl: 10  •  cholecalciferol (VITAMIN D3) 1000 units tablet, Take 1,000 Units by mouth Daily., Disp: , Rfl:   •  coenzyme Q10 100 MG capsule, Take 100 mg by mouth Daily., Disp: , Rfl:   •  diclofenac (VOLTAREN) 75 MG EC tablet, TAKE 1 TABLET DAILY, Disp: 90 tablet, Rfl: 1  •  digoxin (LANOXIN)  125 MCG tablet, TAKE ONE TABLET BY MOUTH DAILY, Disp: 90 tablet, Rfl: 1  •  dilTIAZem CD (CARDIZEM CD) 180 MG 24 hr capsule, TAKE 1 CAPSULE NIGHTLY, Disp: 90 capsule, Rfl: 0  •  Fish Oil-Cholecalciferol (OMEGA-3 + D PO), Take 1 capsule by mouth Daily., Disp: , Rfl:   •  fluticasone (Flonase) 50 MCG/ACT nasal spray, 2 sprays into the nostril(s) as directed by provider Daily., Disp: 15.8 mL, Rfl: 1  •  Glucosamine-Chondroit-Vit C-Mn (GLUCOSAMINE 1500 COMPLEX) capsule, Take 1 tablet by mouth Daily., Disp: , Rfl:   •  guaiFENesin (Mucinex) 600 MG 12 hr tablet, Take 1 tablet by mouth 2 (Two) Times a Day., Disp: 30 tablet, Rfl: 0  •  ipratropium-albuterol (DUO-NEB) 0.5-2.5 mg/3 ml nebulizer, Inhale 3 mL by nebulization 4 (Four) Times a Day., Disp: 360 mL, Rfl: 0  •  loratadine (Claritin) 10 MG tablet, Take 1 tablet by mouth Daily., Disp: 30 tablet, Rfl: 1  •  losartan (COZAAR) 50 MG tablet, TAKE 1 AND 1/2 TABLETS     DAILY, Disp: 150 tablet, Rfl: 0  •  Magnesium 250 MG tablet, Take 250 mg by mouth Daily., Disp: , Rfl:   •  melatonin 3 MG tablet, Take 3 mg by mouth Every Night., Disp: , Rfl:   •  mesalamine (DELZICOL) 400 MG capsule delayed-release delayed release capsule, TAKE 2 CAPSULES TWICE A DAY, Disp: 360 capsule, Rfl: 3  •  Multiple Vitamins-Minerals (MULTIVITAMIN ADULT PO), Take 1 tablet by mouth Daily., Disp: , Rfl:   •  potassium chloride 10 MEQ CR tablet, TAKE 1 TABLET DAILY, Disp: 90 tablet, Rfl: 1  •  Saw Palmetto 160 MG capsule, Take 1 capsule by mouth Daily., Disp: , Rfl:   •  Selenium 200 MCG capsule, Take 200 mcg by mouth Daily., Disp: , Rfl:   •  tamsulosin (FLOMAX) 0.4 MG capsule 24 hr capsule, TAKE ONE CAPSULE BY MOUTH EVERY NIGHT AT BEDTIME, Disp: 90 capsule, Rfl: 0  •  vitamin E 400 UNIT capsule, Take 400 Units by mouth Daily., Disp: , Rfl:   •  Zinc 50 MG capsule, Take 50 mg by mouth Daily., Disp: , Rfl:     Allergies:   Allergies   Allergen Reactions   • Sulfa Antibiotics Nausea Only and Other (See  "Comments)     Headache       Objective   Objective     Physical Exam:  Vital Signs:   Vitals:    08/17/22 1229   BP: 134/78   BP Location: Left arm   Patient Position: Sitting   Cuff Size: Adult   Pulse: 62   Temp: 98.5 °F (36.9 °C)   SpO2: 98%   Weight: 69 kg (152 lb 3.2 oz)   Height: 167.6 cm (65.98\")   PainSc: 0-No pain     Body mass index is 24.58 kg/m².     Physical Exam  Const: NAD, A&Ox4, Pleasant, Cooperative  Eyes: EOMI, no conjunctivitis  ENT: No nasal discharge present, neck supple  Cardiac: Regular rate and rhythm, no cyanosis  Resp: Respiratory rate within normal limits, no increased work of breathing, no audible wheezing or retractions noted  GI: No distention or ascites  MSK: Motor and sensation grossly intact in bilateral upper extremities  Neurologic: CN II-XII grossly intact  Psych: Appropriate mood and behavior.  Skin: Pink, warm, dry  Procedures/Radiology     Procedures  No radiology results for the last 7 days     Assessment & Plan   Assessment / Plan      Assessment/Plan:   Problems Addressed This Visit  Diagnoses and all orders for this visit:    1. Primary hypertension (Primary)  Assessment & Plan:  Hypertension.  His blood pressure is well controlled at this time. He will continue his current medication regimen.      Orders:  -     Comprehensive Metabolic Panel; Future    2. Easy fatigability  Comments:  Mostly outdoors in summer, likely due to age/temp regulation    3. Palpitations  -     Holter Monitor - 72 Hour Up To 15 Days; Future    4. CKD (chronic kidney disease), stage II  -     Vitamin D 25 Hydroxy; Future  -     Microalbumin / Creatinine Urine Ratio - Urine, Clean Catch; Future  -     Urinalysis With Microscopic If Indicated (No Culture) - Urine, Clean Catch; Future  -     Protein / Creatinine Ratio, Urine - Urine, Clean Catch; Future  -     CBC (No Diff); Future    5. Vitamin D deficiency  -     Vitamin D 25 Hydroxy; Future    6. Hyperglycemia  -     Hemoglobin A1c; " Future    Problem List Items Addressed This Visit        Cardiac and Vasculature    Hypertension - Primary    Current Assessment & Plan     Hypertension.  His blood pressure is well controlled at this time. He will continue his current medication regimen.           Relevant Orders    Comprehensive Metabolic Panel (Completed)       Endocrine and Metabolic    Hyperglycemia    Relevant Orders    Hemoglobin A1c (Completed)       Genitourinary and Reproductive     CKD (chronic kidney disease), stage II    Relevant Orders    Vitamin D 25 Hydroxy (Completed)    Microalbumin / Creatinine Urine Ratio - Urine, Clean Catch (Completed)    Urinalysis With Microscopic If Indicated (No Culture) - Urine, Clean Catch (Completed)    Protein / Creatinine Ratio, Urine - Urine, Clean Catch (Completed)    CBC (No Diff) (Completed)      Other Visit Diagnoses     Easy fatigability        Mostly outdoors in summer, likely due to age/temp regulation    Palpitations        Relevant Orders    Holter Monitor - 72 Hour Up To 15 Days    Vitamin D deficiency        Relevant Orders    Vitamin D 25 Hydroxy (Completed)          There are no Patient Instructions on file for this visit.    Follow Up:   Return in about 8 months (around 4/17/2023) for Medicare Wellness, Annual.    CHANDANA Ahuja DO   E  [facility]  [facility]  [address] [facility] [address]  Fax [phone]  Phone [phone]       Transcribed from ambient dictation for Rell Ahuja DO by FALLON HEAD.  08/17/22   13:08 EDT    Patient verbalized consent to the visit recording.  I have personally performed the services described in this document as transcribed by the above individual, and it is both accurate and complete.  Rell Ahuja DO  8/19/2022  14:19 EDT

## 2022-08-17 NOTE — ASSESSMENT & PLAN NOTE
Hypertension.  His blood pressure is well controlled at this time. He will continue his current medication regimen.

## 2022-08-18 LAB
25(OH)D3 SERPL-MCNC: 76.1 NG/ML (ref 30–100)
HBA1C MFR BLD: 6.1 % (ref 4.8–5.6)

## 2022-09-02 ENCOUNTER — TELEPHONE (OUTPATIENT)
Dept: FAMILY MEDICINE CLINIC | Facility: CLINIC | Age: 82
End: 2022-09-02

## 2022-09-02 NOTE — TELEPHONE ENCOUNTER
Caller: Nathan Velarde    Relationship: Self    Best call back number: [phone]    What was the call regarding: PATIENT CALLING ABOUT HOW LONG HE SHOULD WEAR THE HEART MONITOR, PUT IT ON YESTERDAY 09/01/22    Do you require a callback: YES

## 2022-09-02 NOTE — TELEPHONE ENCOUNTER
Pt informed of recommendations to wear for 15 days. He verbalized understanding and had no further questions at this time.

## 2022-09-02 NOTE — TELEPHONE ENCOUNTER
Spoke with pt and informed him provider is out of the office currently and the order says anywhere from 72 hours to 15 days. Informed pt that someone would be in contact with him with further instructions.

## 2022-09-03 DIAGNOSIS — I10 ESSENTIAL HYPERTENSION: ICD-10-CM

## 2022-09-06 RX ORDER — LOSARTAN POTASSIUM 50 MG/1
TABLET ORAL
Qty: 150 TABLET | Refills: 0 | Status: SHIPPED | OUTPATIENT
Start: 2022-09-06 | End: 2022-12-06

## 2022-09-06 RX ORDER — DILTIAZEM HYDROCHLORIDE 180 MG/1
CAPSULE, COATED, EXTENDED RELEASE ORAL
Qty: 90 CAPSULE | Refills: 0 | OUTPATIENT
Start: 2022-09-06

## 2022-09-06 RX ORDER — ATORVASTATIN CALCIUM 40 MG/1
TABLET, FILM COATED ORAL
Qty: 90 TABLET | Refills: 1 | Status: SHIPPED | OUTPATIENT
Start: 2022-09-06 | End: 2023-01-31 | Stop reason: SDUPTHER

## 2022-09-06 RX ORDER — DICLOFENAC SODIUM 75 MG/1
TABLET, DELAYED RELEASE ORAL
Qty: 90 TABLET | Refills: 1 | Status: SHIPPED | OUTPATIENT
Start: 2022-09-06

## 2022-09-06 NOTE — TELEPHONE ENCOUNTER
Rx Refill Note  Requested Prescriptions     Pending Prescriptions Disp Refills   • losartan (COZAAR) 50 MG tablet [Pharmacy Med Name: LOSARTAN TAB 50MG] 150 tablet 0     Sig: TAKE 1 AND 1/2 TABLETS     DAILY   • atorvastatin (LIPITOR) 40 MG tablet [Pharmacy Med Name: ATORVASTATIN TAB 40MG] 90 tablet 1     Sig: TAKE 1 TABLET NIGHTLY AT   BEDTIME   • diclofenac (VOLTAREN) 75 MG EC tablet [Pharmacy Med Name: DICLOFEN SOD TAB 75MG EC] 90 tablet 1     Sig: TAKE 1 TABLET DAILY      Last office visit with prescribing clinician: 8/17/2022      Next office visit with prescribing clinician: 4/18/2023            Keely Russo MA  09/06/22, 11:30 EDT

## 2022-10-06 ENCOUNTER — OFFICE VISIT (OUTPATIENT)
Dept: GASTROENTEROLOGY | Facility: CLINIC | Age: 82
End: 2022-10-06

## 2022-10-06 VITALS
TEMPERATURE: 96.1 F | SYSTOLIC BLOOD PRESSURE: 140 MMHG | HEIGHT: 66 IN | HEART RATE: 46 BPM | WEIGHT: 154 LBS | BODY MASS INDEX: 24.75 KG/M2 | DIASTOLIC BLOOD PRESSURE: 58 MMHG | OXYGEN SATURATION: 97 %

## 2022-10-06 DIAGNOSIS — R79.89 ELEVATED SERUM CREATININE: ICD-10-CM

## 2022-10-06 DIAGNOSIS — K51.80 OTHER ULCERATIVE COLITIS WITHOUT COMPLICATION: Primary | ICD-10-CM

## 2022-10-06 PROCEDURE — 99214 OFFICE O/P EST MOD 30 MIN: CPT | Performed by: INTERNAL MEDICINE

## 2022-10-06 NOTE — PROGRESS NOTES
Patient Name: Nathan Velarde   YOB: 1940   Medical Record number: [id number]     PCP: Rell Ahuja DO    Chief Complaint   Patient presents with   • Follow-up   Follow-up for ulcerative colitis.    History of Present Illness:   HPI  Mr. Velarde presents to the office for a follow-up visit.  The patient was admitted in April with a probable viral pneumonia.  He also had a recent Holter monitor placed but the results are unknown at this time.  Mr. Velarde has regular bowel movements without blood in the stool.  He denies any abdominal pain.  The patient has a good appetite.  There is no history of diarrhea.  He denies night sweats, fever or chills.  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    • Benign prostatic hyperplasia    • Bilateral bunions     LEFT SIDE ONLY   • Bladder cancer (HCC) 1977    curative exfulguration   • Chronic kidney disease 2000   • DJD of left shoulder Adulthood    Severely advanced   • Generalized osteoarthritis Adulthood    Moderately advanced disease   • Hernia 1998   • HTN (hypertension) 1990   • Hyperlipidemia Adulthood    Response to atorvastatin   • Inflammatory bowel disease 1977   • Knee osteoarthritis 2004   • Mitral valve insufficiency    • PAF (paroxysmal atrial fibrillation) ([facility]) 2008    Mild disease - patient declines anticoagulation   • Prediabetes 2015    Hemoglobin A1c 6.2%.   • Prostatism 1995   • Transverse myelitis ([facility]) 2014    Transient T10 with numbness and minimal ataxia - transient    • Ulcerative colitis ([facility]) 1977    Well-controlled on mesalamine    • Wears glasses        Past Surgical History:   Procedure Laterality Date   • BICEPS TENDON REPAIR Left 05/07/2018    Procedure: BICEPS TENDODESIS;  Surgeon: Abner Humphrey MD;  Location: [facility];  Service: Orthopedics   • BLADDER SURGERY     • CATARACT EXTRACTION, BILATERAL     • COLONOSCOPY  2015    Colitis  in remission   • CYSTOSCOPY  1977    Excision of bladder cancer   • FLEXIBLE SIGMOIDOSCOPY  1977   • INGUINAL HERNIA REPAIR Right 1994   • KNEE SURGERY Right 1955    Excision of cyst   • LIPOMA EXCISION     • TONSILLECTOMY  1943   • TOTAL SHOULDER ARTHROPLASTY Left 05/07/2018    Procedure: LEFT TOTAL SHOULDER ARTHROPLASTY;  Surgeon: Abner Humphrey MD;  Location:  [facility];  Service: Orthopedics   • TOTAL SHOULDER ARTHROPLASTY Right 06/21/2021    Procedure: NAVIGATED REVERSE SHOULDER ARTHROPLASTY RIGHT, BICEPS TENODESIS - RIGHT;  Surgeon: Abner Humphrey MD;  Location:  [facility];  Service: Orthopedics;  Laterality: Right;   • UPPER GASTROINTESTINAL ENDOSCOPY  1977         Current Outpatient Medications:   •  Alpha-Lipoic Acid 600 MG capsule, Take 600 mg by mouth Daily., Disp: , Rfl:   •  aspirin-acetaminophen-caffeine (EXCEDRIN MIGRAINE) 250-250-65 MG per tablet, Take 1 tablet by mouth Every 6 (Six) Hours As Needed for Headache., Disp: , Rfl:   •  atorvastatin (LIPITOR) 40 MG tablet, TAKE 1 TABLET NIGHTLY AT   BEDTIME, Disp: 90 tablet, Rfl: 1  •  Calcium Carbonate-Vitamin D (CALCIUM 600+D PO), Take 1 tablet by mouth Daily., Disp: , Rfl:   •  chlorthalidone (HYGROTON) 25 MG tablet, TAKE ONE TABLET BY MOUTH DAILY, Disp: 90 tablet, Rfl: 10  •  cholecalciferol (VITAMIN D3) 1000 units tablet, Take 1,000 Units by mouth Daily., Disp: , Rfl:   •  coenzyme Q10 100 MG capsule, Take 100 mg by mouth Daily., Disp: , Rfl:   •  diclofenac (VOLTAREN) 75 MG EC tablet, TAKE 1 TABLET DAILY, Disp: 90 tablet, Rfl: 1  •  digoxin (LANOXIN) 125 MCG tablet, TAKE ONE TABLET BY MOUTH DAILY, Disp: 90 tablet, Rfl: 1  •  dilTIAZem CD (CARDIZEM CD) 180 MG 24 hr capsule, TAKE 1 CAPSULE NIGHTLY, Disp: 90 capsule, Rfl: 0  •  Fish Oil-Cholecalciferol (OMEGA-3 + D PO), Take 1 capsule by mouth Daily., Disp: , Rfl:   •  Glucosamine-Chondroit-Vit C-Mn (GLUCOSAMINE 1500 COMPLEX) capsule, Take 1 tablet by mouth Daily., Disp: , Rfl:   •  losartan  (COZAAR) 50 MG tablet, TAKE 1 AND 1/2 TABLETS     DAILY, Disp: 150 tablet, Rfl: 0  •  Magnesium 250 MG tablet, Take 250 mg by mouth Daily., Disp: , Rfl:   •  melatonin 3 MG tablet, Take 3 mg by mouth Every Night., Disp: , Rfl:   •  mesalamine (DELZICOL) 400 MG capsule delayed-release delayed release capsule, TAKE 2 CAPSULES TWICE A DAY, Disp: 360 capsule, Rfl: 3  •  Multiple Vitamins-Minerals (MULTIVITAMIN ADULT PO), Take 1 tablet by mouth Daily., Disp: , Rfl:   •  potassium chloride 10 MEQ CR tablet, TAKE 1 TABLET DAILY, Disp: 90 tablet, Rfl: 1  •  Saw Palmetto 160 MG capsule, Take 1 capsule by mouth Daily., Disp: , Rfl:   •  Selenium 200 MCG capsule, Take 200 mcg by mouth Daily., Disp: , Rfl:   •  tamsulosin (FLOMAX) 0.4 MG capsule 24 hr capsule, TAKE ONE CAPSULE BY MOUTH EVERY NIGHT AT BEDTIME, Disp: 90 capsule, Rfl: 0  •  vitamin E 400 UNIT capsule, Take 400 Units by mouth Daily., Disp: , Rfl:   •  Zinc 50 MG capsule, Take 50 mg by mouth Daily., Disp: , Rfl:     Allergies   Allergen Reactions   • Sulfa Antibiotics Nausea Only and Other (See Comments)     Headache       Family History   Problem Relation Age of Onset   • Alzheimer's disease Mother          age 89   • Arthritis Mother            • Hyperlipidemia Mother    • Angina Father    • Hypertension Father    • Pneumonia Father          age 70 - tobacco   • Aneurysm Father         abd aortic   • Vision loss Father         AMD   • Unexplained death Brother    • Breast cancer Maternal Grandmother    • Arthritis Maternal Grandmother            • Diabetes Paternal Uncle        Social History     Socioeconomic History   • Marital status:    Tobacco Use   • Smoking status: Never Smoker   • Smokeless tobacco: Never Used   Vaping Use   • Vaping Use: Never used   Substance and Sexual Activity   • Alcohol use: Yes     Alcohol/week: 2.0 standard drinks     Types: 2 Cans of beer per week     Comment: OCC   • Drug use: Never   • Sexual  activity: Yes     Partners: Female     Birth control/protection: None, Tubal ligation       Review of Systems   Constitutional: Negative for activity change, appetite change, fatigue, fever and unexpected weight change.   HENT: Negative for dental problem, hearing loss, mouth sores, postnasal drip, sneezing, trouble swallowing and voice change.    Eyes: Negative for pain, redness, itching and visual disturbance.   Respiratory: Negative for cough, choking, chest tightness, shortness of breath and wheezing.    Cardiovascular: Negative for chest pain, palpitations and leg swelling.   Gastrointestinal: Negative for abdominal distention, abdominal pain, anal bleeding, blood in stool, constipation, diarrhea, nausea, rectal pain and vomiting.   Endocrine: Negative for cold intolerance, heat intolerance, polydipsia, polyphagia and polyuria.   Genitourinary: Negative.  Negative for dysuria, enuresis, flank pain, hematuria and urgency.   Musculoskeletal: Negative for arthralgias, back pain, gait problem, joint swelling and myalgias.   Skin: Negative for color change, pallor and rash.   Allergic/Immunologic: Negative for environmental allergies, food allergies and immunocompromised state.   Neurological: Negative for dizziness, tremors, seizures, facial asymmetry, speech difficulty, numbness and headaches.   Hematological: Negative for adenopathy.   Psychiatric/Behavioral: Negative for behavioral problems, confusion, dysphoric mood, hallucinations and self-injury.       Vitals:    10/06/22 1419   BP: 140/58   Pulse: (!) 46   Temp: 96.1 °F (35.6 °C)   SpO2: 97%       Physical Exam  Vitals reviewed.   Constitutional:       General: He is not in acute distress.     Appearance: Normal appearance.   HENT:      Head: Normocephalic and atraumatic.      Nose: Nose normal.      Mouth/Throat:      Mouth: Mucous membranes are moist.      Pharynx: Oropharynx is clear.   Eyes:      General: No scleral icterus.     Extraocular Movements:  Extraocular movements intact.   Cardiovascular:      Rate and Rhythm: Normal rate and regular rhythm.      Heart sounds: No murmur heard.    No gallop.   Pulmonary:      Breath sounds: Normal breath sounds. No wheezing or rales.   Abdominal:      General: Bowel sounds are normal.      Palpations: Abdomen is soft.      Tenderness: There is no abdominal tenderness. There is no guarding.   Musculoskeletal:         General: No swelling. Normal range of motion.      Cervical back: Normal range of motion and neck supple.   Skin:     General: Skin is dry.      Coloration: Skin is not jaundiced.   Neurological:      Mental Status: He is alert and oriented to person, place, and time.   Psychiatric:         Mood and Affect: Mood normal.         Thought Content: Thought content normal.         Judgment: Judgment normal.         Diagnoses and all orders for this visit:    1. Other ulcerative colitis without complication (HCC) (Primary)    2. Elevated serum creatinine    The patient is doing well with regard to ulcerative colitis which has been quiescent.  He has been maintained on mesalamine medication.  The patient has experienced an increase in the creatinine level based upon the lab from the middle of August.  He does take diclofenac at times for back discomfort.      Plan: Will continue mesalamine daily.           Avoid use of diclofenac if possible.           Recommend nephrology consult.           The patient will follow-up in 6 to 7-months.

## 2022-10-07 NOTE — THERAPY EVALUATION
Acute Care - Occupational Therapy Initial Evaluation  [facility]     Patient Name: Nathan Velarde  : 1940  MRN: [id number]  Today's Date: 2018  Onset of Illness/Injury or Date of Surgery: 18  Date of Referral to OT: 18  Referring Physician: Dr. Humphrey    Admit Date: 2018       ICD-10-CM ICD-9-CM   1. Impaired mobility and ADLs Z74.09 799.89     Patient Active Problem List   Diagnosis   • Anemia   • Aortic valve disorder   • Ataxic gait   • Paroxysmal atrial fibrillation   • Prostatism   • Fatigue   • Ulcerative colitis   • Transverse myelitis   • Testosterone deficiency   • Paresthesia   • Lumbosacral neuritis   • Osteoarthritis of lumbar spine   • Hypertension   • Hyperlipidemia   • Generalized osteoarthritis   • Gross hematuria   • CKD (chronic kidney disease), stage II   • Hyperglycemia   • Shoulder pain, left   • Preventative health care   • Status post total shoulder arthroplasty, left     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    • Bilateral bunions     LEFT SIDE ONLY   • Bladder cancer     curative exfulguration   • DJD of left shoulder Adulthood    Severely advanced   • Dyslipidemia    • HTN (hypertension)    • Hyperlipidemia    • Knee osteoarthritis    • Mitral valve insufficiency    • Nephrolithiasis     Documented on IVP   • Paroxysmal atrial fibrillation    • Prostatism    • Prostatitis     pt denies   • Ulcerative colitis     Well-controlled disease   • Wears glasses      Past Surgical History:   Procedure Laterality Date   • COLONOSCOPY  2015    Colitis in remission   • CYSTOSCOPY      Excision of bladder cancer   • INGUINAL HERNIA REPAIR Right    • KNEE SURGERY Right     Excision of cyst   • TONSILLECTOMY            OT ASSESSMENT FLOWSHEET (last 72  hours)      Occupational Therapy Evaluation     Row Name 05/07/18 1530                   OT Evaluation Time/Intention    Subjective Information no complaints  -AR        Document Type evaluation  -AR        Patient Effort excellent  -AR        Symptoms Noted During/After Treatment none  -AR           General Information    Patient Profile Reviewed? yes  -AR        Onset of Illness/Injury or Date of Surgery 05/07/18  -AR        Referring Physician Dr. Humphrey  -AR        Patient Observations alert;cooperative;agree to therapy  -AR        Patient/Family Observations pt supine, spouse at bedside  -AR        Prior Level of Function independent:;all household mobility;community mobility;gait;transfer;min assist:;ADL's;dressing;grooming  -AR        Equipment Currently Used at Home cane, straight;walker, rolling   Was not using AD PTA  -AR        Pertinent History of Current Functional Problem Pt is a 77 yom admitted for surgical management of progressive left shoudler pain and dysfunction that failed to improve witrh conservative measures. Pt is POD#0 left TSA and and left biceps tenodesis. OT orders indicate left shoulder PROM FE no limit, IR chest and ER 30; AROM left elbow/wrist/hand and scapular retractions. Pt is right-hand dominant. PTA, she required assist with UB ADLS, was limited in participating in exercises classes and sleep was occasionally interrupted by pain.    -AR        Existing Precautions/Restrictions fall;non-weight bearing;shoulder   interscalene nerve catheter  -AR        Limitations/Impairments insensate body part  -AR        Risks Reviewed patient and family:;LOB;nausea/vomiting;dizziness;increased discomfort;change in vital signs;increased drainage;lines disloged  -AR        Benefits Reviewed patient and family:;improve function;increase independence;increase balance;increase strength;decrease pain;decrease risk of DVT;increase knowledge  -AR        Barriers to Rehab none identified  -AR            Relationship/Environment    Primary Source of Support/Comfort spouse  -AR        Lives With spouse  -AR           Resource/Environmental Concerns    Current Living Arrangements home/apartment/condo  -AR        Resource/Environmental Concerns none   pt stays main leve of home  -AR        Transportation Concerns car, none  -AR           Cognitive Assessment/Interventions    Additional Documentation Cognitive Assessment/Intervention (Group)  -AR           Cognitive Assessment/Intervention- PT/OT    Affect/Mental Status (Cognitive) WNL  -AR        Orientation Status (Cognition) oriented x 4  -AR        Follows Commands (Cognition) WNL  -AR        Cognitive Function (Cognitive) WNL  -AR        Personal Safety Interventions fall prevention program maintained  -AR           Safety Issues, Functional Mobility    Impairments Affecting Function (Mobility) balance;other (see comments)   L shoudler precautions  -AR           Mobility Assessment/Treatment    Extremity Weight-bearing Status left upper extremity  -AR        Left Upper Extremity (Weight-bearing Status) non weight-bearing (NWB)  -AR           Bed Mobility Assessment/Treatment    Bed Mobility Assessment/Treatment supine-sit;sit-supine  -AR        Supine-Sit [place] (Bed Mobility) supervision  -AR        Sit-Supine [place] (Bed Mobility) supervision  -AR        Bed Mobility, Safety Issues decreased use of arms for pushing/pulling  -AR        Comment (Bed Mobility) Educated pt on importance of maintaining NWB LUE, good ability to maintain.   -AR           Functional Mobility    Functional Mobility- Ind. Level contact guard assist;verbal cues required  -AR        Functional Mobility- Device other (see comments)   RUE support  -AR        Functional Mobility-Distance (Feet) 30  -AR        Functional Mobility- Comment Narrow OLENA, needs RUE support for stabilization. Mobility screen score of 17, reciommend PT eval. Pt stating he feels unsteady.    -AR            Transfer Assessment/Treatment    Transfer Assessment/Treatment sit-stand transfer;stand-sit transfer  -AR        Maintains Weight-bearing Status (Transfers) able to maintain  -AR        Sit-Stand [place] (Transfers) contact guard;verbal cues  -AR        Stand-Sit [place] (Transfers) contact guard;verbal cues  -AR           ADL Assessment/Intervention    BADL Assessment/Intervention bathing;upper body dressing;lower body dressing  -AR           Bathing Assessment/Intervention    Comment (Bathing) Educated pt and spouse on left shoulder precautions and L axilla care to maintain  -AR           Upper Body Dressing Assessment/Training    Upper Body Dressing [place] Level doff;don;other (see comments);maximum assist (25% patient effort)   [place] Ultra Sling II  -AR        Assistive Devices (Upper Body Dressing) one hand technique  -AR        Upper Body Dressing Position supine  -AR        Comment (Upper Body Dressing) Edcuated pt and spouse on L shoudler precautions, ADL retraining to maintain, care of On-Q ball with ADLS and sling management including application/wear schedule and proper fit. Spouse secured/unsecred straps with min assist.   -AR           BADL Safety/Performance    Impairments, BADL Safety/Performance balance;other (see comments);sensory awareness   L shoudler preacutions  -AR        Skilled BADL Treatment/Intervention BADL process/adaptation training;compensatory training;monty/one-hand technique  -AR           General ROM    GENERAL ROM COMMENTS RUE WFL, L elbow/FA/wrist and hand WFL  -AR           General Assessment (Manual Muscle Testing)    Comment, General Manual Muscle Testing (MMT) Assessment RUE 4/5, LUE deferred  -AR           Motor Assessment/Interventions    Additional Documentation Balance (Group);Fine Motor Testing & Training (Group);Therapeutic Exercise (Group);Therapeutic Exercise Interventions (Group)  -AR           Therapeutic Exercise    Upper Extremity Range of Motion  (Therapeutic Exercise) shoulder flexion/extension, left;shoulder internal/external rotation, left;elbow flexion/extension, left;forearm supination/pronation, left;wrist flexion/extension, left   scapular retraction LUE  -AR        Hand (Therapeutic Exercise) finger flexion/extension, left;thumb finger opposition, left  -AR        Exercise Type (Therapeutic Exercise) AROM (active range of motion);AAROM (active assistive range of motion);PROM (passive range of motion)   AROM scap/hand/wrist, AAROM elbow, PROM FE/IR/ER  -AR        Position (Therapeutic Exercise) supine  -AR        Sets/Reps (Therapeutic Exercise) 1/10  -AR        Comment (Therapeutic Exercise) Issued/reviewed LUE HEP. Pt toleated PROM , IR chest, ER 30  -AR           Balance    Balance static sitting balance;static standing balance  -AR           Static Sitting Balance    Level of [place] (Unsupported Sitting, Static Balance) supervision  -AR        Sitting Position (Unsupported Sitting, Static Balance) long sitting on bed  -AR        Time Able to Maintain Position (Unsupported Sitting, Static Balance) more than 5 minutes  -AR           Static Standing Balance    Level of [place] (Supported Standing, Static Balance) contact guard assist  -AR        Time Able to Maintain Position (Supported Standing, Static Balance) 3 to 4 minutes  -AR        Assistive Device Utilized (Supported Standing, Static Balance) other (see comments)   RUE  -AR        Comment (Supported Standing, Static Balance) '  -AR           Fine Motor Testing & Training    Comment, Fine Motor Coordination opposition intact Bilat  -AR           Sensory Assessment/Intervention    Sensory General Assessment light touch sensation deficits identified  -AR           Light Touch Sensation Assessment    Left Upper Extremity: Light Touch Sensation Assessment mild impairment, 75% or more correct responses  -AR           Positioning and Restraints    Pre-Treatment Position in bed  -AR         Post Treatment Position bed  -AR        In Bed notified nsg;supine;call light within reach;encouraged to call for assist;exit alarm on;with family/caregiver;with brace  -AR           Pain Assessment    Additional Documentation Pain Scale: Numbers Pre/Post-Treatment (Group)  -AR           Pain Scale: Numbers Pre/Post-Treatment    Pain Scale: Numbers, Pretreatment 0/10 - no pain  -AR        Pain Scale: Numbers, Post-Treatment 0/10 - no pain  -AR        Pre/Post Treatment Pain Comment ONQ at 4  -AR           Orthotics & Prosthetics Management    Orthosis Location upper extremity orthosis  -AR        Additional Documentation Orthosis Location (Row)  -AR           Upper Extremity Orthosis Management    Type (Upper Extremity Orthosis) [place] Ultra II  -AR        Functional Design (Upper Extremity Orthosis) static orthosis  -AR        Therapeutic Indications (Upper Extremity Orthosis) post-op positioning/protection;stabilization and support  -AR        Wearing Schedule (Upper Extremity Orthosis) remove for exercise;remove for hygiene/bathing  -AR        Orthosis Training (Upper Extremity Orthosis) patient and caregiver;all orthosis skills;requires cues  -AR        Compliance/Wearing Issues (Upper Extremity Orthosis) patient/caregiver comprehend strategies  -AR           Wound 05/07/18 1054 Left shoulder incision    Wound - Properties Group Date first assessed: 05/07/18  -JA Time first assessed: 1054  -JA Side: Left  -JA Location: shoulder  -JA Type: incision  -JA       Plan of Care Review    Plan of Care Reviewed With patient;spouse  -AR           Clinical Impression (OT)    Date of Referral to OT 05/07/18  -AR        OT Diagnosis decreased independence with ADLS  -AR        Patient/Family Goals Statement (OT Eval) return home  -AR        Criteria for Skilled Therapeutic Interventions Met (OT Eval) yes;treatment indicated  -AR        Rehab Potential (OT Eval) good, to achieve stated therapy goals  -AR        Therapy  Frequency (OT Eval) daily  -AR        Care Plan Review (OT) evaluation/treatment results reviewed;care plan/treatment goals reviewed;risks/benefits reviewed;current/potential barriers reviewed;patient/other agree to care plan  -AR        Care Plan Review, Other Participant (OT Eval) spouse  -AR        Anticipated Discharge Disposition (OT) home or self care  -AR           Vital Signs    Pretreatment Heart Rate (beats/min) 101  -AR        Posttreatment Heart Rate (beats/min) 76  -AR        Pre SpO2 (%) 97  -AR        O2 Delivery Pre Treatment room air  -AR        Post SpO2 (%) 96  -AR        O2 Delivery Post Treatment room air  -AR        Pre Patient Position Supine  -AR        Intra Patient Position Standing  -AR        Post Patient Position Supine  -AR           Planned OT Interventions    Planned Therapy Interventions (OT Eval) BADL retraining;occupation/activity based interventions;passive ROM/stretching;patient/caregiver education/training;orthotic fabrication/fitting/training;ROM/therapeutic exercise;transfer/mobility retraining  -AR           OT Goals    Bed Mobility Goal Selection (OT) bed mobility, OT goal 1  -AR        Transfer Goal Selection (OT) transfer, OT goal 1  -AR        Dressing Goal Selection (OT) dressing, OT goal 1  -AR        ROM Goal Selection (OT) ROM, OT goal 1  -AR        Precaution Goal Selection (OT) precaution, OT goal 1  -AR        Additional Documentation Precaution Goal Selection (OT) (Row);ROM Goal Selection (OT) (Row)  -AR           Bed Mobility Goal 1 (OT)    Activity/Assistive Device (Bed Mobility Goal 1, OT) sit to supine;supine to sit   while maintaining NWB LUE  -AR        [place] Level/Cues Needed (Bed Mobility Goal 1, OT) supervision required;verbal cues required  -AR        Time Frame (Bed Mobility Goal 1, OT) short term goal (STG);5 days  -AR        Progress/Outcomes (Bed Mobility Goal 1, OT) goal met  -AR           Transfer Goal 1 (OT)    Activity/Assistive Device  (Transfer Goal 1, OT) sit-to-stand/stand-to-sit;bed-to-chair/chair-to-bed  -AR        [place] Level/Cues Needed (Transfer Goal 1, OT) supervision required  -AR        Time Frame (Transfer Goal 1, OT) short term goal (STG);5 days  -AR        Progress/Outcome (Transfer Goal 1, OT) goal ongoing  -AR           Dressing Goal 1 (OT)    Activity/Assistive Device (Dressing Goal 1, OT) other (see comments)   Pt and spouse will don/doff LUE sling   -AR        [place]/Cues Needed (Dressing Goal 1, OT) supervision required;verbal cues required  -AR        Time Frame (Dressing Goal 1, OT) short term goal (STG);5 days  -AR        Progress/Outcome (Dressing Goal 1, OT) goal ongoing  -AR           ROM Goal 1 (OT)    ROM Goal 1 (OT) Pt and spouse will complete LUE HEP within physician parameters with supervision   -AR        Time Frame (ROM Goal 1, OT) short term goal (STG);5 days  -AR        Progress/Outcome (ROM Goal 1, OT) goal ongoing  -AR           Precaution Goal 1 (OT)    Activity (Precaution Goal 1, OT) other (see comments);during ADLs   Pt/spouse verbalize/demo L shoudler precautions  -AR        [place] Level/Cues Needed (Precautions Goal 1, OT) with minimum;verbal cues/redirection  -AR        Time Frame (Precaution Goal 1, OT) short term goal (STG);5 days  -AR        Progress/Outcome (Precaution Goal 1, OT) goal ongoing  -AR          User Key  (r) = Recorded By, (t) = Taken By, (c) = Cosigned By    Initials Name Effective Dates    AR Ailyn Lowe, OT 06/22/15 -     DUYEN Sanders RN 06/16/16 -            Occupational Therapy Education     Title: PT OT SLP Therapies (Done)     Topic: Occupational Therapy (Done)     Point: ADL training (Done)     Description: Instruct learner(s) on proper safety adaptation and remediation techniques during self care or transfers.   Instruct in proper use of assistive devices.   Learning Progress Summary     Learner Status Readiness Method Response Comment  Documented by    Patient Done Priscillaer E,TB,D,H VU,NR  AR 05/07/18 1936    Significant Other Done Eager E,TB,D,H VU,NR  AR 05/07/18 1936          Point: Home exercise program (Done)     Description: Instruct learner(s) on appropriate technique for monitoring, assisting and/or progressing therapeutic exercises/activities.   Learning Progress Summary     Learner Status Readiness Method Response Comment Documented by    Patient Done Eager E,TB,D,H VU,NR  AR 05/07/18 1936    Significant Other Done Eager E,TB,D,H VU,NR  AR 05/07/18 1936          Point: Precautions (Done)     Description: Instruct learner(s) on prescribed precautions during self-care and functional transfers.   Learning Progress Summary     Learner Status Readiness Method Response Comment Documented by    Patient Done Eager E,TB,D,H VU,NR  AR 05/07/18 1936    Significant Other Done Priscillaer E,TB,D,H VU,NR  AR 05/07/18 1936          Point: Body mechanics (Done)     Description: Instruct learner(s) on proper positioning and spine alignment during self-care, functional mobility activities and/or exercises.   Learning Progress Summary     Learner Status Readiness Method Response Comment Documented by    Patient Done Priscillaer E,TB,D,H VU,NR  AR 05/07/18 1936    Significant Other Done Priscillaer E,TB,D,H VU,NR  AR 05/07/18 1936                      User Key     Initials Effective Dates Name Provider Type Discipline    AR 06/22/15 -  Ailyn Lowe, OT Occupational Therapist OT                  OT Recommendation and Plan  Outcome Summary/Treatment Plan (OT)  Anticipated Discharge Disposition (OT): home or self care  Planned Therapy Interventions (OT Eval): BADL retraining, occupation/activity based interventions, passive ROM/stretching, patient/caregiver education/training, orthotic fabrication/fitting/training, ROM/therapeutic exercise, transfer/mobility retraining  Therapy Frequency (OT Eval): daily  Plan of Care Review  Plan of Care Reviewed With: patient, spouse  Plan  of Care Reviewed With: patient, spouse  Outcome Summary: ON-Q running at 4 and pt had no c/o pain. He tolerated L shoulder PROM , IR chest, ER 30. Pt scored 17 on mobility screen, recommend PT evaluation. No issues with desaturaiton on RA. Recommend DC home with assist from spouse when medically ready.             Outcome Measures     Row Name 05/07/18 1530             How much help from another is currently needed...    Putting on and taking off regular lower body clothing? 2  -AR      Bathing (including washing, rinsing, and drying) 3  -AR      Toileting (which includes using toilet bed pan or urinal) 3  -AR      Putting on and taking off regular upper body clothing 2  -AR      Taking care of personal grooming (such as brushing teeth) 3  -AR      Eating meals 3  -AR      Score 16  -AR         Functional Assessment    Outcome Measure Options --  -AR        User Key  (r) = Recorded By, (t) = Taken By, (c) = Cosigned By    Initials Name Provider Type    AR Ailyn Lowe OT Occupational Therapist          Time Calculation:   OT Start Time: 1530    Therapy Charges for Today     Code Description Service Date Service Provider Modifiers Qty    [id number]  OT EVAL MOD COMPLEXITY 4 5/7/2018 Ailyn Lowe OT GO 1    [id number]  OT THERAPEUTIC ACT EA 15 MIN 5/7/2018 Ailyn Lowe OT GO 3               Ailyn Lowe OT  5/7/2018   (4) excellent

## 2022-10-31 RX ORDER — TAMSULOSIN HYDROCHLORIDE 0.4 MG/1
CAPSULE ORAL
Qty: 90 CAPSULE | Refills: 0 | Status: SHIPPED | OUTPATIENT
Start: 2022-10-31 | End: 2023-01-23

## 2022-11-15 ENCOUNTER — TELEPHONE (OUTPATIENT)
Dept: FAMILY MEDICINE CLINIC | Facility: CLINIC | Age: 82
End: 2022-11-15

## 2022-12-05 DIAGNOSIS — I10 ESSENTIAL HYPERTENSION: ICD-10-CM

## 2022-12-05 RX ORDER — DIGOXIN 125 MCG
TABLET ORAL
Qty: 90 TABLET | Refills: 1 | Status: SHIPPED | OUTPATIENT
Start: 2022-12-05

## 2022-12-05 NOTE — TELEPHONE ENCOUNTER
Rx Refill Note  Requested Prescriptions     Pending Prescriptions Disp Refills   • digoxin (LANOXIN) 125 MCG tablet [Pharmacy Med Name: DIGOXIN 0.125 MG TABLET] 90 tablet 1     Sig: TAKE ONE TABLET BY MOUTH DAILY      Last office visit with prescribing clinician: 8/17/2022   Last telemedicine visit with prescribing clinician: 4/18/2023   Next office visit with prescribing clinician: 4/18/2023                         Would you like a call back once the refill request has been completed: [] Yes [] No    If the office needs to give you a call back, can they leave a voicemail: [] Yes [] No    Romelia Urrutia MA  12/05/22, 09:23 EST

## 2022-12-06 RX ORDER — DILTIAZEM HYDROCHLORIDE 180 MG/1
CAPSULE, COATED, EXTENDED RELEASE ORAL
Qty: 90 CAPSULE | Refills: 0 | Status: SHIPPED | OUTPATIENT
Start: 2022-12-06 | End: 2023-04-04 | Stop reason: SDUPTHER

## 2022-12-06 RX ORDER — LOSARTAN POTASSIUM 50 MG/1
TABLET ORAL
Qty: 150 TABLET | Refills: 0 | Status: SHIPPED | OUTPATIENT
Start: 2022-12-06 | End: 2023-03-22 | Stop reason: SDUPTHER

## 2022-12-06 NOTE — TELEPHONE ENCOUNTER
Rx Refill Note  Requested Prescriptions     Pending Prescriptions Disp Refills   • dilTIAZem CD (CARDIZEM CD) 180 MG 24 hr capsule [Pharmacy Med Name: DILTIAZEM CD CAP 180MG/24] 90 capsule 0     Sig: TAKE 1 CAPSULE NIGHTLY      Last office visit with prescribing clinician: 8-17-22  Last telemedicine visit with prescribing clinician:   Next office visit with prescribing clinician: 4-18-23                        Would you like a call back once the refill request has been completed: [] Yes [] No    If the office needs to give you a call back, can they leave a voicemail: [] Yes [] No    Valerie Zacarias MA  12/06/22, 09:58 EST

## 2023-01-23 RX ORDER — TAMSULOSIN HYDROCHLORIDE 0.4 MG/1
CAPSULE ORAL
Qty: 90 CAPSULE | Refills: 0 | Status: SHIPPED | OUTPATIENT
Start: 2023-01-23

## 2023-01-24 ENCOUNTER — TELEPHONE (OUTPATIENT)
Dept: FAMILY MEDICINE CLINIC | Facility: CLINIC | Age: 83
End: 2023-01-24
Payer: MEDICARE

## 2023-01-24 NOTE — TELEPHONE ENCOUNTER
Caller: Nathan Velarde    Relationship to patient: Self    Best call back number: [phone]    Patient is needing: PATIENT IS SWITCHING INSURANCE, NEEDS ALL FUTURE MEDICATION SENT TO [facility]  PHONE:  [phone]

## 2023-01-31 RX ORDER — ATORVASTATIN CALCIUM 40 MG/1
40 TABLET, FILM COATED ORAL NIGHTLY
Qty: 90 TABLET | Refills: 1 | Status: SHIPPED | OUTPATIENT
Start: 2023-01-31

## 2023-03-22 DIAGNOSIS — I10 ESSENTIAL HYPERTENSION: ICD-10-CM

## 2023-03-22 RX ORDER — MESALAMINE 400 MG/1
800 CAPSULE, DELAYED RELEASE ORAL 2 TIMES DAILY
Qty: 360 CAPSULE | Refills: 3 | Status: SHIPPED | OUTPATIENT
Start: 2023-03-22

## 2023-03-22 RX ORDER — LOSARTAN POTASSIUM 50 MG/1
75 TABLET ORAL DAILY
Qty: 150 TABLET | Refills: 0 | Status: SHIPPED | OUTPATIENT
Start: 2023-03-22

## 2023-04-04 RX ORDER — DILTIAZEM HYDROCHLORIDE 180 MG/1
180 CAPSULE, COATED, EXTENDED RELEASE ORAL NIGHTLY
Qty: 90 CAPSULE | Refills: 0 | Status: SHIPPED | OUTPATIENT
Start: 2023-04-04 | End: 2023-04-17 | Stop reason: SDUPTHER

## 2023-04-04 NOTE — TELEPHONE ENCOUNTER
Rx Refill Note  Requested Prescriptions     Pending Prescriptions Disp Refills   • dilTIAZem CD (CARDIZEM CD) 180 MG 24 hr capsule 90 capsule 0     Sig: Take 1 capsule by mouth Every Night.      Last office visit with prescribing clinician: Visit date not found   Last telemedicine visit with prescribing clinician: 4/18/2023   Next office visit with prescribing clinician: Visit date not found                         Would you like a call back once the refill request has been completed: [] Yes [] No    If the office needs to give you a call back, can they leave a voicemail: [] Yes [] No    Sharon Armenta MA  04/04/23, 12:58 EDT

## 2023-04-10 ENCOUNTER — TELEPHONE (OUTPATIENT)
Dept: FAMILY MEDICINE CLINIC | Facility: CLINIC | Age: 83
End: 2023-04-10
Payer: MEDICARE

## 2023-04-10 NOTE — TELEPHONE ENCOUNTER
"    Caller: Nathan Velarde W \"Ja\"    Relationship: Self    Best call back number:     Requested Prescriptions:    dilTIAZem CD (CARDIZEM CD) 180 MG 24 hr capsule    Pharmacy where request should be sent: [facility] - [place], [place] - [address] [facility] [phone] [facility] [phone]      Last office visit with prescribing clinician: 8/17/2022   Last telemedicine visit with prescribing clinician: 7/11/2023   Next office visit with prescribing clinician: 7/11/2023     Additional details provided by patient: PATIENT WILL BE OUT BY NEXT WEEK    Does the patient have less than a 3 day supply:  [] Yes  [x] No        Marleny Oswald Rep   04/10/23 14:12 EDT           "

## 2023-04-17 NOTE — TELEPHONE ENCOUNTER
"Caller: Nathan Velarde W \"Ja\"    Relationship: Self    Best call back number: [phone]    Requested Prescriptions:   Requested Prescriptions     Pending Prescriptions Disp Refills   • dilTIAZem CD (CARDIZEM CD) 180 MG 24 hr capsule 90 capsule 0     Sig: Take 1 capsule by mouth Every Night.        Pharmacy where request should be sent:   Last office visit with prescribing clinician: 8/17/2022   Last telemedicine visit with prescribing clinician: 7/11/2023   Next office visit with prescribing clinician: 7/11/2023     Additional details provided by patient: REQUESTING A 7 DAY SUPPLY TO LOCAL PHARMACY    Does the patient have less than a 3 day supply:  [x] Yes  [] No    Would you like a call back once the refill request has been completed: [x] Yes [] No    If the office needs to give you a call back, can they leave a voicemail: [x] Yes [] No    Marleny Doshi   04/17/23 13:57 EDT           "

## 2023-04-17 NOTE — TELEPHONE ENCOUNTER
"Caller: Nathan Velarde W \"Ja\"    Relationship: Self    Best call back number:[phone]    Requested Prescriptions:   Requested Prescriptions     Pending Prescriptions Disp Refills   • dilTIAZem CD (CARDIZEM CD) 180 MG 24 hr capsule 90 capsule 0     Sig: Take 1 capsule by mouth Every Night.        [facility] where request should be sent[facility] [address] [phone] [phone]      Last office visit with prescribing clinician: 8/17/2022   Last telemedicine visit with prescribing clinician: 7/11/2023   Next office visit with prescribing clinician: 7/11/2023     Additional details provided by patient: COMPLETELY OUT    Does the patient have less than a 3 day supply:  [x] Yes  [] No    Would you like a call back once the refill request has been completed: [x] Yes [] No    If the office needs to give you a call back, can they leave a voicemail: [x] Yes [] No    Marleny Doshi Rep   04/17/23 13:56 EDT           "

## 2023-04-18 RX ORDER — DILTIAZEM HYDROCHLORIDE 180 MG/1
180 CAPSULE, COATED, EXTENDED RELEASE ORAL NIGHTLY
Qty: 90 CAPSULE | Refills: 0 | Status: SHIPPED | OUTPATIENT
Start: 2023-04-18

## 2023-04-18 NOTE — TELEPHONE ENCOUNTER
Rx Refill Note  Requested Prescriptions     Pending Prescriptions Disp Refills   • dilTIAZem CD (CARDIZEM CD) 180 MG 24 hr capsule 90 capsule 0     Sig: Take 1 capsule by mouth Every Night.      Last office visit with prescribing clinician: 8/17/2022   Last telemedicine visit with prescribing clinician: 7/11/2023   Next office visit with prescribing clinician: 7/11/2023                         Would you like a call back once the refill request has been completed: [] Yes [] No    If the office needs to give you a call back, can they leave a voicemail: [] Yes [] No    Valerie Zacraias MA  04/18/23, 08:47 EDT

## 2023-04-20 RX ORDER — TAMSULOSIN HYDROCHLORIDE 0.4 MG/1
CAPSULE ORAL
Qty: 90 CAPSULE | Refills: 0 | Status: SHIPPED | OUTPATIENT
Start: 2023-04-20

## 2023-05-23 RX ORDER — DICLOFENAC SODIUM 75 MG/1
75 TABLET, DELAYED RELEASE ORAL DAILY
Qty: 90 TABLET | Refills: 1 | Status: SHIPPED | OUTPATIENT
Start: 2023-05-23

## 2023-05-23 NOTE — TELEPHONE ENCOUNTER
"Caller: Nathan Velarde W \"Ja\"    Relationship: Self    Best call back number: [phone]    Requested Prescriptions:   Requested Prescriptions     Pending Prescriptions Disp Refills   • diclofenac (VOLTAREN) 75 MG EC tablet 90 tablet 1     Sig: Take 1 tablet by mouth Daily.        Pharmacy where request should be sent: [facility] - [place] [id number] [facility] [phone] [facility] [phone]      Last office visit with prescribing clinician: 8/17/2022   Last telemedicine visit with prescribing clinician: Visit date not found   Next office visit with prescribing clinician: 7/11/2023     Additional details provided by patient: HAS A WEEK LEFT ON THE MEDICATION     Does the patient have less than a 3 day supply:  [] Yes  [x] No    Would you like a call back once the refill request has been completed: [x] Yes [] No    If the office needs to give you a call back, can they leave a voicemail: [x] Yes [] No    Marleny Pedersen Rep   05/23/23 12:35 EDT           "

## 2023-05-23 NOTE — TELEPHONE ENCOUNTER
Rx Refill Note  Requested Prescriptions     Pending Prescriptions Disp Refills   • diclofenac (VOLTAREN) 75 MG EC tablet 90 tablet 1     Sig: Take 1 tablet by mouth Daily.      Last office visit with prescribing clinician: 8/17/2022   Last telemedicine visit with prescribing clinician: Visit date not found   Next office visit with prescribing clinician: 7/11/2023                         Would you like a call back once the refill request has been completed: [] Yes [] No    If the office needs to give you a call back, can they leave a voicemail: [] Yes [] No    Sharon Armenta MA  05/23/23, 12:57 EDT

## 2023-05-28 DIAGNOSIS — I10 ESSENTIAL HYPERTENSION: ICD-10-CM

## 2023-05-30 RX ORDER — DIGOXIN 125 MCG
TABLET ORAL
Qty: 90 TABLET | Refills: 1 | Status: SHIPPED | OUTPATIENT
Start: 2023-05-30

## 2023-05-30 RX ORDER — LOSARTAN POTASSIUM 50 MG/1
TABLET ORAL
Qty: 135 TABLET | Refills: 3 | Status: SHIPPED | OUTPATIENT
Start: 2023-05-30

## 2023-06-16 RX ORDER — DILTIAZEM HYDROCHLORIDE 180 MG/1
CAPSULE, COATED, EXTENDED RELEASE ORAL
Qty: 90 CAPSULE | Refills: 0 | Status: SHIPPED | OUTPATIENT
Start: 2023-06-16

## 2023-06-16 NOTE — TELEPHONE ENCOUNTER
Rx Refill Note  Requested Prescriptions     Pending Prescriptions Disp Refills    dilTIAZem CD (CARDIZEM CD) 180 MG 24 hr capsule [Pharmacy Med Name: dilTIAZem 24H ER (CD) 180 MG CP] 90 capsule 0     Sig: TAKE ONE CAPSULE BY MOUTH ONCE NIGHTLY      Last office visit with prescribing clinician: 8/17/2022   Last telemedicine visit with prescribing clinician: Visit date not found   Next office visit with prescribing clinician: 7/11/2023                         Would you like a call back once the refill request has been completed: [] Yes [] No    If the office needs to give you a call back, can they leave a voicemail: [] Yes [] No    Sharon Armenta MA  06/16/23, 14:07 EDT

## 2023-06-19 RX ORDER — ATORVASTATIN CALCIUM 40 MG/1
40 TABLET, FILM COATED ORAL NIGHTLY
Qty: 90 TABLET | Refills: 3 | Status: SHIPPED | OUTPATIENT
Start: 2023-06-19

## 2023-08-07 NOTE — TELEPHONE ENCOUNTER
"Caller: Nathan Velarde W \"Ja\"    Relationship: Self    Best call back number: [phone]     Requested Prescriptions:   Requested Prescriptions     Pending Prescriptions Disp Refills    chlorthalidone (HYGROTON) 25 MG tablet 90 tablet 10     Sig: Take 1 tablet by mouth Daily.        Pharmacy where request should be sent: [facility] [id number] [address] AT [facility] [phone] [facility] [phone]      Last office visit with prescribing clinician: 7/11/2023   Last telemedicine visit with prescribing clinician: Visit date not found   Next office visit with prescribing clinician: 1/11/2024     Additional details provided by patient: HE WOULD LIKE FOR DR VILLASEÑOR TO CONTINUE THIS PRESCRIPTION. A NEW SCRIPT NEEDS TO BE SENT IN TO THE PHARMACY    Does the patient have less than a 3 day supply:  [x] Yes  [] No    Would you like a call back once the refill request has been completed: [] Yes [x] No    If the office needs to give you a call back, can they leave a voicemail: [] Yes [x] No    Luis Monteiro, PCT   08/07/23 09:23 EDT   "

## 2023-08-08 RX ORDER — CHLORTHALIDONE 25 MG/1
25 TABLET ORAL DAILY
Qty: 90 TABLET | Refills: 3 | Status: SHIPPED | OUTPATIENT
Start: 2023-08-08

## 2023-09-21 NOTE — TELEPHONE ENCOUNTER
Rx Refill Note  Requested Prescriptions     Pending Prescriptions Disp Refills    dilTIAZem CD (CARDIZEM CD) 180 MG 24 hr capsule [Pharmacy Med Name: dilTIAZem 24H ER (CD) 180 MG CP] 90 capsule 0     Sig: TAKE ONE CAPSULE BY MOUTH ONCE NIGHTLY      Last office visit with prescribing clinician: 7/11/2023   Last telemedicine visit with prescribing clinician: Visit date not found   Next office visit with prescribing clinician: 1/11/2024                         Would you like a call back once the refill request has been completed: [] Yes [] No    If the office needs to give you a call back, can they leave a voicemail: [] Yes [] No    Valerie Zacarias MA  09/21/23, 08:39 EDT

## 2023-09-22 RX ORDER — DILTIAZEM HYDROCHLORIDE 180 MG/1
CAPSULE, COATED, EXTENDED RELEASE ORAL
Qty: 90 CAPSULE | Refills: 0 | Status: SHIPPED | OUTPATIENT
Start: 2023-09-22

## 2023-09-25 RX ORDER — DICLOFENAC SODIUM 75 MG/1
75 TABLET, DELAYED RELEASE ORAL DAILY
Qty: 90 TABLET | Refills: 0 | Status: SHIPPED | OUTPATIENT
Start: 2023-09-25

## 2023-09-25 NOTE — TELEPHONE ENCOUNTER
Rx Refill Note  Requested Prescriptions     Pending Prescriptions Disp Refills    diclofenac (VOLTAREN) 75 MG EC tablet [Pharmacy Med Name: Diclofenac Sodium 75 MG Oral Tablet Delayed Release] 90 tablet 3     Sig: TAKE 1 TABLET BY MOUTH DAILY      Last office visit with prescribing clinician: 7/11/2023     Next office visit with prescribing clinician: 1/11/2024     Argelia Schaffer MA  09/25/23, 10:24 EDT

## 2023-10-12 ENCOUNTER — OFFICE VISIT (OUTPATIENT)
Dept: GASTROENTEROLOGY | Facility: CLINIC | Age: 83
End: 2023-10-12
Payer: MEDICARE

## 2023-10-12 VITALS
TEMPERATURE: 97.3 F | DIASTOLIC BLOOD PRESSURE: 60 MMHG | HEIGHT: 66 IN | WEIGHT: 153.8 LBS | SYSTOLIC BLOOD PRESSURE: 114 MMHG | HEART RATE: 47 BPM | OXYGEN SATURATION: 98 % | BODY MASS INDEX: 24.72 KG/M2

## 2023-10-12 DIAGNOSIS — K51.80 OTHER ULCERATIVE COLITIS WITHOUT COMPLICATION: Primary | ICD-10-CM

## 2023-10-12 DIAGNOSIS — R00.1 BRADYCARDIA: ICD-10-CM

## 2023-10-12 PROCEDURE — 3074F SYST BP LT 130 MM HG: CPT | Performed by: INTERNAL MEDICINE

## 2023-10-12 PROCEDURE — 1159F MED LIST DOCD IN RCRD: CPT | Performed by: INTERNAL MEDICINE

## 2023-10-12 PROCEDURE — 1160F RVW MEDS BY RX/DR IN RCRD: CPT | Performed by: INTERNAL MEDICINE

## 2023-10-12 PROCEDURE — 99213 OFFICE O/P EST LOW 20 MIN: CPT | Performed by: INTERNAL MEDICINE

## 2023-10-12 PROCEDURE — 3078F DIAST BP <80 MM HG: CPT | Performed by: INTERNAL MEDICINE

## 2023-10-12 RX ORDER — TAMSULOSIN HYDROCHLORIDE 0.4 MG/1
CAPSULE ORAL
Qty: 90 CAPSULE | Refills: 0 | Status: SHIPPED | OUTPATIENT
Start: 2023-10-12

## 2023-10-12 NOTE — TELEPHONE ENCOUNTER
Rx Refill Note  Requested Prescriptions     Pending Prescriptions Disp Refills    tamsulosin (FLOMAX) 0.4 MG capsule 24 hr capsule [Pharmacy Med Name: TAMSULOSIN HCL 0.4 MG CAPSULE] 90 capsule 0     Sig: TAKE ONE CAPSULE BY MOUTH EVERY NIGHT AT BEDTIME      Last office visit with prescribing clinician: 7/11/2023     Next office visit with prescribing clinician: 1/11/2024       Argelia Schaffer MA  10/12/23, 08:10 EDT

## 2023-10-12 NOTE — PROGRESS NOTES
Patient Name: Nathan Velarde  YOB: 1940   Medical Record number: [id number]     PCP: Rell Ahuja DO    Chief Complaint   Patient presents with    Follow-up   Follow-up for history of ulcerative colitis.    History of Present Illness:   HPI  Mr. Velarde presents to the office for a follow-up visit.  The patient denies any abdominal pain.  There is no history of diarrhea.  Mr. Velarde denies any blood in the stool.  He is tolerating the mesalamine medication well.  There is no history of unexplained weight loss.   is very active exercising on a daily basis.  Past Medical History:   Diagnosis Date    Aortic valve insufficiency     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     Benign prostatic hyperplasia     Bilateral bunions     LEFT SIDE ONLY    Bladder cancer 1977    curative exfulguration    Chronic kidney disease 2000    DJD of left shoulder Adulthood    Severely advanced    Generalized osteoarthritis Adulthood    Moderately advanced disease    Hernia 1998    HTN (hypertension) 1990    Hyperlipidemia Adulthood    Response to atorvastatin    Inflammatory bowel disease 1977    Knee osteoarthritis 2004    Mitral valve insufficiency     PAF (paroxysmal atrial fibrillation) 2008    Mild disease - patient declines anticoagulation    Prediabetes 2015    Hemoglobin A1c 6.2%.    Prostatism 1995    Transverse myelitis 2014    Transient T10 with numbness and minimal ataxia - transient     Ulcerative colitis 1977    Well-controlled on mesalamine     Wears glasses        Past Surgical History:   Procedure Laterality Date    BICEPS TENDON REPAIR Left 05/07/2018    Procedure: BICEPS TENDODESIS;  Surgeon: Abner Humphrey MD;  Location: [facility];  Service: Orthopedics    BLADDER SURGERY      CATARACT EXTRACTION, BILATERAL      COLONOSCOPY  2015    Colitis in remission    CYSTOSCOPY  1977    Excision of bladder cancer    FLEXIBLE SIGMOIDOSCOPY  1977    INGUINAL HERNIA REPAIR Right 1994     KNEE SURGERY Right 1955    Excision of cyst    LIPOMA EXCISION      TONSILLECTOMY  1943    TOTAL SHOULDER ARTHROPLASTY Left 05/07/2018    Procedure: LEFT TOTAL SHOULDER ARTHROPLASTY;  Surgeon: Abner Humphrey MD;  Location:  [facility];  Service: Orthopedics    TOTAL SHOULDER ARTHROPLASTY Right 06/21/2021    Procedure: NAVIGATED REVERSE SHOULDER ARTHROPLASTY RIGHT, BICEPS TENODESIS - RIGHT;  Surgeon: Abner Humphrey MD;  Location:  [facility];  Service: Orthopedics;  Laterality: Right;    UPPER GASTROINTESTINAL ENDOSCOPY  1977         Current Outpatient Medications:     Alpha-Lipoic Acid 600 MG capsule, Take 600 mg by mouth Daily., Disp: , Rfl:     aspirin-acetaminophen-caffeine (EXCEDRIN MIGRAINE) 250-250-65 MG per tablet, Take 1 tablet by mouth Every 6 (Six) Hours As Needed for Headache., Disp: , Rfl:     atorvastatin (LIPITOR) 40 MG tablet, TAKE 1 TABLET BY MOUTH EVERY  NIGHT, Disp: 90 tablet, Rfl: 3    Calcium Carbonate-Vitamin D (CALCIUM 600+D PO), Take 1 tablet by mouth Daily., Disp: , Rfl:     chlorthalidone (HYGROTON) 25 MG tablet, Take 1 tablet by mouth Daily., Disp: 90 tablet, Rfl: 3    cholecalciferol (VITAMIN D3) 1000 units tablet, Take 1 tablet by mouth Daily., Disp: , Rfl:     coenzyme Q10 100 MG capsule, Take 1 capsule by mouth Daily., Disp: , Rfl:     diclofenac (VOLTAREN) 75 MG EC tablet, TAKE 1 TABLET BY MOUTH DAILY, Disp: 90 tablet, Rfl: 0    digoxin (LANOXIN) 125 MCG tablet, TAKE ONE TABLET BY MOUTH DAILY, Disp: 90 tablet, Rfl: 1    dilTIAZem CD (CARDIZEM CD) 180 MG 24 hr capsule, TAKE ONE CAPSULE BY MOUTH ONCE NIGHTLY, Disp: 90 capsule, Rfl: 0    Fish Oil-Cholecalciferol (OMEGA-3 + D PO), Take 1 capsule by mouth Daily., Disp: , Rfl:     Glucosamine-Chondroit-Vit C-Mn (GLUCOSAMINE 1500 COMPLEX) capsule, Take 1,500 mg by mouth Daily., Disp: , Rfl:     losartan (COZAAR) 50 MG tablet, TAKE 1 AND 1/2 TABLETS BY MOUTH  DAILY, Disp: 135 tablet, Rfl: 3    Magnesium 250 MG tablet, Take 1 tablet by  mouth Daily., Disp: , Rfl:     melatonin 3 MG tablet, Take 1 tablet by mouth Every Night., Disp: , Rfl:     mesalamine (DELZICOL) 400 MG capsule delayed-release delayed release capsule, Take 2 capsules by mouth 2 (Two) Times a Day., Disp: 360 capsule, Rfl: 3    Multiple Vitamins-Minerals (MULTIVITAMIN ADULT PO), Take 1 tablet by mouth Daily., Disp: , Rfl:     Saw Palmetto 160 MG capsule, Take 1 capsule by mouth Daily., Disp: , Rfl:     Selenium 200 MCG capsule, Take 200 mcg by mouth Daily., Disp: , Rfl:     tamsulosin (FLOMAX) 0.4 MG capsule 24 hr capsule, TAKE ONE CAPSULE BY MOUTH EVERY NIGHT AT BEDTIME, Disp: 90 capsule, Rfl: 0    vitamin E 400 UNIT capsule, Take 1 capsule by mouth Daily., Disp: , Rfl:     Zinc 50 MG capsule, Take 50 mg by mouth Daily., Disp: , Rfl:     potassium chloride 10 MEQ CR tablet, TAKE 1 TABLET DAILY (Patient not taking: Reported on 2023), Disp: 90 tablet, Rfl: 1    Allergies   Allergen Reactions    Sulfa Antibiotics Nausea Only and Other (See Comments)     Headache       Family History   Problem Relation Age of Onset    Alzheimer's disease Mother          age 89    Arthritis Mother             Hyperlipidemia Mother     Angina Father     Hypertension Father     Pneumonia Father          age 70 - tobacco    Aneurysm Father         abd aortic    Vision loss Father         AMD    Unexplained death Brother     Breast cancer Maternal Grandmother     Arthritis Maternal Grandmother             Diabetes Paternal Uncle        Social History     Socioeconomic History    Marital status:    Tobacco Use    Smoking status: Never    Smokeless tobacco: Never   Vaping Use    Vaping Use: Never used   Substance and Sexual Activity    Alcohol use: Yes     Alcohol/week: 2.0 standard drinks of alcohol     Types: 2 Cans of beer per week     Comment: OCC    Drug use: Never    Sexual activity: Yes     Partners: Female     Birth control/protection: None, Tubal ligation        Review of Systems   Constitutional:  Negative for activity change, appetite change, fatigue, fever and unexpected weight change.   HENT:  Negative for dental problem, hearing loss, mouth sores, postnasal drip, sneezing, trouble swallowing and voice change.    Eyes:  Negative for pain, redness, itching and visual disturbance.   Respiratory:  Negative for cough, choking, chest tightness, shortness of breath and wheezing.    Cardiovascular:  Negative for chest pain, palpitations and leg swelling.   Gastrointestinal:  Negative for abdominal distention, abdominal pain, anal bleeding, blood in stool, constipation, diarrhea, nausea, rectal pain and vomiting.   Endocrine: Negative for cold intolerance, heat intolerance, polydipsia, polyphagia and polyuria.   Genitourinary: Negative.  Negative for dysuria, enuresis, flank pain, hematuria and urgency.   Musculoskeletal:  Negative for arthralgias, back pain, gait problem, joint swelling and myalgias.   Skin:  Negative for color change, pallor and rash.   Allergic/Immunologic: Negative for environmental allergies, food allergies and immunocompromised state.   Neurological:  Negative for dizziness, tremors, seizures, facial asymmetry, speech difficulty, numbness and headaches.   Hematological:  Negative for adenopathy.   Psychiatric/Behavioral:  Negative for behavioral problems, confusion, dysphoric mood, hallucinations and self-injury.        Vitals:    10/12/23 1525   BP: 114/60   Pulse: (!) 47   Temp: 97.3 øF (36.3 øC)   SpO2: 98%       Physical Exam  Vitals reviewed.   Constitutional:       General: He is not in acute distress.     Appearance: Normal appearance.   HENT:      Head: Normocephalic and atraumatic.      Nose: Nose normal.      Mouth/Throat:      Mouth: Mucous membranes are moist.      Pharynx: Oropharynx is clear.   Eyes:      General: No scleral icterus.     Extraocular Movements: Extraocular movements intact.   Cardiovascular:      Rate and Rhythm: Normal  rate and regular rhythm.      Heart sounds: No murmur heard.  Pulmonary:      Breath sounds: Normal breath sounds. No wheezing or rales.   Abdominal:      General: Bowel sounds are normal.      Palpations: Abdomen is soft.      Tenderness: There is no abdominal tenderness. There is no guarding.   Musculoskeletal:         General: No swelling. Normal range of motion.      Cervical back: Normal range of motion and neck supple.   Skin:     General: Skin is dry.      Coloration: Skin is not jaundiced.   Neurological:      Mental Status: He is alert and oriented to person, place, and time.   Psychiatric:         Mood and Affect: Mood normal.         Thought Content: Thought content normal.         Judgment: Judgment normal.         Diagnoses and all orders for this visit:    1. Other ulcerative colitis without complication (Primary)    2. Bradycardia    The patient does not have any clinical signs of active disease.  His heart rate was slow today.  However, the patient does exercise on a daily basis.  He was not orthostatic in the office.      Plan: Will continue mesalamine medication daily.           Recommend that the patient take his blood pressure on a daily basis and check his heart rate.  Discussed that he should follow-up with Dr. Ahuja.

## 2023-11-27 RX ORDER — DICLOFENAC SODIUM 75 MG/1
75 TABLET, DELAYED RELEASE ORAL DAILY
Qty: 90 TABLET | Refills: 1 | Status: SHIPPED | OUTPATIENT
Start: 2023-11-27

## 2023-11-27 NOTE — TELEPHONE ENCOUNTER
Rx Refill Note  Requested Prescriptions     Pending Prescriptions Disp Refills    diclofenac (VOLTAREN) 75 MG EC tablet [Pharmacy Med Name: Diclofenac Sodium 75 MG Oral Tablet Delayed Release] 90 tablet 3     Sig: TAKE 1 TABLET BY MOUTH DAILY      Last office visit with prescribing clinician: 7/11/2023     Next office visit with prescribing clinician: 1/11/2024   Viki Wang MA  11/27/23, 09:39 EST

## 2023-12-01 RX ORDER — DIGOXIN 125 MCG
125 TABLET ORAL DAILY
Qty: 90 TABLET | Refills: 1 | Status: SHIPPED | OUTPATIENT
Start: 2023-12-01

## 2023-12-04 RX ORDER — DILTIAZEM HYDROCHLORIDE 180 MG/1
CAPSULE, COATED, EXTENDED RELEASE ORAL
Qty: 90 CAPSULE | Refills: 0 | Status: SHIPPED | OUTPATIENT
Start: 2023-12-04

## 2023-12-04 RX ORDER — TAMSULOSIN HYDROCHLORIDE 0.4 MG/1
CAPSULE ORAL
Qty: 90 CAPSULE | Refills: 0 | Status: SHIPPED | OUTPATIENT
Start: 2023-12-04

## 2023-12-04 NOTE — TELEPHONE ENCOUNTER
Rx Refill Note  Requested Prescriptions     Pending Prescriptions Disp Refills    dilTIAZem CD (CARDIZEM CD) 180 MG 24 hr capsule [Pharmacy Med Name: dilTIAZem 24H ER (CD) 180 MG CP] 90 capsule 0     Sig: TAKE ONE CAPSULE BY MOUTH ONCE NIGHTLY    tamsulosin (FLOMAX) 0.4 MG capsule 24 hr capsule [Pharmacy Med Name: TAMSULOSIN HCL 0.4 MG CAPSULE] 90 capsule 0     Sig: TAKE ONE CAPSULE BY MOUTH EVERY NIGHT AT BEDTIME      Last office visit with prescribing clinician: 7/11/2023     Next office visit with prescribing clinician: 1/11/2024       Argelia Schaffer MA  12/04/23, 14:23 EST

## 2024-01-11 ENCOUNTER — OFFICE VISIT (OUTPATIENT)
Dept: FAMILY MEDICINE CLINIC | Facility: CLINIC | Age: 84
End: 2024-01-11
Payer: MEDICARE

## 2024-01-11 VITALS
SYSTOLIC BLOOD PRESSURE: 108 MMHG | BODY MASS INDEX: 25.49 KG/M2 | HEIGHT: 65 IN | DIASTOLIC BLOOD PRESSURE: 66 MMHG | WEIGHT: 153 LBS

## 2024-01-11 DIAGNOSIS — Z79.899 ENCOUNTER FOR MONITORING DIGOXIN THERAPY: ICD-10-CM

## 2024-01-11 DIAGNOSIS — Z51.81 ENCOUNTER FOR MONITORING DIGOXIN THERAPY: ICD-10-CM

## 2024-01-11 DIAGNOSIS — R73.9 HYPERGLYCEMIA: Primary | ICD-10-CM

## 2024-01-11 DIAGNOSIS — E78.2 MIXED HYPERLIPIDEMIA: ICD-10-CM

## 2024-01-11 DIAGNOSIS — E55.9 VITAMIN D DEFICIENCY: ICD-10-CM

## 2024-01-11 DIAGNOSIS — R97.20 ELEVATED PSA: ICD-10-CM

## 2024-01-11 LAB
EXPIRATION DATE: ABNORMAL
HBA1C MFR BLD: 6.1 % (ref 4.5–5.7)
Lab: ABNORMAL

## 2024-01-11 NOTE — PROGRESS NOTES
Established Patient Office Visit      Patient Name: Nathan Velarde  : 1940   MRN: [id number]   Care Team: Patient Care Team:  Rell Ahuja DO as PCP - General (Family Medicine)  Ry Feliciano MD as Consulting Physician (Gastroenterology)  Fernando Sanders MD as Consulting Physician (Otolaryngology)    Chief Complaint:    Chief Complaint   Patient presents with    Hyperglycemia     Follow-up a1c       History of Present Illness: Nathan Velarde is a 83 y.o. male who is here today for chief complaint.    HPI    Tested positive for COVID on 24.    This patient is accompanied by their self who contributes to the history of their care.    The following portions of the patient's history were reviewed and updated as appropriate: allergies, current medications, past family history, past medical history, past social history, past surgical history and problem list.    Subjective      Review of Systems:   Review of Systems - See HPI    Past Medical History:   Past Medical History:   Diagnosis Date    Aortic valve insufficiency     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     Benign prostatic hyperplasia     Bilateral bunions     LEFT SIDE ONLY    Bladder cancer     curative exfulguration    Chronic kidney disease 2000    DJD of left shoulder Adulthood    Severely advanced    Generalized osteoarthritis Adulthood    Moderately advanced disease    Hernia     HTN (hypertension)     Hyperlipidemia Adulthood    Response to atorvastatin    Inflammatory bowel disease 1977    Knee osteoarthritis 2004    Mitral valve insufficiency     PAF (paroxysmal atrial fibrillation) 2008    Mild disease - patient declines anticoagulation    Prediabetes 2015    Hemoglobin A1c 6.2%.    Prostatism 1995    Transverse myelitis     Transient T10 with numbness and minimal ataxia - transient     Ulcerative colitis 1977    Well-controlled on mesalamine     Wears glasses        Past Surgical History:    Past Surgical History:   Procedure Laterality Date    BICEPS TENDON REPAIR Left 2018    Procedure: BICEPS TENDODESIS;  Surgeon: Abner Humphrey MD;  Location:  [facility];  Service: Orthopedics    BLADDER SURGERY      CATARACT EXTRACTION, BILATERAL      COLONOSCOPY      Colitis in remission    CYSTOSCOPY      Excision of bladder cancer    FLEXIBLE SIGMOIDOSCOPY  1977    INGUINAL HERNIA REPAIR Right     KNEE SURGERY Right     Excision of cyst    LIPOMA EXCISION      TONSILLECTOMY      TOTAL SHOULDER ARTHROPLASTY Left 2018    Procedure: LEFT TOTAL SHOULDER ARTHROPLASTY;  Surgeon: Abner Humphrey MD;  Location:  [facility];  Service: Orthopedics    TOTAL SHOULDER ARTHROPLASTY Right 2021    Procedure: NAVIGATED REVERSE SHOULDER ARTHROPLASTY RIGHT, BICEPS TENODESIS - RIGHT;  Surgeon: Abner Humphrey MD;  Location:  [facility];  Service: Orthopedics;  Laterality: Right;    UPPER GASTROINTESTINAL ENDOSCOPY         Family History:   Family History   Problem Relation Age of Onset    Alzheimer's disease Mother          age 89    Arthritis Mother             Hyperlipidemia Mother     Angina Father     Hypertension Father     Pneumonia Father          age 70 - tobacco    Aneurysm Father         abd aortic    Vision loss Father         AMD    Unexplained death Brother     Breast cancer Maternal Grandmother     Arthritis Maternal Grandmother             Diabetes Paternal Uncle        Social History:   Social History     Socioeconomic History    Marital status:    Tobacco Use    Smoking status: Never    Smokeless tobacco: Never   Vaping Use    Vaping Use: Never used   Substance and Sexual Activity    Alcohol use: Yes     Alcohol/week: 2.0 standard drinks of alcohol     Types: 2 Cans of beer per week     Comment: OCC    Drug use: Never    Sexual activity: Yes     Partners: Female     Birth control/protection: None, Tubal ligation       Tobacco  History:   Social History     Tobacco Use   Smoking Status Never   Smokeless Tobacco Never       Medications:     Current Outpatient Medications:     Alpha-Lipoic Acid 600 MG capsule, Take 600 mg by mouth Daily., Disp: , Rfl:     aspirin-acetaminophen-caffeine (EXCEDRIN MIGRAINE) 250-250-65 MG per tablet, Take 1 tablet by mouth Every 6 (Six) Hours As Needed for Headache., Disp: , Rfl:     atorvastatin (LIPITOR) 40 MG tablet, TAKE 1 TABLET BY MOUTH EVERY  NIGHT, Disp: 90 tablet, Rfl: 3    Calcium Carbonate-Vitamin D (CALCIUM 600+D PO), Take 1 tablet by mouth Daily., Disp: , Rfl:     chlorthalidone (HYGROTON) 25 MG tablet, Take 1 tablet by mouth Daily., Disp: 90 tablet, Rfl: 3    cholecalciferol (VITAMIN D3) 1000 units tablet, Take 1 tablet by mouth Daily., Disp: , Rfl:     coenzyme Q10 100 MG capsule, Take 1 capsule by mouth Daily., Disp: , Rfl:     diclofenac (VOLTAREN) 75 MG EC tablet, TAKE 1 TABLET BY MOUTH DAILY, Disp: 90 tablet, Rfl: 1    digoxin (LANOXIN) 125 MCG tablet, TAKE 1 TABLET BY MOUTH DAILY, Disp: 90 tablet, Rfl: 1    dilTIAZem CD (CARDIZEM CD) 180 MG 24 hr capsule, TAKE ONE CAPSULE BY MOUTH ONCE NIGHTLY, Disp: 90 capsule, Rfl: 0    Fish Oil-Cholecalciferol (OMEGA-3 + D PO), Take 1 capsule by mouth Daily., Disp: , Rfl:     Glucosamine-Chondroit-Vit C-Mn (GLUCOSAMINE 1500 COMPLEX) capsule, Take 1,500 mg by mouth Daily., Disp: , Rfl:     losartan (COZAAR) 50 MG tablet, TAKE 1 AND 1/2 TABLETS BY MOUTH  DAILY, Disp: 135 tablet, Rfl: 3    Magnesium 250 MG tablet, Take 1 tablet by mouth Daily., Disp: , Rfl:     melatonin 3 MG tablet, Take 1 tablet by mouth Every Night., Disp: , Rfl:     mesalamine (DELZICOL) 400 MG capsule delayed-release delayed release capsule, Take 2 capsules by mouth 2 (Two) Times a Day., Disp: 360 capsule, Rfl: 3    Multiple Vitamins-Minerals (MULTIVITAMIN ADULT PO), Take 1 tablet by mouth Daily., Disp: , Rfl:     potassium chloride 10 MEQ CR tablet, TAKE 1 TABLET DAILY, Disp: 90  "tablet, Rfl: 1    Saw Palmetto 160 MG capsule, Take 1 capsule by mouth Daily., Disp: , Rfl:     Selenium 200 MCG capsule, Take 200 mcg by mouth Daily., Disp: , Rfl:     tamsulosin (FLOMAX) 0.4 MG capsule 24 hr capsule, TAKE ONE CAPSULE BY MOUTH EVERY NIGHT AT BEDTIME, Disp: 90 capsule, Rfl: 0    vitamin E 400 UNIT capsule, Take 1 capsule by mouth Daily., Disp: , Rfl:     Zinc 50 MG capsule, Take 50 mg by mouth Daily., Disp: , Rfl:     Allergies:   Allergies   Allergen Reactions    Sulfa Antibiotics Nausea Only and Other (See Comments)     Headache       Objective   Objective     Physical Exam:  Vital Signs:   Vitals:    01/11/24 1424   BP: 108/66   BP Location: Left arm   Patient Position: Sitting   Cuff Size: Adult   Weight: 69.4 kg (153 lb)   Height: 165.1 cm (65\")     Body mass index is 25.46 kg/m².     Physical Exam  Nursing note reviewed  Const: NAD, A&Ox4, Pleasant, Cooperative  Eyes: EOMI, no conjunctivitis  ENT: No nasal discharge present, neck supple  Cardiac: Regular rate and rhythm, no cyanosis  Resp: Respiratory rate within normal limits, no increased work of breathing, no audible wheezing or retractions noted  GI: No distention or ascites  MSK: Motor and sensation grossly intact in bilateral upper extremities  Neurologic: CN II-XII grossly intact  Psych: Appropriate mood and behavior.  Skin: Warm, dry  Procedures/Radiology     Procedures  No radiology results for the last 7 days     Assessment & Plan   Assessment / Plan      Assessment/Plan:   Problems Addressed This Visit  Diagnoses and all orders for this visit:    1. Hyperglycemia (Primary)  -     POC Glycosylated Hemoglobin (Hb A1C)  -     Comprehensive Metabolic Panel; Future  -     Urinalysis With Microscopic If Indicated (No Culture) - Urine, Clean Catch; Future    2. Vitamin D deficiency  -     Vitamin D,25-Hydroxy; Future    3. Mixed hyperlipidemia  -     Lipid Panel; Future    4. Elevated PSA  -     PSA DIAGNOSTIC; Future    5. Encounter for " monitoring digoxin therapy  -     Digoxin level; Future      Problem List Items Addressed This Visit          Cardiac and Vasculature    Hyperlipidemia    Overview     Atorvastatin 40mg         Relevant Orders    Lipid Panel (Completed)       Endocrine and Metabolic    Hyperglycemia - Primary    Relevant Orders    POC Glycosylated Hemoglobin (Hb A1C) (Completed)    Comprehensive Metabolic Panel (Completed)    Urinalysis With Microscopic If Indicated (No Culture) - Urine, Clean Catch (Completed)     Other Visit Diagnoses       Vitamin D deficiency        Relevant Orders    Vitamin D,25-Hydroxy (Completed)    Elevated PSA        Relevant Orders    PSA DIAGNOSTIC (Completed)    Encounter for monitoring digoxin therapy        Relevant Orders    Digoxin level (Completed)            There are no Patient Instructions on file for this visit.    Follow Up:   Return in about 6 months (around 7/11/2024) for Medicare Wellness.        DO KRYSTYNA Hillman RD  [facility]  [address]  [facility] [address]  Fax [phone]  Phone [phone]

## 2024-01-23 ENCOUNTER — APPOINTMENT (OUTPATIENT)
Dept: LAB | Facility: HOSPITAL | Age: 84
End: 2024-01-23
Payer: MEDICARE

## 2024-01-23 ENCOUNTER — LAB (OUTPATIENT)
Dept: LAB | Facility: HOSPITAL | Age: 84
End: 2024-01-23
Payer: MEDICARE

## 2024-01-23 DIAGNOSIS — R97.20 ELEVATED PSA: ICD-10-CM

## 2024-01-23 DIAGNOSIS — E78.2 MIXED HYPERLIPIDEMIA: ICD-10-CM

## 2024-01-23 DIAGNOSIS — R73.9 HYPERGLYCEMIA: ICD-10-CM

## 2024-01-23 DIAGNOSIS — E55.9 VITAMIN D DEFICIENCY: ICD-10-CM

## 2024-01-23 DIAGNOSIS — Z79.899 ENCOUNTER FOR MONITORING DIGOXIN THERAPY: ICD-10-CM

## 2024-01-23 DIAGNOSIS — Z51.81 ENCOUNTER FOR MONITORING DIGOXIN THERAPY: ICD-10-CM

## 2024-01-23 LAB
BILIRUB UR QL STRIP: NEGATIVE
CLARITY UR: CLEAR
COLOR UR: ABNORMAL
GLUCOSE UR STRIP-MCNC: NEGATIVE MG/DL
HGB UR QL STRIP.AUTO: NEGATIVE
KETONES UR QL STRIP: NEGATIVE
LEUKOCYTE ESTERASE UR QL STRIP.AUTO: NEGATIVE
NITRITE UR QL STRIP: NEGATIVE
PH UR STRIP.AUTO: 6 [PH] (ref 5–8)
PROT UR QL STRIP: ABNORMAL
SP GR UR STRIP: 1.02 (ref 1–1.03)
UROBILINOGEN UR QL STRIP: ABNORMAL

## 2024-01-23 PROCEDURE — 80061 LIPID PANEL: CPT

## 2024-01-23 PROCEDURE — 80053 COMPREHEN METABOLIC PANEL: CPT

## 2024-01-23 PROCEDURE — 84153 ASSAY OF PSA TOTAL: CPT

## 2024-01-23 PROCEDURE — 81003 URINALYSIS AUTO W/O SCOPE: CPT

## 2024-01-23 PROCEDURE — 82306 VITAMIN D 25 HYDROXY: CPT

## 2024-01-23 PROCEDURE — 80162 ASSAY OF DIGOXIN TOTAL: CPT

## 2024-01-24 LAB
25(OH)D3 SERPL-MCNC: 85.2 NG/ML (ref 30–100)
ALBUMIN SERPL-MCNC: 3.9 G/DL (ref 3.5–5.2)
ALBUMIN/GLOB SERPL: 1.2 G/DL
ALP SERPL-CCNC: 105 U/L (ref 39–117)
ALT SERPL W P-5'-P-CCNC: 31 U/L (ref 1–41)
ANION GAP SERPL CALCULATED.3IONS-SCNC: 14 MMOL/L (ref 5–15)
AST SERPL-CCNC: 40 U/L (ref 1–40)
BILIRUB SERPL-MCNC: 0.5 MG/DL (ref 0–1.2)
BUN SERPL-MCNC: 45 MG/DL (ref 8–23)
BUN/CREAT SERPL: 28.8 (ref 7–25)
CALCIUM SPEC-SCNC: 9.8 MG/DL (ref 8.6–10.5)
CHLORIDE SERPL-SCNC: 105 MMOL/L (ref 98–107)
CHOLEST SERPL-MCNC: 102 MG/DL (ref 0–200)
CO2 SERPL-SCNC: 22 MMOL/L (ref 22–29)
CREAT SERPL-MCNC: 1.56 MG/DL (ref 0.76–1.27)
DIGOXIN SERPL-MCNC: 0.7 NG/ML (ref 0.6–1.2)
EGFRCR SERPLBLD CKD-EPI 2021: 43.8 ML/MIN/1.73
GLOBULIN UR ELPH-MCNC: 3.3 GM/DL
GLUCOSE SERPL-MCNC: 114 MG/DL (ref 65–99)
HDLC SERPL-MCNC: 27 MG/DL (ref 40–60)
LDLC SERPL CALC-MCNC: 58 MG/DL (ref 0–100)
LDLC/HDLC SERPL: 2.18 {RATIO}
POTASSIUM SERPL-SCNC: 4.4 MMOL/L (ref 3.5–5.2)
PROT SERPL-MCNC: 7.2 G/DL (ref 6–8.5)
PSA SERPL-MCNC: 6.76 NG/ML (ref 0–4)
SODIUM SERPL-SCNC: 141 MMOL/L (ref 136–145)
TRIGL SERPL-MCNC: 81 MG/DL (ref 0–150)
VLDLC SERPL-MCNC: 17 MG/DL (ref 5–40)

## 2024-02-29 RX ORDER — DILTIAZEM HYDROCHLORIDE 180 MG/1
CAPSULE, COATED, EXTENDED RELEASE ORAL
Qty: 90 CAPSULE | Refills: 0 | Status: SHIPPED | OUTPATIENT
Start: 2024-02-29

## 2024-03-19 RX ORDER — MESALAMINE 400 MG/1
800 CAPSULE, DELAYED RELEASE ORAL 2 TIMES DAILY
Qty: 360 CAPSULE | Refills: 1 | Status: SHIPPED | OUTPATIENT
Start: 2024-03-19

## 2024-04-23 RX ORDER — DICLOFENAC SODIUM 75 MG/1
75 TABLET, DELAYED RELEASE ORAL DAILY
Qty: 90 TABLET | Refills: 3 | Status: SHIPPED | OUTPATIENT
Start: 2024-04-23

## 2024-05-06 RX ORDER — TAMSULOSIN HYDROCHLORIDE 0.4 MG/1
CAPSULE ORAL
Qty: 90 CAPSULE | Refills: 0 | Status: SHIPPED | OUTPATIENT
Start: 2024-05-06

## 2024-05-15 RX ORDER — ATORVASTATIN CALCIUM 40 MG/1
40 TABLET, FILM COATED ORAL
Qty: 90 TABLET | Refills: 0 | Status: SHIPPED | OUTPATIENT
Start: 2024-05-15

## 2024-06-18 DIAGNOSIS — I10 ESSENTIAL HYPERTENSION: ICD-10-CM

## 2024-06-18 RX ORDER — LOSARTAN POTASSIUM 50 MG/1
TABLET ORAL
Qty: 135 TABLET | Refills: 3 | Status: SHIPPED | OUTPATIENT
Start: 2024-06-18

## 2024-06-20 RX ORDER — DILTIAZEM HYDROCHLORIDE 180 MG/1
CAPSULE, COATED, EXTENDED RELEASE ORAL
Qty: 90 CAPSULE | Refills: 0 | Status: SHIPPED | OUTPATIENT
Start: 2024-06-20

## 2024-07-11 ENCOUNTER — OFFICE VISIT (OUTPATIENT)
Dept: GASTROENTEROLOGY | Facility: CLINIC | Age: 84
End: 2024-07-11
Payer: MEDICARE

## 2024-07-11 VITALS
HEART RATE: 49 BPM | OXYGEN SATURATION: 95 % | DIASTOLIC BLOOD PRESSURE: 58 MMHG | SYSTOLIC BLOOD PRESSURE: 110 MMHG | TEMPERATURE: 97.8 F | HEIGHT: 65 IN | WEIGHT: 154.4 LBS | BODY MASS INDEX: 25.72 KG/M2

## 2024-07-11 DIAGNOSIS — K51.80 OTHER ULCERATIVE COLITIS WITHOUT COMPLICATION: Primary | ICD-10-CM

## 2024-07-11 PROCEDURE — 1159F MED LIST DOCD IN RCRD: CPT | Performed by: INTERNAL MEDICINE

## 2024-07-11 PROCEDURE — 3074F SYST BP LT 130 MM HG: CPT | Performed by: INTERNAL MEDICINE

## 2024-07-11 PROCEDURE — 99213 OFFICE O/P EST LOW 20 MIN: CPT | Performed by: INTERNAL MEDICINE

## 2024-07-11 PROCEDURE — 1160F RVW MEDS BY RX/DR IN RCRD: CPT | Performed by: INTERNAL MEDICINE

## 2024-07-11 PROCEDURE — 3078F DIAST BP <80 MM HG: CPT | Performed by: INTERNAL MEDICINE

## 2024-07-11 NOTE — PROGRESS NOTES
"Patient Name: Nathan Velarde (Ron\)   YOB: 1940   Medical Record number: [id number]     PCP: Rell Ahuja DO    Chief Complaint   Patient presents with    Follow-up     UC   Follow-up for ulcerative colitis.    History of Present Illness:   HPI  Mr. Velarde presents to the office for follow-up visit.  Patient denies any abdominal pain or nausea.  He has a good appetite.  There is no history of unexplained weight loss.  Mr. Velarde denies any diarrhea or gross blood in the stool.  He has experienced  issues recently with hard stool and difficult evacuation.  The patient just tried a stool softener yesterday.  There is no history of difficult or painful swallowing.  He denies any heartburn.  Past Medical History:   Diagnosis Date    Aortic valve insufficiency     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     Benign prostatic hyperplasia     Bilateral bunions     LEFT SIDE ONLY    Bladder cancer 1977    curative exfulguration    Chronic kidney disease 2000    DJD of left shoulder Adulthood    Severely advanced    Generalized osteoarthritis Adulthood    Moderately advanced disease    Hernia 1998    HTN (hypertension) 1990    Hyperlipidemia Adulthood    Response to atorvastatin    Inflammatory bowel disease 1977    Knee osteoarthritis 2004    Mitral valve insufficiency     PAF (paroxysmal atrial fibrillation) 2008    Mild disease - patient declines anticoagulation    Prediabetes 2015    Hemoglobin A1c 6.2%.    Prostatism 1995    Transverse myelitis 2014    Transient T10 with numbness and minimal ataxia - transient     Ulcerative colitis 1977    Well-controlled on mesalamine     Wears glasses        Past Surgical History:   Procedure Laterality Date    BICEPS TENDON REPAIR Left 05/07/2018    Procedure: BICEPS TENDODESIS;  Surgeon: Anber Humphrey MD;  Location: [facility];  Service: Orthopedics    BLADDER SURGERY      CATARACT EXTRACTION, BILATERAL      COLONOSCOPY  2015    Colitis in " remission    CYSTOSCOPY  1977    Excision of bladder cancer    FLEXIBLE SIGMOIDOSCOPY  1977    INGUINAL HERNIA REPAIR Right 1994    KNEE SURGERY Right 1955    Excision of cyst    LIPOMA EXCISION      TONSILLECTOMY  1943    TOTAL SHOULDER ARTHROPLASTY Left 05/07/2018    Procedure: LEFT TOTAL SHOULDER ARTHROPLASTY;  Surgeon: Abner Humphrey MD;  Location: [facility];  Service: Orthopedics    TOTAL SHOULDER ARTHROPLASTY Right 06/21/2021    Procedure: NAVIGATED REVERSE SHOULDER ARTHROPLASTY RIGHT, BICEPS TENODESIS - RIGHT;  Surgeon: Abner Humphrey MD;  Location:  [facility];  Service: Orthopedics;  Laterality: Right;    UPPER GASTROINTESTINAL ENDOSCOPY  1977         Current Outpatient Medications:     Alpha-Lipoic Acid 600 MG capsule, Take 600 mg by mouth Daily., Disp: , Rfl:     aspirin-acetaminophen-caffeine (EXCEDRIN MIGRAINE) 250-250-65 MG per tablet, Take 1 tablet by mouth Every 6 (Six) Hours As Needed for Headache., Disp: , Rfl:     atorvastatin (LIPITOR) 40 MG tablet, TAKE 1 TABLET BY MOUTH AT NIGHT, Disp: 90 tablet, Rfl: 0    Calcium Carbonate-Vitamin D (CALCIUM 600+D PO), Take 1 tablet by mouth Daily., Disp: , Rfl:     chlorthalidone (HYGROTON) 25 MG tablet, Take 1 tablet by mouth Daily., Disp: 90 tablet, Rfl: 3    cholecalciferol (VITAMIN D3) 1000 units tablet, Take 1 tablet by mouth Daily., Disp: , Rfl:     coenzyme Q10 100 MG capsule, Take 1 capsule by mouth Daily., Disp: , Rfl:     diclofenac (VOLTAREN) 75 MG EC tablet, TAKE 1 TABLET BY MOUTH DAILY, Disp: 90 tablet, Rfl: 3    digoxin (LANOXIN) 125 MCG tablet, TAKE 1 TABLET BY MOUTH DAILY, Disp: 90 tablet, Rfl: 1    dilTIAZem CD (CARDIZEM CD) 180 MG 24 hr capsule, TAKE ONE CAPSULE BY MOUTH ONCE NIGHTLY, Disp: 90 capsule, Rfl: 0    Fish Oil-Cholecalciferol (OMEGA-3 + D PO), Take 1 capsule by mouth Daily., Disp: , Rfl:     Glucosamine-Chondroit-Vit C-Mn (GLUCOSAMINE 1500 COMPLEX) capsule, Take 1,500 mg by mouth Daily., Disp: , Rfl:     losartan  (COZAAR) 50 MG tablet, TAKE 1 AND 1/2 TABLETS BY MOUTH  DAILY, Disp: 135 tablet, Rfl: 3    Magnesium 250 MG tablet, Take 1 tablet by mouth Daily., Disp: , Rfl:     melatonin 3 MG tablet, Take 1 tablet by mouth Every Night., Disp: , Rfl:     mesalamine (DELZICOL) 400 MG capsule delayed-release delayed release capsule, TAKE 2 CAPSULES BY MOUTH TWICE  DAILY, Disp: 360 capsule, Rfl: 1    Multiple Vitamins-Minerals (MULTIVITAMIN ADULT PO), Take 1 tablet by mouth Daily., Disp: , Rfl:     Saw Palmetto 160 MG capsule, Take 1 capsule by mouth Daily., Disp: , Rfl:     Selenium 200 MCG capsule, Take 200 mcg by mouth Daily., Disp: , Rfl:     tamsulosin (FLOMAX) 0.4 MG capsule 24 hr capsule, TAKE ONE CAPSULE BY MOUTH EVERY NIGHT AT BEDTIME, Disp: 90 capsule, Rfl: 0    vitamin E 400 UNIT capsule, Take 1 capsule by mouth Daily., Disp: , Rfl:     Zinc 50 MG capsule, Take 50 mg by mouth Daily., Disp: , Rfl:     Allergies   Allergen Reactions    Sulfa Antibiotics Nausea Only and Other (See Comments)     Headache       Family History   Problem Relation Age of Onset    Alzheimer's disease Mother          age 89    Arthritis Mother             Hyperlipidemia Mother     Angina Father     Hypertension Father     Pneumonia Father          age 70 - tobacco    Aneurysm Father         abd aortic    Vision loss Father         AMD    Unexplained death Brother     Breast cancer Maternal Grandmother     Arthritis Maternal Grandmother             Diabetes Paternal Uncle        Social History     Socioeconomic History    Marital status:    Tobacco Use    Smoking status: Never    Smokeless tobacco: Never   Vaping Use    Vaping status: Never Used   Substance and Sexual Activity    Alcohol use: Yes     Alcohol/week: 2.0 standard drinks of alcohol     Types: 2 Cans of beer per week     Comment: OCC    Drug use: Never    Sexual activity: Yes     Partners: Female     Birth control/protection: None, Tubal ligation        Review of Systems   Constitutional:  Negative for appetite change, fatigue, fever and unexpected weight change.   HENT:  Negative for dental problem, mouth sores, postnasal drip, sneezing, trouble swallowing and voice change.    Eyes:  Negative for pain, redness and itching.   Respiratory:  Negative for cough, shortness of breath and wheezing.    Cardiovascular:  Negative for chest pain, palpitations and leg swelling.   Gastrointestinal:  Negative for abdominal distention, abdominal pain, anal bleeding, blood in stool, constipation, diarrhea, nausea, rectal pain and vomiting.   Endocrine: Negative for cold intolerance, heat intolerance, polydipsia and polyuria.   Genitourinary:  Negative for dysuria, enuresis, flank pain, hematuria and urgency.   Musculoskeletal:  Negative for arthralgias, back pain, joint swelling and myalgias.   Skin:  Negative for color change, pallor and rash.   Allergic/Immunologic: Negative for environmental allergies, food allergies and immunocompromised state.   Neurological:  Negative for dizziness, tremors, seizures, facial asymmetry, numbness and headaches.   Psychiatric/Behavioral:  Negative for behavioral problems, dysphoric mood, hallucinations and self-injury.        Vitals:    07/11/24 1513   BP: 110/58   Pulse: (!) 49   Temp: 97.8 °F (36.6 °C)   SpO2: 95%       Physical Exam  Vitals reviewed.   Constitutional:       General: He is not in acute distress.     Appearance: Normal appearance.   HENT:      Head: Normocephalic and atraumatic.      Nose: Nose normal.      Mouth/Throat:      Mouth: Mucous membranes are moist.      Pharynx: Oropharynx is clear. No posterior oropharyngeal erythema.   Eyes:      General: No scleral icterus.     Extraocular Movements: Extraocular movements intact.   Cardiovascular:      Rate and Rhythm: Normal rate and regular rhythm.      Heart sounds: No murmur heard.  Pulmonary:      Breath sounds: Normal breath sounds. No wheezing or rales.    Abdominal:      General: Bowel sounds are normal.      Palpations: Abdomen is soft.      Tenderness: There is no abdominal tenderness. There is no guarding.   Genitourinary:     Rectum: Guaiac result negative.      Comments: No mass felt on rectal exam  Musculoskeletal:         General: No swelling. Normal range of motion.      Cervical back: Normal range of motion and neck supple.   Skin:     General: Skin is dry.      Coloration: Skin is not jaundiced.   Neurological:      Mental Status: He is alert and oriented to person, place, and time.   Psychiatric:         Mood and Affect: Mood normal.         Thought Content: Thought content normal.         Judgment: Judgment normal.         Diagnoses and all orders for this visit:    1. Other ulcerative colitis without complication (Primary)    Patient has no clinical signs suggestive of active inflammatory bowel disease.      Plan: Will continue mesalamine at this time.           Suggest augment water consumption and take stool softener as needed.           The patient will follow-up in 9 months.

## 2024-07-12 ENCOUNTER — OFFICE VISIT (OUTPATIENT)
Dept: FAMILY MEDICINE CLINIC | Facility: CLINIC | Age: 84
End: 2024-07-12
Payer: MEDICARE

## 2024-07-12 VITALS
BODY MASS INDEX: 25.43 KG/M2 | OXYGEN SATURATION: 98 % | HEART RATE: 44 BPM | WEIGHT: 152.6 LBS | DIASTOLIC BLOOD PRESSURE: 50 MMHG | SYSTOLIC BLOOD PRESSURE: 120 MMHG | HEIGHT: 65 IN

## 2024-07-12 DIAGNOSIS — R73.9 HYPERGLYCEMIA: Primary | ICD-10-CM

## 2024-07-12 LAB
EXPIRATION DATE: NORMAL
HBA1C MFR BLD: 5.6 % (ref 4.5–5.7)
Lab: NORMAL

## 2024-07-12 PROCEDURE — 99213 OFFICE O/P EST LOW 20 MIN: CPT | Performed by: FAMILY MEDICINE

## 2024-07-12 PROCEDURE — 1126F AMNT PAIN NOTED NONE PRSNT: CPT | Performed by: FAMILY MEDICINE

## 2024-07-12 PROCEDURE — 83036 HEMOGLOBIN GLYCOSYLATED A1C: CPT | Performed by: FAMILY MEDICINE

## 2024-07-12 PROCEDURE — 3044F HG A1C LEVEL LT 7.0%: CPT | Performed by: FAMILY MEDICINE

## 2024-07-12 PROCEDURE — 3074F SYST BP LT 130 MM HG: CPT | Performed by: FAMILY MEDICINE

## 2024-07-12 PROCEDURE — 3078F DIAST BP <80 MM HG: CPT | Performed by: FAMILY MEDICINE

## 2024-07-17 RX ORDER — ATORVASTATIN CALCIUM 40 MG/1
40 TABLET, FILM COATED ORAL NIGHTLY
Qty: 90 TABLET | Refills: 3 | Status: SHIPPED | OUTPATIENT
Start: 2024-07-17

## 2024-07-17 NOTE — TELEPHONE ENCOUNTER
Rx Refill Note  Requested Prescriptions     Pending Prescriptions Disp Refills    atorvastatin (LIPITOR) 40 MG tablet [Pharmacy Med Name: Atorvastatin Calcium 40 MG Oral Tablet] 90 tablet 3     Sig: TAKE 1 TABLET BY MOUTH EVERY  NIGHT      Last office visit with prescribing clinician: 7/12/2024   Last telemedicine visit with prescribing clinician: Visit date not found   Next office visit with prescribing clinician: 12/13/2024                         Would you like a call back once the refill request has been completed: [] Yes [] No    If the office needs to give you a call back, can they leave a voicemail: [] Yes [] No    Essie Reyna MA  07/17/24, 14:07 EDT

## 2024-07-23 RX ORDER — DIGOXIN 125 MCG
125 TABLET ORAL DAILY
Qty: 90 TABLET | Refills: 1 | Status: SHIPPED | OUTPATIENT
Start: 2024-07-23

## 2024-07-23 RX ORDER — CHLORTHALIDONE 25 MG/1
25 TABLET ORAL DAILY
Qty: 90 TABLET | Refills: 0 | Status: SHIPPED | OUTPATIENT
Start: 2024-07-23

## 2024-07-29 NOTE — PROGRESS NOTES
Subjective   Nathan Velarde is a 83 y.o. male.     Chief Complaint   Patient presents with    Hyperlipidemia     6 mo f/u for Hyperglycemia and hyperlipidemia   No longer taking potassium (recommendation from gastro)        History of Present Illness     Nathan Velarde presents today for   Chief Complaint   Patient presents with    Hyperlipidemia     6 mo f/u for Hyperglycemia and hyperlipidemia   No longer taking potassium (recommendation from gastro)      he is in need of chronic disease followup. he denies acute complaints today.    This patient is accompanied by their self who contributes to the history of their care.    The following portions of the patient's history were reviewed and updated as appropriate: allergies, current medications, past family history, past medical history, past social history, past surgical history and problem list.    Current Outpatient Medications   Medication Instructions    Alpha-Lipoic Acid 600 mg, Oral, Daily    aspirin-acetaminophen-caffeine (EXCEDRIN MIGRAINE) 250-250-65 MG per tablet 1 tablet, Oral, Every 6 Hours PRN    atorvastatin (LIPITOR) 40 mg, Oral, Nightly    Calcium Carbonate-Vitamin D (CALCIUM 600+D PO) 1 tablet, Oral, Daily    chlorthalidone (HYGROTON) 25 mg, Oral, Daily    cholecalciferol (VITAMIN D3) 1,000 Units, Oral, Daily    coenzyme Q10 100 mg, Oral, Daily    diclofenac (VOLTAREN) 75 mg, Oral, Daily    digoxin (LANOXIN) 125 mcg, Oral, Daily    dilTIAZem CD (CARDIZEM CD) 180 MG 24 hr capsule TAKE ONE CAPSULE BY MOUTH ONCE NIGHTLY    Fish Oil-Cholecalciferol (OMEGA-3 + D PO) 1 capsule, Oral, Daily    Glucosamine-Chondroit-Vit C-Mn (GLUCOSAMINE 1500 COMPLEX) capsule 1 tablet, Oral, Daily    losartan (COZAAR) 50 MG tablet TAKE 1 AND 1/2 TABLETS BY MOUTH  DAILY    Magnesium 250 mg, Oral, Daily    melatonin 3 mg, Oral, Nightly    mesalamine (DELZICOL) 800 mg, Oral, 2 Times Daily    Multiple Vitamins-Minerals (MULTIVITAMIN ADULT PO) 1 tablet, Oral, Daily    Saw [place]  160 MG capsule 1 capsule, Oral, Daily    Selenium 200 mcg, Oral, Daily    tamsulosin (FLOMAX) 0.4 MG capsule 24 hr capsule TAKE ONE CAPSULE BY MOUTH EVERY NIGHT AT BEDTIME    vitamin E 400 Units, Oral, Daily    Zinc 50 mg, Oral, Daily     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    Hyperglycemia 05/28/2016    Shoulder pain, left 09/26/2016    Preventative health care 11/29/2016    Status post total shoulder arthroplasty, 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    Knee osteoarthritis 01/01/2004     Resolved Ambulatory Problems     Diagnosis Date Noted    Neck pain 05/15/2016    Fatigue 05/15/2016    Ulcerative enterocolitis 05/15/2016    Impaired glucose tolerance 05/15/2016    Gross hematuria 05/15/2016    Viral pneumonia 04/22/2022    Acute respiratory failure with hypoxia 04/22/2022    Hypoxia 04/29/2022     Past Medical History:   Diagnosis Date    Aortic valve insufficiency     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     Benign prostatic hyperplasia     Bilateral bunions     Bladder cancer 1977    Chronic kidney disease 2000    DJD of left shoulder Adulthood    Hernia 1998    HTN (hypertension) 1990    Inflammatory bowel disease 1977    Mitral valve insufficiency     PAF (paroxysmal atrial fibrillation) 2008    Prediabetes 2015    Wears glasses      Past Surgical History:   Procedure Laterality Date    BICEPS TENDON REPAIR Left 05/07/2018    Procedure: BICEPS TENDODESIS;  Surgeon: Abner Humphrey MD;   Location:  [facility];  Service: Orthopedics    BLADDER SURGERY      CATARACT EXTRACTION, BILATERAL      COLONOSCOPY      Colitis in remission    CYSTOSCOPY      Excision of bladder cancer    FLEXIBLE SIGMOIDOSCOPY  1977    INGUINAL HERNIA REPAIR Right 1994    KNEE SURGERY Right     Excision of cyst    LIPOMA EXCISION      TONSILLECTOMY  1943    TOTAL SHOULDER ARTHROPLASTY Left 2018    Procedure: LEFT TOTAL SHOULDER ARTHROPLASTY;  Surgeon: Abner Humphrey MD;  Location:  [facility];  Service: Orthopedics    TOTAL SHOULDER ARTHROPLASTY Right 2021    Procedure: NAVIGATED REVERSE SHOULDER ARTHROPLASTY RIGHT, BICEPS TENODESIS - RIGHT;  Surgeon: Abner Humphrey MD;  Location:  [facility];  Service: Orthopedics;  Laterality: Right;    UPPER GASTROINTESTINAL ENDOSCOPY       Family History   Problem Relation Age of Onset    Alzheimer's disease Mother          age 89    Arthritis Mother             Hyperlipidemia Mother     Angina Father     Hypertension Father     Pneumonia Father          age 70 - tobacco    Aneurysm Father         abd aortic    Vision loss Father         AMD    Unexplained death Brother     Breast cancer Maternal Grandmother     Arthritis Maternal Grandmother             Diabetes Paternal Uncle      Social History     Socioeconomic History    Marital status:    Tobacco Use    Smoking status: Never    Smokeless tobacco: Never   Vaping Use    Vaping status: Never Used   Substance and Sexual Activity    Alcohol use: Yes     Alcohol/week: 2.0 standard drinks of alcohol     Types: 2 Cans of beer per week     Comment: OCC    Drug use: Never    Sexual activity: Yes     Partners: Female     Birth control/protection: None, Tubal ligation       Review of Systems  Review of Systems -  General ROS: negative for - chills, fever or night sweats  Cardiovascular ROS: no chest pain or dyspnea on exertion  Gastrointestinal ROS: no abdominal pain, change in  "bowel habits, or black or bloody stools  Genito-Urinary ROS: no trouble voiding or gross hematuria    Objective   Blood pressure 120/50, pulse (!) 44, height 165.1 cm (65\"), weight 69.2 kg (152 lb 9.6 oz), SpO2 98%.  Nursing note reviewed  Physical Exam  Const: NAD, A&Ox4, Pleasant, Cooperative  Eyes: EOMI, no conjunctivitis  ENT: No nasal discharge present, neck supple  Cardiac: Regular rate and rhythm, no cyanosis  Resp: Respiratory rate within normal limits, no increased work of breathing, no audible wheezing or retractions noted  GI: No distention or ascites  Procedures  Assessment & Plan     Problem List Items Addressed This Visit          Endocrine and Metabolic    Hyperglycemia - Primary    Current Assessment & Plan     A1c today 5.6%, down from 6.1% in January         Relevant Orders    POC Glycosylated Hemoglobin (Hb A1C) (Completed)     See patient diagnoses and orders along with patient instructions for assessment, plan, and changes to care for patient.    Patient Instructions   A1c 5.6% today, down from 6.1% doing very well  Ok to stay off potassium    Return in about 5 months (around 12/12/2024) for Medicare Wellness.    Ambulatory progress note signed and attested to by Rell Ahuja D.O.         "

## 2024-08-02 RX ORDER — TAMSULOSIN HYDROCHLORIDE 0.4 MG/1
CAPSULE ORAL
Qty: 90 CAPSULE | Refills: 0 | Status: SHIPPED | OUTPATIENT
Start: 2024-08-02

## 2024-08-02 NOTE — TELEPHONE ENCOUNTER
Rx Refill Note  Requested Prescriptions     Pending Prescriptions Disp Refills    tamsulosin (FLOMAX) 0.4 MG capsule 24 hr capsule [Pharmacy Med Name: TAMSULOSIN HCL 0.4 MG CAPSULE] 90 capsule 0     Sig: TAKE ONE CAPSULE BY MOUTH EVERY NIGHT AT BEDTIME      Last office visit with prescribing clinician: 7/12/2024   Last telemedicine visit with prescribing clinician: Visit date not found   Next office visit with prescribing clinician: 12/13/2024                         Would you like a call back once the refill request has been completed: [] Yes [] No    If the office needs to give you a call back, can they leave a voicemail: [] Yes [] No    Mitzi Cormier MA  08/02/24, 13:57 EDT

## 2024-09-07 NOTE — TELEPHONE ENCOUNTER
Rx Refill Note  Requested Prescriptions     Pending Prescriptions Disp Refills    mesalamine (DELZICOL) 400 MG capsule delayed-release delayed release capsule [Pharmacy Med Name: Mesalamine 400 MG Oral Capsule Delayed Release] 360 capsule 3     Sig: TAKE 2 CAPSULES BY MOUTH TWICE  DAILY      Last office visit with prescribing clinician: 7/12/2024   Last telemedicine visit with prescribing clinician: Visit date not found   Next office visit with prescribing clinician: 12/13/2024                         Would you like a call back once the refill request has been completed: [] Yes [] No    If the office needs to give you a call back, can they leave a voicemail: [] Yes [] No    Mitzi Cormier MA  09/07/24, 09:48 EDT

## 2024-09-09 RX ORDER — MESALAMINE 400 MG/1
800 CAPSULE, DELAYED RELEASE ORAL 2 TIMES DAILY
Qty: 360 CAPSULE | Refills: 3 | Status: SHIPPED | OUTPATIENT
Start: 2024-09-09

## 2024-09-13 RX ORDER — DILTIAZEM HYDROCHLORIDE 180 MG/1
CAPSULE, COATED, EXTENDED RELEASE ORAL
Qty: 90 CAPSULE | Refills: 0 | Status: SHIPPED | OUTPATIENT
Start: 2024-09-13

## 2024-10-04 RX ORDER — CHLORTHALIDONE 25 MG/1
25 TABLET ORAL DAILY
Qty: 90 TABLET | Refills: 0 | Status: SHIPPED | OUTPATIENT
Start: 2024-10-04

## 2024-11-08 RX ORDER — TAMSULOSIN HYDROCHLORIDE 0.4 MG/1
CAPSULE ORAL
Qty: 90 CAPSULE | Refills: 0 | Status: SHIPPED | OUTPATIENT
Start: 2024-11-08

## 2024-11-08 NOTE — TELEPHONE ENCOUNTER
Rx Refill Note  Requested Prescriptions     Pending Prescriptions Disp Refills    tamsulosin (FLOMAX) 0.4 MG capsule 24 hr capsule [Pharmacy Med Name: TAMSULOSIN HCL 0.4 MG CAPSULE] 90 capsule 0     Sig: TAKE ONE CAPSULE BY MOUTH EVERY NIGHT AT BEDTIME      Last office visit with prescribing clinician: 7/12/2024   Last telemedicine visit with prescribing clinician: Visit date not found   Next office visit with prescribing clinician: 12/13/2024                         Would you like a call back once the refill request has been completed: [] Yes [] No    If the office needs to give you a call back, can they leave a voicemail: [] Yes [] No    Essie Reyna MA  11/08/24, 12:21 EST

## 2024-12-13 ENCOUNTER — OFFICE VISIT (OUTPATIENT)
Dept: FAMILY MEDICINE CLINIC | Facility: CLINIC | Age: 84
End: 2024-12-13
Payer: MEDICARE

## 2024-12-13 VITALS
HEART RATE: 46 BPM | DIASTOLIC BLOOD PRESSURE: 55 MMHG | SYSTOLIC BLOOD PRESSURE: 170 MMHG | WEIGHT: 150.4 LBS | BODY MASS INDEX: 25.06 KG/M2 | OXYGEN SATURATION: 97 % | HEIGHT: 65 IN

## 2024-12-13 DIAGNOSIS — I35.9 AORTIC VALVE DISORDER: ICD-10-CM

## 2024-12-13 DIAGNOSIS — Z12.11 COLON CANCER SCREENING: ICD-10-CM

## 2024-12-13 DIAGNOSIS — I48.0 PAROXYSMAL ATRIAL FIBRILLATION: ICD-10-CM

## 2024-12-13 DIAGNOSIS — E55.9 VITAMIN D DEFICIENCY: ICD-10-CM

## 2024-12-13 DIAGNOSIS — Z79.899 ENCOUNTER FOR MONITORING DIGOXIN THERAPY: ICD-10-CM

## 2024-12-13 DIAGNOSIS — D64.9 ANEMIA, UNSPECIFIED TYPE: ICD-10-CM

## 2024-12-13 DIAGNOSIS — I10 ESSENTIAL HYPERTENSION: ICD-10-CM

## 2024-12-13 DIAGNOSIS — Z51.81 ENCOUNTER FOR MONITORING DIGOXIN THERAPY: ICD-10-CM

## 2024-12-13 DIAGNOSIS — Z13.0 SCREENING FOR DEFICIENCY ANEMIA: ICD-10-CM

## 2024-12-13 DIAGNOSIS — Z13.29 SCREENING FOR ENDOCRINE DISORDER: ICD-10-CM

## 2024-12-13 DIAGNOSIS — Z13.89 SCREENING FOR BLOOD OR PROTEIN IN URINE: ICD-10-CM

## 2024-12-13 DIAGNOSIS — I10 PRIMARY HYPERTENSION: ICD-10-CM

## 2024-12-13 DIAGNOSIS — E78.2 MIXED HYPERLIPIDEMIA: ICD-10-CM

## 2024-12-13 DIAGNOSIS — R73.9 HYPERGLYCEMIA: ICD-10-CM

## 2024-12-13 DIAGNOSIS — Z00.00 MEDICARE ANNUAL WELLNESS VISIT, SUBSEQUENT: Primary | ICD-10-CM

## 2024-12-13 DIAGNOSIS — Z12.5 SCREENING PSA (PROSTATE SPECIFIC ANTIGEN): ICD-10-CM

## 2024-12-13 PROCEDURE — 99397 PER PM REEVAL EST PAT 65+ YR: CPT | Performed by: FAMILY MEDICINE

## 2024-12-13 PROCEDURE — 3078F DIAST BP <80 MM HG: CPT | Performed by: FAMILY MEDICINE

## 2024-12-13 PROCEDURE — 3077F SYST BP >= 140 MM HG: CPT | Performed by: FAMILY MEDICINE

## 2024-12-13 PROCEDURE — G0439 PPPS, SUBSEQ VISIT: HCPCS | Performed by: FAMILY MEDICINE

## 2024-12-13 PROCEDURE — 1126F AMNT PAIN NOTED NONE PRSNT: CPT | Performed by: FAMILY MEDICINE

## 2024-12-13 NOTE — ASSESSMENT & PLAN NOTE
Getting tired easily, more SOA exertion  Recommend repeat echo, referral to cardiology (has been a few years since he saw them)    Orders:    Ambulatory Referral to Cardiology    Adult Transthoracic Echo Complete W/ Cont if Necessary Per Protocol; Future

## 2024-12-13 NOTE — PROGRESS NOTES
Subjective   The ABCs of the Annual Wellness Visit  Medicare Wellness Visit      Nathan Velarde is a 84 y.o. patient who presents for a Medicare Wellness Visit.    The following portions of the patient's history were reviewed and   updated as appropriate: allergies, current medications, past family history, past medical history, past social history, past surgical history, and problem list.    Compared to one year ago, the patient's physical   health is the same.  Compared to one year ago, the patient's mental   health is the same.    Recent Hospitalizations:  He was not admitted to the hospital during the last year.     Current Medical Providers:  Patient Care Team:  Rell Ahuja DO as PCP - General (Family Medicine)  Ry Feliciano MD as Consulting Physician (Gastroenterology)  Fernando Sanders MD as Consulting Physician (Otolaryngology)    Outpatient Medications Prior to Visit   Medication Sig Dispense Refill    Alpha-Lipoic Acid 600 MG capsule Take 600 mg by mouth Daily.      aspirin-acetaminophen-caffeine (EXCEDRIN MIGRAINE) 250-250-65 MG per tablet Take 1 tablet by mouth Every 6 (Six) Hours As Needed for Headache.      atorvastatin (LIPITOR) 40 MG tablet TAKE 1 TABLET BY MOUTH EVERY  NIGHT 90 tablet 3    Calcium Carbonate-Vitamin D (CALCIUM 600+D PO) Take 1 tablet by mouth Daily.      chlorthalidone (HYGROTON) 25 MG tablet TAKE 1 TABLET BY MOUTH DAILY 90 tablet 0    cholecalciferol (VITAMIN D3) 1000 units tablet Take 1 tablet by mouth Daily.      coenzyme Q10 100 MG capsule Take 1 capsule by mouth Daily.      diclofenac (VOLTAREN) 75 MG EC tablet TAKE 1 TABLET BY MOUTH DAILY 90 tablet 3    digoxin (LANOXIN) 125 MCG tablet TAKE 1 TABLET BY MOUTH DAILY 90 tablet 1    dilTIAZem CD (CARDIZEM CD) 180 MG 24 hr capsule TAKE ONE CAPSULE BY MOUTH ONCE NIGHTLY 90 capsule 0    Fish Oil-Cholecalciferol (OMEGA-3 + D PO) Take 1 capsule by mouth Daily.      Glucosamine-Chondroit-Vit C-Mn (GLUCOSAMINE 1500  COMPLEX) capsule Take 1,500 mg by mouth Daily.      losartan (COZAAR) 50 MG tablet TAKE 1 AND 1/2 TABLETS BY MOUTH  DAILY 135 tablet 3    Magnesium 250 MG tablet Take 1 tablet by mouth Daily.      melatonin 3 MG tablet Take 1 tablet by mouth Every Night.      mesalamine (DELZICOL) 400 MG capsule delayed-release delayed release capsule TAKE 2 CAPSULES BY MOUTH TWICE  DAILY 360 capsule 3    Multiple Vitamins-Minerals (MULTIVITAMIN ADULT PO) Take 1 tablet by mouth Daily.      Saw Palmetto 160 MG capsule Take 1 capsule by mouth Daily.      Selenium 200 MCG capsule Take 200 mcg by mouth Daily.      tamsulosin (FLOMAX) 0.4 MG capsule 24 hr capsule TAKE ONE CAPSULE BY MOUTH EVERY NIGHT AT BEDTIME 90 capsule 0    vitamin E 400 UNIT capsule Take 1 capsule by mouth Daily.      Zinc 50 MG capsule Take 50 mg by mouth Daily.       No facility-administered medications prior to visit.     No opioid medication identified on active medication list. I have reviewed chart for other potential  high risk medication/s and harmful drug interactions in the elderly.      Aspirin is not on active medication list.  Aspirin use is not indicated based on review of current medical condition/s. Risk of harm outweighs potential benefits.  .    Patient Active Problem List   Diagnosis    Anemia    Aortic valve disorder    Ataxic gait    Paroxysmal atrial fibrillation    Prostatism    Ulcerative colitis    Transverse myelitis    Testosterone deficiency    Paresthesia    Lumbosacral neuritis    Osteoarthritis of lumbar spine    Hypertension    Hyperlipidemia    Generalized osteoarthritis    CKD (chronic kidney disease), stage II    Hyperglycemia    Shoulder pain, left    Preventative health care    Status post total shoulder arthroplasty, left    Knee osteoarthritis     Advance Care Planning Advance Directive is not on file.  ACP discussion was held with the patient during this visit. Patient does not have an advance directive, information provided.   "          Objective   Vitals:    24 1347   BP: 170/55   Pulse: (!) 46   SpO2: 97%   Weight: 68.2 kg (150 lb 6.4 oz)   Height: 165.1 cm (65\")   PainSc: 0-No pain       Estimated body mass index is 25.03 kg/m² as calculated from the following:    Height as of this encounter: 165.1 cm (65\").    Weight as of this encounter: 68.2 kg (150 lb 6.4 oz).            Does the patient have evidence of cognitive impairment? No  Lab Results   Component Value Date    TRIG 83 2024    HDL 32 (L) 2024    LDL 52 2024    VLDL 17 2024    HGBA1C 5.60 2024   ATTENTION  What is the year: correct  What is the month of the year: correct  What is the day of the week?: correct  What is the date?: correct  MEMORY  Repeat address three times, only score third attempt: Karl Cabrera [address] [place] [address]: 7  HOW MANY ANIMALS DID THE PATIENT NAME  Verbal Fluency -- Animal Names (0-25): 17-21  CLOCK DRAWING  Clock Drawing: All Correct  MEMORY RECALL  Tell me what you remember about that name and address we were repeating at the beginnin  ACE TOTAL SCORE  Total ACE Score - <25/30 strongly suggests cognitive impairment; <21/30 almost certainly shows dementia: 28                                                                                              Health  Risk Assessment    Smoking Status:  Social History     Tobacco Use   Smoking Status Never   Smokeless Tobacco Never     Alcohol Consumption:  Social History     Substance and Sexual Activity   Alcohol Use Yes    Alcohol/week: 2.0 standard drinks of alcohol    Types: 2 Cans of beer per week    Comment: OCC       Fall Risk Screen  STEADI Fall Risk Assessment was completed, and patient is at LOW risk for falls.Assessment completed on:2024    Depression Screening   Little interest or pleasure in doing things? Not at all   Feeling down, depressed, or hopeless? Not at all   PHQ-2 Total Score 0      Health Habits and Functional and Cognitive " Screenin/13/2024     2:00 PM   Functional & Cognitive Status   Do you have difficulty preparing food and eating? No   Do you have difficulty bathing yourself, getting dressed or grooming yourself? No   Do you have difficulty using the toilet? No   Do you have difficulty moving around from place to place? No   Do you have trouble with steps or getting out of a bed or a chair? No   Current Diet Well Balanced Diet   Dental Exam Up to date   Eye Exam Up to date   Exercise (times per week) 5 times per week   Current Exercises Include Weightlifting;Other   Do you need help using the phone?  No   Are you deaf or do you have serious difficulty hearing?  Yes   Do you need help to go to places out of walking distance? No   Do you need help shopping? No   Do you need help preparing meals?  No   Do you need help with housework?  No   Do you need help with laundry? No   Do you need help taking your medications? No   Do you need help managing money? No   Do you ever drive or ride in a car without wearing a seat belt? No   Have you felt unusual stress, anger or loneliness in the last month? No   Who do you live with? Spouse   If you need help, do you have trouble finding someone available to you? No   Have you been bothered in the last four weeks by sexual problems? No   Do you have difficulty concentrating, remembering or making decisions? No           Age-appropriate Screening Schedule:  Refer to the list below for future screening recommendations based on patient's age, sex and/or medical conditions. Orders for these recommended tests are listed in the plan section. The patient has been provided with a written plan.    Health Maintenance List  Health Maintenance   Topic Date Due    ZOSTER VACCINE (1 of 2) Never done    Pneumococcal Vaccine 65+ (2 of 2 - PCV) 2011    RSV Vaccine - Adults (1 - 1-dose 75+ series) Never done    COVID-19 Vaccine (2024- season) 2024    BMI FOLLOWUP  2025     "COLORECTAL CANCER SCREENING  10/01/2025    ANNUAL WELLNESS VISIT  12/13/2025    LIPID PANEL  12/16/2025    TDAP/TD VACCINES (3 - Td or Tdap) 12/20/2027    INFLUENZA VACCINE  Completed                                                                                                                                                CMS Preventative Services Quick Reference  Risk Factors Identified During Encounter  None Identified    The above risks/problems have been discussed with the patient.  Pertinent information has been shared with the patient in the After Visit Summary.  An After Visit Summary and PPPS were made available to the patient.    Follow Up:   Next Medicare Wellness visit to be scheduled in 1 year.         Additional E&M Note during same encounter follows:  Patient has additional, significant, and separately identifiable condition(s)/problem(s) that require work above and beyond the Medicare Wellness Visit     Chief Complaint  Medicare Wellness-subsequent and Anorexia (Pt states that he has been having a loss of appetite )    Subjective   HPI  Ja is also being seen today for an annual adult preventative physical exam.   Reports he has been losing his appetite and losing some weight over the last year. Down from 152 to 142 at home, has stabilized around there for the last 3-4 months. Still having sporadic chest pain, SOA.  Still exercising 3-4x per week.  Some occasional constipation              Objective   Vital Signs:  /55   Pulse (!) 46   Ht 165.1 cm (65\")   Wt 68.2 kg (150 lb 6.4 oz)   SpO2 97%   BMI 25.03 kg/m²   Physical Exam  Const: NAD, A&Ox4, Pleasant, Cooperative  Eyes: EOMI, no conjunctivitis  ENT: No nasal discharge present, neck supple  Cardiac: Regular rate and rhythm, no cyanosis  Resp: Respiratory rate within normal limits, no increased work of breathing, no audible wheezing or retractions noted  GI: No distention or ascites                Assessment and Plan          "   Medicare annual wellness visit, subsequent         Screening for endocrine disorder    Orders:    Comprehensive Metabolic Panel; Future    TSH Rfx On Abnormal To Free T4; Future    Screening for deficiency anemia    Orders:    CBC & Differential; Future    Screening for blood or protein in urine    Orders:    Urinalysis With Microscopic If Indicated (No Culture) - Urine, Clean Catch; Future    Screening PSA (prostate specific antigen)    Orders:    PSA Screen; Future    Vitamin D deficiency    Orders:    Vitamin D,25-Hydroxy; Future    Hyperglycemia    Orders:    Hemoglobin A1c; Future    Essential hypertension           Mixed hyperlipidemia       Orders:    Lipid Panel; Future    High Sensitivity CRP; Future    Encounter for monitoring digoxin therapy         Aortic valve disorder  Getting tired easily, more SOA exertion  Recommend repeat echo, referral to cardiology (has been a few years since he saw them)    Orders:    Ambulatory Referral to Cardiology    Adult Transthoracic Echo Complete W/ Cont if Necessary Per Protocol; Future    Paroxysmal atrial fibrillation    Orders:    Ambulatory Referral to Cardiology    Adult Transthoracic Echo Complete W/ Cont if Necessary Per Protocol; Future    Primary hypertension           Colon cancer screening    Orders:    Ambulatory Referral For Screening Colonoscopy    Anemia, unspecified type    Orders:    ferrous gluconate (FERGON) 324 MG tablet; Take 1 tablet by mouth Daily With Breakfast. For anemia. Recheck 6 weeks    CBC & Differential; Future      Problem List Items Addressed This Visit          Cardiac and Vasculature    Aortic valve disorder    Overview     1. Echo 8-23-19: Moderate to severe aortic valve regurgitation is present.  Echo 2022: Mild aortic stenosis           Current Assessment & Plan     Getting tired easily, more SOA exertion  Recommend repeat echo, referral to cardiology (has been a few years since he saw them)    Orders:    Ambulatory Referral to  Cardiology    Adult Transthoracic Echo Complete W/ Cont if Necessary Per Protocol; Future           Relevant Orders    Ambulatory Referral to Cardiology    Adult Transthoracic Echo Complete W/ Cont if Necessary Per Protocol    Paroxysmal atrial fibrillation    Current Assessment & Plan       Orders:    Ambulatory Referral to Cardiology    Adult Transthoracic Echo Complete W/ Cont if Necessary Per Protocol; Future           Relevant Orders    Ambulatory Referral to Cardiology    Adult Transthoracic Echo Complete W/ Cont if Necessary Per Protocol    Hypertension    Overview     Diltiazem , losartan 50mg, chlorthalidone 25mg. Amlodipine dc at hospitalization April 2023         Current Assessment & Plan                     Hyperlipidemia    Overview     Atorvastatin 40mg         Current Assessment & Plan          Orders:    Lipid Panel; Future    High Sensitivity CRP; Future           Relevant Orders    Lipid Panel (Completed)    High Sensitivity CRP (Completed)       Endocrine and Metabolic    Hyperglycemia    Current Assessment & Plan       Orders:    Hemoglobin A1c; Future           Relevant Orders    Hemoglobin A1c (Completed)       Hematology and Neoplasia    Anemia    Relevant Medications    ferrous gluconate (FERGON) 324 MG tablet    Other Relevant Orders    CBC & Differential     Other Visit Diagnoses       Medicare annual wellness visit, subsequent    -  Primary    Screening for endocrine disorder        Relevant Orders    Comprehensive Metabolic Panel (Completed)    TSH Rfx On Abnormal To Free T4 (Completed)    Screening for deficiency anemia        Relevant Orders    CBC & Differential (Completed)    Screening for blood or protein in urine        Relevant Orders    Urinalysis With Microscopic If Indicated (No Culture) - Urine, Clean Catch (Completed)    Screening PSA (prostate specific antigen)        Relevant Orders    PSA Screen (Completed)    Vitamin D deficiency        Relevant Orders    Vitamin  D,25-Hydroxy (Completed)    Essential hypertension        Encounter for monitoring digoxin therapy        Colon cancer screening        Relevant Orders    Ambulatory Referral For Screening Colonoscopy          Drink 64oz water per day (>40 definitely)  Echo and cardiologist referral  If your PSA is >10, I will place a referral to a urologist  Down to 4.9  Follow up with Dr. Feliciano for colonoscopy- New referral also placed  Monitor weight, maintain around 145    The wellness exam has been reviewed in detail.  The patient has been fully counseled on preventative guidelines for vaccines, cancer screenings, and other health maintenance needs.  Functional testing has been performed to assess capacity for independent living and need for other medical interventions.   The patient was counseled on maintaining a lifestyle to promote good health and to minimize chronic diseases.  The patient has been assisted with scheduling healthcare procedures for the coming year and given a written document outlining these recommendations.    Return in about 3 months (around 3/13/2025) for weight.          Follow Up   Return in about 3 months (around 3/13/2025) for weight.  Patient was given instructions and counseling regarding his condition or for health maintenance advice. Please see specific information pulled into the AVS if appropriate.

## 2024-12-13 NOTE — ASSESSMENT & PLAN NOTE
Orders:    Ambulatory Referral to Cardiology    Adult Transthoracic Echo Complete W/ Cont if Necessary Per Protocol; Future

## 2024-12-13 NOTE — PATIENT INSTRUCTIONS
Drink 64oz water per day (>40 definitely)  Echo and cardiologist referral  If your PSA is >10, I will place a referral to a urologist  Follow up with Dr. Feliciano for colonoscopy  Monitor weight, maintain around 145        Advance Care Planning and Advance Directives     You make decisions on a daily basis - decisions about where you want to live, your career, your home, your life. Perhaps one of the most important decisions you face is your choice for future medical care. Take time to talk with your family and your healthcare team and start planning today.  Advance Care Planning is a process that can help you:  Understand possible future healthcare decisions in light of your own experiences  Reflect on those decision in light of your goals and values  Discuss your decisions with those closest to you and the healthcare professionals that care for you  Make a plan by creating a document that reflects your wishes    Surrogate Decision Maker  In the event of a medical emergency, which has left you unable to communicate or to make your own decisions, you would need someone to make decisions for you.  It is important to discuss your preferences for medical treatment with this person while you are in good health.     Qualities of a surrogate decision maker:  Willing to take on this role and responsibility  Knows what you want for future medical care  Willing to follow your wishes even if they don't agree with them  Able to make difficult medical decisions under stressful circumstances    Advance Directives  These are legal documents you can create that will guide your healthcare team and decision maker(s) when needed. These documents can be stored in the electronic medical record.    Living Will - a legal document to guide your care if you have a terminal condition or a serious illness and are unable to communicate. States vary by statute in document names/types, but most forms may include one or more of the following:         -  Directions regarding life-prolonging treatments        -  Directions regarding artificially provided nutrition/hydration        -  Choosing a healthcare decision maker        -  Direction regarding organ/tissue donation    Durable Power of  for Healthcare - this document names an -in-fact to make medical decisions for you, but it may also allow this person to make personal and financial decisions for you. Please seek the advice of an  if you need this type of document.    **Advance Directives are not required and no one may discriminate against you if you do not sign one.    Medical Orders  Many states allow specific forms/orders signed by your physician to record your wishes for medical treatment in your current state of health. This form, signed in personal communication with your physician, addresses resuscitation and other medical interventions that you may or may not want.      For more information or to schedule a time with a [facility] Advance Care Planning Facilitator contact: [facility] or call [phone] and someone will contact you directly.  You are due for Shingrix vaccination series ( the newest shingles vaccine).  It is a two shot series spaced 2-6 months apart. Please get this vaccine series started at your earliest convenience at your local pharmacy to help avoid shingles outbreak. It is more effective than the old Zostavax vaccine and is recommended even if you have had the Zostavax vaccine in the past.  Once the Shingrix series is completed, it does not need to be repeated.   For more information, please look at the website below:  https://www.cdc.gov/vaccines/vpd/shingles/public/shingrix/index.html    You are due for RSV vaccination. (provides protection against Respiratory Syncytial Virus Infection) Please  get the immunization at your local pharmacy at your earliest convenience. This immunization is currently a one time vaccine only. Please click  on the link for more information about this vaccine.   https://www.cdc.gov/rsv/vaccines/older-adults.html    You are due for a Covid 19 vaccination. (provides protection against Covid 19 Viral Infection) Please  talk to your pharmacist and get the immunization at your local pharmacy at your earliest convenience. Please click on the link for more information about this vaccine.   https://www.cdc.gov/coronavirus/2019-ncov/vaccines/stay-up-to-date.html        Medicare Wellness  Personal Prevention Plan of Service     Date of Office Visit:    Encounter Provider:  Rell Ahuja DO  Place of Service:  [facility]  Patient Name: Nathan Velarde  :  1940    As part of the Medicare Wellness portion of your visit today, we are providing you with this personalized preventive plan of services (PPPS). This plan is based upon recommendations of the [place] Preventive Services Task Force (USPSTF) and the Advisory Committee on Immunization Practices (ACIP).    This lists the preventive care services that should be considered, and provides dates of when you are due. Items listed as completed are up-to-date and do not require any further intervention.    Health Maintenance   Topic Date Due   • ZOSTER VACCINE (1 of 2) Never done   • Pneumococcal Vaccine 65+ (2 of 2 - PCV) 2011   • RSV Vaccine - Adults (1 - 1-dose 75+ series) Never done   • ANNUAL WELLNESS VISIT  2024   • COVID-19 Vaccine ( season) 2024   • BMI FOLLOWUP  2025   • LIPID PANEL  2025   • COLORECTAL CANCER SCREENING  10/01/2025   • TDAP/TD VACCINES (3 - Td or Tdap) 2027   • INFLUENZA VACCINE  Completed       Orders Placed This Encounter   Procedures   • Lipid Panel     Standing Status:   Future     Standing Expiration Date:   2025     Order Specific Question:   Release to patient     Answer:   Routine Release [[id number]]   • Hemoglobin A1c     Standing Status:   Future      Standing Expiration Date:   12/13/2025     Order Specific Question:   Release to patient     Answer:   Routine Release [[id number]]   • Comprehensive Metabolic Panel     Standing Status:   Future     Standing Expiration Date:   12/13/2025     Order Specific Question:   Release to patient     Answer:   Routine Release [[id number]]   • Urinalysis With Microscopic If Indicated (No Culture) - Urine, Clean Catch     Standing Status:   Future     Standing Expiration Date:   12/13/2025     Order Specific Question:   Release to patient     Answer:   Routine Release [[id number]]   • TSH Rfx On Abnormal To Free T4     Standing Status:   Future     Standing Expiration Date:   12/13/2025     Order Specific Question:   Release to patient     Answer:   Routine Release [[id number]]   • PSA Screen     Standing Status:   Future     Standing Expiration Date:   12/13/2025     Order Specific Question:   Release to patient     Answer:   Routine Release [[id number]]   • Vitamin D,25-Hydroxy     Standing Status:   Future     Standing Expiration Date:   12/13/2025     Order Specific Question:   Release to patient     Answer:   Routine Release [[id number]]   • Ambulatory Referral to Cardiology     Referral Priority:   Routine     Referral Type:   Consultation     Referral Reason:   Specialty Services Required     Requested Specialty:   Cardiology     Number of Visits Requested:   1   • Adult Transthoracic Echo Complete W/ Cont if Necessary Per Protocol     Standing Status:   Future     Standing Expiration Date:   12/13/2025     Order Specific Question:   Reason for exam?     Answer:   Valvular Function     Order Specific Question:   Release to patient     Answer:   Routine Release [[id number]]   • CBC & Differential     Standing Status:   Future     Standing Expiration Date:   12/13/2025     Order Specific Question:   Manual Differential     Answer:   No     Order Specific Question:   Release to patient     Answer:   Routine Release  [[id number]]       Return in about 3 months (around 3/13/2025) for weight.

## 2024-12-16 ENCOUNTER — LAB (OUTPATIENT)
Dept: LAB | Facility: HOSPITAL | Age: 84
End: 2024-12-16
Payer: MEDICARE

## 2024-12-16 DIAGNOSIS — Z13.89 SCREENING FOR BLOOD OR PROTEIN IN URINE: ICD-10-CM

## 2024-12-16 DIAGNOSIS — Z13.0 SCREENING FOR DEFICIENCY ANEMIA: ICD-10-CM

## 2024-12-16 DIAGNOSIS — R73.9 HYPERGLYCEMIA: ICD-10-CM

## 2024-12-16 DIAGNOSIS — E55.9 VITAMIN D DEFICIENCY: ICD-10-CM

## 2024-12-16 DIAGNOSIS — E78.2 MIXED HYPERLIPIDEMIA: ICD-10-CM

## 2024-12-16 DIAGNOSIS — Z12.5 SCREENING PSA (PROSTATE SPECIFIC ANTIGEN): ICD-10-CM

## 2024-12-16 DIAGNOSIS — Z13.29 SCREENING FOR ENDOCRINE DISORDER: ICD-10-CM

## 2024-12-16 LAB
25(OH)D3 SERPL-MCNC: 71.3 NG/ML (ref 30–100)
ALBUMIN SERPL-MCNC: 3.9 G/DL (ref 3.5–5.2)
ALBUMIN/GLOB SERPL: 1.4 G/DL
ALP SERPL-CCNC: 125 U/L (ref 39–117)
ALT SERPL W P-5'-P-CCNC: 38 U/L (ref 1–41)
ANION GAP SERPL CALCULATED.3IONS-SCNC: 10 MMOL/L (ref 5–15)
AST SERPL-CCNC: 38 U/L (ref 1–40)
BACTERIA UR QL AUTO: NORMAL /HPF
BASOPHILS # BLD AUTO: 0.05 10*3/MM3 (ref 0–0.2)
BASOPHILS NFR BLD AUTO: 0.5 % (ref 0–1.5)
BILIRUB SERPL-MCNC: 0.5 MG/DL (ref 0–1.2)
BILIRUB UR QL STRIP: NEGATIVE
BUN SERPL-MCNC: 38 MG/DL (ref 8–23)
BUN/CREAT SERPL: 23.9 (ref 7–25)
CALCIUM SPEC-SCNC: 9.6 MG/DL (ref 8.6–10.5)
CHLORIDE SERPL-SCNC: 107 MMOL/L (ref 98–107)
CHOLEST SERPL-MCNC: 101 MG/DL (ref 0–200)
CLARITY UR: CLEAR
CO2 SERPL-SCNC: 25 MMOL/L (ref 22–29)
COLOR UR: ABNORMAL
CREAT SERPL-MCNC: 1.59 MG/DL (ref 0.76–1.27)
CRP SERPL-MCNC: 0.66 MG/DL (ref 0.01–0.5)
DEPRECATED RDW RBC AUTO: 48 FL (ref 37–54)
EGFRCR SERPLBLD CKD-EPI 2021: 42.5 ML/MIN/1.73
EOSINOPHIL # BLD AUTO: 0.49 10*3/MM3 (ref 0–0.4)
EOSINOPHIL NFR BLD AUTO: 4.5 % (ref 0.3–6.2)
ERYTHROCYTE [DISTWIDTH] IN BLOOD BY AUTOMATED COUNT: 15.7 % (ref 12.3–15.4)
GLOBULIN UR ELPH-MCNC: 2.8 GM/DL
GLUCOSE SERPL-MCNC: 123 MG/DL (ref 65–99)
GLUCOSE UR STRIP-MCNC: NEGATIVE MG/DL
HBA1C MFR BLD: 5.6 % (ref 4.8–5.6)
HCT VFR BLD AUTO: 34.7 % (ref 37.5–51)
HDLC SERPL-MCNC: 32 MG/DL (ref 40–60)
HGB BLD-MCNC: 10.4 G/DL (ref 13–17.7)
HGB UR QL STRIP.AUTO: NEGATIVE
HYALINE CASTS UR QL AUTO: NORMAL /LPF
IMM GRANULOCYTES # BLD AUTO: 0.02 10*3/MM3 (ref 0–0.05)
IMM GRANULOCYTES NFR BLD AUTO: 0.2 % (ref 0–0.5)
KETONES UR QL STRIP: NEGATIVE
LDLC SERPL CALC-MCNC: 52 MG/DL (ref 0–100)
LDLC/HDLC SERPL: 1.64 {RATIO}
LEUKOCYTE ESTERASE UR QL STRIP.AUTO: NEGATIVE
LYMPHOCYTES # BLD AUTO: 1.67 10*3/MM3 (ref 0.7–3.1)
LYMPHOCYTES NFR BLD AUTO: 15.4 % (ref 19.6–45.3)
MCH RBC QN AUTO: 25.6 PG (ref 26.6–33)
MCHC RBC AUTO-ENTMCNC: 30 G/DL (ref 31.5–35.7)
MCV RBC AUTO: 85.3 FL (ref 79–97)
MONOCYTES # BLD AUTO: 0.98 10*3/MM3 (ref 0.1–0.9)
MONOCYTES NFR BLD AUTO: 9 % (ref 5–12)
NEUTROPHILS NFR BLD AUTO: 7.65 10*3/MM3 (ref 1.7–7)
NEUTROPHILS NFR BLD AUTO: 70.4 % (ref 42.7–76)
NITRITE UR QL STRIP: NEGATIVE
NRBC BLD AUTO-RTO: 0 /100 WBC (ref 0–0.2)
PH UR STRIP.AUTO: 6 [PH] (ref 5–8)
PLATELET # BLD AUTO: 306 10*3/MM3 (ref 140–450)
PMV BLD AUTO: 10.2 FL (ref 6–12)
POTASSIUM SERPL-SCNC: 4.5 MMOL/L (ref 3.5–5.2)
PROT SERPL-MCNC: 6.7 G/DL (ref 6–8.5)
PROT UR QL STRIP: ABNORMAL
PSA SERPL-MCNC: 4.9 NG/ML (ref 0–4)
RBC # BLD AUTO: 4.07 10*6/MM3 (ref 4.14–5.8)
RBC # UR STRIP: NORMAL /HPF
REF LAB TEST METHOD: NORMAL
SODIUM SERPL-SCNC: 142 MMOL/L (ref 136–145)
SP GR UR STRIP: 1.02 (ref 1–1.03)
SQUAMOUS #/AREA URNS HPF: NORMAL /HPF
T4 FREE SERPL-MCNC: 1.09 NG/DL (ref 0.92–1.68)
TRIGL SERPL-MCNC: 83 MG/DL (ref 0–150)
TSH SERPL DL<=0.05 MIU/L-ACNC: 4.38 UIU/ML (ref 0.27–4.2)
UROBILINOGEN UR QL STRIP: ABNORMAL
VLDLC SERPL-MCNC: 17 MG/DL (ref 5–40)
WBC # UR STRIP: NORMAL /HPF
WBC NRBC COR # BLD AUTO: 10.86 10*3/MM3 (ref 3.4–10.8)

## 2024-12-16 PROCEDURE — 80053 COMPREHEN METABOLIC PANEL: CPT

## 2024-12-16 PROCEDURE — 81001 URINALYSIS AUTO W/SCOPE: CPT

## 2024-12-16 PROCEDURE — 80061 LIPID PANEL: CPT

## 2024-12-16 PROCEDURE — 83036 HEMOGLOBIN GLYCOSYLATED A1C: CPT

## 2024-12-16 PROCEDURE — 85025 COMPLETE CBC W/AUTO DIFF WBC: CPT

## 2024-12-16 PROCEDURE — G0103 PSA SCREENING: HCPCS

## 2024-12-16 PROCEDURE — 84443 ASSAY THYROID STIM HORMONE: CPT

## 2024-12-16 PROCEDURE — 86141 C-REACTIVE PROTEIN HS: CPT

## 2024-12-16 PROCEDURE — 84439 ASSAY OF FREE THYROXINE: CPT

## 2024-12-16 PROCEDURE — 82306 VITAMIN D 25 HYDROXY: CPT

## 2024-12-26 RX ORDER — FERROUS GLUCONATE 324(38)MG
324 TABLET ORAL
Qty: 90 TABLET | Refills: 0 | Status: SHIPPED | OUTPATIENT
Start: 2024-12-26

## 2024-12-26 NOTE — ASSESSMENT & PLAN NOTE
Orders:    ferrous gluconate (FERGON) 324 MG tablet; Take 1 tablet by mouth Daily With Breakfast. For anemia. Recheck 6 weeks    CBC & Differential; Future

## 2024-12-30 ENCOUNTER — OFFICE VISIT (OUTPATIENT)
Dept: FAMILY MEDICINE CLINIC | Facility: CLINIC | Age: 84
End: 2024-12-30
Payer: MEDICARE

## 2024-12-30 VITALS
WEIGHT: 145.8 LBS | TEMPERATURE: 99.1 F | OXYGEN SATURATION: 95 % | HEART RATE: 58 BPM | HEIGHT: 65 IN | BODY MASS INDEX: 24.29 KG/M2 | SYSTOLIC BLOOD PRESSURE: 116 MMHG | DIASTOLIC BLOOD PRESSURE: 74 MMHG

## 2024-12-30 DIAGNOSIS — U07.1 COVID-19: Primary | ICD-10-CM

## 2024-12-30 LAB
EXPIRATION DATE: ABNORMAL
FLUAV AG UPPER RESP QL IA.RAPID: NOT DETECTED
FLUBV AG UPPER RESP QL IA.RAPID: NOT DETECTED
INTERNAL CONTROL: ABNORMAL
Lab: ABNORMAL
SARS-COV-2 AG UPPER RESP QL IA.RAPID: DETECTED

## 2024-12-30 PROCEDURE — 87428 SARSCOV & INF VIR A&B AG IA: CPT | Performed by: FAMILY MEDICINE

## 2024-12-30 PROCEDURE — 3078F DIAST BP <80 MM HG: CPT | Performed by: FAMILY MEDICINE

## 2024-12-30 PROCEDURE — 1126F AMNT PAIN NOTED NONE PRSNT: CPT | Performed by: FAMILY MEDICINE

## 2024-12-30 PROCEDURE — 3074F SYST BP LT 130 MM HG: CPT | Performed by: FAMILY MEDICINE

## 2024-12-30 PROCEDURE — 99213 OFFICE O/P EST LOW 20 MIN: CPT | Performed by: FAMILY MEDICINE

## 2024-12-30 RX ORDER — FLUTICASONE PROPIONATE 50 MCG
1 SPRAY, SUSPENSION (ML) NASAL DAILY
Qty: 16 G | Refills: 0 | Status: SHIPPED | OUTPATIENT
Start: 2024-12-30

## 2024-12-30 RX ORDER — POTASSIUM CHLORIDE 750 MG/1
TABLET, EXTENDED RELEASE ORAL
COMMUNITY

## 2024-12-30 RX ORDER — GUAIFENESIN AND DEXTROMETHORPHAN HYDROBROMIDE 600; 30 MG/1; MG/1
1 TABLET, EXTENDED RELEASE ORAL 2 TIMES DAILY PRN
Qty: 20 TABLET | Refills: 0 | Status: SHIPPED | OUTPATIENT
Start: 2024-12-30 | End: 2025-01-09

## 2024-12-30 NOTE — PROGRESS NOTES
Subjective   Nathan Velarde is a 84 y.o. male.     Chief Complaint   Patient presents with    Cough    Fatigue       History of Present Illness     Nathan Velarde presents today for   Chief Complaint   Patient presents with    Cough    Fatigue       he reports symptoms ongoing for 2 days.  he has tried over-the-counter medications with minimal improvement.  he has tried rest and fluids. he has noticed associated symptoms of congestion and cough as well as a mild subjective fever. he feels as though his symptoms are worsening. he reports some possible sick contacts but none confirmed. Wife also ill with same. Recently flew home.    The following portions of the patient's history were reviewed and updated as appropriate: allergies, current medications, past family history, past medical history, past social history, past surgical history and problem list.    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    Hyperglycemia 05/28/2016    Shoulder pain, left 09/26/2016    Preventative health care 11/29/2016    Status post total shoulder arthroplasty, 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    Knee osteoarthritis 01/01/2004     Resolved Ambulatory Problems     Diagnosis Date Noted    Neck pain 05/15/2016    Fatigue 05/15/2016    Ulcerative enterocolitis 05/15/2016    Impaired glucose tolerance 05/15/2016    Gross hematuria 05/15/2016    Viral pneumonia 04/22/2022    Acute respiratory failure with hypoxia 04/22/2022    Hypoxia 04/29/2022      Past Medical History:   Diagnosis Date    Aortic valve insufficiency     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     Benign prostatic hyperplasia     Bilateral bunions     Bladder cancer     Chronic kidney disease     DJD of left shoulder Adulthood    Hernia     HTN (hypertension)     Inflammatory bowel disease     Mitral valve insufficiency     PAF (paroxysmal atrial fibrillation)     Prediabetes 2015    Wears glasses      Past Surgical History:   Procedure Laterality Date    BICEPS TENDON REPAIR Left 2018    Procedure: BICEPS TENDODESIS;  Surgeon: Abner Humphrey MD;  Location:  [facility];  Service: Orthopedics    BLADDER SURGERY      CATARACT EXTRACTION, BILATERAL      COLONOSCOPY      Colitis in remission    CYSTOSCOPY  1977    Excision of bladder cancer    FLEXIBLE SIGMOIDOSCOPY  1977    INGUINAL HERNIA REPAIR Right     KNEE SURGERY Right     Excision of cyst    LIPOMA EXCISION      TONSILLECTOMY      TOTAL SHOULDER ARTHROPLASTY Left 2018    Procedure: LEFT TOTAL SHOULDER ARTHROPLASTY;  Surgeon: Abner Humphrey MD;  Location:  [facility];  Service: Orthopedics    TOTAL SHOULDER ARTHROPLASTY Right 2021    Procedure: NAVIGATED REVERSE SHOULDER ARTHROPLASTY RIGHT, BICEPS TENODESIS - RIGHT;  Surgeon: Abner Humphrey MD;  Location:  [facility];  Service: Orthopedics;  Laterality: Right;    UPPER GASTROINTESTINAL ENDOSCOPY       Family History   Problem Relation Age of Onset    Alzheimer's disease Mother          age 89    Arthritis Mother             Hyperlipidemia Mother     Angina Father     Hypertension Father     Pneumonia Father          age 70 - tobacco    Aneurysm Father         abd aortic    Vision loss Father         AMD    Unexplained death Brother     Breast cancer Maternal Grandmother     Arthritis Maternal Grandmother             Diabetes Paternal Uncle      Social History  "    Socioeconomic History    Marital status:    Tobacco Use    Smoking status: Never    Smokeless tobacco: Never   Vaping Use    Vaping status: Never Used   Substance and Sexual Activity    Alcohol use: Yes     Alcohol/week: 2.0 standard drinks of alcohol     Types: 2 Cans of beer per week     Comment: OCC    Drug use: Never    Sexual activity: Yes     Partners: Female     Birth control/protection: None, Tubal ligation       Review of Systems  Review of Systems -   General ROS: positive for  - fever and malaise  negative for - hot flashes, night sweats or weight loss  ENT ROS: positive for - headaches, nasal congestion, nasal discharge, sinus pain and sore throat  negative for - epistaxis, oral lesions, tinnitus or visual changes  Respiratory ROS: positive for - cough and sputum changes  negative for - hemoptysis, pleuritic pain, shortness of breath or wheezing  Cardiovascular ROS: no chest pain or dyspnea on exertion    Objective   Blood pressure 116/74, pulse 58, temperature 99.1 °F (37.3 °C), temperature source Oral, height 165.1 cm (65\"), weight 66.1 kg (145 lb 12.8 oz), SpO2 95%.  Nursing note reviewed  Physical Exam  Const: Mildly ill-appearing but in no acute distress, A&Ox4, Pleasant, Cooperative  Eyes: EOMI, no conjunctivitis  ENT: There is moderate nasal discharge present, nasal congestion noted.  There is slight congestion of the mucous membranes.  There is mild submandibular and anterior cervical lymphadenopathy consistent with symptoms.  Cardiac: Regular rate and rhythm, no cyanosis  Resp: Respiratory rate within normal limits, no increased work of breathing, no audible wheezing or retractions noted.  There are slight rhonchi noted, a cough is appreciated sporadically.  GI: No distention or ascites  Psych: Appropriate mood and behavior.  Skin: Warm, dry  Procedures  Assessment & Plan     Problem List Items Addressed This Visit    None  Visit Diagnoses       COVID-19    -  Primary    Relevant " Medications    fluticasone (FLONASE) 50 MCG/ACT nasal spray    guaifenesin-dextromethorphan 600-30 mg (MUCINEX DM)  MG tablet sustained-release 12 hour    Nirmatrelvir & Ritonavir, 300mg/100mg, (PAXLOVID)    Other Relevant Orders    POCT SARS-CoV-2 + Flu Antigen ALYSON (Completed)            Patient was encouraged to practice proper hygiene as well as use the symptomatic medications indicated above.  If no better they were encouraged to return to our office if needed.  Zinc lozenges may be effective in preventing the spread of viral upper respiratory infections including the common cold, and they were counseled on family member use and prophylactic use of these medications. he was encouraged to maintain adequate hydration with Pedialyte if possible.  They were cautioned on the risk of viral myocarditis, and encouraged to avoid exercise or significant exertion while febrile or while symptoms extend below the neck.    Patient Instructions   Maldonado's vaporub and steam shower tablets     Ambulatory progress note signed and attested to by Rell Ahuja D.O.

## 2025-01-02 RX ORDER — CHLORTHALIDONE 25 MG/1
25 TABLET ORAL DAILY
Qty: 90 TABLET | Refills: 0 | Status: SHIPPED | OUTPATIENT
Start: 2025-01-02

## 2025-01-02 RX ORDER — DILTIAZEM HYDROCHLORIDE 180 MG/1
CAPSULE, COATED, EXTENDED RELEASE ORAL
Qty: 90 CAPSULE | Refills: 0 | Status: SHIPPED | OUTPATIENT
Start: 2025-01-02

## 2025-01-05 ENCOUNTER — HOSPITAL ENCOUNTER (EMERGENCY)
Facility: HOSPITAL | Age: 85
Discharge: HOME | End: 2025-01-05
Attending: EMERGENCY MEDICINE | Admitting: EMERGENCY MEDICINE
Payer: MEDICARE

## 2025-01-05 ENCOUNTER — APPOINTMENT (OUTPATIENT)
Dept: GENERAL RADIOLOGY | Facility: HOSPITAL | Age: 85
End: 2025-01-05
Payer: MEDICARE

## 2025-01-05 VITALS
BODY MASS INDEX: 22.5 KG/M2 | WEIGHT: 140 LBS | HEIGHT: 66 IN | SYSTOLIC BLOOD PRESSURE: 125 MMHG | RESPIRATION RATE: 18 BRPM | HEART RATE: 40 BPM | TEMPERATURE: 97.6 F | OXYGEN SATURATION: 97 % | DIASTOLIC BLOOD PRESSURE: 105 MMHG

## 2025-01-05 DIAGNOSIS — U07.1 COVID-19: Primary | ICD-10-CM

## 2025-01-05 DIAGNOSIS — J18.9 PNEUMONIA OF RIGHT LOWER LOBE DUE TO INFECTIOUS ORGANISM: ICD-10-CM

## 2025-01-05 PROCEDURE — 99283 EMERGENCY DEPT VISIT LOW MDM: CPT

## 2025-01-05 PROCEDURE — 71045 X-RAY EXAM CHEST 1 VIEW: CPT

## 2025-01-05 RX ORDER — BENZONATATE 100 MG/1
100 CAPSULE ORAL ONCE
Status: COMPLETED | OUTPATIENT
Start: 2025-01-05 | End: 2025-01-05

## 2025-01-05 RX ORDER — DOXYCYCLINE 100 MG/1
100 CAPSULE ORAL ONCE
Status: COMPLETED | OUTPATIENT
Start: 2025-01-05 | End: 2025-01-05

## 2025-01-05 RX ORDER — DOXYCYCLINE 100 MG/1
100 CAPSULE ORAL 2 TIMES DAILY
Qty: 10 CAPSULE | Refills: 0 | Status: SHIPPED | OUTPATIENT
Start: 2025-01-05 | End: 2025-01-10

## 2025-01-05 RX ORDER — BENZONATATE 100 MG/1
100 CAPSULE ORAL 3 TIMES DAILY PRN
Qty: 9 CAPSULE | Refills: 0 | Status: SHIPPED | OUTPATIENT
Start: 2025-01-05 | End: 2025-01-08

## 2025-01-05 RX ADMIN — DOXYCYCLINE 100 MG: 100 CAPSULE ORAL at 04:52

## 2025-01-05 RX ADMIN — BENZONATATE 100 MG: 100 CAPSULE ORAL at 04:10

## 2025-01-05 RX ADMIN — AMOXICILLIN AND CLAVULANATE POTASSIUM 1 TABLET: 875; 125 TABLET, FILM COATED ORAL at 04:52

## 2025-01-05 NOTE — DISCHARGE INSTRUCTIONS
Take prescribed antibiotics doxycycline and Augmentin for treatment of a right lower lobe pneumonia.  These medications were electronically sent to your pharmacy.  A prescription was also sent for Tessalon Perles to help with your cough.  Call your primary doctor to schedule follow-up appointment.  Return to the ER as needed for new or worsening symptoms

## 2025-01-05 NOTE — ED PROVIDER NOTES
[facility]    EMERGENCY DEPARTMENT ENCOUNTER      Pt Name: Nathan Velarde  MRN: [id number]  YOB: 1940  Date of evaluation: 1/5/2025  Provider: Boston Parsons MD    CHIEF COMPLAINT       Chief Complaint   Patient presents with    Cough         HISTORY OF PRESENT ILLNESS   Nathan Velarde is a 84 y.o. male who presents to the emergency department for evaluation of a cough and trouble taking a big deep breath.  The symptoms have been going on for the past few days.  He was diagnosed with COVID-19 by his primary doctor.  He has completed a course of Paxlovid.  His other symptoms have gotten better.    REVIEW OF SYSTEMS     ROS:  A chief complaint appropriate review of systems was completed and is negative except as noted in the HPI.      PAST MEDICAL HISTORY     Past Medical History:   Diagnosis Date    Aortic valve insufficiency     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     Benign prostatic hyperplasia     Bilateral bunions     LEFT SIDE ONLY    Bladder cancer 1977    curative exfulguration    Chronic kidney disease 2000    DJD of left shoulder Adulthood    Severely advanced    Generalized osteoarthritis Adulthood    Moderately advanced disease    Hernia 1998    HTN (hypertension) 1990    Hyperlipidemia Adulthood    Response to atorvastatin    Inflammatory bowel disease 1977    Knee osteoarthritis 2004    Mitral valve insufficiency     PAF (paroxysmal atrial fibrillation) 2008    Mild disease - patient declines anticoagulation    Prediabetes 2015    Hemoglobin A1c 6.2%.    Prostatism 1995    Transverse myelitis 2014    Transient T10 with numbness and minimal ataxia - transient     Ulcerative colitis 1977    Well-controlled on mesalamine     Wears glasses          SURGICAL HISTORY       Past Surgical History:   Procedure Laterality Date    BICEPS TENDON REPAIR Left 05/07/2018    Procedure: BICEPS TENDODESIS;  Surgeon: Abner Humphrey MD;  Location: [facility];  Service: Orthopedics     BLADDER SURGERY      CATARACT EXTRACTION, BILATERAL      COLONOSCOPY  2015    Colitis in remission    CYSTOSCOPY  1977    Excision of bladder cancer    FLEXIBLE SIGMOIDOSCOPY  1977    INGUINAL HERNIA REPAIR Right 1994    KNEE SURGERY Right 1955    Excision of cyst    LIPOMA EXCISION      TONSILLECTOMY  1943    TOTAL SHOULDER ARTHROPLASTY Left 05/07/2018    Procedure: LEFT TOTAL SHOULDER ARTHROPLASTY;  Surgeon: Abner Humphrey MD;  Location: [facility];  Service: Orthopedics    TOTAL SHOULDER ARTHROPLASTY Right 06/21/2021    Procedure: NAVIGATED REVERSE SHOULDER ARTHROPLASTY RIGHT, BICEPS TENODESIS - RIGHT;  Surgeon: Abner Humphrey MD;  Location: [facility];  Service: Orthopedics;  Laterality: Right;    UPPER GASTROINTESTINAL ENDOSCOPY  1977         CURRENT MEDICATIONS     No current facility-administered medications for this encounter.    Current Outpatient Medications:     Alpha-Lipoic Acid 600 MG capsule, Take 600 mg by mouth Daily., Disp: , Rfl:     amoxicillin-clavulanate (AUGMENTIN) 875-125 MG per tablet, Take 1 tablet by mouth 2 (Two) Times a Day for 5 days., Disp: 10 tablet, Rfl: 0    aspirin-acetaminophen-caffeine (EXCEDRIN MIGRAINE) 250-250-65 MG per tablet, Take 1 tablet by mouth Every 6 (Six) Hours As Needed for Headache., Disp: , Rfl:     atorvastatin (LIPITOR) 40 MG tablet, TAKE 1 TABLET BY MOUTH EVERY  NIGHT, Disp: 90 tablet, Rfl: 3    benzonatate (TESSALON) 100 MG capsule, Take 1 capsule by mouth 3 (Three) Times a Day As Needed for Cough for up to 3 days., Disp: 9 capsule, Rfl: 0    Calcium Carbonate-Vitamin D (CALCIUM 600+D PO), Take 1 tablet by mouth Daily. (Patient not taking: Reported on 12/30/2024), Disp: , Rfl:     chlorthalidone (HYGROTON) 25 MG tablet, TAKE 1 TABLET BY MOUTH DAILY, Disp: 90 tablet, Rfl: 0    cholecalciferol (VITAMIN D3) 1000 units tablet, Take 1 tablet by mouth Daily., Disp: , Rfl:     coenzyme Q10 100 MG capsule, Take 1 capsule by mouth Daily. (Patient not taking:  Reported on 12/30/2024), Disp: , Rfl:     diclofenac (VOLTAREN) 75 MG EC tablet, TAKE 1 TABLET BY MOUTH DAILY (Patient not taking: Reported on 12/30/2024), Disp: 90 tablet, Rfl: 3    digoxin (LANOXIN) 125 MCG tablet, TAKE 1 TABLET BY MOUTH DAILY, Disp: 90 tablet, Rfl: 1    dilTIAZem CD (CARDIZEM CD) 180 MG 24 hr capsule, TAKE ONE CAPSULE BY MOUTH ONCE NIGHTLY, Disp: 90 capsule, Rfl: 0    doxycycline (MONODOX) 100 MG capsule, Take 1 capsule by mouth 2 (Two) Times a Day for 5 days., Disp: 10 capsule, Rfl: 0    ferrous gluconate (FERGON) 324 MG tablet, Take 1 tablet by mouth Daily With Breakfast. For anemia. Recheck 6 weeks (Patient not taking: Reported on 12/30/2024), Disp: 90 tablet, Rfl: 0    Fish Oil-Cholecalciferol (OMEGA-3 + D PO), Take 1 capsule by mouth Daily., Disp: , Rfl:     fluticasone (FLONASE) 50 MCG/ACT nasal spray, Administer 1 spray into the nostril(s) as directed by provider Daily., Disp: 16 g, Rfl: 0    Glucosamine-Chondroit-Vit C-Mn (GLUCOSAMINE 1500 COMPLEX) capsule, Take 1,500 mg by mouth Daily., Disp: , Rfl:     guaifenesin-dextromethorphan 600-30 mg (MUCINEX DM)  MG tablet sustained-release 12 hour, Take 1 tablet by mouth 2 (Two) Times a Day As Needed (cough) for up to 10 days., Disp: 20 tablet, Rfl: 0    losartan (COZAAR) 50 MG tablet, TAKE 1 AND 1/2 TABLETS BY MOUTH  DAILY, Disp: 135 tablet, Rfl: 3    Magnesium 250 MG tablet, Take 1 tablet by mouth Daily., Disp: , Rfl:     melatonin 3 MG tablet, Take 1 tablet by mouth Every Night., Disp: , Rfl:     mesalamine (DELZICOL) 400 MG capsule delayed-release delayed release capsule, TAKE 2 CAPSULES BY MOUTH TWICE  DAILY, Disp: 360 capsule, Rfl: 3    Multiple Vitamins-Minerals (MULTIVITAMIN ADULT PO), Take 1 tablet by mouth Daily., Disp: , Rfl:     potassium chloride 10 MEQ CR tablet, , Disp: , Rfl:     Saw Palmetto 160 MG capsule, Take 1 capsule by mouth Daily., Disp: , Rfl:     Selenium 200 MCG capsule, Take 200 mcg by mouth Daily., Disp: ,  Rfl:     tamsulosin (FLOMAX) 0.4 MG capsule 24 hr capsule, TAKE ONE CAPSULE BY MOUTH EVERY NIGHT AT BEDTIME, Disp: 90 capsule, Rfl: 0    vitamin E 400 UNIT capsule, Take 1 capsule by mouth Daily., Disp: , Rfl:     Zinc 50 MG capsule, Take 50 mg by mouth Daily., Disp: , Rfl:     ALLERGIES     Sulfa antibiotics    FAMILY HISTORY       Family History   Problem Relation Age of Onset    Alzheimer's disease Mother          age 89    Arthritis Mother             Hyperlipidemia Mother     Angina Father     Hypertension Father     Pneumonia Father          age 70 - tobacco    Aneurysm Father         abd aortic    Vision loss Father         AMD    Unexplained death Brother     Breast cancer Maternal Grandmother     Arthritis Maternal Grandmother             Diabetes Paternal Uncle           SOCIAL HISTORY       Social History     Socioeconomic History    Marital status:    Tobacco Use    Smoking status: Never    Smokeless tobacco: Never   Vaping Use    Vaping status: Never Used   Substance and Sexual Activity    Alcohol use: Yes     Alcohol/week: 2.0 standard drinks of alcohol     Types: 2 Cans of beer per week     Comment: OCC    Drug use: Never    Sexual activity: Yes     Partners: Female     Birth control/protection: None, Tubal ligation         PHYSICAL EXAM    (up to 7 for level 4, 8 or more for level 5)     Vitals:    25 0400 25 0415 25 0430 25 0445   BP: 162/58 157/52 163/54 (!) 125/105   BP Location:       Patient Position:       Pulse: (!) 43 (!) 40 (!) 41 (!) 40   Resp:       Temp:       TempSrc:       SpO2: 98% 98% 96% 97%   Weight:       Height:           Physical Exam  Constitutional:       General: He is not in acute distress.  HENT:      Head: Normocephalic and atraumatic.   Eyes:      Conjunctiva/sclera: Conjunctivae normal.      Pupils: Pupils are equal, round, and reactive to light.   Cardiovascular:      Rate and Rhythm: Normal rate and regular rhythm.    Pulmonary:      Effort: Pulmonary effort is normal. No respiratory distress.   Abdominal:      General: Abdomen is flat. There is no distension.   Musculoskeletal:         General: No swelling or deformity. Normal range of motion.   Skin:     General: Skin is warm and dry.      Capillary Refill: Capillary refill takes less than 2 seconds.   Neurological:      General: No focal deficit present.      Mental Status: He is alert and oriented to person, place, and time.   Psychiatric:         Mood and Affect: Mood normal.         Behavior: Behavior normal.            DIAGNOSTIC RESULTS     EKG: All EKGs are interpreted by the Emergency Department Physician who either signs or Co-signs this chart in the absence of a cardiologist.    No orders to display         RADIOLOGY:   [x] Radiologist's Report Reviewed:  XR Chest 1 View   Final Result   Impression:   Cardiomegaly. Bibasilar atelectasis. Patchy airspace disease in the right lower lobe may indicate pneumonia.               Electronically Signed: Kenneth Escalera MD     1/5/2025 4:04 AM EST     Workstation ID: [id number]          I ordered and independently reviewed the above noted radiographic studies.        LABS:  I independently interpreted all laboratory studies conducted during this ED visit.  The results of these studies can be seen below and my independent interpretation in the ED course      EMERGENCY DEPARTMENT COURSE and DIFFERENTIAL DIAGNOSIS/MDM:   Vitals:  AS OF 06:29 EST    BP - (!) 125/105  HR - (!) 40  TEMP - 97.6 °F (36.4 °C) (Oral)  O2 SATS - 97%        Discussion below represents my analysis of pertinent findings related to patient's condition, differential diagnosis, treatment plan and final disposition.      Differential diagnosis:  The differential diagnosis associated with the patient's presentation includes: Ongoing symptoms related to COVID-19, pneumonia which is the patient's primary concern      Independent interpretations (ECG/rhythm  strip/X-ray/US/CT scan): See ED course      Additional sources:  Discussed/obtained information from independent historians:   [x] Spouse: Patient's wife provides some details of HPI   [] Parent:   [] Friend:   [] EMS:   [] Other:    External record review:  12/30/2024 reviewed most recent primary care visit, seen in family medicine clinic for cough and fatigue, diagnosed with COVID-19, treated with Paxlovid, fluticasone, Mucinex DM      Patient's care impacted by:   [] Diabetes   [x] Hypertension   [] Coronary Artery Disease   [] Cancer   [] Other:     Care significantly affected by Social Determinants of Health (housing and economic circumstances, unemployment)    [] Yes     [x] No   If yes, Patient's care significantly limited by  Social Determinants of Health including:    [] Inadequate housing    [] Low income    [] Alcoholism and drug addiction in family    [] Problems related to primary support group    [] Unemployment    [] Problems related to employment    [] Other Social Determinants of Health:     I considered prescription management with:    [] Pain medication:   [] Antiviral:   [x] Antibiotic: Augmentin, doxycycline   [] Other:    Additional orders considered but not ordered:  The following testing was considered but ultimately not selected: N/A    ED Course:    ED Course as of 01/05/25 0629   Sun Jan 05, 2025   0445 Chest x-ray independently interpreted by myself demonstrates patchy right lower lobe airspace disease concerning for pneumonia [KB]      ED Course User Index  [KB] Boston Parsons MD         Chest radiograph was obtained.  Tessalon Perles given for cough.  I reevaluated the patient and he continues to be overall well-appearing.  He is not hypoxic, tachypneic or having any other respiratory compromise.  He reports his symptoms are improved after Tessalon.  We discussed his imaging results and while I believe probably his abnormal findings are related to his COVID-19 infection could represent  a superimposed bacterial infection and so he will be treated with a course of antibiotics.  He was started on Augmentin and doxycycline and given first dose in the ER and an outpatient prescription for the same.    Prior to discharge from the emergency department I discussed with the patient and any family members present the current diagnosis, treatment plan, recommendations regarding follow-up with a primary care doctor or specialist, general emergency room return precautions as well as return precautions specific to their diagnosis and treatment.  The patient/family indicated understanding of these instructions.  Time was allotted to answer questions at that time and throughout the ED course.         PROCEDURES:  Procedures    CRITICAL CARE TIME        CONSULTS       FINAL IMPRESSION      1. COVID-19    2. Pneumonia of right lower lobe due to infectious organism          DISPOSITION/PLAN     ED Disposition       ED Disposition   Discharge    Condition   Stable    Comment   --                 Comment: Please note this report has been produced using speech recognition software.      Boston Parsons MD  Attending Emergency Physician    No results found for this or any previous visit (from the past 24 hours).  Note: In addition to lab results from this visit, the labs listed above may include labs taken at another facility or during a different encounter within the last 24 hours. Please correlate lab times with ED admission and discharge times for further clarification of the services performed during this visit.                 Boston Parsons MD  01/05/25 0860

## 2025-01-07 ENCOUNTER — TELEPHONE (OUTPATIENT)
Dept: FAMILY MEDICINE CLINIC | Facility: CLINIC | Age: 85
End: 2025-01-07
Payer: MEDICARE

## 2025-01-07 NOTE — TELEPHONE ENCOUNTER
"    Caller: Nathan Velarde \"Ja\"    Relationship: Self    Best call back number: [phone]     Which medication are you concerned about: digoxin (LANOXIN) 125 MCG tablet     Who prescribed you this medication: DR VILLASEÑOR AND DR FERGUSON    When did you start taking this medication: 01.05.25    What are your concerns: THAT IS MIGHT INTERFER WITH THE ANTIBIOTIC HE WAS GIVEN doxycycline (MONODOX) 100 MG capsule     How long have you had these concerns: A DAY OR SO    "

## 2025-01-10 RX ORDER — TAMSULOSIN HYDROCHLORIDE 0.4 MG/1
CAPSULE ORAL
Qty: 90 CAPSULE | Refills: 0 | Status: SHIPPED | OUTPATIENT
Start: 2025-01-10

## 2025-01-10 NOTE — TELEPHONE ENCOUNTER
Rx Refill Note  Requested Prescriptions     Pending Prescriptions Disp Refills    digoxin (LANOXIN) 125 MCG tablet [Pharmacy Med Name: DIGOXIN 125 MCG TABLET] 90 tablet 1     Sig: TAKE 1 TABLET BY MOUTH DAILY    tamsulosin (FLOMAX) 0.4 MG capsule 24 hr capsule [Pharmacy Med Name: TAMSULOSIN HCL 0.4 MG CAPSULE] 90 capsule 0     Sig: TAKE ONE CAPSULE BY MOUTH EVERY NIGHT AT BEDTIME      Last office visit with prescribing clinician: 12/30/2024   Last telemedicine visit with prescribing clinician: Visit date not found   Next office visit with prescribing clinician: 1/7/2025    APPROVAL: TAMSULOSIN    Ana Cristina Ram MA  01/10/25, 09:33 EST

## 2025-01-13 RX ORDER — DIGOXIN 125 MCG
125 TABLET ORAL DAILY
Qty: 90 TABLET | Refills: 1 | Status: SHIPPED | OUTPATIENT
Start: 2025-01-13

## 2025-01-16 ENCOUNTER — TELEPHONE (OUTPATIENT)
Dept: FAMILY MEDICINE CLINIC | Facility: CLINIC | Age: 85
End: 2025-01-16
Payer: MEDICARE

## 2025-01-16 DIAGNOSIS — I10 ESSENTIAL HYPERTENSION: ICD-10-CM

## 2025-01-16 RX ORDER — LOSARTAN POTASSIUM 50 MG/1
75 TABLET ORAL DAILY
Qty: 135 TABLET | Refills: 0 | Status: SHIPPED | OUTPATIENT
Start: 2025-01-16

## 2025-01-16 NOTE — TELEPHONE ENCOUNTER
"Caller: Nathan Velarde \"Ja\"    Relationship: Self    Best call back number:       [phone] (Home)     Which medication are you concerned about:     losartan (COZAAR) 50 MG tablet     Who prescribed you this medication:     DR VILLASEÑOR    What are your concerns:     PATIENT STATED REFILL THROUGH [facility] HAS BEEN LOST IN THE MAIL    PATIENT REQUESTED A REFILL BE FORWARDED TO:    [facility] - [place], [place]    TELEPHONE CONTACT:    [phone]    PATIENT ALSO STATED HE IS OUT OF THE MEDICATION  "

## 2025-01-30 ENCOUNTER — HOSPITAL ENCOUNTER (OUTPATIENT)
Dept: CARDIOLOGY | Facility: HOSPITAL | Age: 85
Discharge: HOME | End: 2025-01-30
Admitting: FAMILY MEDICINE
Payer: MEDICARE

## 2025-01-30 DIAGNOSIS — I48.0 PAROXYSMAL ATRIAL FIBRILLATION: ICD-10-CM

## 2025-01-30 DIAGNOSIS — I35.9 AORTIC VALVE DISORDER: ICD-10-CM

## 2025-01-30 LAB
ASCENDING AORTA: 3.9 CM
AV MEAN PRESS GRAD SYS DOP V1V2: 7.4 MMHG
AV VMAX SYS DOP: 205.3 CM/SEC
BH CV ECHO LEFT VENTRICLE GLOBAL LONGITUDINAL STRAIN: -16.6 %
BH CV ECHO MEAS - AO MAX PG: 16.9 MMHG
BH CV ECHO MEAS - AO ROOT DIAM: 3.7 CM
BH CV ECHO MEAS - AO V2 VTI: 41.3 CM
BH CV ECHO MEAS - IVS/LVPW: 0.67 CM
BH CV ECHO MEAS - IVSD: 0.8 CM
BH CV ECHO MEAS - LA DIMENSION: 4.1 CM
BH CV ECHO MEAS - LAT PEAK E' VEL: 9.6 CM/SEC
BH CV ECHO MEAS - LV MAX PG: 8.3 MMHG
BH CV ECHO MEAS - LV MEAN PG: 4.5 MMHG
BH CV ECHO MEAS - LV V1 MAX: 144.2 CM/SEC
BH CV ECHO MEAS - LV V1 VTI: 32.9 CM
BH CV ECHO MEAS - LVIDD: 6 CM
BH CV ECHO MEAS - LVIDS: 3.5 CM
BH CV ECHO MEAS - LVOT DIAM: 2.1 CM
BH CV ECHO MEAS - LVPWD: 1.2 CM
BH CV ECHO MEAS - MED PEAK E' VEL: 9.4 CM/SEC
BH CV ECHO MEAS - MR MAX PG: 105 MMHG
BH CV ECHO MEAS - MR MAX VEL: 512.3 CM/SEC
BH CV ECHO MEAS - MV A MAX VEL: 55 CM/SEC
BH CV ECHO MEAS - MV DEC SLOPE: 186.3 CM/SEC2
BH CV ECHO MEAS - MV E MAX VEL: 120.9 CM/SEC
BH CV ECHO MEAS - MV E/A: 2.2
BH CV ECHO MEAS - MV MAX PG: 12.2 MMHG
BH CV ECHO MEAS - MV MEAN PG: 3.1 MMHG
BH CV ECHO MEAS - MV V2 VTI: 59.3 CM
BH CV ECHO MEAS - TAPSE (>1.6): 2.29 CM
BH CV ECHO MEASUREMENTS AVERAGE E/E' RATIO: 12.73
BH CV XLRA - RV BASE: 4.1 CM
BH CV XLRA - RV LENGTH: 5.1 CM
BH CV XLRA - RV MID: 2.8 CM
BH CV XLRA - TDI S': 21.7 CM/SEC
LEFT ATRIUM VOLUME INDEX: 64.4 ML/M2
LV EF BIPLANE MOD: 56.3 %

## 2025-01-30 PROCEDURE — 93306 TTE W/DOPPLER COMPLETE: CPT

## 2025-01-31 ENCOUNTER — OFFICE VISIT (OUTPATIENT)
Dept: CARDIOLOGY | Facility: CLINIC | Age: 85
End: 2025-01-31
Payer: MEDICARE

## 2025-01-31 VITALS
HEART RATE: 50 BPM | DIASTOLIC BLOOD PRESSURE: 42 MMHG | BODY MASS INDEX: 23.3 KG/M2 | OXYGEN SATURATION: 96 % | WEIGHT: 145 LBS | HEIGHT: 66 IN | SYSTOLIC BLOOD PRESSURE: 146 MMHG

## 2025-01-31 DIAGNOSIS — N18.2 CKD (CHRONIC KIDNEY DISEASE), STAGE II: ICD-10-CM

## 2025-01-31 DIAGNOSIS — I10 PRIMARY HYPERTENSION: ICD-10-CM

## 2025-01-31 DIAGNOSIS — I48.0 PAROXYSMAL ATRIAL FIBRILLATION: Primary | ICD-10-CM

## 2025-01-31 DIAGNOSIS — I35.9 AORTIC VALVE DISORDER: ICD-10-CM

## 2025-01-31 NOTE — PROGRESS NOTES
[facility]  Consultation H&P  Nathan Velarde  1940  [address] [facility] [address]     VISIT DATE:  01/31/25    PCP: Rell Ahuja DO  [address]  [facility] [address]    IDENTIFICATION: A 84 y.o. male, , retired from [employer], originally from [place]   Last cardio visit 2021    PROBLEM LIST:   Paroxsymal atrial fibrillation  CHADS2-VASc score =3:    Converted  to sinus rhythm following dig  Digitalis noncompliance with reversion to atrial fibrillation.  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  CKD stage II  Hx of ulcerative colitis.  Hx of bladder cancer; no chemotherapy or radiation required.  COVID-19/pneumonia 1/5/2025  BPH  Surgical history:  Status post bladder surgery, remote:  TURP.  Herniorrhaphy.    Knee surgery (data deficit).  Total right shoulder arthroplasty with biceps tenodesis - Dr. Humphrey       CC:  Chief Complaint   Patient presents with    Aortic valve disorder       Allergies  Allergies   Allergen Reactions    Sulfa Antibiotics Nausea Only and Other (See Comments)     Headache       Current Medications  Current Outpatient Medications   Medication Instructions    Alpha-Lipoic Acid 600 mg, Daily    aspirin-acetaminophen-caffeine (EXCEDRIN MIGRAINE) 250-250-65 MG per tablet 1 tablet, Every 6 Hours PRN    atorvastatin (LIPITOR) 40 mg, Oral, Nightly    Calcium Carbonate-Vitamin D (CALCIUM 600+D PO) 1 tablet, Daily    chlorthalidone (HYGROTON) 25 mg, Oral, Daily    coenzyme Q10 100 mg, Daily    diclofenac (VOLTAREN) 75 mg,  Oral, Daily    dilTIAZem CD (CARDIZEM CD) 180 MG 24 hr capsule TAKE ONE CAPSULE BY MOUTH ONCE NIGHTLY    Fish Oil-Cholecalciferol (OMEGA-3 + D PO) 1 capsule, Daily    Glucosamine-Chondroit-Vit C-Mn (GLUCOSAMINE 1500 COMPLEX) capsule 1 tablet, Daily    losartan (COZAAR) 75 mg, Oral, Daily    Magnesium 250 mg, Daily    melatonin 3 mg, Nightly    mesalamine (DELZICOL) 800 mg, Oral, 2 Times Daily    Multiple Vitamins-Minerals (MULTIVITAMIN ADULT PO) 1 tablet, Daily    Saw Palmetto 160 MG capsule 1 capsule, Daily    Selenium 200 mcg, Daily    tamsulosin (FLOMAX) 0.4 MG capsule 24 hr capsule TAKE ONE CAPSULE BY MOUTH EVERY NIGHT AT BEDTIME    vitamin E 400 Units, Daily    Zinc 50 mg, Daily        History of Present Illness   HPI  Nathan Velarde is a 84 y.o. year old male with the above mentioned PMH who presents for consult from Rell Ahuja DO for evaluation of aortic valve disease.  He was last seen by our office in 2021 and was lost to follow-up.  He did complete echocardiogram in December 2024 showing normal EF, moderate AI, mild to moderate MR. He reports over the last several months he has noted increased fatigue with moderate exertion.  He is active going to the gym several days a week doing weights and cardio which he tolerates.  He also reports losing weight, no appetite over the last couple years.    Pt denies any chest pain, dyspnea at rest, orthopnea, PND, palpitations, lower extremity edema, or claudication. Pt denies history of CHF, DVT, PE, MI, CVA, TIA, or rheumatic fever.       ROS  ROS    SOCIAL HX  Social History     Socioeconomic History    Marital status:    Tobacco Use    Smoking status: Never     Passive exposure: Past    Smokeless tobacco: Never   Vaping Use    Vaping status: Never Used   Substance and Sexual Activity    Alcohol use: Yes     Alcohol/week: 2.0 standard drinks of alcohol     Types: 2 Cans of beer per week     Comment: OCC    Drug use: Never    Sexual activity: Yes      "Partners: Female     Birth control/protection: None, Tubal ligation       FAMILY HX  Family History   Problem Relation Age of Onset    Alzheimer's disease Mother          age 89    Arthritis Mother             Hyperlipidemia Mother     Angina Father     Hypertension Father     Pneumonia Father          age 70 - tobacco    Aneurysm Father         abd aortic    Vision loss Father         AMD    Unexplained death Brother     Breast cancer Maternal Grandmother     Arthritis Maternal Grandmother             Diabetes Paternal Uncle        Vitals:    25 1308   BP: 146/42   BP Location: Left arm   Patient Position: Sitting   Pulse: 50   SpO2: 96%   Weight: 65.8 kg (145 lb)   Height: 167.6 cm (66\")     Body mass index is 23.4 kg/m².     PHYSICAL EXAMINATION:  Vitals reviewed.   Constitutional:       General: Not in acute distress.     Appearance: Normal appearance. Normal weight.   Neck:      Vascular: JVD normal.   Pulmonary:      Effort: Pulmonary effort is normal.      Breath sounds: Normal breath sounds.   Cardiovascular:      Bradycardia present. Regular rhythm. Normal S1. Normal S2.       Murmurs: There is a diastolic murmur.   Pulses:     Intact distal pulses.   Edema:     Peripheral edema absent.   Skin:     General: Skin is warm and dry.   Neurological:      General: No focal deficit present.      Mental Status: Alert.         Diagnostic Data:    ECG 12 Lead    Date/Time: 2025 1:42 PM  Performed by: Giovanni Mcdowell MD    Authorized by: Giovanni Mcdowell MD  Comparison: compared with previous ECG from 2022  Comparison to previous ECG: Rate lower, junctional rhythm today  Rate: bradycardic  QRS axis: left    Clinical impression: abnormal EKG        Lab Results   Component Value Date    CHOL 101 2024    CHLPL 125 2016    TRIG 83 2024    HDL 32 (L) 2024    LDL 52 2024      Lab Results   Component Value Date    GLUCOSE 123 (H) 2024    CALCIUM 9.6 " 12/16/2024     12/16/2024    K 4.5 12/16/2024    CO2 25.0 12/16/2024     12/16/2024    BUN 38 (H) 12/16/2024    CREATININE 1.59 (H) 12/16/2024    EGFR 42.5 (L) 12/16/2024    BCR 23.9 12/16/2024    ANIONGAP 10.0 12/16/2024      Lab Results   Component Value Date    WBC 10.86 (H) 12/16/2024    HGB 10.4 (L) 12/16/2024    HCT 34.7 (L) 12/16/2024    MCV 85.3 12/16/2024     12/16/2024     Lab Results   Component Value Date    HGBA1C 5.60 12/16/2024      Lab Results   Component Value Date    TSH 4.380 (H) 12/16/2024          Advance Care Planning   ACP discussion was held with the patient during this visit. Patient has an advance directive (not in EMR), copy requested.         ASSESSMENT:     Diagnosis Plan   1. Paroxysmal atrial fibrillation        2. Aortic valve disorder        3. Primary hypertension        4. CKD (chronic kidney disease), stage III            PLAN:    Paroxysmal atrial fibrillation, [id number] 4, patient declines anticoagulation  Dyspnea with moderate exertion may be rate related as he is tolerating his usual exercise regimen.  -Rate in the 50s today on digoxin and diltiazem   -Discontinue digoxin due to age, heart rate, renal function, anorexia  -Continue diltiazem at current and follow heart rate on Fitbit.    Valvular heart disease-normal LVEF, moderate aortic insufficiency, mild to moderate MR  -Continue annual surveillance.    HTN, slightly elevated in office today  -Follow home BP readings  -continue losartan, diltiazem    CKD III, GFR 42  - May need discontinuation of chlorthalidone  - follow up with PCP closely     Rell Ahuja, DO, thank you for referring Mr. Velarde for evaluation.  I have forwarded my electronically generated recommendations to you for review.  Please do not hesitate to call with any questions.    Scribed for Giovanni Mcdowell MD by Liberty Tomas, AYAH. 1/31/2025  13:47 EST   Giovanni Mcdowell MD, FACC

## 2025-02-13 ENCOUNTER — TELEPHONE (OUTPATIENT)
Dept: CARDIOLOGY | Facility: CLINIC | Age: 85
End: 2025-02-13
Payer: MEDICARE

## 2025-02-13 NOTE — TELEPHONE ENCOUNTER
"Caller: Nathan Velarde \"Ja\"    Relationship: Self    Best call back number: [phone]     What is the best time to reach you: ANYTIME     Who are you requesting to speak with (clinical staff, provider,  specific staff member): CLINICAL     What was the call regarding: PATIENT CALLED IN TO REPORT TO DR. TANG THAT SINCE STOPPING THE DIGOXIN HIS HEART RATE HAS REMAINED THE SAME WITHOUT INCREASE. PATIENT STATED HE FEELS FINE AND HAS NOT HAD ANY SYMPTOMS FROM THE LOW HEART RATE    Is it okay if the provider responds through MyChart: PREFERS A CALL     "

## 2025-02-14 RX ORDER — SODIUM, POTASSIUM,MAG SULFATES 17.5-3.13G
1 SOLUTION, RECONSTITUTED, ORAL ORAL TAKE AS DIRECTED
Qty: 354 ML | Refills: 0 | Status: SHIPPED | OUTPATIENT
Start: 2025-02-14

## 2025-02-18 ENCOUNTER — TELEPHONE (OUTPATIENT)
Dept: GASTROENTEROLOGY | Facility: CLINIC | Age: 85
End: 2025-02-18
Payer: MEDICARE

## 2025-02-18 RX ORDER — SODIUM, POTASSIUM,MAG SULFATES 17.5-3.13G
1 SOLUTION, RECONSTITUTED, ORAL ORAL TAKE AS DIRECTED
Qty: 354 ML | Refills: 0 | Status: SHIPPED | OUTPATIENT
Start: 2025-02-18

## 2025-02-18 NOTE — TELEPHONE ENCOUNTER
PLEASE  CALL IN BOWEL PREP TO [facility] ON [address] - PATIENT HAS NOT RECEIVED BOWEL PREP VIA MAIL YET - JUST INCASE MAILING SYSTEM IS DELAYED DUE TO UPCOMING WEATHER - PATIENT IS SCHEDULED FOR COLON WITH DR. FARIA TUESDAY 2/25/25

## 2025-02-25 ENCOUNTER — OUTSIDE FACILITY SERVICE (OUTPATIENT)
Dept: GASTROENTEROLOGY | Facility: CLINIC | Age: 85
End: 2025-02-25
Payer: MEDICARE

## 2025-02-25 PROCEDURE — 88305 TISSUE EXAM BY PATHOLOGIST: CPT | Performed by: INTERNAL MEDICINE

## 2025-02-26 ENCOUNTER — LAB REQUISITION (OUTPATIENT)
Dept: LAB | Facility: HOSPITAL | Age: 85
End: 2025-02-26
Payer: MEDICARE

## 2025-02-26 DIAGNOSIS — K57.30 DIVERTICULOSIS OF LARGE INTESTINE WITHOUT PERFORATION OR ABSCESS WITHOUT BLEEDING: ICD-10-CM

## 2025-02-26 DIAGNOSIS — R63.4 ABNORMAL WEIGHT LOSS: ICD-10-CM

## 2025-02-26 DIAGNOSIS — K64.8 OTHER HEMORRHOIDS: ICD-10-CM

## 2025-02-27 LAB — REF LAB TEST METHOD: NORMAL

## 2025-02-28 ENCOUNTER — TELEPHONE (OUTPATIENT)
Dept: GASTROENTEROLOGY | Facility: CLINIC | Age: 85
End: 2025-02-28
Payer: MEDICARE

## 2025-02-28 NOTE — TELEPHONE ENCOUNTER
I called and spoke with MrWillis and Mrs. Velarde.  I stated there was no evidence of ulcerative colitis based on any of the biopsies.  Commend that he follow-up with Dr. Ahuja for further evaluation of weight loss.  He likely will need further imaging to make sure the pneumonia has cleared from January.

## 2025-03-05 RX ORDER — DICLOFENAC SODIUM 75 MG/1
75 TABLET, DELAYED RELEASE ORAL DAILY
Qty: 90 TABLET | Refills: 3 | Status: SHIPPED | OUTPATIENT
Start: 2025-03-05

## 2025-03-05 NOTE — TELEPHONE ENCOUNTER
Rx Refill Note  Requested Prescriptions     Pending Prescriptions Disp Refills    diclofenac (VOLTAREN) 75 MG EC tablet [Pharmacy Med Name: Diclofenac Sodium 75 MG Oral Tablet Delayed Release] 90 tablet 3     Sig: TAKE 1 TABLET BY MOUTH DAILY      Last office visit with prescribing clinician: 12/30/2024   Last telemedicine visit with prescribing clinician: Visit date not found   Next office visit with prescribing clinician: Visit date not found                         Would you like a call back once the refill request has been completed: [] Yes [] No    If the office needs to give you a call back, can they leave a voicemail: [] Yes [] No    Mitzi Cormier MA  03/05/25, 08:41 EST

## 2025-03-27 RX ORDER — DILTIAZEM HYDROCHLORIDE 180 MG/1
180 CAPSULE, COATED, EXTENDED RELEASE ORAL NIGHTLY
Qty: 90 CAPSULE | Refills: 0 | Status: SHIPPED | OUTPATIENT
Start: 2025-03-27

## 2025-03-27 NOTE — TELEPHONE ENCOUNTER
Rx Refill Note  Requested Prescriptions     Pending Prescriptions Disp Refills    dilTIAZem CD (CARDIZEM CD) 180 MG 24 hr capsule 90 capsule 0     Sig: Take 1 capsule by mouth Every Night.      Last office visit with prescribing clinician: 12/30/2024   Last telemedicine visit with prescribing clinician: Visit date not found   Next office visit with prescribing clinician: Visit date not found   Ana Cristina Ram MA  03/27/25, 14:35 EDT

## 2025-04-04 RX ORDER — CHLORTHALIDONE 25 MG/1
25 TABLET ORAL DAILY
Qty: 90 TABLET | Refills: 0 | Status: SHIPPED | OUTPATIENT
Start: 2025-04-04

## 2025-04-07 RX ORDER — MESALAMINE 400 MG/1
800 CAPSULE, DELAYED RELEASE ORAL 2 TIMES DAILY
Qty: 360 CAPSULE | Refills: 3 | Status: SHIPPED | OUTPATIENT
Start: 2025-04-07

## 2025-04-07 NOTE — TELEPHONE ENCOUNTER
"    Caller: Nathan Velarde W \"Ja\"    Relationship: Self    Best call back number: [phone]     Requested Prescriptions:   Requested Prescriptions     Pending Prescriptions Disp Refills    mesalamine (DELZICOL) 400 MG capsule delayed-release delayed release capsule 360 capsule 3     Sig: Take 2 capsules by mouth 2 (Two) Times a Day.        Pharmacy where request should be sent: [facility] [id number] [address] AT [facility] [phone] [facility] [phone]      Last office visit with prescribing clinician: 12/30/2024   Last telemedicine visit with prescribing clinician: Visit date not found   Next office visit with prescribing clinician: Visit date not found     Does the patient have less than a 3 day supply:  [x] Yes  [] No    Would you like a call back once the refill request has been completed: [] Yes [] No    If the office needs to give you a call back, can they leave a voicemail: [] Yes [x] No    Marleny Milan Rep   04/07/25 09:25 EDT           "

## 2025-04-14 ENCOUNTER — OFFICE VISIT (OUTPATIENT)
Dept: GASTROENTEROLOGY | Facility: CLINIC | Age: 85
End: 2025-04-14
Payer: MEDICARE

## 2025-04-14 VITALS
BODY MASS INDEX: 22.66 KG/M2 | TEMPERATURE: 97.4 F | HEART RATE: 77 BPM | HEIGHT: 66 IN | DIASTOLIC BLOOD PRESSURE: 70 MMHG | OXYGEN SATURATION: 97 % | SYSTOLIC BLOOD PRESSURE: 130 MMHG | WEIGHT: 141 LBS

## 2025-04-14 DIAGNOSIS — R63.4 WEIGHT LOSS: ICD-10-CM

## 2025-04-14 DIAGNOSIS — I48.91 ATRIAL FIBRILLATION, UNSPECIFIED TYPE: ICD-10-CM

## 2025-04-14 DIAGNOSIS — K51.80 OTHER ULCERATIVE COLITIS WITHOUT COMPLICATION: Primary | ICD-10-CM

## 2025-04-14 PROCEDURE — 3075F SYST BP GE 130 - 139MM HG: CPT | Performed by: INTERNAL MEDICINE

## 2025-04-14 PROCEDURE — 3078F DIAST BP <80 MM HG: CPT | Performed by: INTERNAL MEDICINE

## 2025-04-14 PROCEDURE — 1160F RVW MEDS BY RX/DR IN RCRD: CPT | Performed by: INTERNAL MEDICINE

## 2025-04-14 PROCEDURE — 99213 OFFICE O/P EST LOW 20 MIN: CPT | Performed by: INTERNAL MEDICINE

## 2025-04-14 PROCEDURE — 1159F MED LIST DOCD IN RCRD: CPT | Performed by: INTERNAL MEDICINE

## 2025-04-14 NOTE — PROGRESS NOTES
Patient Name: Nathan Velarde   YOB: 1940   Medical Record number: [id number]     PCP: Rell Ahuja DO    Chief Complaint   Patient presents with    Ulcerative Colitis     Follow up    Abdominal Pain     Left side       History of Present Illness:   HPI  Mr. Velarde returns to the office for follow-up.  He had a recent colonoscopy that was unremarkable for any evidence of active colitis.  The patient denies any issues with diarrhea or yazmin blood in the stool.  Mr. Velarde states that his weight has fluctuated for the last year.  The patient states his ideal weight is about 150 pounds.  He has a good appetite overall.  Mr. Velarde denies any nausea.  There is no history of breakthrough heartburn.  Patient had pneumonia earlier this year.  There is no issue with shortness of breath at this time.  Past Medical History:   Diagnosis Date    Abnormal ECG     Aortic valve insufficiency     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     Benign prostatic hyperplasia     Bilateral bunions     LEFT SIDE ONLY    Bladder cancer 1977    curative exfulguration    Chronic kidney disease 2000    COVID-19 12/25/2024    DJD of left shoulder Adulthood    Severely advanced    Generalized osteoarthritis Adulthood    Moderately advanced disease    Hernia 1998    HTN (hypertension) 1990    Hyperlipidemia Adulthood    Response to atorvastatin    Inflammatory bowel disease 1977    Knee osteoarthritis 2004    Mitral valve insufficiency     PAF (paroxysmal atrial fibrillation) 2008    Mild disease - patient declines anticoagulation    Prediabetes 2015    Hemoglobin A1c 6.2%.    Prostatism 1995    Transverse myelitis 2014    Transient T10 with numbness and minimal ataxia - transient     Ulcerative colitis 1977    Well-controlled on mesalamine     Wears glasses        Past Surgical History:   Procedure Laterality Date    BICEPS TENDON REPAIR Left 05/07/2018    Procedure: BICEPS TENDODESIS;  Surgeon: Abner Humphrey,  MD;  Location:  [facility];  Service: Orthopedics    BLADDER SURGERY      CATARACT EXTRACTION, BILATERAL      COLONOSCOPY  2015    Colitis in remission    CYSTOSCOPY  1977    Excision of bladder cancer    FLEXIBLE SIGMOIDOSCOPY  1977    INGUINAL HERNIA REPAIR Right 1994    KNEE SURGERY Right 1955    Excision of cyst    LIPOMA EXCISION      TONSILLECTOMY  1943    TOTAL SHOULDER ARTHROPLASTY Left 05/07/2018    Procedure: LEFT TOTAL SHOULDER ARTHROPLASTY;  Surgeon: Abner Humphrey MD;  Location:  [facility];  Service: Orthopedics    TOTAL SHOULDER ARTHROPLASTY Right 06/21/2021    Procedure: NAVIGATED REVERSE SHOULDER ARTHROPLASTY RIGHT, BICEPS TENODESIS - RIGHT;  Surgeon: Abner Humphrey MD;  Location:  [facility];  Service: Orthopedics;  Laterality: Right;    UPPER GASTROINTESTINAL ENDOSCOPY  1977         Current Outpatient Medications:     Alpha-Lipoic Acid 600 MG capsule, Take 600 mg by mouth Daily., Disp: , Rfl:     aspirin-acetaminophen-caffeine (EXCEDRIN MIGRAINE) 250-250-65 MG per tablet, Take 1 tablet by mouth Every 6 (Six) Hours As Needed for Headache., Disp: , Rfl:     atorvastatin (LIPITOR) 40 MG tablet, TAKE 1 TABLET BY MOUTH EVERY  NIGHT, Disp: 90 tablet, Rfl: 3    Calcium Carbonate-Vitamin D (CALCIUM 600+D PO), Take 1 tablet by mouth Daily., Disp: , Rfl:     chlorthalidone (HYGROTON) 25 MG tablet, TAKE 1 TABLET BY MOUTH DAILY, Disp: 90 tablet, Rfl: 0    coenzyme Q10 100 MG capsule, Take 1 capsule by mouth Daily., Disp: , Rfl:     diclofenac (VOLTAREN) 75 MG EC tablet, TAKE 1 TABLET BY MOUTH DAILY, Disp: 90 tablet, Rfl: 3    Glucosamine-Chondroit-Vit C-Mn (GLUCOSAMINE 1500 COMPLEX) capsule, Take 1,500 mg by mouth Daily., Disp: , Rfl:     losartan (COZAAR) 50 MG tablet, Take 1.5 tablets by mouth Daily., Disp: 135 tablet, Rfl: 0    Magnesium 250 MG tablet, Take 1 tablet by mouth Daily., Disp: , Rfl:     melatonin 3 MG tablet, Take 1 tablet by mouth Every Night., Disp: , Rfl:     mesalamine (DELZICOL)  400 MG capsule delayed-release delayed release capsule, Take 2 capsules by mouth 2 (Two) Times a Day., Disp: 360 capsule, Rfl: 3    Multiple Vitamins-Minerals (MULTIVITAMIN ADULT PO), Take 1 tablet by mouth Daily., Disp: , Rfl:     Saw Palmetto 160 MG capsule, Take 1 capsule by mouth Daily., Disp: , Rfl:     Selenium 200 MCG capsule, Take 200 mcg by mouth Daily., Disp: , Rfl:     tamsulosin (FLOMAX) 0.4 MG capsule 24 hr capsule, TAKE ONE CAPSULE BY MOUTH EVERY NIGHT AT BEDTIME, Disp: 90 capsule, Rfl: 0    vitamin E 400 UNIT capsule, Take 1 capsule by mouth Daily., Disp: , Rfl:     Zinc 50 MG capsule, Take 50 mg by mouth Daily., Disp: , Rfl:     dilTIAZem CD (CARDIZEM CD) 180 MG 24 hr capsule, Take 1 capsule by mouth Every Night., Disp: 90 capsule, Rfl: 0    Fish Oil-Cholecalciferol (OMEGA-3 + D PO), Take 1 capsule by mouth Daily., Disp: , Rfl:     Allergies   Allergen Reactions    Sulfa Antibiotics Nausea Only and Other (See Comments)     Headache       Family History   Problem Relation Age of Onset    Alzheimer's disease Mother          age 89    Arthritis Mother             Hyperlipidemia Mother     Angina Father     Hypertension Father     Pneumonia Father          age 70 - tobacco    Aneurysm Father         abd aortic    Vision loss Father         AMD    Unexplained death Brother     Breast cancer Maternal Grandmother     Arthritis Maternal Grandmother             Diabetes Paternal Uncle     Diabetes Paternal Uncle        Social History     Socioeconomic History    Marital status:    Tobacco Use    Smoking status: Never     Passive exposure: Past    Smokeless tobacco: Never   Vaping Use    Vaping status: Never Used   Substance and Sexual Activity    Alcohol use: Yes     Alcohol/week: 2.0 standard drinks of alcohol     Types: 2 Cans of beer per week     Comment: OCC    Drug use: Never    Sexual activity: Yes     Partners: Female     Birth control/protection: None, Tubal ligation        Review of Systems   Constitutional:  Negative for activity change, appetite change, fatigue, fever and unexpected weight change (weight loss).   HENT:  Negative for dental problem, hearing loss, mouth sores, postnasal drip, sneezing, trouble swallowing and voice change.    Eyes:  Negative for pain, redness, itching and visual disturbance.   Respiratory:  Negative for cough, choking, chest tightness, shortness of breath and wheezing.    Cardiovascular:  Negative for chest pain, palpitations and leg swelling.   Gastrointestinal:  Negative for abdominal distention, abdominal pain (left side), anal bleeding, blood in stool, constipation, diarrhea, nausea, rectal pain and vomiting.   Endocrine: Negative for cold intolerance, heat intolerance, polydipsia, polyphagia and polyuria.   Genitourinary: Negative.  Negative for dysuria, enuresis, flank pain, hematuria and urgency.   Musculoskeletal:  Negative for arthralgias, back pain, gait problem, joint swelling and myalgias.   Skin:  Negative for color change, pallor and rash.   Allergic/Immunologic: Negative for environmental allergies, food allergies and immunocompromised state.   Neurological:  Negative for dizziness, tremors, seizures, facial asymmetry, speech difficulty, numbness and headaches.   Hematological:  Negative for adenopathy.   Psychiatric/Behavioral:  Negative for behavioral problems, confusion, dysphoric mood, hallucinations and self-injury.        Vitals:    04/14/25 1311   BP: 130/70   Pulse: 77   Temp: 97.4 °F (36.3 °C)   SpO2: 97%       Physical Exam  Vitals reviewed.   Constitutional:       General: He is not in acute distress.     Appearance: Normal appearance.   HENT:      Head: Normocephalic and atraumatic.      Nose: Nose normal.      Mouth/Throat:      Mouth: Mucous membranes are moist.      Pharynx: Oropharynx is clear. No posterior oropharyngeal erythema.   Eyes:      General: No scleral icterus.     Extraocular Movements: Extraocular  movements intact.   Cardiovascular:      Rate and Rhythm: Rhythm irregular.      Heart sounds: No murmur heard.     No gallop.   Pulmonary:      Breath sounds: Normal breath sounds. No wheezing or rales.   Abdominal:      General: Bowel sounds are normal.      Palpations: Abdomen is soft.      Tenderness: There is no abdominal tenderness. There is no guarding.   Musculoskeletal:         General: No swelling or tenderness.      Cervical back: Normal range of motion and neck supple.   Skin:     General: Skin is dry.      Coloration: Skin is not jaundiced.   Neurological:      Mental Status: He is alert and oriented to person, place, and time.   Psychiatric:         Mood and Affect: Mood normal.         Thought Content: Thought content normal.         Judgment: Judgment normal.         Diagnoses and all orders for this visit:    1. Other ulcerative colitis without complication (Primary)    2. Atrial fibrillation, unspecified type    3. Weight loss    The patient had a colonoscopy and there was no evidence of active ulcerative colitis.  The biopsies were unremarkable.  His weight today was 4 pounds less than recorded weight in December 2024.  However, he did have pneumonia earlier in the year and has not followed up with his primary care to date.      Plan: Suggest that Mr. Velarde contact Dr. Ahuja for a follow-up appointment.           Will follow-up in 6 months.

## 2025-04-25 RX ORDER — TAMSULOSIN HYDROCHLORIDE 0.4 MG/1
1 CAPSULE ORAL
Qty: 90 CAPSULE | Refills: 0 | Status: SHIPPED | OUTPATIENT
Start: 2025-04-25

## 2025-04-25 NOTE — TELEPHONE ENCOUNTER
Rx Refill Note  Requested Prescriptions     Pending Prescriptions Disp Refills    tamsulosin (FLOMAX) 0.4 MG capsule 24 hr capsule [Pharmacy Med Name: TAMSULOSIN HCL 0.4 MG CAPSULE] 90 capsule 0     Sig: TAKE ONE CAPSULE BY MOUTH EVERY NIGHT AT BEDTIME      Last office visit with prescribing clinician: 12/30/2024   Last telemedicine visit with prescribing clinician: Visit date not found   Next office visit with prescribing clinician: Visit date not found                         Would you like a call back once the refill request has been completed: [] Yes [] No    If the office needs to give you a call back, can they leave a voicemail: [] Yes [] No    Essie Reyna MA  04/25/25, 16:12 EDT

## 2025-06-20 RX ORDER — DILTIAZEM HYDROCHLORIDE 180 MG/1
CAPSULE, COATED, EXTENDED RELEASE ORAL
Qty: 90 CAPSULE | Refills: 0 | Status: SHIPPED | OUTPATIENT
Start: 2025-06-20

## 2025-06-24 DIAGNOSIS — I10 ESSENTIAL HYPERTENSION: ICD-10-CM

## 2025-06-24 RX ORDER — LOSARTAN POTASSIUM 50 MG/1
75 TABLET ORAL DAILY
Qty: 135 TABLET | Refills: 3 | Status: SHIPPED | OUTPATIENT
Start: 2025-06-24

## 2025-07-14 RX ORDER — CHLORTHALIDONE 25 MG/1
25 TABLET ORAL DAILY
Qty: 90 TABLET | Refills: 0 | Status: SHIPPED | OUTPATIENT
Start: 2025-07-14

## 2025-07-14 NOTE — TELEPHONE ENCOUNTER
Rx Refill Note  Requested Prescriptions     Pending Prescriptions Disp Refills    chlorthalidone (HYGROTON) 25 MG tablet [Pharmacy Med Name: CHLORTHALIDONE 25 MG TABLET] 90 tablet 0     Sig: TAKE 1 TABLET BY MOUTH DAILY      Last office visit with prescribing clinician: 12/30/2024   Last telemedicine visit with prescribing clinician: Visit date not found   Next office visit with prescribing clinician: 7/15/2025                         Would you like a call back once the refill request has been completed: [] Yes [] No    If the office needs to give you a call back, can they leave a voicemail: [] Yes [] No    Essie Reyna MA  07/14/25, 16:24 EDT

## 2025-07-15 ENCOUNTER — OFFICE VISIT (OUTPATIENT)
Dept: FAMILY MEDICINE CLINIC | Facility: CLINIC | Age: 85
End: 2025-07-15
Payer: MEDICARE

## 2025-07-15 VITALS
WEIGHT: 151 LBS | BODY MASS INDEX: 24.27 KG/M2 | HEIGHT: 66 IN | HEART RATE: 67 BPM | OXYGEN SATURATION: 99 % | DIASTOLIC BLOOD PRESSURE: 62 MMHG | SYSTOLIC BLOOD PRESSURE: 130 MMHG

## 2025-07-15 DIAGNOSIS — S39.81XA SPORTS HERNIA, INITIAL ENCOUNTER: ICD-10-CM

## 2025-07-15 DIAGNOSIS — I10 PRIMARY HYPERTENSION: Primary | ICD-10-CM

## 2025-07-15 PROCEDURE — G2211 COMPLEX E/M VISIT ADD ON: HCPCS | Performed by: FAMILY MEDICINE

## 2025-07-15 PROCEDURE — 3078F DIAST BP <80 MM HG: CPT | Performed by: FAMILY MEDICINE

## 2025-07-15 PROCEDURE — 3075F SYST BP GE 130 - 139MM HG: CPT | Performed by: FAMILY MEDICINE

## 2025-07-15 PROCEDURE — 99214 OFFICE O/P EST MOD 30 MIN: CPT | Performed by: FAMILY MEDICINE

## 2025-07-15 PROCEDURE — 1126F AMNT PAIN NOTED NONE PRSNT: CPT | Performed by: FAMILY MEDICINE

## 2025-07-16 RX ORDER — CHLORTHALIDONE 25 MG/1
25 TABLET ORAL DAILY
Qty: 90 TABLET | Refills: 0 | OUTPATIENT
Start: 2025-07-16

## 2025-07-30 NOTE — PROGRESS NOTES
Established Patient Office Visit      Patient Name: Nathan Velarde  : 1940   MRN: [id number]   Care Team: Patient Care Team:  Rell Ahuja DO as PCP - General (Family Medicine)  Ry Feliciano MD as Consulting Physician (Gastroenterology)  Fernando Sanders MD as Consulting Physician (Otolaryngology)  Giovanni Mcdowell MD as Consulting Physician (Cardiology)    Chief Complaint:    Chief Complaint   Patient presents with    Hypertension     7 month follow up and be tested for a hernia       History of Present Illness: Nathan Velarde is a 84 y.o. male who is here today for Hypertension (7 month follow up and be tested for a hernia).    HPI    History of Present Illness  The patient is an 84-year-old male who presents today for a follow-up on blood pressure.    He reports that his blood pressure tends to be low in the morning and increases as the day progresses. He has been using old equipment to check his blood pressure at home, which may not be accurate. He mentions that his systolic blood pressure was 98/30 one morning and increased to 166 by night. His diastolic blood pressure remains relatively stable around 30 to 40. He was informed that his low blood pressure could be due to his aortic valve issue, but his cardiologist has assured him that it is not a cause for concern.    He has been experiencing a slight discomfort in the area of his previous hernia, which was repaired with mesh in . This discomfort is occasionally felt during rest and more so when pulling objects, such as making the bed. However, it does not interfere with his exercise routine. He reports no swelling in the scrotum or any pain or tenderness in the area.    He also mentions that his wife is concerned about his thyroid function. His thyroid lab results have fluctuated, but follow-up tests have shown normal thyroid hormone levels.    He has been making efforts to gain weight by increasing his food intake, including ice  cream. He aims to maintain his weight at 150 pounds. He had orange juice at 7:00 AM and yogurt at 8:00 AM today. He reports not drinking much water during the day but does consume some at night. He often wakes up with a dry mouth, which he believes is due to sleeping with his mouth open.    Social History:  Marital Status:   Diet: He is trying to gain weight by increasing his food intake, including ice cream.  Sleep: He often wakes up with a dry mouth, which he believes is due to sleeping with his mouth open.    PAST SURGICAL HISTORY:  Hernia repair with mesh in 1994       The following portions of the patient's history were reviewed and updated as appropriate: allergies, current medications, past family history, past medical history, past social history, past surgical history and problem list.    Subjective      Review of Systems:   Review of Systems - See HPI    Past Medical History:   Past Medical History:   Diagnosis Date    Abnormal ECG     Aortic valve insufficiency     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     Benign prostatic hyperplasia     Bilateral bunions     LEFT SIDE ONLY    Bladder cancer 1977    curative exfulguration    Chronic kidney disease 2000    COVID-19 12/25/2024    Diverticulosis     DJD of left shoulder Adulthood    Severely advanced    Generalized osteoarthritis Adulthood    Moderately advanced disease    Hernia 1998    HTN (hypertension) 1990    Hyperlipidemia Adulthood    Response to atorvastatin    Inflammatory bowel disease 1977    Knee osteoarthritis 2004    Mitral valve insufficiency     PAF (paroxysmal atrial fibrillation) 2008    Mild disease - patient declines anticoagulation    Prediabetes 2015    Hemoglobin A1c 6.2%.    Prostatism 1995    Renal insufficiency     Transverse myelitis 2014    Transient T10 with numbness and minimal ataxia - transient     Ulcerative colitis 1977    Well-controlled on mesalamine     Wears glasses        Past Surgical History:    Past Surgical History:   Procedure Laterality Date    BICEPS TENDON REPAIR Left 2018    Procedure: BICEPS TENDODESIS;  Surgeon: Abner Humphrey MD;  Location:  [facility];  Service: Orthopedics    BLADDER SURGERY      CATARACT EXTRACTION, BILATERAL      COLONOSCOPY      Colitis in remission    CYSTOSCOPY      Excision of bladder cancer    FLEXIBLE SIGMOIDOSCOPY      INGUINAL HERNIA REPAIR Right     JOINT REPLACEMENT      KNEE SURGERY Right     Excision of cyst    LIPOMA EXCISION      TONSILLECTOMY  1943    TOTAL SHOULDER ARTHROPLASTY Left 2018    Procedure: LEFT TOTAL SHOULDER ARTHROPLASTY;  Surgeon: Abner Humphrey MD;  Location:  [facility];  Service: Orthopedics    TOTAL SHOULDER ARTHROPLASTY Right 2021    Procedure: NAVIGATED REVERSE SHOULDER ARTHROPLASTY RIGHT, BICEPS TENODESIS - RIGHT;  Surgeon: Abner Humphrey MD;  Location:  [facility];  Service: Orthopedics;  Laterality: Right;    UPPER GASTROINTESTINAL ENDOSCOPY         Family History:   Family History   Problem Relation Age of Onset    Alzheimer's disease Mother          age 89    Arthritis Mother             Hyperlipidemia Mother     Angina Father     Hypertension Father     Pneumonia Father          age 70 - tobacco    Aneurysm Father         abd aortic    Vision loss Father         AMD    Unexplained death Brother     Breast cancer Maternal Grandmother     Arthritis Maternal Grandmother             Diabetes Paternal Uncle     Diabetes Paternal Uncle        Social History:   Social History     Socioeconomic History    Marital status:    Tobacco Use    Smoking status: Never     Passive exposure: Past    Smokeless tobacco: Never   Vaping Use    Vaping status: Never Used   Substance and Sexual Activity    Alcohol use: Yes     Alcohol/week: 2.0 standard drinks of alcohol     Types: 2 Cans of beer per week     Comment: OCC    Drug use: Never    Sexual activity: Yes      Partners: Female     Birth control/protection: None, Tubal ligation       Tobacco History:   Social History     Tobacco Use   Smoking Status Never    Passive exposure: Past   Smokeless Tobacco Never       Medications:   Outpatient Medications Prior to Visit   Medication Sig Dispense Refill    Alpha-Lipoic Acid 600 MG capsule Take 600 mg by mouth Daily.      aspirin-acetaminophen-caffeine (EXCEDRIN MIGRAINE) 250-250-65 MG per tablet Take 1 tablet by mouth Every 6 (Six) Hours As Needed for Headache.      atorvastatin (LIPITOR) 40 MG tablet TAKE 1 TABLET BY MOUTH EVERY  NIGHT 90 tablet 3    Calcium Carbonate-Vitamin D (CALCIUM 600+D PO) Take 1 tablet by mouth Daily.      chlorthalidone (HYGROTON) 25 MG tablet TAKE 1 TABLET BY MOUTH DAILY 90 tablet 0    coenzyme Q10 100 MG capsule Take 1 capsule by mouth Daily.      diclofenac (VOLTAREN) 75 MG EC tablet TAKE 1 TABLET BY MOUTH DAILY 90 tablet 3    dilTIAZem CD (CARDIZEM CD) 180 MG 24 hr capsule TAKE 1 CAPSULE BY MOUTH ONCE NIGHTLY 90 capsule 0    Fish Oil-Cholecalciferol (OMEGA-3 + D PO) Take 1 capsule by mouth Daily.      Glucosamine-Chondroit-Vit C-Mn (GLUCOSAMINE 1500 COMPLEX) capsule Take 1,500 mg by mouth Daily.      losartan (COZAAR) 50 MG tablet TAKE 1 AND 1/2 TABLETS BY MOUTH  DAILY 135 tablet 3    Magnesium 250 MG tablet Take 1 tablet by mouth Daily.      melatonin 3 MG tablet Take 1 tablet by mouth Every Night.      mesalamine (DELZICOL) 400 MG capsule delayed-release delayed release capsule Take 2 capsules by mouth 2 (Two) Times a Day. 360 capsule 3    Multiple Vitamins-Minerals (MULTIVITAMIN ADULT PO) Take 1 tablet by mouth Daily.      Saw Palmetto 160 MG capsule Take 1 capsule by mouth Daily.      Selenium 200 MCG capsule Take 200 mcg by mouth Daily.      tamsulosin (FLOMAX) 0.4 MG capsule 24 hr capsule TAKE ONE CAPSULE BY MOUTH EVERY NIGHT AT BEDTIME 90 capsule 0    vitamin E 400 UNIT capsule Take 1 capsule by mouth Daily.      Zinc 50 MG capsule Take 50 mg  "by mouth Daily.       No facility-administered medications prior to visit.        Allergies:   Allergies   Allergen Reactions    Sulfa Antibiotics Nausea Only and Other (See Comments)     Headache       Objective   Objective     Physical Exam:  Vital Signs:   Vitals:    07/15/25 1438   BP: 130/62   Pulse: 67   SpO2: 99%   Weight: 68.5 kg (151 lb)   Height: 167.6 cm (65.98\")     Body mass index is 24.38 kg/m².     Physical Exam  Nursing note reviewed  Const: NAD, A&Ox4, Pleasant, Cooperative  Eyes: EOMI, no conjunctivitis  ENT: No nasal discharge present, neck supple  Cardiac: Regular rate and rhythm, no cyanosis  Resp: Respiratory rate within normal limits, no increased work of breathing, no audible wheezing or retractions noted  GI: No distention or ascites  MSK: Motor and sensation grossly intact in bilateral upper extremities  Neurologic: CN II-XII grossly intact  Psych: Appropriate mood and behavior.  Skin: Warm, dry  Procedures/Radiology     Procedures  No radiology results for the last 7 days     Assessment & Plan   Assessment / Plan      Assessment/Plan:   Problems Addressed This Visit  Diagnoses and all orders for this visit:    1. Primary hypertension (Primary)    2. Sports hernia, initial encounter      Problem List Items Addressed This Visit          Cardiac and Vasculature    Hypertension - Primary    Overview   Diltiazem , losartan 50mg, chlorthalidone 25mg. Amlodipine dc at hospitalization April 2023          Other Visit Diagnoses         Sports hernia, initial encounter                No orders of the defined types were placed in this encounter.       Assessment & Plan  1. Blood pressure management.  - Blood pressure readings have been inconsistent, with systolic values ranging from 98 to 166 and diastolic values around 30 to 40.  - The most recent echocardiogram conducted in 01/2025 showed no abnormalities.  - Current medication regimen is primarily aimed at managing atrial fibrillation rather " than hypertension.  - Advised to monitor blood pressure closely, particularly ensuring that the systolic reading does not fall below 80. Encouraged to increase water intake to approximately 50 ounces daily.    2. Suspected femoral hernia.  - Reported a twinge in the area of a previous hernia repair from 1994.  - Physical examination did not reveal an inguinal hernia but suggested a possible femoral hernia. The defect is small and unlikely to cause significant issues.  - Advised to apply ice, rest, and avoid activities that exacerbate the discomfort.  - Home exercises recommended, and information will be provided in the after-visit summary. If increased swelling or pain occurs, further imaging such as an ultrasound or CT scan may be considered.    3. Thyroid function.  - TSH levels were slightly elevated, but thyroid hormone levels were normal, indicating a false positive.  - No immediate action is required, but monitoring will continue.    4. Health maintenance.  - PSA levels have decreased compared to the previous year, and kidney function remains stable.  - Gained 10 pounds since the last visit, which is a positive development.  - No additional labs are needed at this time. Lab orders will be placed for the next visit in 12/2025.    Follow-up  The patient will follow up in 12/2025 for his annual visit.       There are no Patient Instructions on file for this visit.    Follow Up:   Return in about 5 months (around 12/15/2025) for Medicare Wellness.    DO KRYSTYNA Hillman RD  [facility]  [address]  [facility] [address]  Fax [phone]  Phone [phone]    Patient or patient representative verbalized consent for the use of Ambient Listening during the visit with  Rell Ahuja DO for chart documentation. 7/30/2025 12:32 EDT     Disclaimer to patients: The 21[employer] Century C[employer] Act makes medical notes like these available to  patients in the interest of transparency. However, please be advised that this is still a medical document. It is intended as [id number] communication. Many sections may include medical language or jargon, abbreviations, and additional verbiage that are unfamiliar or confusing. In some ways it may come across as blunt, direct, or may be summarized in order to clearly and concisely communicate the most crucial information to medical professionals. It may also include mentions of conditions that are unlikely but considered as part of the differential diagnosis, including serious disorders. These are not always discussed at length at the time of appointment because their likelihood is so low, but may be included in a medical note to make it clear what has been considered and/or ruled out as part of a work-up. Medical documents are intended to carry relevant information, facts as evident, and the personal clinical opinion of the physician. If you have any questions regarding this medical document, please bring them to the attention of the physician at your next scheduled appointment.

## 2025-08-01 RX ORDER — TAMSULOSIN HYDROCHLORIDE 0.4 MG/1
1 CAPSULE ORAL
Qty: 90 CAPSULE | Refills: 0 | Status: SHIPPED | OUTPATIENT
Start: 2025-08-01

## (undated) DEVICE — SUT MONOCRYL PLS ANTIB UND 3/0  PS1 27IN

## (undated) DEVICE — SST TWIST DRILL, STANDARD, 2.4MM DIA. X 127MM: Brand: MICROAIRE®

## (undated) DEVICE — PK MAJ SHLDR SPLT 10

## (undated) DEVICE — HANDPIECE SET WITH HIGH FLOW TIP AND SUCTION TUBE: Brand: INTERPULSE

## (undated) DEVICE — CANNULA,OXY,ADULT,SUPERSOFT,W/7'TUB,UC: Brand: MEDLINE

## (undated) DEVICE — ELECTRD BLD EZ CLN STD 2.5IN

## (undated) DEVICE — AIRWY 90MM NO9

## (undated) DEVICE — SYR CATH/TIP 50ML 2OZ STRL 1P/U

## (undated) DEVICE — GLV SURG SIGNATURE TOUCH PF LTX 8 STRL BX/50

## (undated) DEVICE — ANTIBACTERIAL UNDYED BRAIDED (POLYGLACTIN 910), SYNTHETIC ABSORBABLE SUTURE: Brand: COATED VICRYL

## (undated) DEVICE — DRAPE,SHOULDER,BEACH CHAIR,STERILE: Brand: MEDLINE

## (undated) DEVICE — COVER,MAYO STAND,STERILE: Brand: MEDLINE

## (undated) DEVICE — GLV SURG SIGNATURE ESSENTIAL PF LTX SZ7.5

## (undated) DEVICE — SYRINGE,PISTON,IRRIGATION,60ML,STERILE: Brand: MEDLINE

## (undated) DEVICE — GLV SURG TRIUMPH CLASSIC PF LTX 7.5 STRL

## (undated) DEVICE — T4 PULLOVER TOGA, X-LARGE

## (undated) DEVICE — ADHS SKIN PREMIERPRO EXOFIN TOPICAL HI/VISC .5ML

## (undated) DEVICE — STRYKER PERFORMANCE SERIES SAGITTAL BLADE: Brand: STRYKER PERFORMANCE SERIES

## (undated) DEVICE — KT SHLDR EXACTECHGPS DISP

## (undated) DEVICE — CVR HNDL LT SURG ACCSSRY BLU STRL

## (undated) DEVICE — MEDI-VAC NON-CONDUCTIVE SUCTION TUBING: Brand: CARDINAL HEALTH

## (undated) DEVICE — SOL LR 1000ML

## (undated) DEVICE — MEDI-VAC YANKAUER SUCTION HANDLE W/BULBOUS TIP: Brand: CARDINAL HEALTH

## (undated) DEVICE — 3 BONE CEMENT MIXER: Brand: MIXEVAC

## (undated) DEVICE — PATIENT RETURN ELECTRODE, SINGLE-USE, CONTACT QUALITY MONITORING, ADULT, WITH 9FT CORD, FOR PATIENTS WEIGING OVER 33LBS. (15KG): Brand: MEGADYNE

## (undated) DEVICE — SOL POVIDONE IODINE 10PCT 3/4OZ STRL

## (undated) DEVICE — BLANKT WARM LOWR/BDY 100X120CM

## (undated) DEVICE — STERILE PVP: Brand: MEDLINE INDUSTRIES, INC.

## (undated) DEVICE — SUT VIC 0 TIES 18IN J912G

## (undated) DEVICE — SLNG ULTRSLING2 11TO13IN MD

## (undated) DEVICE — SYR LUERLOK 30CC

## (undated) DEVICE — PENCL ROCKRSWCH MEGADYNE W/HOLSTR 10FT SS

## (undated) DEVICE — DRSNG SURG AQUACEL AG 9X15CM

## (undated) DEVICE — T4 PULLOVER TOGA, LARGE

## (undated) DEVICE — KT PUMP PAIN ONQ CBLOC SELCTAFLO 400ML

## (undated) DEVICE — GLND KWIRE
Type: IMPLANTABLE DEVICE | Site: SHOULDER | Status: NON-FUNCTIONAL
Brand: EQUINOXE
Removed: 2021-06-21

## (undated) DEVICE — SKIN AFFIX SURG ADHESIVE 72/CS 0.55ML: Brand: MEDLINE

## (undated) DEVICE — DRSNG SURG AQUACEL AG 9X25CM

## (undated) DEVICE — POSTN HD UNIV

## (undated) DEVICE — KT PIN HEX SHLDR CORACOID EXACTECHGPS DISP

## (undated) DEVICE — PUMP PAIN AUTOFUSER AUTO SELCT NOBOLUS 1TO14ML/HR 550ML DISP

## (undated) DEVICE — ELECTRD BLD EZ CLN STD 6.5IN

## (undated) DEVICE — T-MAX DISPOSABLE FACE MASK 8 PER BOX